# Patient Record
Sex: FEMALE | Race: OTHER | HISPANIC OR LATINO
[De-identification: names, ages, dates, MRNs, and addresses within clinical notes are randomized per-mention and may not be internally consistent; named-entity substitution may affect disease eponyms.]

---

## 2017-01-06 ENCOUNTER — APPOINTMENT (OUTPATIENT)
Dept: GASTROENTEROLOGY | Facility: CLINIC | Age: 42
End: 2017-01-06

## 2017-02-15 ENCOUNTER — APPOINTMENT (OUTPATIENT)
Dept: ENDOCRINOLOGY | Facility: CLINIC | Age: 42
End: 2017-02-15

## 2017-02-15 VITALS
DIASTOLIC BLOOD PRESSURE: 85 MMHG | BODY MASS INDEX: 35.54 KG/M2 | SYSTOLIC BLOOD PRESSURE: 126 MMHG | HEART RATE: 92 BPM | HEIGHT: 56 IN | WEIGHT: 158 LBS

## 2017-02-24 ENCOUNTER — OUTPATIENT (OUTPATIENT)
Dept: OUTPATIENT SERVICES | Facility: HOSPITAL | Age: 42
LOS: 1 days | Discharge: HOME | End: 2017-02-24

## 2017-02-24 ENCOUNTER — APPOINTMENT (OUTPATIENT)
Dept: GASTROENTEROLOGY | Facility: CLINIC | Age: 42
End: 2017-02-24

## 2017-03-01 ENCOUNTER — RESULT REVIEW (OUTPATIENT)
Age: 42
End: 2017-03-01

## 2017-03-02 ENCOUNTER — APPOINTMENT (OUTPATIENT)
Dept: PODIATRY | Facility: CLINIC | Age: 42
End: 2017-03-02

## 2017-03-06 LAB
AFP-TM SERPL-MCNC: 1.8 NG/ML
BACTERIA STL CULT: NORMAL
C DIFF DNA SPEC QL NAA+PROBE: NORMAL
CANCER AG19-9 SERPL-ACNC: 6.4 U/ML
CEA SERPL-MCNC: 0.9 NG/ML
HBV CORE AB SER-ACNC: NONREACTIVE
HBV SURFACE AB SERPL IA-ACNC: 208 MIU/ML
HBV SURFACE AG SER-ACNC: NONREACTIVE
HCV AB S/CO SERPL IA: 0.07 S/CO
HCV AB SER QL: NONREACTIVE

## 2017-03-24 ENCOUNTER — APPOINTMENT (OUTPATIENT)
Dept: GASTROENTEROLOGY | Facility: CLINIC | Age: 42
End: 2017-03-24

## 2017-04-18 ENCOUNTER — APPOINTMENT (OUTPATIENT)
Dept: OBGYN | Facility: CLINIC | Age: 42
End: 2017-04-18

## 2017-04-18 VITALS
BODY MASS INDEX: 36.89 KG/M2 | WEIGHT: 164 LBS | SYSTOLIC BLOOD PRESSURE: 106 MMHG | HEART RATE: 80 BPM | HEIGHT: 56 IN | DIASTOLIC BLOOD PRESSURE: 80 MMHG

## 2017-05-02 ENCOUNTER — APPOINTMENT (OUTPATIENT)
Dept: OBGYN | Facility: CLINIC | Age: 42
End: 2017-05-02

## 2017-05-03 ENCOUNTER — APPOINTMENT (OUTPATIENT)
Dept: ENDOCRINOLOGY | Facility: CLINIC | Age: 42
End: 2017-05-03

## 2017-05-05 ENCOUNTER — APPOINTMENT (OUTPATIENT)
Dept: GASTROENTEROLOGY | Facility: CLINIC | Age: 42
End: 2017-05-05

## 2017-05-16 ENCOUNTER — APPOINTMENT (OUTPATIENT)
Dept: OBGYN | Facility: CLINIC | Age: 42
End: 2017-05-16

## 2017-06-16 ENCOUNTER — APPOINTMENT (OUTPATIENT)
Dept: GASTROENTEROLOGY | Facility: CLINIC | Age: 42
End: 2017-06-16

## 2017-06-16 ENCOUNTER — OUTPATIENT (OUTPATIENT)
Dept: OUTPATIENT SERVICES | Facility: HOSPITAL | Age: 42
LOS: 1 days | Discharge: HOME | End: 2017-06-16

## 2017-06-16 DIAGNOSIS — Z98.890 OTHER SPECIFIED POSTPROCEDURAL STATES: ICD-10-CM

## 2017-06-16 DIAGNOSIS — K57.92 DIVERTICULITIS OF INTESTINE, PART UNSPECIFIED, WITHOUT PERFORATION OR ABSCESS WITHOUT BLEEDING: ICD-10-CM

## 2017-06-16 DIAGNOSIS — C56.9 MALIGNANT NEOPLASM OF UNSPECIFIED OVARY: ICD-10-CM

## 2017-06-28 DIAGNOSIS — Z02.9 ENCOUNTER FOR ADMINISTRATIVE EXAMINATIONS, UNSPECIFIED: ICD-10-CM

## 2017-07-19 ENCOUNTER — APPOINTMENT (OUTPATIENT)
Dept: ENDOCRINOLOGY | Facility: CLINIC | Age: 42
End: 2017-07-19

## 2017-07-26 ENCOUNTER — APPOINTMENT (OUTPATIENT)
Dept: OBGYN | Facility: CLINIC | Age: 42
End: 2017-07-26

## 2017-07-26 ENCOUNTER — RESULT CHARGE (OUTPATIENT)
Age: 42
End: 2017-07-26

## 2017-07-26 ENCOUNTER — OUTPATIENT (OUTPATIENT)
Dept: OUTPATIENT SERVICES | Facility: HOSPITAL | Age: 42
LOS: 1 days | Discharge: HOME | End: 2017-07-26

## 2017-07-26 VITALS
BODY MASS INDEX: 37.79 KG/M2 | WEIGHT: 168 LBS | HEIGHT: 56 IN | SYSTOLIC BLOOD PRESSURE: 120 MMHG | DIASTOLIC BLOOD PRESSURE: 70 MMHG

## 2017-07-26 DIAGNOSIS — K57.92 DIVERTICULITIS OF INTESTINE, PART UNSPECIFIED, WITHOUT PERFORATION OR ABSCESS WITHOUT BLEEDING: ICD-10-CM

## 2017-07-26 DIAGNOSIS — C56.9 MALIGNANT NEOPLASM OF UNSPECIFIED OVARY: ICD-10-CM

## 2017-07-26 DIAGNOSIS — Z98.890 OTHER SPECIFIED POSTPROCEDURAL STATES: ICD-10-CM

## 2017-07-26 LAB — HCG UR QL: NEGATIVE

## 2017-07-26 RX ORDER — CIPROFLOXACIN HYDROCHLORIDE 500 MG/1
500 TABLET, FILM COATED ORAL TWICE DAILY
Qty: 14 | Refills: 0 | Status: DISCONTINUED | COMMUNITY
Start: 2017-02-24 | End: 2017-07-26

## 2017-07-26 RX ORDER — METRONIDAZOLE 500 MG/1
500 TABLET ORAL 3 TIMES DAILY
Qty: 21 | Refills: 0 | Status: DISCONTINUED | COMMUNITY
Start: 2017-02-24 | End: 2017-07-26

## 2017-08-24 ENCOUNTER — APPOINTMENT (OUTPATIENT)
Dept: ENDOCRINOLOGY | Facility: CLINIC | Age: 42
End: 2017-08-24

## 2017-09-20 ENCOUNTER — OUTPATIENT (OUTPATIENT)
Dept: OUTPATIENT SERVICES | Facility: HOSPITAL | Age: 42
LOS: 1 days | Discharge: HOME | End: 2017-09-20

## 2017-09-20 ENCOUNTER — APPOINTMENT (OUTPATIENT)
Dept: OBGYN | Facility: CLINIC | Age: 42
End: 2017-09-20

## 2017-09-20 VITALS
SYSTOLIC BLOOD PRESSURE: 130 MMHG | HEIGHT: 56 IN | WEIGHT: 166 LBS | DIASTOLIC BLOOD PRESSURE: 80 MMHG | BODY MASS INDEX: 37.34 KG/M2

## 2017-09-20 DIAGNOSIS — Z98.890 OTHER SPECIFIED POSTPROCEDURAL STATES: ICD-10-CM

## 2017-09-20 DIAGNOSIS — K57.92 DIVERTICULITIS OF INTESTINE, PART UNSPECIFIED, WITHOUT PERFORATION OR ABSCESS WITHOUT BLEEDING: ICD-10-CM

## 2017-09-20 DIAGNOSIS — C56.9 MALIGNANT NEOPLASM OF UNSPECIFIED OVARY: ICD-10-CM

## 2017-09-22 ENCOUNTER — RESULT REVIEW (OUTPATIENT)
Age: 42
End: 2017-09-22

## 2017-09-26 ENCOUNTER — OUTPATIENT (OUTPATIENT)
Dept: OUTPATIENT SERVICES | Facility: HOSPITAL | Age: 42
LOS: 1 days | Discharge: HOME | End: 2017-09-26

## 2017-09-26 DIAGNOSIS — Z98.890 OTHER SPECIFIED POSTPROCEDURAL STATES: ICD-10-CM

## 2017-09-26 DIAGNOSIS — Z12.31 ENCOUNTER FOR SCREENING MAMMOGRAM FOR MALIGNANT NEOPLASM OF BREAST: ICD-10-CM

## 2017-09-26 DIAGNOSIS — K57.92 DIVERTICULITIS OF INTESTINE, PART UNSPECIFIED, WITHOUT PERFORATION OR ABSCESS WITHOUT BLEEDING: ICD-10-CM

## 2017-09-26 DIAGNOSIS — C56.9 MALIGNANT NEOPLASM OF UNSPECIFIED OVARY: ICD-10-CM

## 2017-09-30 LAB — HPV I/H RISK 1 DNA CVX QL PROBE+SIG AMP: NOT DETECTED

## 2017-10-02 DIAGNOSIS — Z01.419 ENCOUNTER FOR GYNECOLOGICAL EXAMINATION (GENERAL) (ROUTINE) WITHOUT ABNORMAL FINDINGS: ICD-10-CM

## 2017-10-05 ENCOUNTER — OUTPATIENT (OUTPATIENT)
Dept: OUTPATIENT SERVICES | Facility: HOSPITAL | Age: 42
LOS: 1 days | Discharge: HOME | End: 2017-10-05

## 2017-10-05 DIAGNOSIS — R92.8 OTHER ABNORMAL AND INCONCLUSIVE FINDINGS ON DIAGNOSTIC IMAGING OF BREAST: ICD-10-CM

## 2017-10-05 DIAGNOSIS — Z98.890 OTHER SPECIFIED POSTPROCEDURAL STATES: ICD-10-CM

## 2017-10-05 DIAGNOSIS — K57.92 DIVERTICULITIS OF INTESTINE, PART UNSPECIFIED, WITHOUT PERFORATION OR ABSCESS WITHOUT BLEEDING: ICD-10-CM

## 2017-10-05 DIAGNOSIS — C56.9 MALIGNANT NEOPLASM OF UNSPECIFIED OVARY: ICD-10-CM

## 2017-10-06 ENCOUNTER — OUTPATIENT (OUTPATIENT)
Dept: OUTPATIENT SERVICES | Facility: HOSPITAL | Age: 42
LOS: 1 days | Discharge: HOME | End: 2017-10-06

## 2017-10-06 ENCOUNTER — APPOINTMENT (OUTPATIENT)
Dept: GASTROENTEROLOGY | Facility: CLINIC | Age: 42
End: 2017-10-06

## 2017-10-06 VITALS — BODY MASS INDEX: 37.57 KG/M2 | WEIGHT: 167 LBS | HEIGHT: 56 IN

## 2017-10-06 DIAGNOSIS — R10.9 UNSPECIFIED ABDOMINAL PAIN: ICD-10-CM

## 2017-10-06 DIAGNOSIS — C56.9 MALIGNANT NEOPLASM OF UNSPECIFIED OVARY: ICD-10-CM

## 2017-10-06 DIAGNOSIS — K57.92 DIVERTICULITIS OF INTESTINE, PART UNSPECIFIED, WITHOUT PERFORATION OR ABSCESS WITHOUT BLEEDING: ICD-10-CM

## 2017-10-06 DIAGNOSIS — K57.32 DIVERTICULITIS OF LARGE INTESTINE WITHOUT PERFORATION OR ABSCESS WITHOUT BLEEDING: ICD-10-CM

## 2017-10-06 DIAGNOSIS — Z98.890 OTHER SPECIFIED POSTPROCEDURAL STATES: ICD-10-CM

## 2017-10-18 ENCOUNTER — APPOINTMENT (OUTPATIENT)
Dept: ENDOCRINOLOGY | Facility: CLINIC | Age: 42
End: 2017-10-18

## 2017-10-18 ENCOUNTER — OUTPATIENT (OUTPATIENT)
Dept: OUTPATIENT SERVICES | Facility: HOSPITAL | Age: 42
LOS: 1 days | Discharge: HOME | End: 2017-10-18

## 2017-10-18 VITALS
HEART RATE: 71 BPM | WEIGHT: 164 LBS | HEIGHT: 56 IN | SYSTOLIC BLOOD PRESSURE: 103 MMHG | DIASTOLIC BLOOD PRESSURE: 68 MMHG | BODY MASS INDEX: 36.89 KG/M2

## 2017-10-18 DIAGNOSIS — K57.92 DIVERTICULITIS OF INTESTINE, PART UNSPECIFIED, WITHOUT PERFORATION OR ABSCESS WITHOUT BLEEDING: ICD-10-CM

## 2017-10-18 DIAGNOSIS — C56.9 MALIGNANT NEOPLASM OF UNSPECIFIED OVARY: ICD-10-CM

## 2017-10-18 DIAGNOSIS — Z98.890 OTHER SPECIFIED POSTPROCEDURAL STATES: ICD-10-CM

## 2017-10-20 LAB
ANION GAP SERPL CALC-SCNC: 9 MEQ/L
BASOPHILS # BLD: 0.02 TH/MM3
BASOPHILS NFR BLD: 0.2 %
BUN SERPL-MCNC: 16 MG/DL
BUN/CREAT SERPL: 28.1 %
CALCIUM SERPL-MCNC: 9.4 MG/DL
CHLORIDE SERPL-SCNC: 108 MEQ/L
CO2 SERPL-SCNC: 23 MEQ/L
CREAT SERPL-MCNC: 0.57 MG/DL
DIFFERENTIAL METHOD BLD: NORMAL
EOSINOPHIL # BLD: 0.12 TH/MM3
EOSINOPHIL NFR BLD: 1.3 %
ERYTHROCYTE [DISTWIDTH] IN BLOOD BY AUTOMATED COUNT: 14.2 %
GFR SERPL CREATININE-BSD FRML MDRD: 117
GLUCOSE SERPL-MCNC: 93 MG/DL
GRANULOCYTES # BLD: 4.89 TH/MM3
GRANULOCYTES NFR BLD: 53.1 %
HCT VFR BLD AUTO: 47.3 %
HGB BLD-MCNC: 14.7 G/DL
IMM GRANULOCYTES # BLD: 0.02 TH/MM3
IMM GRANULOCYTES NFR BLD: 0.2 %
LYMPHOCYTES # BLD: 3.53 TH/MM3
LYMPHOCYTES NFR BLD: 38.3 %
MCH RBC QN AUTO: 28.8 PG
MCHC RBC AUTO-ENTMCNC: 31.1 G/DL
MCV RBC AUTO: 92.6 FL
MONOCYTES # BLD: 0.64 TH/MM3
MONOCYTES NFR BLD: 6.9 %
PLATELET # BLD: 337 TH/MM3
PMV BLD AUTO: 11.2 FL
POTASSIUM SERPL-SCNC: 4.4 MMOL/L
RBC # BLD AUTO: 5.11 MIL/MM3
SODIUM SERPL-SCNC: 140 MEQ/L
WBC # BLD: 9.22 TH/MM3

## 2017-10-23 RX ORDER — EMPAGLIFLOZIN 25 MG/1
25 TABLET, FILM COATED ORAL DAILY
Qty: 30 | Refills: 5 | Status: DISCONTINUED | COMMUNITY
Start: 2017-02-15 | End: 2017-10-23

## 2017-10-31 ENCOUNTER — OUTPATIENT (OUTPATIENT)
Dept: OUTPATIENT SERVICES | Facility: HOSPITAL | Age: 42
LOS: 1 days | Discharge: HOME | End: 2017-10-31

## 2017-10-31 ENCOUNTER — APPOINTMENT (OUTPATIENT)
Dept: OBGYN | Facility: CLINIC | Age: 42
End: 2017-10-31

## 2017-10-31 VITALS — SYSTOLIC BLOOD PRESSURE: 118 MMHG | WEIGHT: 168 LBS | BODY MASS INDEX: 37.67 KG/M2 | DIASTOLIC BLOOD PRESSURE: 80 MMHG

## 2017-10-31 DIAGNOSIS — K57.92 DIVERTICULITIS OF INTESTINE, PART UNSPECIFIED, WITHOUT PERFORATION OR ABSCESS WITHOUT BLEEDING: ICD-10-CM

## 2017-10-31 DIAGNOSIS — Z98.890 OTHER SPECIFIED POSTPROCEDURAL STATES: ICD-10-CM

## 2017-10-31 DIAGNOSIS — C56.9 MALIGNANT NEOPLASM OF UNSPECIFIED OVARY: ICD-10-CM

## 2017-11-20 ENCOUNTER — OUTPATIENT (OUTPATIENT)
Dept: OUTPATIENT SERVICES | Facility: HOSPITAL | Age: 42
LOS: 1 days | Discharge: HOME | End: 2017-11-20

## 2017-11-20 DIAGNOSIS — Z98.890 OTHER SPECIFIED POSTPROCEDURAL STATES: ICD-10-CM

## 2017-11-20 DIAGNOSIS — C56.9 MALIGNANT NEOPLASM OF UNSPECIFIED OVARY: ICD-10-CM

## 2017-11-20 DIAGNOSIS — K57.92 DIVERTICULITIS OF INTESTINE, PART UNSPECIFIED, WITHOUT PERFORATION OR ABSCESS WITHOUT BLEEDING: ICD-10-CM

## 2017-11-22 ENCOUNTER — OUTPATIENT (OUTPATIENT)
Dept: OUTPATIENT SERVICES | Facility: HOSPITAL | Age: 42
LOS: 1 days | Discharge: HOME | End: 2017-11-22

## 2017-11-22 DIAGNOSIS — Z01.818 ENCOUNTER FOR OTHER PREPROCEDURAL EXAMINATION: ICD-10-CM

## 2017-11-22 DIAGNOSIS — Z64.1 PROBLEMS RELATED TO MULTIPARITY: ICD-10-CM

## 2017-11-22 DIAGNOSIS — K57.92 DIVERTICULITIS OF INTESTINE, PART UNSPECIFIED, WITHOUT PERFORATION OR ABSCESS WITHOUT BLEEDING: ICD-10-CM

## 2017-11-22 DIAGNOSIS — Z98.890 OTHER SPECIFIED POSTPROCEDURAL STATES: ICD-10-CM

## 2017-11-22 DIAGNOSIS — C56.9 MALIGNANT NEOPLASM OF UNSPECIFIED OVARY: ICD-10-CM

## 2017-11-24 ENCOUNTER — APPOINTMENT (OUTPATIENT)
Dept: GASTROENTEROLOGY | Facility: CLINIC | Age: 42
End: 2017-11-24

## 2017-11-24 ENCOUNTER — OUTPATIENT (OUTPATIENT)
Dept: OUTPATIENT SERVICES | Facility: HOSPITAL | Age: 42
LOS: 1 days | Discharge: HOME | End: 2017-11-24

## 2017-11-24 VITALS
SYSTOLIC BLOOD PRESSURE: 135 MMHG | WEIGHT: 166 LBS | DIASTOLIC BLOOD PRESSURE: 91 MMHG | HEART RATE: 66 BPM | BODY MASS INDEX: 37.22 KG/M2

## 2017-11-24 DIAGNOSIS — E66.9 OBESITY, UNSPECIFIED: ICD-10-CM

## 2017-11-24 DIAGNOSIS — C56.9 MALIGNANT NEOPLASM OF UNSPECIFIED OVARY: ICD-10-CM

## 2017-11-24 DIAGNOSIS — E11.9 TYPE 2 DIABETES MELLITUS WITHOUT COMPLICATIONS: ICD-10-CM

## 2017-11-24 DIAGNOSIS — E78.00 PURE HYPERCHOLESTEROLEMIA, UNSPECIFIED: ICD-10-CM

## 2017-11-24 DIAGNOSIS — Z98.890 OTHER SPECIFIED POSTPROCEDURAL STATES: ICD-10-CM

## 2017-11-24 DIAGNOSIS — K57.92 DIVERTICULITIS OF INTESTINE, PART UNSPECIFIED, WITHOUT PERFORATION OR ABSCESS WITHOUT BLEEDING: ICD-10-CM

## 2017-11-27 ENCOUNTER — APPOINTMENT (OUTPATIENT)
Dept: ORTHOPEDIC SURGERY | Facility: CLINIC | Age: 42
End: 2017-11-27

## 2017-11-28 ENCOUNTER — OUTPATIENT (OUTPATIENT)
Dept: OUTPATIENT SERVICES | Facility: HOSPITAL | Age: 42
LOS: 1 days | Discharge: HOME | End: 2017-11-28

## 2017-11-28 ENCOUNTER — APPOINTMENT (OUTPATIENT)
Dept: OBGYN | Facility: CLINIC | Age: 42
End: 2017-11-28

## 2017-11-28 VITALS — DIASTOLIC BLOOD PRESSURE: 80 MMHG | WEIGHT: 171 LBS | SYSTOLIC BLOOD PRESSURE: 120 MMHG | BODY MASS INDEX: 38.34 KG/M2

## 2017-11-28 DIAGNOSIS — Z98.890 OTHER SPECIFIED POSTPROCEDURAL STATES: ICD-10-CM

## 2017-11-28 DIAGNOSIS — K57.92 DIVERTICULITIS OF INTESTINE, PART UNSPECIFIED, WITHOUT PERFORATION OR ABSCESS WITHOUT BLEEDING: ICD-10-CM

## 2017-11-28 DIAGNOSIS — C56.9 MALIGNANT NEOPLASM OF UNSPECIFIED OVARY: ICD-10-CM

## 2017-11-30 ENCOUNTER — APPOINTMENT (OUTPATIENT)
Dept: OTOLARYNGOLOGY | Facility: CLINIC | Age: 42
End: 2017-11-30

## 2017-11-30 LAB
CANCER AG125 SERPL-ACNC: 107 U/ML
CANCER AG19-9 SERPL-ACNC: 8.6 U/ML
CEA SERPL-MCNC: 1.6 NG/ML
LACTATE DEHYDROGENASE (NORTH): 241 IU/L

## 2017-12-01 ENCOUNTER — APPOINTMENT (OUTPATIENT)
Dept: ANTEPARTUM | Facility: CLINIC | Age: 42
End: 2017-12-01

## 2017-12-04 DIAGNOSIS — N83.209 UNSPECIFIED OVARIAN CYST, UNSPECIFIED SIDE: ICD-10-CM

## 2017-12-04 DIAGNOSIS — C56.1 MALIGNANT NEOPLASM OF RIGHT OVARY: ICD-10-CM

## 2017-12-04 LAB — INHIBIN B SERPL-MCNC: < 10 PG/ML

## 2017-12-15 ENCOUNTER — OUTPATIENT (OUTPATIENT)
Dept: OUTPATIENT SERVICES | Facility: HOSPITAL | Age: 42
LOS: 1 days | Discharge: HOME | End: 2017-12-15

## 2017-12-15 ENCOUNTER — INPATIENT (INPATIENT)
Facility: HOSPITAL | Age: 42
LOS: 5 days | Discharge: HOME | End: 2017-12-21
Attending: OBSTETRICS & GYNECOLOGY | Admitting: INTERNAL MEDICINE

## 2017-12-15 ENCOUNTER — APPOINTMENT (OUTPATIENT)
Dept: GYNECOLOGIC ONCOLOGY | Facility: CLINIC | Age: 42
End: 2017-12-15

## 2017-12-15 VITALS
BODY MASS INDEX: 37.34 KG/M2 | DIASTOLIC BLOOD PRESSURE: 98 MMHG | SYSTOLIC BLOOD PRESSURE: 142 MMHG | HEIGHT: 56 IN | RESPIRATION RATE: 14 BRPM | WEIGHT: 166 LBS | TEMPERATURE: 97.8 F | HEART RATE: 70 BPM

## 2017-12-15 DIAGNOSIS — Z98.890 OTHER SPECIFIED POSTPROCEDURAL STATES: ICD-10-CM

## 2017-12-15 DIAGNOSIS — K57.92 DIVERTICULITIS OF INTESTINE, PART UNSPECIFIED, WITHOUT PERFORATION OR ABSCESS WITHOUT BLEEDING: ICD-10-CM

## 2017-12-15 DIAGNOSIS — C56.9 MALIGNANT NEOPLASM OF UNSPECIFIED OVARY: ICD-10-CM

## 2017-12-18 DIAGNOSIS — C80.0 DISSEMINATED MALIGNANT NEOPLASM, UNSPECIFIED: ICD-10-CM

## 2017-12-27 DIAGNOSIS — E66.9 OBESITY, UNSPECIFIED: ICD-10-CM

## 2017-12-27 DIAGNOSIS — E78.5 HYPERLIPIDEMIA, UNSPECIFIED: ICD-10-CM

## 2017-12-27 DIAGNOSIS — R16.0 HEPATOMEGALY, NOT ELSEWHERE CLASSIFIED: ICD-10-CM

## 2017-12-27 DIAGNOSIS — R10.9 UNSPECIFIED ABDOMINAL PAIN: ICD-10-CM

## 2017-12-27 DIAGNOSIS — I34.0 NONRHEUMATIC MITRAL (VALVE) INSUFFICIENCY: ICD-10-CM

## 2017-12-27 DIAGNOSIS — K29.70 GASTRITIS, UNSPECIFIED, WITHOUT BLEEDING: ICD-10-CM

## 2017-12-27 DIAGNOSIS — E11.9 TYPE 2 DIABETES MELLITUS WITHOUT COMPLICATIONS: ICD-10-CM

## 2017-12-27 DIAGNOSIS — Z85.43 PERSONAL HISTORY OF MALIGNANT NEOPLASM OF OVARY: ICD-10-CM

## 2017-12-27 DIAGNOSIS — K57.90 DIVERTICULOSIS OF INTESTINE, PART UNSPECIFIED, WITHOUT PERFORATION OR ABSCESS WITHOUT BLEEDING: ICD-10-CM

## 2017-12-27 DIAGNOSIS — D39.11 NEOPLASM OF UNCERTAIN BEHAVIOR OF RIGHT OVARY: ICD-10-CM

## 2017-12-27 DIAGNOSIS — F17.210 NICOTINE DEPENDENCE, CIGARETTES, UNCOMPLICATED: ICD-10-CM

## 2017-12-28 ENCOUNTER — OUTPATIENT (OUTPATIENT)
Dept: OUTPATIENT SERVICES | Facility: HOSPITAL | Age: 42
LOS: 1 days | Discharge: HOME | End: 2017-12-28

## 2017-12-28 ENCOUNTER — APPOINTMENT (OUTPATIENT)
Dept: ENDOCRINOLOGY | Facility: CLINIC | Age: 42
End: 2017-12-28

## 2017-12-28 VITALS
HEIGHT: 56 IN | DIASTOLIC BLOOD PRESSURE: 78 MMHG | SYSTOLIC BLOOD PRESSURE: 118 MMHG | HEART RATE: 76 BPM | BODY MASS INDEX: 37.77 KG/M2 | WEIGHT: 167.9 LBS

## 2017-12-28 DIAGNOSIS — Z98.890 OTHER SPECIFIED POSTPROCEDURAL STATES: ICD-10-CM

## 2017-12-28 DIAGNOSIS — C56.9 MALIGNANT NEOPLASM OF UNSPECIFIED OVARY: ICD-10-CM

## 2017-12-28 DIAGNOSIS — K57.92 DIVERTICULITIS OF INTESTINE, PART UNSPECIFIED, WITHOUT PERFORATION OR ABSCESS WITHOUT BLEEDING: ICD-10-CM

## 2017-12-28 RX ORDER — FLUCONAZOLE 150 MG/1
150 TABLET ORAL DAILY
Qty: 1 | Refills: 0 | Status: COMPLETED | COMMUNITY
Start: 2017-09-28 | End: 2017-12-28

## 2017-12-28 RX ORDER — NORGESTIMATE AND ETHINYL ESTRADIOL 7DAYSX3 LO
0.18/0.215/0.25 KIT ORAL DAILY
Qty: 1 | Refills: 0 | Status: DISCONTINUED | COMMUNITY
Start: 2017-04-18 | End: 2017-12-28

## 2018-01-03 DIAGNOSIS — C56.1 MALIGNANT NEOPLASM OF RIGHT OVARY: ICD-10-CM

## 2018-01-03 DIAGNOSIS — I45.10 UNSPECIFIED RIGHT BUNDLE-BRANCH BLOCK: ICD-10-CM

## 2018-01-05 ENCOUNTER — OUTPATIENT (OUTPATIENT)
Dept: OUTPATIENT SERVICES | Facility: HOSPITAL | Age: 43
LOS: 1 days | Discharge: HOME | End: 2018-01-05

## 2018-01-05 ENCOUNTER — APPOINTMENT (OUTPATIENT)
Dept: GYNECOLOGIC ONCOLOGY | Facility: CLINIC | Age: 43
End: 2018-01-05

## 2018-01-05 VITALS
RESPIRATION RATE: 14 BRPM | HEART RATE: 78 BPM | SYSTOLIC BLOOD PRESSURE: 114 MMHG | HEIGHT: 56 IN | WEIGHT: 166 LBS | TEMPERATURE: 96.7 F | BODY MASS INDEX: 37.34 KG/M2 | DIASTOLIC BLOOD PRESSURE: 80 MMHG

## 2018-01-08 DIAGNOSIS — Z48.816 ENCOUNTER FOR SURGICAL AFTERCARE FOLLOWING SURGERY ON THE GENITOURINARY SYSTEM: ICD-10-CM

## 2018-01-10 DIAGNOSIS — E11.65 TYPE 2 DIABETES MELLITUS WITH HYPERGLYCEMIA: ICD-10-CM

## 2018-01-10 DIAGNOSIS — E66.01 MORBID (SEVERE) OBESITY DUE TO EXCESS CALORIES: ICD-10-CM

## 2018-01-29 ENCOUNTER — APPOINTMENT (OUTPATIENT)
Dept: HEMATOLOGY ONCOLOGY | Facility: CLINIC | Age: 43
End: 2018-01-29

## 2018-01-29 VITALS
HEART RATE: 94 BPM | WEIGHT: 172 LBS | BODY MASS INDEX: 38.69 KG/M2 | DIASTOLIC BLOOD PRESSURE: 81 MMHG | RESPIRATION RATE: 14 BRPM | SYSTOLIC BLOOD PRESSURE: 117 MMHG | HEIGHT: 56 IN | TEMPERATURE: 96.2 F

## 2018-01-30 DIAGNOSIS — Z98.890 OTHER SPECIFIED POSTPROCEDURAL STATES: ICD-10-CM

## 2018-01-30 DIAGNOSIS — K57.92 DIVERTICULITIS OF INTESTINE, PART UNSPECIFIED, WITHOUT PERFORATION OR ABSCESS WITHOUT BLEEDING: ICD-10-CM

## 2018-01-30 DIAGNOSIS — C56.9 MALIGNANT NEOPLASM OF UNSPECIFIED OVARY: ICD-10-CM

## 2018-01-31 LAB
ALBUMIN SERPL-MCNC: 4.2 G/DL
ALBUMIN/GLOB SERPL: 1.4
ALP SERPL-CCNC: 87 IU/L
ALT SERPL-CCNC: 45 IU/L
ANION GAP SERPL CALC-SCNC: 9 MEQ/L
AST SERPL-CCNC: 29 IU/L
BASOPHILS # BLD: 0.02 TH/MM3
BASOPHILS NFR BLD: 0.3 %
BILIRUB SERPL-MCNC: 0.2 MG/DL
BUN SERPL-MCNC: 14 MG/DL
BUN/CREAT SERPL: 26.9 %
CALCIUM SERPL-MCNC: 9.7 MG/DL
CANCER AG125 SERPL-ACNC: 34 U/ML
CHLORIDE SERPL-SCNC: 111 MEQ/L
CO2 SERPL-SCNC: 23 MEQ/L
CREAT SERPL-MCNC: 0.52 MG/DL
EOSINOPHIL # BLD: 0.27 TH/MM3
EOSINOPHIL NFR BLD: 3.4 %
ERYTHROCYTE [DISTWIDTH] IN BLOOD BY AUTOMATED COUNT: 13.6 %
GFR SERPL CREATININE-BSD FRML MDRD: 129
GLUCOSE SERPL-MCNC: 94 MG/DL
GRANULOCYTES # BLD: 4.03 TH/MM3
GRANULOCYTES NFR BLD: 50.5 %
HCT VFR BLD AUTO: 39.3 %
HGB BLD-MCNC: 12.6 G/DL
IMM GRANULOCYTES # BLD: 0.02 TH/MM3
IMM GRANULOCYTES NFR BLD: 0.3 %
LYMPHOCYTES # BLD: 3.18 TH/MM3
LYMPHOCYTES NFR BLD: 39.9 %
MCH RBC QN AUTO: 28.8 PG
MCHC RBC AUTO-ENTMCNC: 32.1 G/DL
MCV RBC AUTO: 89.9 FL
MONOCYTES # BLD: 0.45 TH/MM3
MONOCYTES NFR BLD: 5.6 %
PLATELET # BLD: 298 TH/MM3
PMV BLD AUTO: 10.2 FL
POTASSIUM SERPL-SCNC: 4.4 MMOL/L
PROT SERPL-MCNC: 7.2 G/DL
RBC # BLD AUTO: 4.37 MIL/MM3
SODIUM SERPL-SCNC: 143 MEQ/L
WBC # BLD: 7.97 TH/MM3

## 2018-02-02 ENCOUNTER — OUTPATIENT (OUTPATIENT)
Dept: OUTPATIENT SERVICES | Facility: HOSPITAL | Age: 43
LOS: 1 days | Discharge: HOME | End: 2018-02-02

## 2018-02-02 ENCOUNTER — APPOINTMENT (OUTPATIENT)
Dept: GYNECOLOGIC ONCOLOGY | Facility: CLINIC | Age: 43
End: 2018-02-02

## 2018-02-02 VITALS
RESPIRATION RATE: 14 BRPM | TEMPERATURE: 97.6 F | DIASTOLIC BLOOD PRESSURE: 80 MMHG | HEART RATE: 80 BPM | SYSTOLIC BLOOD PRESSURE: 108 MMHG | BODY MASS INDEX: 38.24 KG/M2 | WEIGHT: 170 LBS | HEIGHT: 56 IN

## 2018-02-04 DIAGNOSIS — K57.92 DIVERTICULITIS OF INTESTINE, PART UNSPECIFIED, WITHOUT PERFORATION OR ABSCESS WITHOUT BLEEDING: ICD-10-CM

## 2018-02-04 DIAGNOSIS — C56.9 MALIGNANT NEOPLASM OF UNSPECIFIED OVARY: ICD-10-CM

## 2018-02-04 DIAGNOSIS — Z98.890 OTHER SPECIFIED POSTPROCEDURAL STATES: ICD-10-CM

## 2018-02-05 ENCOUNTER — OUTPATIENT (OUTPATIENT)
Dept: OUTPATIENT SERVICES | Facility: HOSPITAL | Age: 43
LOS: 1 days | Discharge: HOME | End: 2018-02-05

## 2018-02-05 DIAGNOSIS — C56.9 MALIGNANT NEOPLASM OF UNSPECIFIED OVARY: ICD-10-CM

## 2018-02-05 DIAGNOSIS — N89.8 OTHER SPECIFIED NONINFLAMMATORY DISORDERS OF VAGINA: ICD-10-CM

## 2018-02-09 LAB
Lab: NEGATIVE
Lab: NORMAL

## 2018-02-12 ENCOUNTER — APPOINTMENT (OUTPATIENT)
Dept: HEMATOLOGY ONCOLOGY | Facility: CLINIC | Age: 43
End: 2018-02-12

## 2018-02-12 VITALS
DIASTOLIC BLOOD PRESSURE: 86 MMHG | BODY MASS INDEX: 38.24 KG/M2 | HEIGHT: 56 IN | HEART RATE: 87 BPM | WEIGHT: 170 LBS | SYSTOLIC BLOOD PRESSURE: 131 MMHG | TEMPERATURE: 96.8 F | RESPIRATION RATE: 14 BRPM

## 2018-02-20 DIAGNOSIS — R19.7 DIARRHEA, UNSPECIFIED: ICD-10-CM

## 2018-02-23 ENCOUNTER — OUTPATIENT (OUTPATIENT)
Dept: OUTPATIENT SERVICES | Facility: HOSPITAL | Age: 43
LOS: 1 days | Discharge: HOME | End: 2018-02-23

## 2018-02-23 DIAGNOSIS — C56.9 MALIGNANT NEOPLASM OF UNSPECIFIED OVARY: ICD-10-CM

## 2018-03-02 ENCOUNTER — OUTPATIENT (OUTPATIENT)
Dept: OUTPATIENT SERVICES | Facility: HOSPITAL | Age: 43
LOS: 1 days | Discharge: HOME | End: 2018-03-02

## 2018-03-02 VITALS
HEART RATE: 82 BPM | OXYGEN SATURATION: 98 % | WEIGHT: 169.09 LBS | HEIGHT: 56 IN | RESPIRATION RATE: 20 BRPM | SYSTOLIC BLOOD PRESSURE: 130 MMHG | DIASTOLIC BLOOD PRESSURE: 80 MMHG | TEMPERATURE: 98 F

## 2018-03-02 DIAGNOSIS — Z01.818 ENCOUNTER FOR OTHER PREPROCEDURAL EXAMINATION: ICD-10-CM

## 2018-03-02 DIAGNOSIS — Z98.890 OTHER SPECIFIED POSTPROCEDURAL STATES: Chronic | ICD-10-CM

## 2018-03-02 LAB
ALBUMIN SERPL ELPH-MCNC: 4.8 G/DL — SIGNIFICANT CHANGE UP (ref 3–5.5)
ALP SERPL-CCNC: 95 U/L — SIGNIFICANT CHANGE UP (ref 30–115)
ALT FLD-CCNC: 57 U/L — HIGH (ref 0–41)
ANION GAP SERPL CALC-SCNC: 10 MMOL/L — SIGNIFICANT CHANGE UP (ref 7–14)
APTT BLD: 32.1 SEC — SIGNIFICANT CHANGE UP (ref 27–39.2)
AST SERPL-CCNC: 34 U/L — SIGNIFICANT CHANGE UP (ref 0–41)
BILIRUB SERPL-MCNC: 0.4 MG/DL — SIGNIFICANT CHANGE UP (ref 0.2–1.2)
BUN SERPL-MCNC: 10 MG/DL — SIGNIFICANT CHANGE UP (ref 10–20)
CALCIUM SERPL-MCNC: 10.4 MG/DL — HIGH (ref 8.5–10.1)
CHLORIDE SERPL-SCNC: 105 MMOL/L — SIGNIFICANT CHANGE UP (ref 98–110)
CO2 SERPL-SCNC: 27 MMOL/L — SIGNIFICANT CHANGE UP (ref 17–32)
CREAT SERPL-MCNC: 0.6 MG/DL — LOW (ref 0.7–1.5)
GLUCOSE SERPL-MCNC: 88 MG/DL — SIGNIFICANT CHANGE UP (ref 70–110)
HCT VFR BLD CALC: 43.8 % — SIGNIFICANT CHANGE UP (ref 37–47)
HGB BLD-MCNC: 14.3 G/DL — SIGNIFICANT CHANGE UP (ref 12–16)
INR BLD: 1.11 RATIO — SIGNIFICANT CHANGE UP (ref 0.65–1.3)
MCHC RBC-ENTMCNC: 28.8 PG — SIGNIFICANT CHANGE UP (ref 27–31)
MCHC RBC-ENTMCNC: 32.6 G/DL — SIGNIFICANT CHANGE UP (ref 32–37)
MCV RBC AUTO: 88.1 FL — SIGNIFICANT CHANGE UP (ref 81–99)
NRBC # BLD: 0 /100 WBCS — SIGNIFICANT CHANGE UP (ref 0–0)
PLATELET # BLD AUTO: 413 K/UL — HIGH (ref 130–400)
POTASSIUM SERPL-MCNC: 4.4 MMOL/L — SIGNIFICANT CHANGE UP (ref 3.5–5)
POTASSIUM SERPL-SCNC: 4.4 MMOL/L — SIGNIFICANT CHANGE UP (ref 3.5–5)
PROT SERPL-MCNC: 8.4 G/DL — HIGH (ref 6–8)
PROTHROM AB SERPL-ACNC: 12 SEC — SIGNIFICANT CHANGE UP (ref 9.95–12.87)
RBC # BLD: 4.97 M/UL — SIGNIFICANT CHANGE UP (ref 4.2–5.4)
RBC # FLD: 12.9 % — SIGNIFICANT CHANGE UP (ref 11.5–14.5)
SODIUM SERPL-SCNC: 142 MMOL/L — SIGNIFICANT CHANGE UP (ref 135–146)
WBC # BLD: 9.19 K/UL — SIGNIFICANT CHANGE UP (ref 4.8–10.8)
WBC # FLD AUTO: 9.19 K/UL — SIGNIFICANT CHANGE UP (ref 4.8–10.8)

## 2018-03-02 NOTE — H&P PST ADULT - FAMILY HISTORY
Father  Still living? Unknown  Diabetes, Age at diagnosis: Age Unknown     Mother  Still living? Yes, Estimated age: 61-70  Diabetes, Age at diagnosis: Age Unknown  Hypercholesteremia, Age at diagnosis: Age Unknown  HTN (hypertension), Age at diagnosis: Age Unknown

## 2018-03-02 NOTE — H&P PST ADULT - PMH
Diabetes    Liver disorder  LIVER LESIONS APPEARED ON CT-SCAN  Ovarian cancer  UNSURE OF STAGE HOWEVER REPORTS IT "MINOR"

## 2018-03-02 NOTE — H&P PST ADULT - HISTORY OF PRESENT ILLNESS
41 Y/O FEMALE WITH H/O WILBERT IN DEC 2017. PT REPORTS SHE HAD A CANCEROUS MASS REMOVED &  NOW REQUIRES LIVER BIOPSY SECONDARY TO LESIONS THAT APPEARED ON CT-SCAN. SCHEDULED FOR LIVER BX. ONCE THE PATHOLOGY REPORTS ARE COMPLETED PT STATES SHE WILL BEGIN CHEMO THERAPY  REPORTS NO C/O CP,SOB,PALPITATIONS,COUGH OR DYSURIA  1-2 FOS WITHOUT SOB

## 2018-03-05 ENCOUNTER — APPOINTMENT (OUTPATIENT)
Dept: HEMATOLOGY ONCOLOGY | Facility: CLINIC | Age: 43
End: 2018-03-05

## 2018-03-05 VITALS
DIASTOLIC BLOOD PRESSURE: 81 MMHG | WEIGHT: 174 LBS | TEMPERATURE: 95.6 F | SYSTOLIC BLOOD PRESSURE: 127 MMHG | HEART RATE: 79 BPM | HEIGHT: 56 IN | BODY MASS INDEX: 39.14 KG/M2

## 2018-03-08 ENCOUNTER — RESULT REVIEW (OUTPATIENT)
Age: 43
End: 2018-03-08

## 2018-03-08 ENCOUNTER — OUTPATIENT (OUTPATIENT)
Dept: OUTPATIENT SERVICES | Facility: HOSPITAL | Age: 43
LOS: 1 days | Discharge: HOME | End: 2018-03-08

## 2018-03-08 DIAGNOSIS — R16.0 HEPATOMEGALY, NOT ELSEWHERE CLASSIFIED: ICD-10-CM

## 2018-03-08 DIAGNOSIS — Z98.890 OTHER SPECIFIED POSTPROCEDURAL STATES: Chronic | ICD-10-CM

## 2018-03-08 RX ORDER — SODIUM CHLORIDE 9 MG/ML
500 INJECTION INTRAMUSCULAR; INTRAVENOUS; SUBCUTANEOUS ONCE
Qty: 0 | Refills: 0 | Status: DISCONTINUED | OUTPATIENT
Start: 2018-03-08 | End: 2018-03-23

## 2018-03-08 NOTE — PROGRESS NOTE ADULT - SUBJECTIVE AND OBJECTIVE BOX
Interventional Radiology Follow- Up Note      42y Female s/p CT guided liver biopsy. Case went without issue; patient was in IR recovery about to be discharged and patient became faint and says she felt chest pain.     No complaints offered.    Vitals:  BP, HR, Resp within normal limits on bedside monitor    PHYSICAL EXAM:  General: sitting up in bed, non-toxic appearing  Abd - no significant drainage from biopsy site. No peritonitis No pain with palpation  Chest - pain increased with palpation of upper chest  Resp - non labored breathing      Impression: 42y Female s/p CT guided liver biopsy with chest discomfort after procedurre    Plan:    Pain/discomfort likely positional. Will obtain ECG and order 500 mL normal saline bolus. Patient can likely be discharged today.     Please call Interventional Radiology [phone] with any questions, concerns, or issues regarding above.

## 2018-03-08 NOTE — PROGRESS NOTE ADULT - SUBJECTIVE AND OBJECTIVE BOX
Interventional Radiology Outpatient Documentation    PREOPERATIVE DAY OF PROCEDURE EVALUATION:     I have personally seen and examined this patient. I agree with the history and physical which I have reviewed and noted any changes below:     Plan is for Liver mass biopsy    Procedure/ risks/ benefits/ goals/ alternatives were explained. All questions answered. Informed content obtained from patient. Consent placed in chart.     POSTOPERATIVE PROCEDURAL EVALUATION:     Procedure: CT guided liver mass biopsy    Pre-Op Diagnosis: Liver mass    Post-Op Diagnosis: Liver mass    Attending: Sarah  Assistant: None    Anesthesia (type):  [ ] General Anesthesia  [x ] Sedation  [ ] Spinal Anesthesia  [x ] Local/Regional    Contrast: 70 cc optiray    Estimated Blood Loss: < 10 cc    Condition:   [ ] Critical  [ ] Serious  [x ] Fair   [ ] Good    Findings/Follow up Plan of Care: See full report in pacs    Specimens Removed: 4 core samples    Implants: none    Complications: small perihepatic hematoma    Disposition: Recovery

## 2018-03-09 ENCOUNTER — OUTPATIENT (OUTPATIENT)
Dept: OUTPATIENT SERVICES | Facility: HOSPITAL | Age: 43
LOS: 1 days | Discharge: HOME | End: 2018-03-09

## 2018-03-09 ENCOUNTER — APPOINTMENT (OUTPATIENT)
Dept: PODIATRY | Facility: CLINIC | Age: 43
End: 2018-03-09
Payer: MEDICAID

## 2018-03-09 VITALS
BODY MASS INDEX: 39.14 KG/M2 | WEIGHT: 174 LBS | HEART RATE: 93 BPM | RESPIRATION RATE: 16 BRPM | DIASTOLIC BLOOD PRESSURE: 55 MMHG | HEIGHT: 56 IN | SYSTOLIC BLOOD PRESSURE: 108 MMHG

## 2018-03-09 DIAGNOSIS — Z98.890 OTHER SPECIFIED POSTPROCEDURAL STATES: Chronic | ICD-10-CM

## 2018-03-09 PROCEDURE — 99202 OFFICE O/P NEW SF 15 MIN: CPT

## 2018-03-12 LAB — NON-GYNECOLOGICAL CYTOLOGY STUDY: SIGNIFICANT CHANGE UP

## 2018-03-13 LAB — NON-GYNECOLOGICAL CYTOLOGY STUDY: SIGNIFICANT CHANGE UP

## 2018-03-19 DIAGNOSIS — K76.0 FATTY (CHANGE OF) LIVER, NOT ELSEWHERE CLASSIFIED: ICD-10-CM

## 2018-03-19 DIAGNOSIS — Z85.43 PERSONAL HISTORY OF MALIGNANT NEOPLASM OF OVARY: ICD-10-CM

## 2018-03-19 DIAGNOSIS — K76.9 LIVER DISEASE, UNSPECIFIED: ICD-10-CM

## 2018-03-26 ENCOUNTER — OUTPATIENT (OUTPATIENT)
Dept: OUTPATIENT SERVICES | Facility: HOSPITAL | Age: 43
LOS: 1 days | Discharge: HOME | End: 2018-03-26

## 2018-03-26 ENCOUNTER — APPOINTMENT (OUTPATIENT)
Dept: HEMATOLOGY ONCOLOGY | Facility: CLINIC | Age: 43
End: 2018-03-26

## 2018-03-26 VITALS
HEART RATE: 83 BPM | HEIGHT: 56 IN | SYSTOLIC BLOOD PRESSURE: 133 MMHG | BODY MASS INDEX: 39.37 KG/M2 | WEIGHT: 175 LBS | RESPIRATION RATE: 16 BRPM | DIASTOLIC BLOOD PRESSURE: 83 MMHG | TEMPERATURE: 96.6 F

## 2018-03-26 DIAGNOSIS — Z98.890 OTHER SPECIFIED POSTPROCEDURAL STATES: Chronic | ICD-10-CM

## 2018-03-26 DIAGNOSIS — N64.89 OTHER SPECIFIED DISORDERS OF BREAST: ICD-10-CM

## 2018-03-26 DIAGNOSIS — C56.9 MALIGNANT NEOPLASM OF UNSPECIFIED OVARY: ICD-10-CM

## 2018-03-27 DIAGNOSIS — Z15.89 GENETIC SUSCEPTIBILITY TO OTHER DISEASE: ICD-10-CM

## 2018-04-06 ENCOUNTER — APPOINTMENT (OUTPATIENT)
Dept: PODIATRY | Facility: CLINIC | Age: 43
End: 2018-04-06

## 2018-04-13 ENCOUNTER — APPOINTMENT (OUTPATIENT)
Dept: GYNECOLOGIC ONCOLOGY | Facility: CLINIC | Age: 43
End: 2018-04-13

## 2018-04-16 ENCOUNTER — EMERGENCY (EMERGENCY)
Facility: HOSPITAL | Age: 43
LOS: 0 days | Discharge: HOME | End: 2018-04-16
Attending: EMERGENCY MEDICINE | Admitting: INTERNAL MEDICINE

## 2018-04-16 VITALS
RESPIRATION RATE: 20 BRPM | DIASTOLIC BLOOD PRESSURE: 72 MMHG | OXYGEN SATURATION: 99 % | HEART RATE: 89 BPM | TEMPERATURE: 97 F | SYSTOLIC BLOOD PRESSURE: 129 MMHG

## 2018-04-16 VITALS
OXYGEN SATURATION: 99 % | DIASTOLIC BLOOD PRESSURE: 75 MMHG | TEMPERATURE: 97 F | SYSTOLIC BLOOD PRESSURE: 130 MMHG | RESPIRATION RATE: 20 BRPM | HEART RATE: 85 BPM

## 2018-04-16 DIAGNOSIS — R10.84 GENERALIZED ABDOMINAL PAIN: ICD-10-CM

## 2018-04-16 DIAGNOSIS — R19.7 DIARRHEA, UNSPECIFIED: ICD-10-CM

## 2018-04-16 DIAGNOSIS — R50.9 FEVER, UNSPECIFIED: ICD-10-CM

## 2018-04-16 DIAGNOSIS — Z90.49 ACQUIRED ABSENCE OF OTHER SPECIFIED PARTS OF DIGESTIVE TRACT: ICD-10-CM

## 2018-04-16 DIAGNOSIS — Z98.890 OTHER SPECIFIED POSTPROCEDURAL STATES: Chronic | ICD-10-CM

## 2018-04-16 DIAGNOSIS — Z90.710 ACQUIRED ABSENCE OF BOTH CERVIX AND UTERUS: ICD-10-CM

## 2018-04-16 DIAGNOSIS — R11.2 NAUSEA WITH VOMITING, UNSPECIFIED: ICD-10-CM

## 2018-04-16 DIAGNOSIS — R06.02 SHORTNESS OF BREATH: ICD-10-CM

## 2018-04-16 DIAGNOSIS — M79.1 MYALGIA: ICD-10-CM

## 2018-04-16 DIAGNOSIS — E78.00 PURE HYPERCHOLESTEROLEMIA, UNSPECIFIED: ICD-10-CM

## 2018-04-16 DIAGNOSIS — I10 ESSENTIAL (PRIMARY) HYPERTENSION: ICD-10-CM

## 2018-04-16 DIAGNOSIS — Z90.721 ACQUIRED ABSENCE OF OVARIES, UNILATERAL: ICD-10-CM

## 2018-04-16 DIAGNOSIS — E11.9 TYPE 2 DIABETES MELLITUS WITHOUT COMPLICATIONS: ICD-10-CM

## 2018-04-16 LAB
ALBUMIN SERPL ELPH-MCNC: 4.7 G/DL — SIGNIFICANT CHANGE UP (ref 3.5–5.2)
ALP SERPL-CCNC: 110 U/L — SIGNIFICANT CHANGE UP (ref 30–115)
ALT FLD-CCNC: 56 U/L — HIGH (ref 0–41)
ANION GAP SERPL CALC-SCNC: 16 MMOL/L — HIGH (ref 7–14)
APPEARANCE UR: (no result)
APTT BLD: 30 SEC — SIGNIFICANT CHANGE UP (ref 27–39.2)
AST SERPL-CCNC: 34 U/L — SIGNIFICANT CHANGE UP (ref 0–41)
BASOPHILS # BLD AUTO: 0.01 K/UL — SIGNIFICANT CHANGE UP (ref 0–0.2)
BASOPHILS NFR BLD AUTO: 0.1 % — SIGNIFICANT CHANGE UP (ref 0–1)
BILIRUB SERPL-MCNC: 0.3 MG/DL — SIGNIFICANT CHANGE UP (ref 0.2–1.2)
BILIRUB UR-MCNC: NEGATIVE — SIGNIFICANT CHANGE UP
BUN SERPL-MCNC: 15 MG/DL — SIGNIFICANT CHANGE UP (ref 10–20)
CALCIUM SERPL-MCNC: 9.4 MG/DL — SIGNIFICANT CHANGE UP (ref 8.5–10.1)
CHLORIDE SERPL-SCNC: 101 MMOL/L — SIGNIFICANT CHANGE UP (ref 98–110)
CO2 SERPL-SCNC: 22 MMOL/L — SIGNIFICANT CHANGE UP (ref 17–32)
COLOR SPEC: YELLOW — SIGNIFICANT CHANGE UP
CREAT SERPL-MCNC: 0.6 MG/DL — LOW (ref 0.7–1.5)
DIFF PNL FLD: NEGATIVE — SIGNIFICANT CHANGE UP
EOSINOPHIL # BLD AUTO: 0.06 K/UL — SIGNIFICANT CHANGE UP (ref 0–0.7)
EOSINOPHIL NFR BLD AUTO: 0.9 % — SIGNIFICANT CHANGE UP (ref 0–8)
GLUCOSE SERPL-MCNC: 96 MG/DL — SIGNIFICANT CHANGE UP (ref 70–99)
GLUCOSE UR QL: NEGATIVE MG/DL — SIGNIFICANT CHANGE UP
HCT VFR BLD CALC: 42.9 % — SIGNIFICANT CHANGE UP (ref 37–47)
HGB BLD-MCNC: 14.1 G/DL — SIGNIFICANT CHANGE UP (ref 12–16)
IMM GRANULOCYTES NFR BLD AUTO: 0.3 % — SIGNIFICANT CHANGE UP (ref 0.1–0.3)
INR BLD: 1.05 RATIO — SIGNIFICANT CHANGE UP (ref 0.65–1.3)
KETONES UR-MCNC: NEGATIVE — SIGNIFICANT CHANGE UP
LACTATE SERPL-SCNC: 1.5 MMOL/L — SIGNIFICANT CHANGE UP (ref 0.5–2.2)
LEUKOCYTE ESTERASE UR-ACNC: NEGATIVE — SIGNIFICANT CHANGE UP
LIDOCAIN IGE QN: 27 U/L — SIGNIFICANT CHANGE UP (ref 7–60)
LYMPHOCYTES # BLD AUTO: 0.89 K/UL — LOW (ref 1.2–3.4)
LYMPHOCYTES # BLD AUTO: 12.8 % — LOW (ref 20.5–51.1)
MCHC RBC-ENTMCNC: 28.5 PG — SIGNIFICANT CHANGE UP (ref 27–31)
MCHC RBC-ENTMCNC: 32.9 G/DL — SIGNIFICANT CHANGE UP (ref 32–37)
MCV RBC AUTO: 86.7 FL — SIGNIFICANT CHANGE UP (ref 81–99)
MONOCYTES # BLD AUTO: 0.26 K/UL — SIGNIFICANT CHANGE UP (ref 0.1–0.6)
MONOCYTES NFR BLD AUTO: 3.7 % — SIGNIFICANT CHANGE UP (ref 1.7–9.3)
NEUTROPHILS # BLD AUTO: 5.73 K/UL — SIGNIFICANT CHANGE UP (ref 1.4–6.5)
NEUTROPHILS NFR BLD AUTO: 82.2 % — HIGH (ref 42.2–75.2)
NITRITE UR-MCNC: NEGATIVE — SIGNIFICANT CHANGE UP
PH UR: 6 — SIGNIFICANT CHANGE UP (ref 5–8)
PLATELET # BLD AUTO: 264 K/UL — SIGNIFICANT CHANGE UP (ref 130–400)
POTASSIUM SERPL-MCNC: 4.5 MMOL/L — SIGNIFICANT CHANGE UP (ref 3.5–5)
POTASSIUM SERPL-SCNC: 4.5 MMOL/L — SIGNIFICANT CHANGE UP (ref 3.5–5)
PROT SERPL-MCNC: 7.5 G/DL — SIGNIFICANT CHANGE UP (ref 6–8)
PROT UR-MCNC: NEGATIVE MG/DL — SIGNIFICANT CHANGE UP
PROTHROM AB SERPL-ACNC: 11.4 SEC — SIGNIFICANT CHANGE UP (ref 9.95–12.87)
RBC # BLD: 4.95 M/UL — SIGNIFICANT CHANGE UP (ref 4.2–5.4)
RBC # FLD: 13.3 % — SIGNIFICANT CHANGE UP (ref 11.5–14.5)
SODIUM SERPL-SCNC: 139 MMOL/L — SIGNIFICANT CHANGE UP (ref 135–146)
SP GR SPEC: 1.01 — SIGNIFICANT CHANGE UP (ref 1.01–1.03)
UROBILINOGEN FLD QL: 0.2 MG/DL — SIGNIFICANT CHANGE UP (ref 0.2–0.2)
WBC # BLD: 6.97 K/UL — SIGNIFICANT CHANGE UP (ref 4.8–10.8)
WBC # FLD AUTO: 6.97 K/UL — SIGNIFICANT CHANGE UP (ref 4.8–10.8)

## 2018-04-16 RX ORDER — ACETAMINOPHEN 500 MG
650 TABLET ORAL ONCE
Qty: 0 | Refills: 0 | Status: COMPLETED | OUTPATIENT
Start: 2018-04-16 | End: 2018-04-16

## 2018-04-16 RX ORDER — SODIUM CHLORIDE 9 MG/ML
2000 INJECTION INTRAMUSCULAR; INTRAVENOUS; SUBCUTANEOUS ONCE
Qty: 0 | Refills: 0 | Status: COMPLETED | OUTPATIENT
Start: 2018-04-16 | End: 2018-04-16

## 2018-04-16 RX ORDER — ONDANSETRON 8 MG/1
4 TABLET, FILM COATED ORAL ONCE
Qty: 0 | Refills: 0 | Status: COMPLETED | OUTPATIENT
Start: 2018-04-16 | End: 2018-04-16

## 2018-04-16 RX ORDER — MORPHINE SULFATE 50 MG/1
6 CAPSULE, EXTENDED RELEASE ORAL ONCE
Qty: 0 | Refills: 0 | Status: DISCONTINUED | OUTPATIENT
Start: 2018-04-16 | End: 2018-04-16

## 2018-04-16 RX ORDER — FAMOTIDINE 10 MG/ML
20 INJECTION INTRAVENOUS ONCE
Qty: 0 | Refills: 0 | Status: COMPLETED | OUTPATIENT
Start: 2018-04-16 | End: 2018-04-16

## 2018-04-16 RX ADMIN — ONDANSETRON 4 MILLIGRAM(S): 8 TABLET, FILM COATED ORAL at 10:49

## 2018-04-16 RX ADMIN — MORPHINE SULFATE 6 MILLIGRAM(S): 50 CAPSULE, EXTENDED RELEASE ORAL at 10:49

## 2018-04-16 RX ADMIN — SODIUM CHLORIDE 4000 MILLILITER(S): 9 INJECTION INTRAMUSCULAR; INTRAVENOUS; SUBCUTANEOUS at 10:49

## 2018-04-16 RX ADMIN — MORPHINE SULFATE 6 MILLIGRAM(S): 50 CAPSULE, EXTENDED RELEASE ORAL at 13:01

## 2018-04-16 RX ADMIN — FAMOTIDINE 20 MILLIGRAM(S): 10 INJECTION INTRAVENOUS at 13:18

## 2018-04-16 RX ADMIN — Medication 650 MILLIGRAM(S): at 10:49

## 2018-04-16 RX ADMIN — Medication 650 MILLIGRAM(S): at 13:01

## 2018-04-16 NOTE — ED PROVIDER NOTE - NS ED ROS FT
Constitutional: chills, malaise, no fever, no loss of appetitie   Eyes: no redness/discharge/pain/vision changes  ENT: no rhinorrhea/ear pain/sore throat  Cardiac: No chest pain, edema. SOB for the past day since symptoms started.  Respiratory: No cough or respiratory distress  GI:  Nausea, vomiting, diarrhea, abdominal pain. As per HPI  : No dysuria, frequency, urgency or hematuria  MS: pain is generalized in back and extremities as per HPI, weakness. no loss of ROM   Neuro: No headache. No LOC.   Skin: No skin rash.  Endocrine: As per HPI, PMHx.  Except as documented in the HPI, all other systems are negative.

## 2018-04-16 NOTE — ED PROVIDER NOTE - PHYSICAL EXAMINATION
AOx4, Appears uncomfortable, Non toxic appearing, NAD. Skin  warm and moist, no acute rash. PERRLA/EOM, conjunctiva and sclera clear. MM moist, no nasal discharge.  Pharynx and TM's unremarkable. Neck supple nt, no meningeal signs. Heart RRR s1s2 nl, no rub/murmur. Lungs- BS equal, CTAB. Abdomen soft, tender to palipitation in all quadrants, no r/g.  Bowel sounds presents.  Extremities- moves all, +equal distal pulses, sensation wnl, normal ROM. No LE edema, calves nttp b/l.

## 2018-04-16 NOTE — ED PROVIDER NOTE - PROGRESS NOTE DETAILS
ATTENDING NOTE:   41 y/o F ovarian CA, s/p WILBERT and right ovary removal in December 2017, found to have fatty liver currently undergoing workup for this as outpatient including recent biopsy, presents to ED for evaluation of abdominal pain, nausea and vomiting that began last night. States pain is diffuse but worse to epigastrium. Reports few episodes of NB/NB emesis and about 6 episode of NB diarrhea since yesterday. (+)Myalgias. No fever/chills, CP, SOB. (+)Sick contacts at home with similar.     Oral temp 99.1. On exam: Pt is non-toxic, WA. Pink conjunctiva, anicteric. MMM, no exudates. Neck with no crepitus. Regular rate, no murmurs. Lungs CTAB. Abdomen is soft, (+)diffuse TTP. No peritoneal signs. (+)BS.  Will get labs, imaging and reassess.

## 2018-04-16 NOTE — ED PROVIDER NOTE - PSH
H/O abdominal hysterectomy  With Right Oophrecetomy   In December 2017  History of cholecystectomy  10-15 years ago

## 2018-04-16 NOTE — ED PROVIDER NOTE - OBJECTIVE STATEMENT
42 y F with PMHx of  DM and malignant neoplasm of R ovary for which she received WILBERT w/ R Oophorectomy (Dec 2017) presents to the ED with pain "all over in her abdomen, in her arms and legs".  Patient reports x6 BM last night approx 6:30PM, and vomitting x6 this morning at approx 6AM.  Patient reports nausea, fever, chills, generalized weakness, SOB. No LOC.  No blood in vomitus or BM.  Patient reports that her neice as well as her boyfriend had a viral illness last week and indicates her boyfriend has had similar symptoms as her. 42y female with PMHx of controlled DM and malignant neoplasm of R ovary for which she had a WILBERT w/ R oophorectomy (Dec 2017) presents to the ED for nausea, vomiting and generalized pain in her abdomen and extremities. Patient has had x6 watery bowel movements since approx 6:30PM and vomiting x6 since 6am.  No hemetesis, hematochezia. Patient reports SOB, fever, chills, and nausea.  As per patient there are no alleviating or exacerbating factors, Pain scale over general body is a 10. Patient did not get a flu shot this year, and indicates that both her niece and her boyfriend had viral illnesses last week.  Boyfriend presented with similar symptoms of vomiting and diarrhea last week and is currently still ill (boyfriend has not seen a doctor).

## 2018-04-16 NOTE — ED ADULT NURSE NOTE - OBJECTIVE STATEMENT
Pt presents to ED with epigastric and RUQ pain, with vomiting and diarrhea that began last night. Denies fever. Pain is sharp and severe.

## 2018-04-16 NOTE — ED PROVIDER NOTE - PMH
Diabetes  Family Doctor: Dr Reeves  Liver disorder  LIVER LESIONS APPEARED ON CT-SCAN  (Within the past few months)  Ovarian cancer  UNSURE OF STAGE HOWEVER REPORTS IT "MINOR"  Sees Oncologist Dr. Vanessa Correa

## 2018-04-27 ENCOUNTER — OUTPATIENT (OUTPATIENT)
Dept: OUTPATIENT SERVICES | Facility: HOSPITAL | Age: 43
LOS: 1 days | Discharge: HOME | End: 2018-04-27

## 2018-04-27 ENCOUNTER — APPOINTMENT (OUTPATIENT)
Dept: GYNECOLOGIC ONCOLOGY | Facility: CLINIC | Age: 43
End: 2018-04-27

## 2018-04-27 VITALS
BODY MASS INDEX: 38.69 KG/M2 | WEIGHT: 172 LBS | HEART RATE: 88 BPM | HEIGHT: 56 IN | TEMPERATURE: 97 F | SYSTOLIC BLOOD PRESSURE: 109 MMHG | RESPIRATION RATE: 16 BRPM | DIASTOLIC BLOOD PRESSURE: 82 MMHG

## 2018-04-27 DIAGNOSIS — Z98.890 OTHER SPECIFIED POSTPROCEDURAL STATES: Chronic | ICD-10-CM

## 2018-04-28 LAB — CANCER AG125 SERPL-ACNC: 23 U/ML

## 2018-05-01 DIAGNOSIS — D39.11 NEOPLASM OF UNCERTAIN BEHAVIOR OF RIGHT OVARY: ICD-10-CM

## 2018-06-04 ENCOUNTER — FORM ENCOUNTER (OUTPATIENT)
Age: 43
End: 2018-06-04

## 2018-06-04 ENCOUNTER — APPOINTMENT (OUTPATIENT)
Dept: PODIATRY | Facility: CLINIC | Age: 43
End: 2018-06-04

## 2018-06-05 ENCOUNTER — OUTPATIENT (OUTPATIENT)
Dept: OUTPATIENT SERVICES | Facility: HOSPITAL | Age: 43
LOS: 1 days | Discharge: HOME | End: 2018-06-05

## 2018-06-05 DIAGNOSIS — Z98.890 OTHER SPECIFIED POSTPROCEDURAL STATES: Chronic | ICD-10-CM

## 2018-06-05 DIAGNOSIS — R92.8 OTHER ABNORMAL AND INCONCLUSIVE FINDINGS ON DIAGNOSTIC IMAGING OF BREAST: ICD-10-CM

## 2018-06-25 ENCOUNTER — OUTPATIENT (OUTPATIENT)
Dept: OUTPATIENT SERVICES | Facility: HOSPITAL | Age: 43
LOS: 1 days | Discharge: HOME | End: 2018-06-25

## 2018-06-25 DIAGNOSIS — Z98.890 OTHER SPECIFIED POSTPROCEDURAL STATES: Chronic | ICD-10-CM

## 2018-06-25 DIAGNOSIS — Z98.818 OTHER DENTAL PROCEDURE STATUS: ICD-10-CM

## 2018-07-05 ENCOUNTER — LABORATORY RESULT (OUTPATIENT)
Age: 43
End: 2018-07-05

## 2018-07-05 ENCOUNTER — APPOINTMENT (OUTPATIENT)
Dept: HEMATOLOGY ONCOLOGY | Facility: CLINIC | Age: 43
End: 2018-07-05

## 2018-07-05 VITALS
DIASTOLIC BLOOD PRESSURE: 81 MMHG | HEIGHT: 56 IN | RESPIRATION RATE: 14 BRPM | BODY MASS INDEX: 39.14 KG/M2 | TEMPERATURE: 96.9 F | HEART RATE: 67 BPM | SYSTOLIC BLOOD PRESSURE: 142 MMHG | WEIGHT: 174 LBS

## 2018-07-09 LAB
ALBUMIN SERPL ELPH-MCNC: 4.8 G/DL
ALP BLD-CCNC: 139 U/L
ALT SERPL-CCNC: 68 U/L
ANION GAP SERPL CALC-SCNC: 17 MMOL/L
AST SERPL-CCNC: 39 U/L
BILIRUB SERPL-MCNC: <0.2 MG/DL
BUN SERPL-MCNC: 22 MG/DL
CALCIUM SERPL-MCNC: 9.9 MG/DL
CANCER AG125 SERPL-ACNC: 18 U/ML
CHLORIDE SERPL-SCNC: 102 MMOL/L
CO2 SERPL-SCNC: 23 MMOL/L
CREAT SERPL-MCNC: 0.6 MG/DL
GLUCOSE SERPL-MCNC: 75 MG/DL
HCT VFR BLD CALC: 40.7 %
HGB BLD-MCNC: 13.4 G/DL
MCHC RBC-ENTMCNC: 28.9 PG
MCHC RBC-ENTMCNC: 32.9 G/DL
MCV RBC AUTO: 87.9 FL
PLATELET # BLD AUTO: 305 K/UL
PMV BLD: 10.7 FL
POTASSIUM SERPL-SCNC: 4.6 MMOL/L
PROT SERPL-MCNC: 7.6 G/DL
RBC # BLD: 4.63 M/UL
RBC # FLD: 14.4 %
SODIUM SERPL-SCNC: 142 MMOL/L
WBC # FLD AUTO: 7.85 K/UL

## 2018-07-18 ENCOUNTER — OUTPATIENT (OUTPATIENT)
Dept: OUTPATIENT SERVICES | Facility: HOSPITAL | Age: 43
LOS: 1 days | Discharge: HOME | End: 2018-07-18

## 2018-07-18 ENCOUNTER — APPOINTMENT (OUTPATIENT)
Dept: ENDOCRINOLOGY | Facility: CLINIC | Age: 43
End: 2018-07-18

## 2018-07-18 VITALS
SYSTOLIC BLOOD PRESSURE: 112 MMHG | BODY MASS INDEX: 39.37 KG/M2 | HEIGHT: 56 IN | WEIGHT: 175 LBS | DIASTOLIC BLOOD PRESSURE: 80 MMHG | HEART RATE: 78 BPM

## 2018-07-18 DIAGNOSIS — Z98.890 OTHER SPECIFIED POSTPROCEDURAL STATES: Chronic | ICD-10-CM

## 2018-07-18 PROBLEM — C56.9 MALIGNANT NEOPLASM OF UNSPECIFIED OVARY: Chronic | Status: ACTIVE | Noted: 2018-03-02

## 2018-07-24 ENCOUNTER — APPOINTMENT (OUTPATIENT)
Dept: PODIATRY | Facility: CLINIC | Age: 43
End: 2018-07-24

## 2018-08-10 ENCOUNTER — APPOINTMENT (OUTPATIENT)
Dept: GASTROENTEROLOGY | Facility: CLINIC | Age: 43
End: 2018-08-10

## 2018-08-15 ENCOUNTER — OUTPATIENT (OUTPATIENT)
Dept: OUTPATIENT SERVICES | Facility: HOSPITAL | Age: 43
LOS: 1 days | Discharge: HOME | End: 2018-08-15

## 2018-08-15 ENCOUNTER — APPOINTMENT (OUTPATIENT)
Dept: INTERNAL MEDICINE | Facility: CLINIC | Age: 43
End: 2018-08-15

## 2018-08-15 VITALS
DIASTOLIC BLOOD PRESSURE: 87 MMHG | HEIGHT: 57 IN | HEART RATE: 82 BPM | TEMPERATURE: 98 F | WEIGHT: 173 LBS | SYSTOLIC BLOOD PRESSURE: 138 MMHG | BODY MASS INDEX: 37.32 KG/M2

## 2018-08-15 DIAGNOSIS — Z98.890 OTHER SPECIFIED POSTPROCEDURAL STATES: Chronic | ICD-10-CM

## 2018-08-15 DIAGNOSIS — Z80.49 FAMILY HISTORY OF MALIGNANT NEOPLASM OF OTHER GENITAL ORGANS: ICD-10-CM

## 2018-08-15 RX ORDER — DULAGLUTIDE 0.75 MG/.5ML
0.75 INJECTION, SOLUTION SUBCUTANEOUS
Qty: 4 | Refills: 5 | Status: DISCONTINUED | COMMUNITY
Start: 2017-10-23 | End: 2018-08-15

## 2018-08-15 RX ORDER — DULAGLUTIDE 1.5 MG/.5ML
1.5 INJECTION, SOLUTION SUBCUTANEOUS WEEKLY
Qty: 1 | Refills: 5 | Status: DISCONTINUED | COMMUNITY
Start: 2017-12-28 | End: 2018-08-15

## 2018-08-15 NOTE — HISTORY OF PRESENT ILLNESS
[Other: _____] : [unfilled] [FreeTextEntry1] : 43 yo F comes to establish primary care.  [de-identified] : 41 yo F with PMH DM, ovarian cancer s/p left oophorectomy in 2006 and RT oophorectomy and hysterotomy in 2017 comes to establish primary care. pt complain of burning epigastric pain of few weeks that increase with spicy food, denies nausea, vomiting or abnormal bowel movements. pt also complain of depressed mood with occasional suicidal ideation.

## 2018-08-15 NOTE — PLAN
[FreeTextEntry1] : \par \par 41 yo F with PMH DM, ovarian cancer s/p left oophorectomy in 2006 and RT oophorectomy and hysterotomy in 2017 comes to establish primary care. pt complain of burning epigastric pain of few weeks that increase with spicy food, denies nausea, vomiting or abnormal bowel movements. pt also complain of depressed mood with occasional suicidal ideation.  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

## 2018-08-15 NOTE — REVIEW OF SYSTEMS
[Hot Flashes] : hot flashes [Abdominal Pain] : abdominal pain [Depression] : depression [Fever] : no fever [Chills] : no chills [Fatigue] : no fatigue [Discharge] : no discharge [Vision Problems] : no vision problems [Earache] : no earache [Nasal Discharge] : no nasal discharge [Chest Pain] : no chest pain [Palpitations] : no palpitations [Leg Claudication] : no leg claudication [Lower Ext Edema] : no lower extremity edema [Shortness Of Breath] : no shortness of breath [Cough] : no cough [Dyspnea on Exertion] : no dyspnea on exertion [Nausea] : no nausea [Constipation] : no constipation [Diarrhea] : diarrhea [Vomiting] : no vomiting [Dysuria] : no dysuria [Hematuria] : no hematuria [Joint Pain] : no joint pain [Muscle Weakness] : no muscle weakness [Muscle Pain] : no muscle pain [Itching] : no itching [Skin Rash] : no skin rash [Headache] : no headache [FreeTextEntry7] : epigastric pain

## 2018-08-15 NOTE — PHYSICAL EXAM
[No Acute Distress] : no acute distress [Normal Sclera/Conjunctiva] : normal sclera/conjunctiva [Normal Outer Ear/Nose] : the outer ears and nose were normal in appearance [No JVD] : no jugular venous distention [No Respiratory Distress] : no respiratory distress  [Clear to Auscultation] : lungs were clear to auscultation bilaterally [Normal Rate] : normal rate  [Regular Rhythm] : with a regular rhythm [Normal S1, S2] : normal S1 and S2 [No Murmur] : no murmur heard [Pedal Pulses Present] : the pedal pulses are present [No Edema] : there was no peripheral edema [Soft] : abdomen soft [Non-distended] : non-distended [Grossly Normal Strength/Tone] : grossly normal strength/tone [No Rash] : no rash [Normal Gait] : normal gait [No Focal Deficits] : no focal deficits [de-identified] : mild epigastric tenderness  [de-identified] : complain of depressed mood

## 2018-08-21 ENCOUNTER — APPOINTMENT (OUTPATIENT)
Dept: NEUROLOGY | Facility: CLINIC | Age: 43
End: 2018-08-21

## 2018-08-21 ENCOUNTER — OUTPATIENT (OUTPATIENT)
Dept: OUTPATIENT SERVICES | Facility: HOSPITAL | Age: 43
LOS: 1 days | Discharge: HOME | End: 2018-08-21

## 2018-08-21 ENCOUNTER — APPOINTMENT (OUTPATIENT)
Dept: OBGYN | Facility: CLINIC | Age: 43
End: 2018-08-21

## 2018-08-21 VITALS
BODY MASS INDEX: 37.11 KG/M2 | SYSTOLIC BLOOD PRESSURE: 135 MMHG | DIASTOLIC BLOOD PRESSURE: 90 MMHG | HEIGHT: 57 IN | HEART RATE: 80 BPM | WEIGHT: 172 LBS

## 2018-08-21 DIAGNOSIS — R10.13 EPIGASTRIC PAIN: ICD-10-CM

## 2018-08-21 DIAGNOSIS — E66.01 MORBID (SEVERE) OBESITY DUE TO EXCESS CALORIES: ICD-10-CM

## 2018-08-21 DIAGNOSIS — Z23 ENCOUNTER FOR IMMUNIZATION: ICD-10-CM

## 2018-08-21 DIAGNOSIS — Z15.09 GENETIC SUSCEPTIBILITY TO OTHER MALIGNANT NEOPLASM: ICD-10-CM

## 2018-08-21 DIAGNOSIS — Z98.890 OTHER SPECIFIED POSTPROCEDURAL STATES: Chronic | ICD-10-CM

## 2018-08-21 DIAGNOSIS — D39.11 NEOPLASM OF UNCERTAIN BEHAVIOR OF RIGHT OVARY: ICD-10-CM

## 2018-08-21 DIAGNOSIS — E11.65 TYPE 2 DIABETES MELLITUS WITH HYPERGLYCEMIA: ICD-10-CM

## 2018-08-25 ENCOUNTER — LABORATORY RESULT (OUTPATIENT)
Age: 43
End: 2018-08-25

## 2018-08-27 LAB
CHOLEST SERPL-MCNC: 251 MG/DL
CHOLEST/HDLC SERPL: 6.6 RATIO
CREAT SPEC-SCNC: 161 MG/DL
CREAT SPEC-SCNC: 178 MG/DL
CREAT/PROT UR: 0.1 RATIO
HDLC SERPL-MCNC: 38 MG/DL
LDLC SERPL CALC-MCNC: 188 MG/DL
MICROALBUMIN 24H UR DL<=1MG/L-MCNC: <1.2 MG/DL
MICROALBUMIN/CREAT 24H UR-RTO: NORMAL
PROT UR-MCNC: 21 MG/DLG/24H
TRIGL SERPL-MCNC: 220 MG/DL

## 2018-08-29 ENCOUNTER — LABORATORY RESULT (OUTPATIENT)
Age: 43
End: 2018-08-29

## 2018-08-29 ENCOUNTER — OUTPATIENT (OUTPATIENT)
Dept: OUTPATIENT SERVICES | Facility: HOSPITAL | Age: 43
LOS: 1 days | Discharge: HOME | End: 2018-08-29

## 2018-08-29 DIAGNOSIS — Z98.890 OTHER SPECIFIED POSTPROCEDURAL STATES: Chronic | ICD-10-CM

## 2018-08-29 DIAGNOSIS — R25.1 TREMOR, UNSPECIFIED: ICD-10-CM

## 2018-08-29 DIAGNOSIS — R56.9 UNSPECIFIED CONVULSIONS: ICD-10-CM

## 2018-08-30 ENCOUNTER — APPOINTMENT (OUTPATIENT)
Dept: HEMATOLOGY ONCOLOGY | Facility: CLINIC | Age: 43
End: 2018-08-30

## 2018-08-30 RX ORDER — BLOOD SUGAR DIAGNOSTIC
STRIP MISCELLANEOUS TWICE DAILY
Qty: 50 | Refills: 5 | Status: DISCONTINUED | COMMUNITY
Start: 2017-12-28 | End: 2018-08-30

## 2018-09-01 LAB
ALBUMIN SERPL ELPH-MCNC: 4.5 G/DL
ALP BLD-CCNC: 131 U/L
ALT SERPL-CCNC: 38 U/L
AST SERPL-CCNC: 25 U/L
BILIRUB DIRECT SERPL-MCNC: <0.2 MG/DL
BILIRUB INDIRECT SERPL-MCNC: NORMAL MG/DL
BILIRUB SERPL-MCNC: <0.2 MG/DL
PROT SERPL-MCNC: 7.5 G/DL

## 2018-09-15 LAB — CK SERPL-CCNC: 207 U/L

## 2018-09-17 ENCOUNTER — OUTPATIENT (OUTPATIENT)
Dept: OUTPATIENT SERVICES | Facility: HOSPITAL | Age: 43
LOS: 1 days | Discharge: HOME | End: 2018-09-17

## 2018-09-17 ENCOUNTER — EMERGENCY (EMERGENCY)
Facility: HOSPITAL | Age: 43
LOS: 0 days | Discharge: HOME | End: 2018-09-17
Attending: EMERGENCY MEDICINE | Admitting: EMERGENCY MEDICINE

## 2018-09-17 VITALS
RESPIRATION RATE: 16 BRPM | HEART RATE: 86 BPM | TEMPERATURE: 98 F | DIASTOLIC BLOOD PRESSURE: 85 MMHG | OXYGEN SATURATION: 99 % | SYSTOLIC BLOOD PRESSURE: 130 MMHG

## 2018-09-17 VITALS
SYSTOLIC BLOOD PRESSURE: 132 MMHG | DIASTOLIC BLOOD PRESSURE: 86 MMHG | RESPIRATION RATE: 20 BRPM | TEMPERATURE: 98 F | HEART RATE: 80 BPM | OXYGEN SATURATION: 99 %

## 2018-09-17 DIAGNOSIS — R51 HEADACHE: ICD-10-CM

## 2018-09-17 DIAGNOSIS — Z98.890 OTHER SPECIFIED POSTPROCEDURAL STATES: Chronic | ICD-10-CM

## 2018-09-17 DIAGNOSIS — E11.9 TYPE 2 DIABETES MELLITUS WITHOUT COMPLICATIONS: ICD-10-CM

## 2018-09-17 DIAGNOSIS — Z90.49 ACQUIRED ABSENCE OF OTHER SPECIFIED PARTS OF DIGESTIVE TRACT: ICD-10-CM

## 2018-09-17 DIAGNOSIS — R06.02 SHORTNESS OF BREATH: ICD-10-CM

## 2018-09-17 DIAGNOSIS — R25.1 TREMOR, UNSPECIFIED: ICD-10-CM

## 2018-09-17 DIAGNOSIS — Z90.710 ACQUIRED ABSENCE OF BOTH CERVIX AND UTERUS: ICD-10-CM

## 2018-09-17 DIAGNOSIS — Z79.899 OTHER LONG TERM (CURRENT) DRUG THERAPY: ICD-10-CM

## 2018-09-17 LAB — GLUCOSE BLDC GLUCOMTR-MCNC: 127 MG/DL — HIGH (ref 70–99)

## 2018-09-17 RX ORDER — FAMOTIDINE 10 MG/ML
20 INJECTION INTRAVENOUS ONCE
Qty: 0 | Refills: 0 | Status: COMPLETED | OUTPATIENT
Start: 2018-09-17 | End: 2018-09-17

## 2018-09-17 RX ORDER — KETOROLAC TROMETHAMINE 30 MG/ML
30 SYRINGE (ML) INJECTION ONCE
Qty: 0 | Refills: 0 | Status: DISCONTINUED | OUTPATIENT
Start: 2018-09-17 | End: 2018-09-17

## 2018-09-17 RX ORDER — IPRATROPIUM BROMIDE 0.2 MG/ML
500 SOLUTION, NON-ORAL INHALATION ONCE
Qty: 0 | Refills: 0 | Status: COMPLETED | OUTPATIENT
Start: 2018-09-17 | End: 2018-09-17

## 2018-09-17 RX ORDER — METOCLOPRAMIDE HCL 10 MG
10 TABLET ORAL ONCE
Qty: 0 | Refills: 0 | Status: COMPLETED | OUTPATIENT
Start: 2018-09-17 | End: 2018-09-17

## 2018-09-17 RX ORDER — ALBUTEROL 90 UG/1
2.5 AEROSOL, METERED ORAL ONCE
Qty: 0 | Refills: 0 | Status: COMPLETED | OUTPATIENT
Start: 2018-09-17 | End: 2018-09-17

## 2018-09-17 RX ORDER — SODIUM CHLORIDE 9 MG/ML
1000 INJECTION INTRAMUSCULAR; INTRAVENOUS; SUBCUTANEOUS ONCE
Qty: 0 | Refills: 0 | Status: COMPLETED | OUTPATIENT
Start: 2018-09-17 | End: 2018-09-17

## 2018-09-17 RX ORDER — DIPHENHYDRAMINE HCL 50 MG
50 CAPSULE ORAL ONCE
Qty: 0 | Refills: 0 | Status: COMPLETED | OUTPATIENT
Start: 2018-09-17 | End: 2018-09-17

## 2018-09-17 RX ORDER — ONDANSETRON 8 MG/1
4 TABLET, FILM COATED ORAL ONCE
Qty: 0 | Refills: 0 | Status: COMPLETED | OUTPATIENT
Start: 2018-09-17 | End: 2018-09-17

## 2018-09-17 RX ADMIN — Medication 104 MILLIGRAM(S): at 18:06

## 2018-09-17 RX ADMIN — ONDANSETRON 4 MILLIGRAM(S): 8 TABLET, FILM COATED ORAL at 15:59

## 2018-09-17 RX ADMIN — SODIUM CHLORIDE 2000 MILLILITER(S): 9 INJECTION INTRAMUSCULAR; INTRAVENOUS; SUBCUTANEOUS at 18:06

## 2018-09-17 RX ADMIN — FAMOTIDINE 20 MILLIGRAM(S): 10 INJECTION INTRAVENOUS at 16:04

## 2018-09-17 RX ADMIN — Medication 500 MICROGRAM(S): at 16:00

## 2018-09-17 RX ADMIN — Medication 50 MILLIGRAM(S): at 16:11

## 2018-09-17 RX ADMIN — ALBUTEROL 2.5 MILLIGRAM(S): 90 AEROSOL, METERED ORAL at 16:00

## 2018-09-17 RX ADMIN — Medication 125 MILLIGRAM(S): at 16:34

## 2018-09-17 RX ADMIN — Medication 30 MILLIGRAM(S): at 15:58

## 2018-09-17 NOTE — ED PROVIDER NOTE - OBJECTIVE STATEMENT
43 yo F with hx of migraines, DM, presents for evaluation of shortness of breath, onset PTA, after received contrast for an outpatient MR, associated with headache. NO nausea, no vomiting, no chest pain, no back pain, no abdominal apin, no chills, no pallor, no jaundice. Per EMS, patient was given tylenol at the outpatient radiology with little relief. Per family, patient's face was swollen but has not gone down to normal. No other symptoms reported

## 2018-09-17 NOTE — ED PROVIDER NOTE - MEDICAL DECISION MAKING DETAILS
41 Y/O F H/O MIGRAINE HAS, DM PRESENTED WITH HEADACHE AFTER AN OUTPT MRI. NEURO EXAM NONFOCAL. PTS HEADACHE IMPROVED WITH TREATMENT IN THE ED. PT DISCHARGED TO HOME WITH FAMILY. PT GIVEN STRICT RETURN INSTRUCTIONS AND VERBALIZED UNDERSTANDING.

## 2018-09-17 NOTE — ED PROVIDER NOTE - PROGRESS NOTE DETAILS
ATTENDING NOTE: 43 y/o female with hx of migraines. Was receiving out-patient MRI today for headaches. After receiving contrast developed shortness of breath and worsening H/A. No urticaria. O/E: No respiratory distress. No pallor, no jaundice. Throat clear - no uvular edema. Neck supple, no meningeal signs. Lungs CTA b/l. No wheezes, no stridor. S1 S2 regular, no murmur. ABD soft, no tenderness. No pedal edema, no calf tenderness. No skin rash. No neurologic deficits.   Imp: Possible allergic reaction to gadolinium. Possible side effect - apparently other patients today had similar symptoms. No sign of PE.  A/P: Nebulizers, anti-histamines. Will re-assess. Patient doing well, will give medications and reassess, no sob at this time s/o Dr. Wallis. PT SIGNED OUT TO ME BY DR. RUSHING, REASSESS AND DISPO. Patient's headache resolved, feeling much better. Discussed follow up with Dr. Saravia. Discussed fluids and motrin and tylenol for headache. Discussed signs and symptoms to return to the ED. Patient and family agree with discharge plan

## 2018-09-17 NOTE — ED ADULT NURSE REASSESSMENT NOTE - NS ED NURSE REASSESS COMMENT FT1
Patient continues to c/o headache. Will give bolus of NS and Reglan and reassess. Will continue to monitor

## 2018-09-17 NOTE — ED ADULT NURSE NOTE - CHIEF COMPLAINT QUOTE
BIBA for headache after having an MRI with IV contrast possible allergic reaction pt also had facial swelling PTA

## 2018-09-17 NOTE — ED ADULT NURSE NOTE - OBJECTIVE STATEMENT
Patient went for MRI of head today with IV contrast, patient states following MRI began feeling lightheaded, developed facial swelling (especially eyelids) and headache

## 2018-09-17 NOTE — ED ADULT TRIAGE NOTE - CHIEF COMPLAINT QUOTE
BIBA for headache after having an MRI with IV contrast BIBA for headache after having an MRI with IV contrast possible allergic reaction pt also had facial swelling PTA

## 2018-09-21 ENCOUNTER — APPOINTMENT (OUTPATIENT)
Dept: GASTROENTEROLOGY | Facility: CLINIC | Age: 43
End: 2018-09-21

## 2018-09-21 ENCOUNTER — OUTPATIENT (OUTPATIENT)
Dept: OUTPATIENT SERVICES | Facility: HOSPITAL | Age: 43
LOS: 1 days | Discharge: HOME | End: 2018-09-21

## 2018-09-21 VITALS
DIASTOLIC BLOOD PRESSURE: 84 MMHG | SYSTOLIC BLOOD PRESSURE: 127 MMHG | WEIGHT: 172 LBS | HEART RATE: 76 BPM | HEIGHT: 57 IN | BODY MASS INDEX: 37.11 KG/M2

## 2018-09-21 DIAGNOSIS — Z98.890 OTHER SPECIFIED POSTPROCEDURAL STATES: Chronic | ICD-10-CM

## 2018-09-25 ENCOUNTER — OUTPATIENT (OUTPATIENT)
Dept: OUTPATIENT SERVICES | Facility: HOSPITAL | Age: 43
LOS: 1 days | Discharge: HOME | End: 2018-09-25

## 2018-09-25 ENCOUNTER — APPOINTMENT (OUTPATIENT)
Dept: NEUROLOGY | Facility: CLINIC | Age: 43
End: 2018-09-25

## 2018-09-25 VITALS
WEIGHT: 173 LBS | HEART RATE: 77 BPM | DIASTOLIC BLOOD PRESSURE: 80 MMHG | HEIGHT: 57 IN | BODY MASS INDEX: 37.32 KG/M2 | TEMPERATURE: 98 F | SYSTOLIC BLOOD PRESSURE: 110 MMHG

## 2018-09-25 DIAGNOSIS — Z98.890 OTHER SPECIFIED POSTPROCEDURAL STATES: Chronic | ICD-10-CM

## 2018-10-02 ENCOUNTER — APPOINTMENT (OUTPATIENT)
Dept: INTERNAL MEDICINE | Facility: CLINIC | Age: 43
End: 2018-10-02

## 2018-10-02 ENCOUNTER — OUTPATIENT (OUTPATIENT)
Dept: OUTPATIENT SERVICES | Facility: HOSPITAL | Age: 43
LOS: 1 days | Discharge: HOME | End: 2018-10-02

## 2018-10-02 VITALS
HEIGHT: 57 IN | WEIGHT: 171 LBS | BODY MASS INDEX: 36.89 KG/M2 | DIASTOLIC BLOOD PRESSURE: 70 MMHG | HEART RATE: 105 BPM | SYSTOLIC BLOOD PRESSURE: 102 MMHG

## 2018-10-02 DIAGNOSIS — Z98.890 OTHER SPECIFIED POSTPROCEDURAL STATES: Chronic | ICD-10-CM

## 2018-10-02 RX ORDER — POLYETHYLENE GLYCOL 3350 AND ELECTROLYTES WITH LEMON FLAVOR 236; 22.74; 6.74; 5.86; 2.97 G/4L; G/4L; G/4L; G/4L; G/4L
236 POWDER, FOR SOLUTION ORAL
Qty: 1 | Refills: 0 | Status: DISCONTINUED | COMMUNITY
Start: 2017-11-24 | End: 2018-10-02

## 2018-10-02 RX ORDER — SENNOSIDES 8.6 MG TABLETS 8.6 MG/1
8.6 TABLET ORAL TWICE DAILY
Qty: 60 | Refills: 2 | Status: DISCONTINUED | COMMUNITY
Start: 2017-11-24 | End: 2018-10-02

## 2018-10-02 RX ORDER — OMEPRAZOLE 40 MG/1
40 CAPSULE, DELAYED RELEASE ORAL DAILY
Qty: 30 | Refills: 3 | Status: DISCONTINUED | COMMUNITY
Start: 2018-08-15 | End: 2018-10-02
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
## 2018-10-02 NOTE — REVIEW OF SYSTEMS
[Fatigue] : fatigue [Vision Problems] : vision problems [Dyspnea on Exertion] : dyspnea on exertion [Nausea] : nausea [Heartburn] : heartburn [Itching] : Itching [Headache] : headache [Dizziness] : dizziness [Memory Loss] : memory loss [Negative] : Heme/Lymph [Discharge] : no discharge [Pain] : no pain [Redness] : no redness [Dryness] : no dryness  [Itching] : no itching [Shortness Of Breath] : no shortness of breath [Wheezing] : no wheezing [Cough] : no cough [Abdominal Pain] : no abdominal pain [Constipation] : no constipation [Diarrhea] : diarrhea [Vomiting] : no vomiting [Melena] : no melena [Mole Changes] : no mole changes [Nail Changes] : no nail changes [Hair Changes] : no hair changes [Skin Rash] : no skin rash [Fainting] : no fainting [Confusion] : no confusion [Unsteady Walking] : no ataxia [FreeTextEntry3] : blurry vision after starting keppra [de-identified] : itching after starting the keppra [de-identified] : everything started after the medicine

## 2018-10-02 NOTE — HISTORY OF PRESENT ILLNESS
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 In 2017, the pt had divericulitis, concern for metastatic dz following WILBERT/BSO and omenectomy in 2017. The pt had a colonoscopy 3 years ago which revealed hemoorhoids. She had a liver bx 2/2 lesion seen on CT A/P which showed benign lymphangioma.  recently had MRI of the brain w/ and w/o w/reaction to SYED, saw her neurologist Dr. Bui, who started her on keppra 500 mg bid for a possible LRE. The patient is scheduled for a VEEG in -patient in the next couple of weeks.

## 2018-10-03 DIAGNOSIS — G40.909 EPILEPSY, UNSPECIFIED, NOT INTRACTABLE, WITHOUT STATUS EPILEPTICUS: ICD-10-CM

## 2018-10-03 DIAGNOSIS — E11.65 TYPE 2 DIABETES MELLITUS WITH HYPERGLYCEMIA: ICD-10-CM

## 2018-10-03 DIAGNOSIS — C56.9 MALIGNANT NEOPLASM OF UNSPECIFIED OVARY: ICD-10-CM

## 2018-10-04 ENCOUNTER — LABORATORY RESULT (OUTPATIENT)
Age: 43
End: 2018-10-04

## 2018-10-04 ENCOUNTER — OUTPATIENT (OUTPATIENT)
Dept: OUTPATIENT SERVICES | Facility: HOSPITAL | Age: 43
LOS: 1 days | Discharge: HOME | End: 2018-10-04

## 2018-10-04 ENCOUNTER — APPOINTMENT (OUTPATIENT)
Dept: HEMATOLOGY ONCOLOGY | Facility: CLINIC | Age: 43
End: 2018-10-04

## 2018-10-04 VITALS
SYSTOLIC BLOOD PRESSURE: 110 MMHG | DIASTOLIC BLOOD PRESSURE: 85 MMHG | HEART RATE: 86 BPM | HEIGHT: 57 IN | WEIGHT: 172 LBS | BODY MASS INDEX: 37.11 KG/M2 | RESPIRATION RATE: 14 BRPM | TEMPERATURE: 96.7 F

## 2018-10-04 DIAGNOSIS — D39.11 NEOPLASM OF UNCERTAIN BEHAVIOR OF RIGHT OVARY: ICD-10-CM

## 2018-10-04 DIAGNOSIS — Z15.89 GENETIC SUSCEPTIBILITY TO OTHER DISEASE: ICD-10-CM

## 2018-10-04 DIAGNOSIS — Z98.890 OTHER SPECIFIED POSTPROCEDURAL STATES: Chronic | ICD-10-CM

## 2018-10-05 LAB
ALBUMIN SERPL ELPH-MCNC: 4.7 G/DL
ALP BLD-CCNC: 150 U/L
ALT SERPL-CCNC: 51 U/L
ANION GAP SERPL CALC-SCNC: 16 MMOL/L
AST SERPL-CCNC: 35 U/L
BILIRUB SERPL-MCNC: 0.3 MG/DL
BUN SERPL-MCNC: 20 MG/DL
CALCIUM SERPL-MCNC: 10.2 MG/DL
CANCER AG125 SERPL-ACNC: 17 U/ML
CHLORIDE SERPL-SCNC: 104 MMOL/L
CO2 SERPL-SCNC: 25 MMOL/L
CREAT SERPL-MCNC: 0.5 MG/DL
GLUCOSE SERPL-MCNC: 91 MG/DL
HCT VFR BLD CALC: 41.1 %
HGB BLD-MCNC: 13.6 G/DL
MCHC RBC-ENTMCNC: 28.9 PG
MCHC RBC-ENTMCNC: 33.1 G/DL
MCV RBC AUTO: 87.4 FL
PLATELET # BLD AUTO: 310 K/UL
PMV BLD: 10.6 FL
POTASSIUM SERPL-SCNC: 4.1 MMOL/L
PROT SERPL-MCNC: 7.4 G/DL
RBC # BLD: 4.7 M/UL
RBC # FLD: 13.4 %
SODIUM SERPL-SCNC: 145 MMOL/L
WBC # FLD AUTO: 7.67 K/UL

## 2018-10-09 ENCOUNTER — APPOINTMENT (OUTPATIENT)
Dept: NEUROLOGY | Facility: CLINIC | Age: 43
End: 2018-10-09

## 2018-10-09 ENCOUNTER — OUTPATIENT (OUTPATIENT)
Dept: OUTPATIENT SERVICES | Facility: HOSPITAL | Age: 43
LOS: 1 days | Discharge: HOME | End: 2018-10-09

## 2018-10-09 VITALS
HEIGHT: 57 IN | WEIGHT: 176 LBS | HEART RATE: 73 BPM | SYSTOLIC BLOOD PRESSURE: 119 MMHG | DIASTOLIC BLOOD PRESSURE: 82 MMHG | BODY MASS INDEX: 37.97 KG/M2

## 2018-10-09 DIAGNOSIS — Z98.890 OTHER SPECIFIED POSTPROCEDURAL STATES: Chronic | ICD-10-CM

## 2018-10-09 DIAGNOSIS — C56.9 MALIGNANT NEOPLASM OF UNSPECIFIED OVARY: ICD-10-CM

## 2018-10-10 ENCOUNTER — RESULT REVIEW (OUTPATIENT)
Age: 43
End: 2018-10-10

## 2018-10-10 ENCOUNTER — OUTPATIENT (OUTPATIENT)
Dept: OUTPATIENT SERVICES | Facility: HOSPITAL | Age: 43
LOS: 1 days | Discharge: HOME | End: 2018-10-10

## 2018-10-10 VITALS
HEART RATE: 84 BPM | DIASTOLIC BLOOD PRESSURE: 74 MMHG | TEMPERATURE: 98 F | SYSTOLIC BLOOD PRESSURE: 102 MMHG | HEIGHT: 56 IN | WEIGHT: 171.96 LBS | RESPIRATION RATE: 18 BRPM

## 2018-10-10 VITALS — SYSTOLIC BLOOD PRESSURE: 107 MMHG | HEART RATE: 77 BPM | RESPIRATION RATE: 18 BRPM | DIASTOLIC BLOOD PRESSURE: 58 MMHG

## 2018-10-10 DIAGNOSIS — Z98.890 OTHER SPECIFIED POSTPROCEDURAL STATES: Chronic | ICD-10-CM

## 2018-10-10 LAB — GLUCOSE BLDC GLUCOMTR-MCNC: 101 MG/DL — HIGH (ref 70–99)

## 2018-10-10 NOTE — H&P ADULT - NSHPPHYSICALEXAM_GEN_ALL_CORE
PHYSICAL EXAM:   Vital Signs:  Vital Signs Last 24 Hrs  T(C): 36.8 (10 Oct 2018 12:51), Max: 36.8 (10 Oct 2018 12:51)  T(F): 98.2 (10 Oct 2018 12:51), Max: 98.2 (10 Oct 2018 12:51)  HR: 84 (10 Oct 2018 12:51) (84 - 84)  BP: 102/74 (10 Oct 2018 12:51) (102/74 - 102/74)  BP(mean): --  RR: 18 (10 Oct 2018 12:51) (18 - 18)  SpO2: --  Daily Height in cm: 142.24 (10 Oct 2018 12:51)    Daily     GENERAL:  Appears stated age, well-groomed, well-nourished, no distress  HEENT:  NC/AT,  conjunctivae clear and pink, no thyromegaly, nodules, adenopathy, no JVD, sclera -anicteric  CHEST:  Full & symmetric excursion, no increased effort, breath sounds clear  HEART:  Regular rhythm, S1, S2, no murmur/rub/S3/S4, no abdominal bruit, no edema  ABDOMEN:  Soft, non-tender, non-distended, normoactive bowel sounds,  no masses ,no hepato-splenomegaly, no signs of chronic liver disease  EXTEREMITIES:  no cyanosis,clubbing or edema  SKIN:  No rash/erythema/ecchymoses/petechiae/wounds/abscess/warm/dry  NEURO:  Alert, oriented, no asterixis, no tremor, no encephalopathy

## 2018-10-10 NOTE — ASU DISCHARGE PLAN (ADULT/PEDIATRIC). - NOTIFY
Bleeding that does not stop/Persistent Nausea and Vomiting/Excessive Diarrhea/Pain not relieved by Medications/Fever greater than 101/Inability to Tolerate Liquids or Foods

## 2018-10-10 NOTE — H&P ADULT - ASSESSMENT
42 year old female history of ovarian cancer s/p WILBERT and BSO is here for EGD and EUS for screening for pancreatic cancer, since patient was found to have CKDN2A mutation.

## 2018-10-10 NOTE — ASU DISCHARGE PLAN (ADULT/PEDIATRIC). - ITEMS TO FOLLOWUP WITH YOUR PHYSICIAN'S
follow up on biopsy results, follow up in GI clinic  Reschedule the EUS after being 24 hours on clear liquid diet.

## 2018-10-11 LAB — SURGICAL PATHOLOGY STUDY: SIGNIFICANT CHANGE UP

## 2018-10-15 DIAGNOSIS — B96.81 HELICOBACTER PYLORI [H. PYLORI] AS THE CAUSE OF DISEASES CLASSIFIED ELSEWHERE: ICD-10-CM

## 2018-10-15 DIAGNOSIS — I10 ESSENTIAL (PRIMARY) HYPERTENSION: ICD-10-CM

## 2018-10-15 DIAGNOSIS — E78.00 PURE HYPERCHOLESTEROLEMIA, UNSPECIFIED: ICD-10-CM

## 2018-10-15 DIAGNOSIS — Z15.09 GENETIC SUSCEPTIBILITY TO OTHER MALIGNANT NEOPLASM: ICD-10-CM

## 2018-10-15 DIAGNOSIS — K29.50 UNSPECIFIED CHRONIC GASTRITIS WITHOUT BLEEDING: ICD-10-CM

## 2018-10-15 DIAGNOSIS — E11.9 TYPE 2 DIABETES MELLITUS WITHOUT COMPLICATIONS: ICD-10-CM

## 2018-10-16 ENCOUNTER — OUTPATIENT (OUTPATIENT)
Dept: OUTPATIENT SERVICES | Facility: HOSPITAL | Age: 43
LOS: 1 days | Discharge: HOME | End: 2018-10-16

## 2018-10-16 ENCOUNTER — LABORATORY RESULT (OUTPATIENT)
Age: 43
End: 2018-10-16

## 2018-10-16 ENCOUNTER — INPATIENT (INPATIENT)
Facility: HOSPITAL | Age: 43
LOS: 2 days | Discharge: HOME | End: 2018-10-19
Attending: HOSPITALIST | Admitting: HOSPITALIST

## 2018-10-16 VITALS
DIASTOLIC BLOOD PRESSURE: 74 MMHG | RESPIRATION RATE: 18 BRPM | WEIGHT: 174.17 LBS | SYSTOLIC BLOOD PRESSURE: 124 MMHG | HEIGHT: 56 IN | HEART RATE: 73 BPM | TEMPERATURE: 96 F

## 2018-10-16 DIAGNOSIS — Z98.890 OTHER SPECIFIED POSTPROCEDURAL STATES: Chronic | ICD-10-CM

## 2018-10-16 DIAGNOSIS — Z32.00 ENCOUNTER FOR PREGNANCY TEST, RESULT UNKNOWN: ICD-10-CM

## 2018-10-16 LAB
ALBUMIN SERPL ELPH-MCNC: 4.4 G/DL — SIGNIFICANT CHANGE UP (ref 3.5–5.2)
ALP SERPL-CCNC: 155 U/L — HIGH (ref 30–115)
ALT FLD-CCNC: 54 U/L — HIGH (ref 0–41)
ANION GAP SERPL CALC-SCNC: 12 MMOL/L — SIGNIFICANT CHANGE UP (ref 7–14)
AST SERPL-CCNC: 31 U/L — SIGNIFICANT CHANGE UP (ref 0–41)
BASOPHILS # BLD AUTO: 0.02 K/UL — SIGNIFICANT CHANGE UP (ref 0–0.2)
BASOPHILS NFR BLD AUTO: 0.3 % — SIGNIFICANT CHANGE UP (ref 0–1)
BILIRUB SERPL-MCNC: <0.2 MG/DL — SIGNIFICANT CHANGE UP (ref 0.2–1.2)
BUN SERPL-MCNC: 15 MG/DL — SIGNIFICANT CHANGE UP (ref 10–20)
CALCIUM SERPL-MCNC: 9.4 MG/DL — SIGNIFICANT CHANGE UP (ref 8.5–10.1)
CARBAMAZEPINE SERPL-MCNC: 6.8 UG/ML — SIGNIFICANT CHANGE UP (ref 4–12)
CHLORIDE SERPL-SCNC: 103 MMOL/L — SIGNIFICANT CHANGE UP (ref 98–110)
CO2 SERPL-SCNC: 27 MMOL/L — SIGNIFICANT CHANGE UP (ref 17–32)
CREAT SERPL-MCNC: 0.5 MG/DL — LOW (ref 0.7–1.5)
EOSINOPHIL # BLD AUTO: 0.19 K/UL — SIGNIFICANT CHANGE UP (ref 0–0.7)
EOSINOPHIL NFR BLD AUTO: 3.2 % — SIGNIFICANT CHANGE UP (ref 0–8)
GLUCOSE BLDC GLUCOMTR-MCNC: 100 MG/DL — HIGH (ref 70–99)
GLUCOSE BLDC GLUCOMTR-MCNC: 112 MG/DL — HIGH (ref 70–99)
GLUCOSE BLDC GLUCOMTR-MCNC: 130 MG/DL — HIGH (ref 70–99)
GLUCOSE SERPL-MCNC: 160 MG/DL — HIGH (ref 70–99)
HCT VFR BLD CALC: 40 % — SIGNIFICANT CHANGE UP (ref 37–47)
HGB BLD-MCNC: 12.9 G/DL — SIGNIFICANT CHANGE UP (ref 12–16)
IMM GRANULOCYTES NFR BLD AUTO: 0.3 % — SIGNIFICANT CHANGE UP (ref 0.1–0.3)
LYMPHOCYTES # BLD AUTO: 2.71 K/UL — SIGNIFICANT CHANGE UP (ref 1.2–3.4)
LYMPHOCYTES # BLD AUTO: 46.2 % — SIGNIFICANT CHANGE UP (ref 20.5–51.1)
MAGNESIUM SERPL-MCNC: 2.1 MG/DL — SIGNIFICANT CHANGE UP (ref 1.8–2.4)
MCHC RBC-ENTMCNC: 28.7 PG — SIGNIFICANT CHANGE UP (ref 27–31)
MCHC RBC-ENTMCNC: 32.3 G/DL — SIGNIFICANT CHANGE UP (ref 32–37)
MCV RBC AUTO: 88.9 FL — SIGNIFICANT CHANGE UP (ref 81–99)
MONOCYTES # BLD AUTO: 0.35 K/UL — SIGNIFICANT CHANGE UP (ref 0.1–0.6)
MONOCYTES NFR BLD AUTO: 6 % — SIGNIFICANT CHANGE UP (ref 1.7–9.3)
NEUTROPHILS # BLD AUTO: 2.57 K/UL — SIGNIFICANT CHANGE UP (ref 1.4–6.5)
NEUTROPHILS NFR BLD AUTO: 44 % — SIGNIFICANT CHANGE UP (ref 42.2–75.2)
NRBC # BLD: 0 /100 WBCS — SIGNIFICANT CHANGE UP (ref 0–0)
PHOSPHATE SERPL-MCNC: 4.3 MG/DL — SIGNIFICANT CHANGE UP (ref 2.1–4.9)
PLATELET # BLD AUTO: 335 K/UL — SIGNIFICANT CHANGE UP (ref 130–400)
POTASSIUM SERPL-MCNC: 4.2 MMOL/L — SIGNIFICANT CHANGE UP (ref 3.5–5)
POTASSIUM SERPL-SCNC: 4.2 MMOL/L — SIGNIFICANT CHANGE UP (ref 3.5–5)
PROT SERPL-MCNC: 7.1 G/DL — SIGNIFICANT CHANGE UP (ref 6–8)
RBC # BLD: 4.5 M/UL — SIGNIFICANT CHANGE UP (ref 4.2–5.4)
RBC # FLD: 13.5 % — SIGNIFICANT CHANGE UP (ref 11.5–14.5)
SODIUM SERPL-SCNC: 142 MMOL/L — SIGNIFICANT CHANGE UP (ref 135–146)
WBC # BLD: 5.86 K/UL — SIGNIFICANT CHANGE UP (ref 4.8–10.8)
WBC # FLD AUTO: 5.86 K/UL — SIGNIFICANT CHANGE UP (ref 4.8–10.8)

## 2018-10-16 RX ORDER — OMEPRAZOLE 10 MG/1
1 CAPSULE, DELAYED RELEASE ORAL
Qty: 0 | Refills: 0 | COMMUNITY

## 2018-10-16 RX ORDER — LEVETIRACETAM 250 MG/1
1 TABLET, FILM COATED ORAL
Qty: 0 | Refills: 0 | COMMUNITY

## 2018-10-16 RX ORDER — DEXTROSE 50 % IN WATER 50 %
25 SYRINGE (ML) INTRAVENOUS ONCE
Qty: 0 | Refills: 0 | Status: DISCONTINUED | OUTPATIENT
Start: 2018-10-16 | End: 2018-10-19

## 2018-10-16 RX ORDER — SODIUM CHLORIDE 9 MG/ML
1000 INJECTION, SOLUTION INTRAVENOUS
Qty: 0 | Refills: 0 | Status: DISCONTINUED | OUTPATIENT
Start: 2018-10-16 | End: 2018-10-19

## 2018-10-16 RX ORDER — NICOTINE POLACRILEX 2 MG
1 GUM BUCCAL DAILY
Qty: 0 | Refills: 0 | Status: DISCONTINUED | OUTPATIENT
Start: 2018-10-16 | End: 2018-10-19

## 2018-10-16 RX ORDER — PANTOPRAZOLE SODIUM 20 MG/1
40 TABLET, DELAYED RELEASE ORAL
Qty: 0 | Refills: 0 | Status: DISCONTINUED | OUTPATIENT
Start: 2018-10-16 | End: 2018-10-19

## 2018-10-16 RX ORDER — HEPARIN SODIUM 5000 [USP'U]/ML
5000 INJECTION INTRAVENOUS; SUBCUTANEOUS EVERY 12 HOURS
Qty: 0 | Refills: 0 | Status: DISCONTINUED | OUTPATIENT
Start: 2018-10-16 | End: 2018-10-19

## 2018-10-16 RX ORDER — DEXTROSE 50 % IN WATER 50 %
15 SYRINGE (ML) INTRAVENOUS ONCE
Qty: 0 | Refills: 0 | Status: DISCONTINUED | OUTPATIENT
Start: 2018-10-16 | End: 2018-10-19

## 2018-10-16 RX ORDER — ATORVASTATIN CALCIUM 80 MG/1
20 TABLET, FILM COATED ORAL AT BEDTIME
Qty: 0 | Refills: 0 | Status: DISCONTINUED | OUTPATIENT
Start: 2018-10-16 | End: 2018-10-19

## 2018-10-16 RX ORDER — GLUCAGON INJECTION, SOLUTION 0.5 MG/.1ML
1 INJECTION, SOLUTION SUBCUTANEOUS ONCE
Qty: 0 | Refills: 0 | Status: DISCONTINUED | OUTPATIENT
Start: 2018-10-16 | End: 2018-10-19

## 2018-10-16 RX ORDER — SENNA PLUS 8.6 MG/1
1 TABLET ORAL
Qty: 0 | Refills: 0 | COMMUNITY

## 2018-10-16 RX ORDER — DEXTROSE 50 % IN WATER 50 %
12.5 SYRINGE (ML) INTRAVENOUS ONCE
Qty: 0 | Refills: 0 | Status: DISCONTINUED | OUTPATIENT
Start: 2018-10-16 | End: 2018-10-19

## 2018-10-16 RX ORDER — INFLUENZA VIRUS VACCINE 15; 15; 15; 15 UG/.5ML; UG/.5ML; UG/.5ML; UG/.5ML
0.5 SUSPENSION INTRAMUSCULAR ONCE
Qty: 0 | Refills: 0 | Status: COMPLETED | OUTPATIENT
Start: 2018-10-16 | End: 2018-10-19

## 2018-10-16 RX ORDER — INSULIN LISPRO 100/ML
VIAL (ML) SUBCUTANEOUS
Qty: 0 | Refills: 0 | Status: DISCONTINUED | OUTPATIENT
Start: 2018-10-16 | End: 2018-10-19

## 2018-10-16 RX ORDER — CARBAMAZEPINE 200 MG
200 TABLET ORAL
Qty: 0 | Refills: 0 | Status: DISCONTINUED | OUTPATIENT
Start: 2018-10-16 | End: 2018-10-17

## 2018-10-16 RX ORDER — SIMVASTATIN 20 MG/1
1 TABLET, FILM COATED ORAL
Qty: 0 | Refills: 0 | COMMUNITY

## 2018-10-16 RX ADMIN — ATORVASTATIN CALCIUM 20 MILLIGRAM(S): 80 TABLET, FILM COATED ORAL at 21:28

## 2018-10-16 RX ADMIN — Medication 200 MILLIGRAM(S): at 17:51

## 2018-10-16 NOTE — CONSULT NOTE ADULT - CONSULT REASON
VEEG for better characterization and assessment VEEG for better characterization of the events  and treatment plan

## 2018-10-16 NOTE — H&P ADULT - HISTORY OF PRESENT ILLNESS
43 y/o F DM, Seizures and GERD presesnted for VEEG monitoring. Pt states that she has been waking up with seizure like activity and having nausea in the am. Pt states that she is awake during these episodes and remebers everything, She says she remembers the shaking and feels nauseous after. Denies any fevers, chills, ha, cp or sob. No Urinary sx after episodes.

## 2018-10-16 NOTE — CONSULT NOTE ADULT - ATTENDING COMMENTS
agree with the history and plan.  The history is not suggestive of an epileptic event  will stat the monitoring  Seizure precaution

## 2018-10-16 NOTE — H&P ADULT - ASSESSMENT
41 y/o F DM, Seizures and GERD presesnted for VEEG monitoring. Pt states that she has been waking up with seizure like activity and having nausea in the am. Pt states that she is awake during these episodes and remebers everything, She says she remembers the shaking and feels nauseous after. Denies any fevers, chills, ha, cp or sob. No Urinary sx after episodes.     Seizure like disorder  unlikely seizures as pt remembers episodes no post ictal states.   VEEG as per Neuro  Continue AED meds. Meds reviewed with Neuro  SZR precautions    Hx of suicidal ideations: Non currently  Neuro recommending Psych consult as pt dos have recent hx of suicidal attempt. neuro would like psych to establish care as she has not followed up.     DM  pt on trulicity at home  will place on SS here, fu fsbg if long acting needed will add    GERD- continue ppi    DVT PPX- hep sq

## 2018-10-16 NOTE — CONSULT NOTE ADULT - SUBJECTIVE AND OBJECTIVE BOX
Neurology/Epilepsy Consult:    ALFIE MOSLEY 42y Female  MRN-[id number]    Patient is a 42y old right-handed Female who presents for elective VEEG monitoring    HPI: History obtained from patient and son  Patient states about 18 months ago she started waking up with a sense of nausea, followed by a headache/dizziness and "body shaking). The shaking is described as shivering-like movements of the entire body, not associated with LOC, lasting up to 2 min. Patient remembers every second of it, and feels better in a few minutes. Gradually these episodes became more frequent, and by the Summer 2018 were happening every morning. Patient was evaluated at [facility] neurology clinic, had REEG and MRI done. At the beginning of September the frequency of the events increased to twice daily (on awakening and prior to falling asleep) During clinic follow on 2018 patient also reported waking up twice in the prior month with tongue bite on the left side, and was started on Keppra 500mg q12hrs plus referred for VEEG admission. Keppra was discontinued after 2 weeks due to c/o weakness, insomnia,  and dizziness. Last week patient was started on Tegretol that she continues taking. Patient c/o dizziness and weakness after every dose of Tegretol. States the typical events happen every morning, but are not associated with shaking any longer, just gets dizziness and headache that lasts for few minutes.  Patient has h/o head trauma with LOC that she sustained in  as a result of a suicide attempt (patient jumped on the train tracks). Patient was hospitalized at [facility] for 2 wks with pelvic fracture, denies any bleeding in the brain.   Patient does not follow up with psychiatrist, never had a therapist. Patient and son state they were trying to find a Occitan-speaking therapist, but were not successful. Patient denies any suicidal thought at this time, reports feeling a lot better since her children moved to the [place].         PAST MEDICAL & SURGICAL HISTORY:  Headaches  Ovarian cancer  DM  DLD  GERD  Suicide attempt   s/p hysterectomy/oophorectomy 2017  Liver biopsy 3/2018  History of cholecystectomy 10-15 years ago        FAMILY HISTORY:  HTN (Mother)  Hypercholesteremia (Mother)  Diabetes (Father, Mother)        Social History:   Lives with children  Smoking several sig/day  ETOH on weekend (3-4 drinks)  Works as HHA and cleaning      Risk factors:  Born without complications  Normal growth and development  No febrile seizures/CNS infections  Head trauma with LOC        Allergy:  No Known Allergies        Home Medications:  Tegretol 200mg q12hrs  Lipitor 20mg QHS  multivitamin: 1 tab(s) orally once a day (10 Oct 2018 13:02)  omeprazole 40 mg oral delayed release capsule: 1 cap(s) orally once a day (16 Oct 2018 12:12)  Trulicity Pen 0.75 mg/0.5 mL subcutaneous solution: 4 unit(s) subcutaneous once a week ON  (10 Oct 2018 13:02)  Tylenol PRN      MEDICATIONS  (STANDING):  atorvastatin 20 milliGRAM(s) Oral at bedtime  carBAMazepine 200 milliGRAM(s) Oral two times a day  dextrose 5%. 1000 milliLiter(s) (50 mL/Hr) IV Continuous <Continuous>  dextrose 50% Injectable 12.5 Gram(s) IV Push once  dextrose 50% Injectable 25 Gram(s) IV Push once  dextrose 50% Injectable 25 Gram(s) IV Push once  heparin  Injectable 5000 Unit(s) SubCutaneous every 12 hours  insulin lispro (HumaLOG) corrective regimen sliding scale   SubCutaneous three times a day before meals  pantoprazole    Tablet 40 milliGRAM(s) Oral before breakfast    MEDICATIONS  (PRN):  dextrose 40% Gel 15 Gram(s) Oral once PRN Blood Glucose LESS THAN 70 milliGRAM(s)/deciliter  glucagon  Injectable 1 milliGRAM(s) IntraMuscular once PRN Glucose LESS THAN 70 milligrams/deciliter  LORazepam   Injectable 2 milliGRAM(s) IV Push once PRN generalized tonic-clonic seizure lasting longer than 2 minutes        T(F): 96 (10-16-18 @ 12:10), Max: 96 (10-16-18 @ 12:10)  HR: 73 (10-16-18 @ 12:10) (73 - 73)  BP: 124/74 (10-16-18 @ 12:10) (124/74 - 124/74)  RR: 18 (10-16-18 @ 12:10) (18 - 18)  SpO2: --      Neurologic Examination:  General:  Appearance is consistent with chronologic age.  No abnormal facies.   General: The patient is oriented to person, place, time and date.  Recent and remote memory intact.  Follows 4-step directions. Language with normal repetition, comprehension and naming.    Cranial nerves: EOMI w/o nystagmus, skew or reported double vision.  PERRL.  No ptosis/weakness of eyelid closure.  Facial sensation is normal with normal bite.  No facial asymmetry.  Hearing grossly intact b/l.  Palate elevates midline.  Tongue midline.  Motor examination:   Normal tone, bulk and range of motion.  No tenderness, twitching, tremors or involuntary movements.  Formal Muscle Strength Testin/5 UE; 5/5 LE.  No observable drift.  Reflexes:   2+ b/l pectoralis, biceps, triceps, brachioradialis, patella and Achilles.    Sensory examination:   Intact to light touch and pinprick, pain.  Cerebellum:   FTN/HKS intact with normal MIQUEL in all limbs.  No dysmetria or dysdiadokinesia.  Gait narrow based and normal.        Neuroimaging:  MRI Brain:   < from: MR Head w/wo IV Cont (18 @ 13:41) >  EXAM:  MR BRAIN WAW IC          PROCEDURE DATE:  2018      INTERPRETATION:  Clinical History / Reason for exam: Shaking.     Technique: Sagittal and axial precontrast T1-weighted images, axial   T2-weighted images, axial FLAIR images,axial gradient echo images, axial   diffusion weighted images, and sagittal, axial and coronal postcontrast   T1-weighted images of the brain were obtained on the 1.5 Bruna magnet   after the intravenous administration of 7 cc of gadolinium contrast.0.5   cc of contrast was discarded.    Comparison: None available at this time.      Findings: The ventricles, basal cisterns and sulcal pattern are slightly   prominent consistent with parenchymal volume loss.  There are scattered   patchy and punctate foci of T2 and FLAIR hyperintensities in the   periventricular and subcortical white matter which are nonspecific and   without mass effect which may represent chronic small vessel ischemic   changes, demyelination or gliosis in a patient of this stated age.  There   is no acute mass effect, midline shift, hemorrhage or abnormal   enhancement.  No extra axial fluid collections are identified.    There are no acute infarcts on diffusion weighted images.  Expected   signal flow voids are noted in the major intracranial vessels consistent   with their patency.    Globes and orbits are grossly within normal limits.  The paranasal   sinuses and bilateral mastoid complexes are within normal limits.     There is no bone marrow signal abnormality. The sella is unremarkable.      IMPRESSION:    1.  Scattered T2 and FLAIR hyperintensities periventricular and   subcortical white matter which are nonspecific and without mass effect   which may represent chronic small vessel ischemic changes, demyelination   or gliosis in a patient of this stated age.    2.  No abnormal enhancement of any of the white matter lesions.    3.  No acute infarcts or intracranial hemorrhage.    TEJAS HAUSER M.D., ATTENDING RADIOLOGIST  This document has been electronically signed. Sep 17 2018  2:48PM    < end of copied text >        REEG 2018 - normal       Assessment:  This is a 42y Female with h/o head trauma, headaches, ovarian cancer, DM, DLD, and possible seizures. Patient was started on AED recently at [facility].      Discussed with Dr. Costa      Plan:   - VEEG for better characterization and assessment  - Seizure precautions  - Continue current dose of Tegretol 200mg q12hrs for now (ordered)  - Ativan 2mg IV PRN for generalized tonic-clonic seizure lasting longer than 2 minutes, or two consecutive seizures without return to baseline in-between (ordered)  - CBC, CMP, Mg, AED levels trough (ordered)  - Keep Mg above 2  - Consider psychiatry evaluation    Plan discussed with patient and son in details, all questions answered    Discussed with hospitalist Dr. Caldera Neurology/Epilepsy Consult:    ALFIE MOSLEY 42y Female  MRN-[id number]    Patient is a 42y old right-handed Female who presents for elective VEEG monitoring    HPI: History obtained from patient and son  Patient states about 18 months ago she started waking up with a sense of nausea, followed by a headache/dizziness and "body shaking." The shaking is described as shivering-like movements of the entire body, not associated with LOC, lasting up to 2 min. Patient remembers every second of it, and feels better in a few minutes. Gradually these episodes became more frequent, and by the Summer 2018 were happening every morning. Patient was evaluated at [facility] neurology clinic, had REEG and MRI done. At the beginning of September the frequency of the events increased to twice daily (on awakening and prior to falling asleep) During clinic follow on 2018 patient also reported waking up twice in the prior month with tongue bite on the left side, and was started on Keppra 500mg q12hrs plus referred for VEEG admission. Keppra was discontinued after 2 weeks due to c/o weakness, insomnia,  and dizziness. Last week patient was started on Tegretol that she continues taking. Patient c/o dizziness and weakness after every dose of Tegretol. States the typical events happen every morning, but are not associated with shaking any longer, just gets dizziness and headache that lasts for few minutes.  Patient has h/o head trauma with LOC that she sustained in  as a result of a suicide attempt (patient jumped on the train tracks). Patient was hospitalized at [facility] for 2 wks with pelvic fracture, denies any bleeding in the brain.   Patient does not follow up with psychiatrist, never had a therapist. Patient and son state they were trying to find a French-speaking therapist, but were not successful. Patient denies any suicidal thought at this time, reports feeling a lot better since her children moved to the [place].         PAST MEDICAL & SURGICAL HISTORY:  Headaches  Ovarian cancer  DM  DLD  GERD  Suicide attempt   s/p hysterectomy/oophorectomy 2017  Liver biopsy 3/2018  History of cholecystectomy 10-15 years ago        FAMILY HISTORY:  HTN (Mother)  Hypercholesteremia (Mother)  Diabetes (Father, Mother)        Social History:   Lives with children  Smoking several sig/day  ETOH on weekend (3-4 drinks)  Works as HHA and cleaning      Risk factors:  Born without complications  Normal growth and development  No febrile seizures/CNS infections  Head trauma with LOC        Allergy:  No Known Allergies        Home Medications:  Tegretol 200mg q12hrs  Lipitor 20mg QHS  multivitamin: 1 tab(s) orally once a day (10 Oct 2018 13:02)  omeprazole 40 mg oral delayed release capsule: 1 cap(s) orally once a day (16 Oct 2018 12:12)  Trulicity Pen 0.75 mg/0.5 mL subcutaneous solution: 4 unit(s) subcutaneous once a week ON  (10 Oct 2018 13:02)  Tylenol PRN      MEDICATIONS  (STANDING):  atorvastatin 20 milliGRAM(s) Oral at bedtime  carBAMazepine 200 milliGRAM(s) Oral two times a day  dextrose 5%. 1000 milliLiter(s) (50 mL/Hr) IV Continuous <Continuous>  dextrose 50% Injectable 12.5 Gram(s) IV Push once  dextrose 50% Injectable 25 Gram(s) IV Push once  dextrose 50% Injectable 25 Gram(s) IV Push once  heparin  Injectable 5000 Unit(s) SubCutaneous every 12 hours  insulin lispro (HumaLOG) corrective regimen sliding scale   SubCutaneous three times a day before meals  pantoprazole    Tablet 40 milliGRAM(s) Oral before breakfast    MEDICATIONS  (PRN):  dextrose 40% Gel 15 Gram(s) Oral once PRN Blood Glucose LESS THAN 70 milliGRAM(s)/deciliter  glucagon  Injectable 1 milliGRAM(s) IntraMuscular once PRN Glucose LESS THAN 70 milligrams/deciliter  LORazepam   Injectable 2 milliGRAM(s) IV Push once PRN generalized tonic-clonic seizure lasting longer than 2 minutes        T(F): 96 (10-16-18 @ 12:10), Max: 96 (10-16-18 @ 12:10)  HR: 73 (10-16-18 @ 12:10) (73 - 73)  BP: 124/74 (10-16-18 @ 12:10) (124/74 - 124/74)  RR: 18 (10-16-18 @ 12:10) (18 - 18)  SpO2: --      Neurologic Examination:  General:  Appearance is consistent with chronologic age.  No abnormal facies.   General: The patient is oriented to person, place, time and date.  Recent and remote memory intact.  Follows 4-step directions. Language with normal repetition, comprehension and naming.    Cranial nerves: EOMI w/o nystagmus, skew or reported double vision.  PERRL.  No ptosis/weakness of eyelid closure.  Facial sensation is normal with normal bite.  No facial asymmetry.  Hearing grossly intact b/l.  Palate elevates midline.  Tongue midline.  Motor examination:   Normal tone, bulk and range of motion.  No tenderness, twitching, tremors or involuntary movements.  Formal Muscle Strength Testin/5 UE; 5/5 LE.  No observable drift.  Reflexes:   2+ b/l pectoralis, biceps, triceps, brachioradialis, patella and Achilles.    Sensory examination:   Intact to light touch and pinprick, pain.  Cerebellum:   FTN/HKS intact with normal MIQUEL in all limbs.  No dysmetria or dysdiadokinesia.  Gait narrow based and normal.        Neuroimaging:  MRI Brain:   < from: MR Head w/wo IV Cont (18 @ 13:41) >  EXAM:  MR BRAIN WAW IC          PROCEDURE DATE:  2018      INTERPRETATION:  Clinical History / Reason for exam: Shaking.     Technique: Sagittal and axial precontrast T1-weighted images, axial   T2-weighted images, axial FLAIR images,axial gradient echo images, axial   diffusion weighted images, and sagittal, axial and coronal postcontrast   T1-weighted images of the brain were obtained on the 1.5 Bruna magnet   after the intravenous administration of 7 cc of gadolinium contrast.0.5   cc of contrast was discarded.    Comparison: None available at this time.      Findings: The ventricles, basal cisterns and sulcal pattern are slightly   prominent consistent with parenchymal volume loss.  There are scattered   patchy and punctate foci of T2 and FLAIR hyperintensities in the   periventricular and subcortical white matter which are nonspecific and   without mass effect which may represent chronic small vessel ischemic   changes, demyelination or gliosis in a patient of this stated age.  There   is no acute mass effect, midline shift, hemorrhage or abnormal   enhancement.  No extra axial fluid collections are identified.    There are no acute infarcts on diffusion weighted images.  Expected   signal flow voids are noted in the major intracranial vessels consistent   with their patency.    Globes and orbits are grossly within normal limits.  The paranasal   sinuses and bilateral mastoid complexes are within normal limits.     There is no bone marrow signal abnormality. The sella is unremarkable.      IMPRESSION:    1.  Scattered T2 and FLAIR hyperintensities periventricular and   subcortical white matter which are nonspecific and without mass effect   which may represent chronic small vessel ischemic changes, demyelination   or gliosis in a patient of this stated age.    2.  No abnormal enhancement of any of the white matter lesions.    3.  No acute infarcts or intracranial hemorrhage.    TEJAS HAUSER M.D., ATTENDING RADIOLOGIST  This document has been electronically signed. Sep 17 2018  2:48PM    < end of copied text >        REEG 2018 - normal       Assessment:  This is a 42y Female with h/o head trauma, headaches, ovarian cancer, DM, DLD, and possible seizures. Patient was started on AED recently at [facility].      Discussed with Dr. Costa      Plan:   - VEEG for better characterization and assessment  - Seizure precautions  - Continue current dose of Tegretol 200mg q12hrs for now (ordered)  - Ativan 2mg IV PRN for generalized tonic-clonic seizure lasting longer than 2 minutes, or two consecutive seizures without return to baseline in-between (ordered)  - CBC, CMP, Mg, AED levels trough (ordered)  - Keep Mg above 2  - Consider psychiatry evaluation    Plan discussed with patient and son in details, all questions answered    Discussed with hospitalist Dr. Caldera Neurology/Epilepsy Consult:    ALFIE MOSLEY 42y Female  MRN-[id number]    Patient is a 42y old right-handed Female who presents for elective VEEG monitoring    HPI: History obtained from patient and son  Patient states about 18 months ago she started waking up with a sense of nausea, followed by a headache/dizziness and "body shaking." The shaking is described as shivering-like movements of the entire body, not associated with LOC, lasting up to 2 min. Patient remembers every second of it, and feels better in a few minutes. Gradually these episodes became more frequent, and by the Summer 2018 were happening every morning. Patient was evaluated at [facility] neurology clinic, had REEG and MRI done. At the beginning of September the frequency of the events increased to twice daily (on awakening and prior to falling asleep) During clinic follow on 2018 patient also reported waking up twice in the prior month with tongue bite on the left side, and was started on Keppra 500mg q12hrs, referred for VEEG admission. Keppra was discontinued after 2 weeks due to c/o weakness, insomnia,  and dizziness. Last week patient was started on Tegretol that she continues taking. Patient c/o dizziness and weakness after every dose of Tegretol. States the typical events continue every morning, but are not associated with shaking any longer, just gets dizziness, nausea, and headache that lasts for few minutes.  Patient reports almost daily bi-temple headaches lasting 20-30 min, takes Tylenol 1000mg for those.  Patient has h/o head trauma with LOC that she sustained in  as a result of a suicide attempt (patient jumped on the train tracks). Patient was hospitalized at [facility] for 2 wks with pelvic fracture, denies any bleeding in the brain.   Patient does not follow up with psychiatrist, never had a therapist. Patient and son state they were trying to find a Mauritanian-speaking therapist, but were not successful. Patient denies any suicidal thoughts or ideation at this time, reports feeling a lot better since her children moved to the [place].         PAST MEDICAL & SURGICAL HISTORY:  Headaches  Ovarian cancer  DM  DLD  GERD  Suicide attempt   s/p hysterectomy/oophorectomy 2017  Liver biopsy 3/2018  History of cholecystectomy 10-15 years ago        FAMILY HISTORY:  HTN (Mother)  Hypercholesteremia (Mother)  Diabetes (Father, Mother)        Social History:   Lives with children  Smoking several sig/day  ETOH on weekend (3-4 drinks)  Works as HHA and cleaning      Risk factors:  Born without complications  Normal growth and development  No febrile seizures/CNS infections  Head trauma with LOC        Allergy:  No Known Allergies        Home Medications:  Tegretol 200mg q12hrs  Lipitor 20mg QHS  multivitamin: 1 tab(s) orally once a day (10 Oct 2018 13:02)  omeprazole 40 mg oral delayed release capsule: 1 cap(s) orally once a day (16 Oct 2018 12:12)  Trulicity Pen 0.75 mg/0.5 mL subcutaneous solution: 4 unit(s) subcutaneous once a week ON  (10 Oct 2018 13:02)  Tylenol PRN      MEDICATIONS  (STANDING):  atorvastatin 20 milliGRAM(s) Oral at bedtime  carBAMazepine 200 milliGRAM(s) Oral two times a day  dextrose 5%. 1000 milliLiter(s) (50 mL/Hr) IV Continuous <Continuous>  dextrose 50% Injectable 12.5 Gram(s) IV Push once  dextrose 50% Injectable 25 Gram(s) IV Push once  dextrose 50% Injectable 25 Gram(s) IV Push once  heparin  Injectable 5000 Unit(s) SubCutaneous every 12 hours  insulin lispro (HumaLOG) corrective regimen sliding scale   SubCutaneous three times a day before meals  pantoprazole    Tablet 40 milliGRAM(s) Oral before breakfast    MEDICATIONS  (PRN):  dextrose 40% Gel 15 Gram(s) Oral once PRN Blood Glucose LESS THAN 70 milliGRAM(s)/deciliter  glucagon  Injectable 1 milliGRAM(s) IntraMuscular once PRN Glucose LESS THAN 70 milligrams/deciliter  LORazepam   Injectable 2 milliGRAM(s) IV Push once PRN generalized tonic-clonic seizure lasting longer than 2 minutes        T(F): 96 (10-16-18 @ 12:10), Max: 96 (10-16-18 @ 12:10)  HR: 73 (10-16-18 @ 12:10) (73 - 73)  BP: 124/74 (10-16-18 @ 12:10) (124/74 - 124/74)  RR: 18 (10-16-18 @ 12:10) (18 - 18)  SpO2: --      Neurologic Examination:  General:  Appearance is consistent with chronologic age.  No abnormal facies.   General: The patient is oriented to person, place, time and date.  Recent and remote memory intact.  Follows 4-step directions. Language with normal repetition, comprehension and naming.    Cranial nerves: EOMI w/o nystagmus, skew or reported double vision.  PERRL.  No ptosis/weakness of eyelid closure.  Facial sensation is normal with normal bite.  No facial asymmetry.  Hearing grossly intact b/l.  Palate elevates midline.  Tongue midline.  Motor examination:   Normal tone, bulk and range of motion.  No tenderness, twitching, tremors or involuntary movements.  Formal Muscle Strength Testin/5 UE; 5/5 LE.  No observable drift.  Reflexes:   2+ b/l pectoralis, biceps, triceps, brachioradialis, patella and Achilles.    Sensory examination:   Intact to light touch and pinprick, pain.  Cerebellum:   FTN/HKS intact with normal MIQUEL in all limbs.  No dysmetria or dysdiadokinesia.  Gait narrow based and normal.        Neuroimaging:  MRI Brain:   < from: MR Head w/wo IV Cont (18 @ 13:41) >  EXAM:  MR BRAIN WAW IC          PROCEDURE DATE:  2018      INTERPRETATION:  Clinical History / Reason for exam: Shaking.     Technique: Sagittal and axial precontrast T1-weighted images, axial   T2-weighted images, axial FLAIR images,axial gradient echo images, axial   diffusion weighted images, and sagittal, axial and coronal postcontrast   T1-weighted images of the brain were obtained on the 1.5 Bruna magnet   after the intravenous administration of 7 cc of gadolinium contrast.0.5   cc of contrast was discarded.    Comparison: None available at this time.      Findings: The ventricles, basal cisterns and sulcal pattern are slightly   prominent consistent with parenchymal volume loss.  There are scattered   patchy and punctate foci of T2 and FLAIR hyperintensities in the   periventricular and subcortical white matter which are nonspecific and   without mass effect which may represent chronic small vessel ischemic   changes, demyelination or gliosis in a patient of this stated age.  There   is no acute mass effect, midline shift, hemorrhage or abnormal   enhancement.  No extra axial fluid collections are identified.    There are no acute infarcts on diffusion weighted images.  Expected   signal flow voids are noted in the major intracranial vessels consistent   with their patency.    Globes and orbits are grossly within normal limits.  The paranasal   sinuses and bilateral mastoid complexes are within normal limits.     There is no bone marrow signal abnormality. The sella is unremarkable.      IMPRESSION:    1.  Scattered T2 and FLAIR hyperintensities periventricular and   subcortical white matter which are nonspecific and without mass effect   which may represent chronic small vessel ischemic changes, demyelination   or gliosis in a patient of this stated age.    2.  No abnormal enhancement of any of the white matter lesions.    3.  No acute infarcts or intracranial hemorrhage.    TEJAS HAUSER M.D., ATTENDING RADIOLOGIST  This document has been electronically signed. Sep 17 2018  2:48PM    < end of copied text >        REEG 2018 - normal       Assessment:  This is a 42y Female with h/o head trauma, headaches, ovarian cancer, DM, DLD, and possible seizures. Patient was started on AED recently at [facility].      Discussed with Dr. Costa      Plan:   - VEEG for better characterization and assessment  - Seizure precautions  - Continue current dose of Tegretol 200mg q12hrs for now (ordered)  - Ativan 2mg IV PRN for generalized tonic-clonic seizure lasting longer than 2 minutes, or two consecutive seizures without return to baseline in-between (ordered)  - CBC, CMP, Mg, AED levels trough (ordered)  - Keep Mg above 2  - Consider psychiatry evaluation    Plan discussed with patient and son in details, all questions answered    Discussed with hospitalist Dr. Caldera

## 2018-10-17 DIAGNOSIS — T14.91XD: ICD-10-CM

## 2018-10-17 DIAGNOSIS — E11.9 TYPE 2 DIABETES MELLITUS WITHOUT COMPLICATIONS: ICD-10-CM

## 2018-10-17 DIAGNOSIS — R56.9 UNSPECIFIED CONVULSIONS: ICD-10-CM

## 2018-10-17 DIAGNOSIS — Z02.9 ENCOUNTER FOR ADMINISTRATIVE EXAMINATIONS, UNSPECIFIED: ICD-10-CM

## 2018-10-17 DIAGNOSIS — Z72.0 TOBACCO USE: ICD-10-CM

## 2018-10-17 DIAGNOSIS — K76.9 LIVER DISEASE, UNSPECIFIED: ICD-10-CM

## 2018-10-17 LAB
ANION GAP SERPL CALC-SCNC: 13 MMOL/L — SIGNIFICANT CHANGE UP (ref 7–14)
BUN SERPL-MCNC: 24 MG/DL — HIGH (ref 10–20)
CALCIUM SERPL-MCNC: 9 MG/DL — SIGNIFICANT CHANGE UP (ref 8.5–10.1)
CHLORIDE SERPL-SCNC: 109 MMOL/L — SIGNIFICANT CHANGE UP (ref 98–110)
CO2 SERPL-SCNC: 24 MMOL/L — SIGNIFICANT CHANGE UP (ref 17–32)
CREAT SERPL-MCNC: 0.5 MG/DL — LOW (ref 0.7–1.5)
ESTIMATED AVERAGE GLUCOSE: 111 MG/DL — SIGNIFICANT CHANGE UP (ref 68–114)
GLUCOSE BLDC GLUCOMTR-MCNC: 121 MG/DL — HIGH (ref 70–99)
GLUCOSE BLDC GLUCOMTR-MCNC: 134 MG/DL — HIGH (ref 70–99)
GLUCOSE BLDC GLUCOMTR-MCNC: 138 MG/DL — HIGH (ref 70–99)
GLUCOSE BLDC GLUCOMTR-MCNC: 93 MG/DL — SIGNIFICANT CHANGE UP (ref 70–99)
GLUCOSE SERPL-MCNC: 110 MG/DL — HIGH (ref 70–99)
HBA1C BLD-MCNC: 5.5 % — SIGNIFICANT CHANGE UP (ref 4–5.6)
HCT VFR BLD CALC: 37.4 % — SIGNIFICANT CHANGE UP (ref 37–47)
HGB BLD-MCNC: 12.2 G/DL — SIGNIFICANT CHANGE UP (ref 12–16)
MAGNESIUM SERPL-MCNC: 1.9 MG/DL — SIGNIFICANT CHANGE UP (ref 1.8–2.4)
MCHC RBC-ENTMCNC: 29 PG — SIGNIFICANT CHANGE UP (ref 27–31)
MCHC RBC-ENTMCNC: 32.6 G/DL — SIGNIFICANT CHANGE UP (ref 32–37)
MCV RBC AUTO: 88.8 FL — SIGNIFICANT CHANGE UP (ref 81–99)
NRBC # BLD: 0 /100 WBCS — SIGNIFICANT CHANGE UP (ref 0–0)
PLATELET # BLD AUTO: 288 K/UL — SIGNIFICANT CHANGE UP (ref 130–400)
POTASSIUM SERPL-MCNC: 4.4 MMOL/L — SIGNIFICANT CHANGE UP (ref 3.5–5)
POTASSIUM SERPL-SCNC: 4.4 MMOL/L — SIGNIFICANT CHANGE UP (ref 3.5–5)
RBC # BLD: 4.21 M/UL — SIGNIFICANT CHANGE UP (ref 4.2–5.4)
RBC # FLD: 13.6 % — SIGNIFICANT CHANGE UP (ref 11.5–14.5)
SODIUM SERPL-SCNC: 146 MMOL/L — SIGNIFICANT CHANGE UP (ref 135–146)
WBC # BLD: 5.43 K/UL — SIGNIFICANT CHANGE UP (ref 4.8–10.8)
WBC # FLD AUTO: 5.43 K/UL — SIGNIFICANT CHANGE UP (ref 4.8–10.8)

## 2018-10-17 RX ORDER — ACETAMINOPHEN 500 MG
650 TABLET ORAL EVERY 6 HOURS
Qty: 0 | Refills: 0 | Status: DISCONTINUED | OUTPATIENT
Start: 2018-10-17 | End: 2018-10-19

## 2018-10-17 RX ORDER — CARBAMAZEPINE 200 MG
100 TABLET ORAL
Qty: 0 | Refills: 0 | Status: DISCONTINUED | OUTPATIENT
Start: 2018-10-17 | End: 2018-10-18

## 2018-10-17 RX ADMIN — Medication 1 TABLET(S): at 11:01

## 2018-10-17 RX ADMIN — Medication 650 MILLIGRAM(S): at 11:01

## 2018-10-17 RX ADMIN — Medication 1 PATCH: at 11:01

## 2018-10-17 RX ADMIN — PANTOPRAZOLE SODIUM 40 MILLIGRAM(S): 20 TABLET, DELAYED RELEASE ORAL at 06:01

## 2018-10-17 RX ADMIN — Medication 100 MILLIGRAM(S): at 17:04

## 2018-10-17 RX ADMIN — Medication 200 MILLIGRAM(S): at 06:01

## 2018-10-17 RX ADMIN — Medication 1 PATCH: at 19:43

## 2018-10-17 RX ADMIN — Medication 650 MILLIGRAM(S): at 11:31

## 2018-10-17 NOTE — PROVIDER CONTACT NOTE (OTHER) - SITUATION
Pt c/o dizziness and nausea, with slight left arm twitch. Vs stable /90, Hr 90, PO 95%. Dizziness starting to pass after a few minutes. Dr. Rocha notified. will continue to monitor.

## 2018-10-18 ENCOUNTER — TRANSCRIPTION ENCOUNTER (OUTPATIENT)
Age: 43
End: 2018-10-18

## 2018-10-18 LAB
ERYTHROCYTE [SEDIMENTATION RATE] IN BLOOD: 40 MM/HR — HIGH (ref 0–20)
GLUCOSE BLDC GLUCOMTR-MCNC: 107 MG/DL — HIGH (ref 70–99)
GLUCOSE BLDC GLUCOMTR-MCNC: 165 MG/DL — HIGH (ref 70–99)
GLUCOSE BLDC GLUCOMTR-MCNC: 165 MG/DL — HIGH (ref 70–99)
GLUCOSE BLDC GLUCOMTR-MCNC: 85 MG/DL — SIGNIFICANT CHANGE UP (ref 70–99)

## 2018-10-18 RX ADMIN — Medication 1 TABLET(S): at 11:04

## 2018-10-18 RX ADMIN — Medication 2: at 12:09

## 2018-10-18 RX ADMIN — Medication 1 PATCH: at 23:30

## 2018-10-18 RX ADMIN — Medication 100 MILLIGRAM(S): at 05:19

## 2018-10-18 RX ADMIN — Medication 1 PATCH: at 11:04

## 2018-10-18 RX ADMIN — PANTOPRAZOLE SODIUM 40 MILLIGRAM(S): 20 TABLET, DELAYED RELEASE ORAL at 05:19

## 2018-10-18 RX ADMIN — ATORVASTATIN CALCIUM 20 MILLIGRAM(S): 80 TABLET, FILM COATED ORAL at 21:39

## 2018-10-18 RX ADMIN — Medication 1 PATCH: at 11:05

## 2018-10-18 NOTE — DISCHARGE NOTE ADULT - CARE PLAN
Principal Discharge DX:	Seizure  Goal:	prevent recurrence  Assessment and plan of treatment:	take medication as prescribed   follow up with neurology in the clinic on [address] [place] [address] on 11/27/18 at 2pm  seizure precautions  Secondary Diagnosis:	Diabetes  Assessment and plan of treatment:	take medication as prescribed  Secondary Diagnosis:	Tobacco abuse  Assessment and plan of treatment:	stop smoking  Secondary Diagnosis:	Liver disorder  Assessment and plan of treatment:	follow up with PMD  Secondary Diagnosis:	Suicide attempt, subsequent encounter  Assessment and plan of treatment:	please follow up with psychiatry - please make an appointment at the following numbers:  [facility] [phone]  [facility] [phone]

## 2018-10-18 NOTE — DISCHARGE NOTE ADULT - PLAN OF CARE
prevent recurrence take medication as prescribed   follow up with neurology in the clinic on [address] [place] [address] on 11/27/18 at 2pm  seizure precautions take medication as prescribed stop smoking follow up with PMD please follow up with psychiatry - please make an appointment at the following numbers:  [facility] [phone]  [facility] [phone]

## 2018-10-18 NOTE — DISCHARGE NOTE ADULT - MEDICATION SUMMARY - MEDICATIONS TO TAKE
I will START or STAY ON the medications listed below when I get home from the hospital:    carBAMazepine 100 mg oral tablet, chewable  -- take half a tablet every 12 hours  -- Chew tablets before swallowing  Do not take this drug if you are pregnant.  It is very important that you take or use this exactly as directed.  Do not skip doses or discontinue unless directed by your doctor.  May cause drowsiness.  Alcohol may intensify this effect.  Use care when operating dangerous machinery.  Obtain medical advice before taking any non-prescription drugs as some may affect the action of this medication.    -- Indication: For Seizure    Trulicity Pen 0.75 mg/0.5 mL subcutaneous solution  -- 4 unit(s) subcutaneous once a week ON MONDAYS  -- Indication: For Diabetes    omeprazole 40 mg oral delayed release capsule  -- 1 cap(s) by mouth once a day  -- Indication: For gerd    nicotine 21 mg/24 hr transdermal film, extended release  -- 1 patch by transdermal patch once a day   -- Indication: For Tobacco abuse    multivitamin  -- 1 tab(s) by mouth once a day  -- Indication: For VItamin

## 2018-10-18 NOTE — DISCHARGE NOTE ADULT - HOSPITAL COURSE
42 year old female brought in for VEEG to r/o seizures.  On VEEG for over 48 hours - no events.  Will d/c home today as per neurology on tegretol.  Outpt follow up.      Problem/Plan - 1:  ·  Problem: Seizure.  Plan: on veeg for over 48 hours - monitoring to be discontinued now as per neuro  seizure precautions  epilepsy following  will d/c on tegretol as per neuro with tapering dose as outpt  appointment obtained for pt with neurology as outpt.      Problem/Plan - 2:  ·  Problem: Diabetes.  Plan: cont insulin   check finger sticks qac and hs.      Problem/Plan - 3:  ·  Problem: Liver disorder.  Plan: outpt follow up.      Problem/Plan - 4:  ·  Problem: Suicide attempt, subsequent encounter.  Plan: history of suicide attempt  needs psych for continuity of care - consult done. will arrange for close follow up upon d/c  doesn't have a psychiatrist as outpt.      Problem/Plan - 5:  ·  Problem: Tobacco abuse.  Plan: smoking cessation counseling  nicotine patch.     Dispo: d/c home today with close neuro follow up  d/c planning took over 50 min

## 2018-10-18 NOTE — DISCHARGE NOTE ADULT - PROVIDER TOKENS
FREE:[LAST:[Primary doctor],PHONE:[(   )    -],FAX:[(   )    -]],FREE:[LAST:[Neurology Clinic],PHONE:[(   )    -],FAX:[(   )    -],ADDRESS:[[address] [place] [address]  11/27/18 2pm]],FREE:[LAST:[psychiatry],PHONE:[(   )    -],FAX:[(   )    -],ADDRESS:[North: [phone]  OR  South: [phone]]]

## 2018-10-18 NOTE — DISCHARGE NOTE ADULT - PATIENT PORTAL LINK FT
You can access the [email or web address], offered by [facility], by registering with the following website: [email or web address]

## 2018-10-18 NOTE — CONSULT NOTE ADULT - ASSESSMENT
r/o major depression recurrent in remission.    no need for ipp  no need for antidepressants   will benefit from therapy. refer to opd [phone] [place]  [phone] [place] [facility].

## 2018-10-18 NOTE — DISCHARGE NOTE ADULT - CARE PROVIDER_API CALL
Primary doctor,   Phone: (   )    -  Fax: (   )    -    Neurology Clinic,   [address] [place] [address]  11/27/18 2pm  Phone: (   )    -  Fax: (   )    -    psychiatry,   North: [phone]  OR  South: [phone]  Phone: (   )    -  Fax: (   )    -
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
## 2018-10-19 ENCOUNTER — APPOINTMENT (OUTPATIENT)
Dept: GYNECOLOGIC ONCOLOGY | Facility: CLINIC | Age: 43
End: 2018-10-19

## 2018-10-19 VITALS
RESPIRATION RATE: 16 BRPM | TEMPERATURE: 97 F | DIASTOLIC BLOOD PRESSURE: 62 MMHG | HEART RATE: 67 BPM | SYSTOLIC BLOOD PRESSURE: 115 MMHG

## 2018-10-19 LAB
ANA TITR SER: NEGATIVE — SIGNIFICANT CHANGE UP
CARDIOLIPIN AB SER-ACNC: NEGATIVE — SIGNIFICANT CHANGE UP
GLUCOSE BLDC GLUCOMTR-MCNC: 101 MG/DL — HIGH (ref 70–99)
GLUCOSE BLDC GLUCOMTR-MCNC: 99 MG/DL — SIGNIFICANT CHANGE UP (ref 70–99)

## 2018-10-19 RX ORDER — CARBAMAZEPINE 200 MG
1 TABLET ORAL
Qty: 30 | Refills: 2
Start: 2018-10-19 | End: 2019-01-16

## 2018-10-19 RX ORDER — NICOTINE POLACRILEX 2 MG
1 GUM BUCCAL
Qty: 7 | Refills: 0 | OUTPATIENT
Start: 2018-10-19 | End: 2018-10-25

## 2018-10-19 RX ADMIN — INFLUENZA VIRUS VACCINE 0.5 MILLILITER(S): 15; 15; 15; 15 SUSPENSION INTRAMUSCULAR at 11:51

## 2018-10-19 RX ADMIN — PANTOPRAZOLE SODIUM 40 MILLIGRAM(S): 20 TABLET, DELAYED RELEASE ORAL at 09:32

## 2018-10-19 RX ADMIN — Medication 1 TABLET(S): at 11:28

## 2018-10-19 NOTE — PROGRESS NOTE ADULT - ASSESSMENT
42 year old female brought in for VEEG to r/o seizures
42 year old female brought in for VEEG to r/o seizures
42 year old female brought in for VEEG to r/o seizures.  On VEEG for over 48 hours - no events.  Will d/c home today as per neurology on tegretol.  Outpt follow up.

## 2018-10-19 NOTE — PROGRESS NOTE ADULT - SUBJECTIVE AND OBJECTIVE BOX
ALFIE MOSLEY  42y  Female      Patient is a 42y old Female who presents with a chief complaint of Seizures (16 Oct 2018 15:21)      INTERVAL HPI/OVERNIGHT EVENTS:  on VEEG      REVIEW OF SYSTEMS:  CONSTITUTIONAL: No fever, weight loss, or fatigue  EYES: No eye pain, visual disturbances, or discharge  ENMT:  No difficulty hearing, tinnitus, vertigo; No sinus or throat pain  NECK: No pain or stiffness  BREASTS: No pain, masses, or nipple discharge  RESPIRATORY: No cough, wheezing, chills or hemoptysis; No shortness of breath  CARDIOVASCULAR: No chest pain, palpitations, dizziness, or leg swelling  GASTROINTESTINAL: No abdominal or epigastric pain. No nausea, vomiting, or hematemesis; No diarrhea or constipation. No melena or hematochezia.  GENITOURINARY: No dysuria, frequency, hematuria, or incontinence  NEUROLOGICAL: seizures  SKIN: No itching, burning, rashes, or lesions   LYMPH NODES: No enlarged glands  ENDOCRINE: No heat or cold intolerance; No hair loss  MUSCULOSKELETAL: No joint pain or swelling; No muscle, back, or extremity pain  PSYCHIATRIC: No depression, anxiety, mood swings, or difficulty sleeping  HEME/LYMPH: No easy bruising, or bleeding gums  ALLERY AND IMMUNOLOGIC: No hives or eczema    T(C): 35.9 (10-17-18 @ 06:13), Max: 36.3 (10-16-18 @ 22:37)  HR: 71 (10-17-18 @ 06:13) (71 - 81)  BP: 119/72 (10-17-18 @ 06:13) (118/78 - 124/74)  RR: 16 (10-17-18 @ 06:13) (16 - 18)  SpO2: --      PHYSICAL EXAM:  GENERAL: NAD, well-groomed, well-developed  HEAD:  Atraumatic, Normocephalic  EYES: EOMI, PERRLA, conjunctiva and sclera clear  ENMT: No tonsillar erythema, exudates, or enlargement; Moist mucous membranes, Good dentition, No lesions  NECK: Supple, No JVD, Normal thyroid  NERVOUS SYSTEM:  Alert & Oriented X3, Good concentration; Motor Strength 5/5 B/L upper and lower extremities; DTRs 2+ intact and symmetric  CHEST/LUNG: Clear to percussion bilaterally; No rales, rhonchi, wheezing, or rubs  HEART: Regular rate and rhythm; No murmurs, rubs, or gallops  ABDOMEN: Soft, Nontender, Nondistended; Bowel sounds present  EXTREMITIES:  2+ Peripheral Pulses, No clubbing, cyanosis, or edema  LYMPH: No lymphadenopathy noted  SKIN: No rashes or lesions    Consultant(s) Notes Reviewed:  [x ] YES  [ ] NO  Care Discussed with Consultants/Other Providers [ x] YES  [ ] NO    LAB:    10-17    146  |  109  |  24<H>  ----------------------------<  110<H>  4.4   |  24  |  0.5<L>    Ca    9.0      17 Oct 2018 07:03  Phos  4.3     10-16  Mg     1.9     10-17    TPro  7.1  /  Alb  4.4  /  TBili  <0.2  /  DBili  x   /  AST  31  /  ALT  54<H>  /  AlkPhos  155<H>  10-16                        12.2   5.43  )-----------( 288      ( 17 Oct 2018 07:03 )             37.4     Daily Height in cm: 142.24 (16 Oct 2018 12:10)    Daily   Drug Dosing Weight  Height (cm): 142.24 (16 Oct 2018 12:10)  Weight (kg): 79 (16 Oct 2018 12:10)  BMI (kg/m2): 39 (16 Oct 2018 12:10)  BSA (m2): 1.67 (16 Oct 2018 12:10)  CAPILLARY BLOOD GLUCOSE      POCT Blood Glucose.: 121 mg/dL (17 Oct 2018 08:08)  POCT Blood Glucose.: 112 mg/dL (16 Oct 2018 21:11)  POCT Blood Glucose.: 130 mg/dL (16 Oct 2018 16:37)  POCT Blood Glucose.: 100 mg/dL (16 Oct 2018 14:33)    I&O's Summary      LIVER FUNCTIONS - ( 16 Oct 2018 15:20 )  Alb: 4.4 g/dL / Pro: 7.1 g/dL / ALK PHOS: 155 U/L / ALT: 54 U/L / AST: 31 U/L / GGT: x               RADIOLOGY & ADDITIONAL TESTS:    Imaging Personally Reviewed:  [ ] YES  [ ] NO    HEALTH ISSUES - PROBLEM Dx:    MEDICATIONS  (STANDING):  atorvastatin 20 milliGRAM(s) Oral at bedtime  dextrose 5%. 1000 milliLiter(s) (50 mL/Hr) IV Continuous <Continuous>  dextrose 50% Injectable 12.5 Gram(s) IV Push once  dextrose 50% Injectable 25 Gram(s) IV Push once  dextrose 50% Injectable 25 Gram(s) IV Push once  heparin  Injectable 5000 Unit(s) SubCutaneous every 12 hours  influenza   Vaccine 0.5 milliLiter(s) IntraMuscular once  insulin lispro (HumaLOG) corrective regimen sliding scale   SubCutaneous three times a day before meals  multivitamin 1 Tablet(s) Oral daily  nicotine - 21 mG/24Hr(s) Patch 1 patch Transdermal daily  pantoprazole    Tablet 40 milliGRAM(s) Oral before breakfast    MEDICATIONS  (PRN):  acetaminophen   Tablet .. 650 milliGRAM(s) Oral every 6 hours PRN Mild Pain (1 - 3)  dextrose 40% Gel 15 Gram(s) Oral once PRN Blood Glucose LESS THAN 70 milliGRAM(s)/deciliter  glucagon  Injectable 1 milliGRAM(s) IntraMuscular once PRN Glucose LESS THAN 70 milligrams/deciliter  LORazepam   Injectable 2 milliGRAM(s) IV Push once PRN generalized tonic-clonic seizure lasting longer than 2 minutes
ALFIE MOSLEY  42y  Female      Patient is a 42y old Female who presents with a chief complaint of Seizures (16 Oct 2018 15:21)      INTERVAL HPI/OVERNIGHT EVENTS:  on VEEG  no complaints      REVIEW OF SYSTEMS:  CONSTITUTIONAL: No fever, weight loss, or fatigue  EYES: No eye pain, visual disturbances, or discharge  ENMT:  No difficulty hearing, tinnitus, vertigo; No sinus or throat pain  NECK: No pain or stiffness  BREASTS: No pain, masses, or nipple discharge  RESPIRATORY: No cough, wheezing, chills or hemoptysis; No shortness of breath  CARDIOVASCULAR: No chest pain, palpitations, dizziness, or leg swelling  GASTROINTESTINAL: No abdominal or epigastric pain. No nausea, vomiting, or hematemesis; No diarrhea or constipation. No melena or hematochezia.  GENITOURINARY: No dysuria, frequency, hematuria, or incontinence  NEUROLOGICAL: seizures  SKIN: No itching, burning, rashes, or lesions   LYMPH NODES: No enlarged glands  ENDOCRINE: No heat or cold intolerance; No hair loss  MUSCULOSKELETAL: No joint pain or swelling; No muscle, back, or extremity pain  PSYCHIATRIC: No depression, anxiety, mood swings, or difficulty sleeping  HEME/LYMPH: No easy bruising, or bleeding gums  ALLERY AND IMMUNOLOGIC: No hives or eczema    Vital Signs Last 24 Hrs  T(C): 36.2 (18 Oct 2018 14:06), Max: 36.2 (18 Oct 2018 14:06)  T(F): 97.2 (18 Oct 2018 14:06), Max: 97.2 (18 Oct 2018 14:06)  HR: 85 (18 Oct 2018 14:06) (74 - 90)  BP: 120/68 (18 Oct 2018 14:06) (120/68 - 150/86)  BP(mean): --  RR: 16 (18 Oct 2018 14:06) (16 - 18)  SpO2: 95% (17 Oct 2018 18:42) (95% - 95%) on room air      PHYSICAL EXAM:  GENERAL: NAD, well-groomed, well-developed  HEAD:  Atraumatic, Normocephalic  EYES: EOMI, PERRLA, conjunctiva and sclera clear  ENMT: No tonsillar erythema, exudates, or enlargement; Moist mucous membranes, Good dentition, No lesions  NECK: Supple, No JVD, Normal thyroid  NERVOUS SYSTEM:  Alert & Oriented X3, Good concentration; Motor Strength 5/5 B/L upper and lower extremities; DTRs 2+ intact and symmetric  CHEST/LUNG: Clear to percussion bilaterally; No rales, rhonchi, wheezing, or rubs  HEART: Regular rate and rhythm; No murmurs, rubs, or gallops  ABDOMEN: Soft, Nontender, Nondistended; Bowel sounds present  EXTREMITIES:  2+ Peripheral Pulses, No clubbing, cyanosis, or edema  LYMPH: No lymphadenopathy noted  SKIN: No rashes or lesions    Consultant(s) Notes Reviewed:  [x ] YES  [ ] NO  Care Discussed with Consultants/Other Providers [ x] YES  [ ] NO    LAB:    10-17    146  |  109  |  24<H>  ----------------------------<  110<H>  4.4   |  24  |  0.5<L>    Ca    9.0      17 Oct 2018 07:03  Phos  4.3     10-16  Mg     1.9     10-17    TPro  7.1  /  Alb  4.4  /  TBili  <0.2  /  DBili  x   /  AST  31  /  ALT  54<H>  /  AlkPhos  155<H>  10-16                        12.2   5.43  )-----------( 288      ( 17 Oct 2018 07:03 )             37.4     Daily Height in cm: 142.24 (16 Oct 2018 12:10)    Daily   Drug Dosing Weight  Height (cm): 142.24 (16 Oct 2018 12:10)  Weight (kg): 79 (16 Oct 2018 12:10)  BMI (kg/m2): 39 (16 Oct 2018 12:10)  BSA (m2): 1.67 (16 Oct 2018 12:10)  CAPILLARY BLOOD GLUCOSE    POCT Blood Glucose.: 121 mg/dL (17 Oct 2018 08:08)  POCT Blood Glucose.: 112 mg/dL (16 Oct 2018 21:11)  POCT Blood Glucose.: 130 mg/dL (16 Oct 2018 16:37)  POCT Blood Glucose.: 100 mg/dL (16 Oct 2018 14:33)      LIVER FUNCTIONS - ( 16 Oct 2018 15:20 )  Alb: 4.4 g/dL / Pro: 7.1 g/dL / ALK PHOS: 155 U/L / ALT: 54 U/L / AST: 31 U/L / GGT: x           RADIOLOGY & ADDITIONAL TESTS:    Imaging Personally Reviewed:  [x] YES  [ ] NO    HEALTH ISSUES - PROBLEM Dx:    MEDICATIONS  (STANDING):  atorvastatin 20 milliGRAM(s) Oral at bedtime  dextrose 5%. 1000 milliLiter(s) (50 mL/Hr) IV Continuous <Continuous>  dextrose 50% Injectable 12.5 Gram(s) IV Push once  dextrose 50% Injectable 25 Gram(s) IV Push once  dextrose 50% Injectable 25 Gram(s) IV Push once  heparin  Injectable 5000 Unit(s) SubCutaneous every 12 hours  influenza   Vaccine 0.5 milliLiter(s) IntraMuscular once  insulin lispro (HumaLOG) corrective regimen sliding scale   SubCutaneous three times a day before meals  multivitamin 1 Tablet(s) Oral daily  nicotine - 21 mG/24Hr(s) Patch 1 patch Transdermal daily  pantoprazole    Tablet 40 milliGRAM(s) Oral before breakfast    MEDICATIONS  (PRN):  acetaminophen   Tablet .. 650 milliGRAM(s) Oral every 6 hours PRN Mild Pain (1 - 3)  dextrose 40% Gel 15 Gram(s) Oral once PRN Blood Glucose LESS THAN 70 milliGRAM(s)/deciliter  glucagon  Injectable 1 milliGRAM(s) IntraMuscular once PRN Glucose LESS THAN 70 milligrams/deciliter  LORazepam   Injectable 2 milliGRAM(s) IV Push once PRN generalized tonic-clonic seizure lasting longer than 2 minutes
ALFIE MOSLEY  42y  Female      Patient is a 42y old Female who presents with a chief complaint of Seizures (16 Oct 2018 15:21)      INTERVAL HPI/OVERNIGHT EVENTS:  on VEEG - no epileptiform activity seen.   no complaints      REVIEW OF SYSTEMS:  CONSTITUTIONAL: No fever, weight loss, or fatigue  EYES: No eye pain, visual disturbances, or discharge  ENMT:  No difficulty hearing, tinnitus, vertigo; No sinus or throat pain  NECK: No pain or stiffness  BREASTS: No pain, masses, or nipple discharge  RESPIRATORY: No cough, wheezing, chills or hemoptysis; No shortness of breath  CARDIOVASCULAR: No chest pain, palpitations, dizziness, or leg swelling  GASTROINTESTINAL: No abdominal or epigastric pain. No nausea, vomiting, or hematemesis; No diarrhea or constipation. No melena or hematochezia.  GENITOURINARY: No dysuria, frequency, hematuria, or incontinence  NEUROLOGICAL: seizures  SKIN: No itching, burning, rashes, or lesions   LYMPH NODES: No enlarged glands  ENDOCRINE: No heat or cold intolerance; No hair loss  MUSCULOSKELETAL: No joint pain or swelling; No muscle, back, or extremity pain  PSYCHIATRIC: No depression, anxiety, mood swings, or difficulty sleeping  HEME/LYMPH: No easy bruising, or bleeding gums  ALLERY AND IMMUNOLOGIC: No hives or eczema    Vital Signs Last 24 Hrs  T(C): 36.1 (19 Oct 2018 06:29), Max: 36.3 (18 Oct 2018 22:06)  T(F): 97 (19 Oct 2018 06:29), Max: 97.4 (18 Oct 2018 22:06)  HR: 67 (19 Oct 2018 06:29) (67 - 87)  BP: 115/62 (19 Oct 2018 06:29) (115/62 - 122/84)  BP(mean): --  RR: 16 (19 Oct 2018 06:29) (16 - 16)  SpO2: --      PHYSICAL EXAM:  GENERAL: NAD, well-groomed, well-developed  HEAD:  Atraumatic, Normocephalic  EYES: EOMI, PERRLA, conjunctiva and sclera clear  ENMT: No tonsillar erythema, exudates, or enlargement; Moist mucous membranes, Good dentition, No lesions  NECK: Supple, No JVD, Normal thyroid  NERVOUS SYSTEM:  Alert & Oriented X3, Good concentration; Motor Strength 5/5 B/L upper and lower extremities; DTRs 2+ intact and symmetric  CHEST/LUNG: Clear to percussion bilaterally; No rales, rhonchi, wheezing, or rubs  HEART: Regular rate and rhythm; No murmurs, rubs, or gallops  ABDOMEN: Soft, Nontender, Nondistended; Bowel sounds present  EXTREMITIES:  2+ Peripheral Pulses, No clubbing, cyanosis, or edema  LYMPH: No lymphadenopathy noted  SKIN: No rashes or lesions    Consultant(s) Notes Reviewed:  [x ] YES  [ ] NO  Care Discussed with Consultants/Other Providers [ x] YES  [ ] NO    LAB: no new labs      10-17    146  |  109  |  24<H>  ----------------------------<  110<H>  4.4   |  24  |  0.5<L>    Ca    9.0      17 Oct 2018 07:03  Phos  4.3     10-16  Mg     1.9     10-17    TPro  7.1  /  Alb  4.4  /  TBili  <0.2  /  DBili  x   /  AST  31  /  ALT  54<H>  /  AlkPhos  155<H>  10-16                        12.2   5.43  )-----------( 288      ( 17 Oct 2018 07:03 )             37.4     Daily Height in cm: 142.24 (16 Oct 2018 12:10)    Daily   Drug Dosing Weight  Height (cm): 142.24 (16 Oct 2018 12:10)  Weight (kg): 79 (16 Oct 2018 12:10)  BMI (kg/m2): 39 (16 Oct 2018 12:10)  BSA (m2): 1.67 (16 Oct 2018 12:10)  CAPILLARY BLOOD GLUCOSE    POCT Blood Glucose.: 121 mg/dL (17 Oct 2018 08:08)  POCT Blood Glucose.: 112 mg/dL (16 Oct 2018 21:11)  POCT Blood Glucose.: 130 mg/dL (16 Oct 2018 16:37)  POCT Blood Glucose.: 100 mg/dL (16 Oct 2018 14:33)      LIVER FUNCTIONS - ( 16 Oct 2018 15:20 )  Alb: 4.4 g/dL / Pro: 7.1 g/dL / ALK PHOS: 155 U/L / ALT: 54 U/L / AST: 31 U/L / GGT: x           RADIOLOGY & ADDITIONAL TESTS:    Imaging Personally Reviewed:  [x] YES  [ ] NO    MEDICATIONS  (STANDING):  atorvastatin 20 milliGRAM(s) Oral at bedtime  dextrose 5%. 1000 milliLiter(s) (50 mL/Hr) IV Continuous <Continuous>  dextrose 50% Injectable 12.5 Gram(s) IV Push once  dextrose 50% Injectable 25 Gram(s) IV Push once  dextrose 50% Injectable 25 Gram(s) IV Push once  heparin  Injectable 5000 Unit(s) SubCutaneous every 12 hours  influenza   Vaccine 0.5 milliLiter(s) IntraMuscular once  insulin lispro (HumaLOG) corrective regimen sliding scale   SubCutaneous three times a day before meals  multivitamin 1 Tablet(s) Oral daily  nicotine - 21 mG/24Hr(s) Patch 1 patch Transdermal daily  pantoprazole    Tablet 40 milliGRAM(s) Oral before breakfast    MEDICATIONS  (PRN):  acetaminophen   Tablet .. 650 milliGRAM(s) Oral every 6 hours PRN Mild Pain (1 - 3)  dextrose 40% Gel 15 Gram(s) Oral once PRN Blood Glucose LESS THAN 70 milliGRAM(s)/deciliter  glucagon  Injectable 1 milliGRAM(s) IntraMuscular once PRN Glucose LESS THAN 70 milligrams/deciliter  LORazepam   Injectable 2 milliGRAM(s) IV Push once PRN generalized tonic-clonic seizure lasting longer than 2 minutes
Epilepsy Attending Note:     ALFIE MOSLEY    42y Female  MRN [id number]    Vital Signs Last 24 Hrs  T(C): 36.1 (19 Oct 2018 06:29), Max: 36.3 (18 Oct 2018 22:06)  T(F): 97 (19 Oct 2018 06:29), Max: 97.4 (18 Oct 2018 22:06)  HR: 67 (19 Oct 2018 06:29) (67 - 87)  BP: 115/62 (19 Oct 2018 06:29) (115/62 - 122/84)  BP(mean): --  RR: 16 (19 Oct 2018 06:29) (16 - 16)  SpO2: --                MEDICATIONS  (STANDING):  atorvastatin 20 milliGRAM(s) Oral at bedtime  dextrose 5%. 1000 milliLiter(s) (50 mL/Hr) IV Continuous <Continuous>  dextrose 50% Injectable 12.5 Gram(s) IV Push once  dextrose 50% Injectable 25 Gram(s) IV Push once  dextrose 50% Injectable 25 Gram(s) IV Push once  heparin  Injectable 5000 Unit(s) SubCutaneous every 12 hours  influenza   Vaccine 0.5 milliLiter(s) IntraMuscular once  insulin lispro (HumaLOG) corrective regimen sliding scale   SubCutaneous three times a day before meals  multivitamin 1 Tablet(s) Oral daily  nicotine - 21 mG/24Hr(s) Patch 1 patch Transdermal daily  pantoprazole    Tablet 40 milliGRAM(s) Oral before breakfast    MEDICATIONS  (PRN):  acetaminophen   Tablet .. 650 milliGRAM(s) Oral every 6 hours PRN Mild Pain (1 - 3)  dextrose 40% Gel 15 Gram(s) Oral once PRN Blood Glucose LESS THAN 70 milliGRAM(s)/deciliter  glucagon  Injectable 1 milliGRAM(s) IntraMuscular once PRN Glucose LESS THAN 70 milligrams/deciliter  LORazepam   Injectable 2 milliGRAM(s) IV Push once PRN generalized tonic-clonic seizure lasting longer than 2 minutes      Carbamazepine Level, Serum: 6.8 ug/mL [4.0 - 12.0] (10-16-18 @ 15:20)        VEEG in the last 24 hours:    Background-----8-9    Focal and generalized slowing----none    Interictal activity---none    Events---- she had reported an event and described it as feeling shakey and having tremor. On review it was not clinically seen and was also not associated with EEG changes from the baseline    Seizures---none    Impression:   normal EEG. The events as reported were not epileptic in nature    Plan - Dc the monitoring           Dc on 50 mg of tegretol BId , to be tapered off as out patient   needs F/U with neurology.   The anti-cardiolipin antibody sent yesterday will be followed as outpatient
Epilepsy Attending Note:     ALFIE MOSLEY    42y Female  MRN [id number]    Vital Signs Last 24 Hrs  T(C): 36.1 (18 Oct 2018 05:11), Max: 36.1 (18 Oct 2018 05:11)  T(F): 96.9 (18 Oct 2018 05:11), Max: 96.9 (18 Oct 2018 05:11)  HR: 74 (18 Oct 2018 05:11) (74 - 90)  BP: 125/64 (18 Oct 2018 05:11) (119/69 - 150/86)  BP(mean): --  RR: 16 (18 Oct 2018 05:11) (16 - 18)  SpO2: 95% (17 Oct 2018 18:42) (95% - 95%)                          12.2   5.43  )-----------( 288      ( 17 Oct 2018 07:03 )             37.4       10-17    146  |  109  |  24<H>  ----------------------------<  110<H>  4.4   |  24  |  0.5<L>    Ca    9.0      17 Oct 2018 07:03  Phos  4.3     10-16  Mg     1.9     10-17    TPro  7.1  /  Alb  4.4  /  TBili  <0.2  /  DBili  x   /  AST  31  /  ALT  54<H>  /  AlkPhos  155<H>  10-16      MEDICATIONS  (STANDING):  atorvastatin 20 milliGRAM(s) Oral at bedtime  dextrose 5%. 1000 milliLiter(s) (50 mL/Hr) IV Continuous <Continuous>  dextrose 50% Injectable 12.5 Gram(s) IV Push once  dextrose 50% Injectable 25 Gram(s) IV Push once  dextrose 50% Injectable 25 Gram(s) IV Push once  heparin  Injectable 5000 Unit(s) SubCutaneous every 12 hours  influenza   Vaccine 0.5 milliLiter(s) IntraMuscular once  insulin lispro (HumaLOG) corrective regimen sliding scale   SubCutaneous three times a day before meals  multivitamin 1 Tablet(s) Oral daily  nicotine - 21 mG/24Hr(s) Patch 1 patch Transdermal daily  pantoprazole    Tablet 40 milliGRAM(s) Oral before breakfast    MEDICATIONS  (PRN):  acetaminophen   Tablet .. 650 milliGRAM(s) Oral every 6 hours PRN Mild Pain (1 - 3)  dextrose 40% Gel 15 Gram(s) Oral once PRN Blood Glucose LESS THAN 70 milliGRAM(s)/deciliter  glucagon  Injectable 1 milliGRAM(s) IntraMuscular once PRN Glucose LESS THAN 70 milligrams/deciliter  LORazepam   Injectable 2 milliGRAM(s) IV Push once PRN generalized tonic-clonic seizure lasting longer than 2 minutes      Carbamazepine Level, Serum: 6.8 ug/mL [4.0 - 12.0] (10-16-18 @ 15:20)        VEEG in the last 24 hours:    Background----8-9    Focal and generalized slowing---none    Interictal activity----none    Events---on two occasions she reported events and described it as not feeling well /dizzy.  None were associated with EEG changes from the baseline      Seizures-----none    Impression   :normal EEG    Plan - DC the tegretol           Seizure precaution
Epilepsy Attending Note:     ALFIE MOSLEY    42y Female  MRN [id number]    Vital Signs Last 24 Hrs  T(C): 35.9 (17 Oct 2018 06:13), Max: 36.3 (16 Oct 2018 22:37)  T(F): 96.7 (17 Oct 2018 06:13), Max: 97.3 (16 Oct 2018 22:37)  HR: 71 (17 Oct 2018 06:13) (71 - 81)  BP: 119/72 (17 Oct 2018 06:13) (118/78 - 119/78)  BP(mean): --  RR: 16 (17 Oct 2018 06:13) (16 - 18)  SpO2: --                          12.2   5.43  )-----------( 288      ( 17 Oct 2018 07:03 )             37.4       10-17    146  |  109  |  24<H>  ----------------------------<  110<H>  4.4   |  24  |  0.5<L>    Ca    9.0      17 Oct 2018 07:03  Phos  4.3     10-16  Mg     1.9     10-17    TPro  7.1  /  Alb  4.4  /  TBili  <0.2  /  DBili  x   /  AST  31  /  ALT  54<H>  /  AlkPhos  155<H>  10-16      MEDICATIONS  (STANDING):  atorvastatin 20 milliGRAM(s) Oral at bedtime  carBAMazepine Chewable 100 milliGRAM(s) Chew two times a day  dextrose 5%. 1000 milliLiter(s) (50 mL/Hr) IV Continuous <Continuous>  dextrose 50% Injectable 12.5 Gram(s) IV Push once  dextrose 50% Injectable 25 Gram(s) IV Push once  dextrose 50% Injectable 25 Gram(s) IV Push once  heparin  Injectable 5000 Unit(s) SubCutaneous every 12 hours  influenza   Vaccine 0.5 milliLiter(s) IntraMuscular once  insulin lispro (HumaLOG) corrective regimen sliding scale   SubCutaneous three times a day before meals  multivitamin 1 Tablet(s) Oral daily  nicotine - 21 mG/24Hr(s) Patch 1 patch Transdermal daily  pantoprazole    Tablet 40 milliGRAM(s) Oral before breakfast    MEDICATIONS  (PRN):  acetaminophen   Tablet .. 650 milliGRAM(s) Oral every 6 hours PRN Mild Pain (1 - 3)  dextrose 40% Gel 15 Gram(s) Oral once PRN Blood Glucose LESS THAN 70 milliGRAM(s)/deciliter  glucagon  Injectable 1 milliGRAM(s) IntraMuscular once PRN Glucose LESS THAN 70 milligrams/deciliter  LORazepam   Injectable 2 milliGRAM(s) IV Push once PRN generalized tonic-clonic seizure lasting longer than 2 minutes      Carbamazepine Level, Serum: 6.8 ug/mL [4.0 - 12.0] (10-16-18 @ 15:20)        VEEG in the last 24 hours:    Background---8-9    Focal and generalized slowing----none    Interictal activity------none    Events---none    Seizures---none    Impression:  normal V-EEg    Plan - PS/HV           decrease tegretol to 100 bid           seizure precaution
Epilepsy NP Note:    Follow up appointment is scheduled at [facility]  for November 27, 2018 at 2 pm.    [address] [place] [id number]  [phone]    Rx for Tegretol sent to the pharmacy.    Information is given to the patient and hospitalist.

## 2018-10-19 NOTE — PROGRESS NOTE ADULT - PROBLEM SELECTOR PLAN 1
on veeg  seizure precautions  epilepsy following
on veeg  seizure precautions  epilepsy following
on veeg for over 48 hours - monitoring to be discontinued now as per neuro  seizure precautions  epilepsy following  will d/c on tegretol as per neuro with tapering dose as outpt  appointment obtained for pt with neurology as outpt

## 2018-10-19 NOTE — PROGRESS NOTE ADULT - PROBLEM SELECTOR PLAN 2
cont insulin   check finger sticks qac and hs

## 2018-10-19 NOTE — PROGRESS NOTE ADULT - PROBLEM SELECTOR PLAN 4
history of suicide attempt  needs psych for continuity of care  doesn't have a psychiatrist as outpt
history of suicide attempt  needs psych for continuity of care - consult done. will arrange for close follow up upon d/c  doesn't have a psychiatrist as outpt
history of suicide attempt  needs psych for continuity of care - consult done. will arrange for close follow up upon d/c  doesn't have a psychiatrist as outpt

## 2018-10-19 NOTE — PROGRESS NOTE ADULT - PROBLEM SELECTOR PROBLEM 4
Suicide attempt, subsequent encounter

## 2018-10-19 NOTE — PROGRESS NOTE ADULT - PROBLEM SELECTOR PLAN 5
smoking cessation counseling  nicotine patch

## 2018-10-25 DIAGNOSIS — F17.210 NICOTINE DEPENDENCE, CIGARETTES, UNCOMPLICATED: ICD-10-CM

## 2018-10-25 DIAGNOSIS — K21.9 GASTRO-ESOPHAGEAL REFLUX DISEASE WITHOUT ESOPHAGITIS: ICD-10-CM

## 2018-10-25 DIAGNOSIS — R51 HEADACHE: ICD-10-CM

## 2018-10-25 DIAGNOSIS — G40.909 EPILEPSY, UNSPECIFIED, NOT INTRACTABLE, WITHOUT STATUS EPILEPTICUS: ICD-10-CM

## 2018-10-25 DIAGNOSIS — Z85.43 PERSONAL HISTORY OF MALIGNANT NEOPLASM OF OVARY: ICD-10-CM

## 2018-10-25 DIAGNOSIS — E11.9 TYPE 2 DIABETES MELLITUS WITHOUT COMPLICATIONS: ICD-10-CM

## 2018-10-25 DIAGNOSIS — C56.9 MALIGNANT NEOPLASM OF UNSPECIFIED OVARY: ICD-10-CM

## 2018-10-25 DIAGNOSIS — K76.9 LIVER DISEASE, UNSPECIFIED: ICD-10-CM

## 2018-10-25 DIAGNOSIS — E78.5 HYPERLIPIDEMIA, UNSPECIFIED: ICD-10-CM

## 2018-11-02 ENCOUNTER — APPOINTMENT (OUTPATIENT)
Dept: GASTROENTEROLOGY | Facility: CLINIC | Age: 43
End: 2018-11-02

## 2018-11-09 ENCOUNTER — APPOINTMENT (OUTPATIENT)
Dept: GYNECOLOGIC ONCOLOGY | Facility: CLINIC | Age: 43
End: 2018-11-09

## 2018-11-13 ENCOUNTER — OUTPATIENT (OUTPATIENT)
Dept: OUTPATIENT SERVICES | Facility: HOSPITAL | Age: 43
LOS: 1 days | Discharge: HOME | End: 2018-11-13

## 2018-11-13 ENCOUNTER — APPOINTMENT (OUTPATIENT)
Dept: DERMATOLOGY | Facility: CLINIC | Age: 43
End: 2018-11-13

## 2018-11-13 DIAGNOSIS — Z98.890 OTHER SPECIFIED POSTPROCEDURAL STATES: Chronic | ICD-10-CM

## 2018-11-23 ENCOUNTER — OUTPATIENT (OUTPATIENT)
Dept: OUTPATIENT SERVICES | Facility: HOSPITAL | Age: 43
LOS: 1 days | Discharge: HOME | End: 2018-11-23

## 2018-11-23 ENCOUNTER — APPOINTMENT (OUTPATIENT)
Dept: GYNECOLOGIC ONCOLOGY | Facility: CLINIC | Age: 43
End: 2018-11-23

## 2018-11-23 VITALS
BODY MASS INDEX: 37.97 KG/M2 | HEIGHT: 57 IN | TEMPERATURE: 98.3 F | SYSTOLIC BLOOD PRESSURE: 124 MMHG | RESPIRATION RATE: 14 BRPM | WEIGHT: 176 LBS | DIASTOLIC BLOOD PRESSURE: 88 MMHG | HEART RATE: 85 BPM

## 2018-11-23 DIAGNOSIS — N83.9 NONINFLAMMATORY DISORDER OF OVARY, FALLOPIAN TUBE AND BROAD LIGAMENT, UNSPECIFIED: ICD-10-CM

## 2018-11-23 DIAGNOSIS — N83.201 UNSPECIFIED OVARIAN CYST, RIGHT SIDE: ICD-10-CM

## 2018-11-23 DIAGNOSIS — Z98.890 OTHER SPECIFIED POSTPROCEDURAL STATES: Chronic | ICD-10-CM

## 2018-11-23 DIAGNOSIS — Z85.43 PERSONAL HISTORY OF MALIGNANT NEOPLASM OF OVARY: ICD-10-CM

## 2018-11-23 LAB
APPEARANCE: CLEAR
BILIRUBIN URINE: NEGATIVE
BLOOD URINE: NEGATIVE
COLOR: YELLOW
GLUCOSE QUALITATIVE U: NEGATIVE MG/DL
KETONES URINE: NEGATIVE
LEUKOCYTE ESTERASE URINE: NEGATIVE
NITRITE URINE: NEGATIVE
PH URINE: 6
PROTEIN URINE: NEGATIVE MG/DL
SPECIFIC GRAVITY URINE: 1.02
UROBILINOGEN URINE: 0.2 MG/DL (ref 0.2–?)

## 2018-11-24 LAB — CANCER AG125 SERPL-ACNC: 17 U/ML

## 2018-11-26 ENCOUNTER — APPOINTMENT (OUTPATIENT)
Dept: INTERNAL MEDICINE | Facility: CLINIC | Age: 43
End: 2018-11-26

## 2018-11-26 ENCOUNTER — OUTPATIENT (OUTPATIENT)
Dept: OUTPATIENT SERVICES | Facility: HOSPITAL | Age: 43
LOS: 1 days | Discharge: HOME | End: 2018-11-26

## 2018-11-26 VITALS
BODY MASS INDEX: 38.62 KG/M2 | DIASTOLIC BLOOD PRESSURE: 86 MMHG | TEMPERATURE: 97.4 F | HEART RATE: 72 BPM | HEIGHT: 57 IN | WEIGHT: 179 LBS | SYSTOLIC BLOOD PRESSURE: 127 MMHG

## 2018-11-26 DIAGNOSIS — M54.2 CERVICALGIA: ICD-10-CM

## 2018-11-26 DIAGNOSIS — M79.2 NEURALGIA AND NEURITIS, UNSPECIFIED: ICD-10-CM

## 2018-11-26 DIAGNOSIS — Z98.890 OTHER SPECIFIED POSTPROCEDURAL STATES: Chronic | ICD-10-CM

## 2018-11-26 DIAGNOSIS — M17.11 UNILATERAL PRIMARY OSTEOARTHRITIS, RIGHT KNEE: ICD-10-CM

## 2018-11-26 DIAGNOSIS — M25.511 PAIN IN RIGHT SHOULDER: ICD-10-CM

## 2018-11-26 DIAGNOSIS — D39.10 NEOPLASM OF UNCERTAIN BEHAVIOR OF UNSPECIFIED OVARY: ICD-10-CM

## 2018-11-26 RX ORDER — LEVETIRACETAM 500 MG/1
500 TABLET, FILM COATED ORAL
Refills: 0 | Status: DISCONTINUED | COMMUNITY
End: 2018-11-26

## 2018-11-26 RX ORDER — LEVETIRACETAM 500 MG/1
500 TABLET, FILM COATED ORAL TWICE DAILY
Qty: 60 | Refills: 5 | Status: DISCONTINUED | COMMUNITY
Start: 2018-09-25 | End: 2018-11-26

## 2018-11-26 RX ORDER — ATORVASTATIN CALCIUM 20 MG/1
20 TABLET, FILM COATED ORAL
Qty: 30 | Refills: 3 | Status: DISCONTINUED | COMMUNITY
Start: 2018-08-27 | End: 2018-11-26

## 2018-11-26 NOTE — PHYSICAL EXAM
[No Acute Distress] : no acute distress [Well Nourished] : well nourished [Well Developed] : well developed [No Respiratory Distress] : no respiratory distress  [Clear to Auscultation] : lungs were clear to auscultation bilaterally [No Accessory Muscle Use] : no accessory muscle use [Normal Rate] : normal rate  [Regular Rhythm] : with a regular rhythm [Normal S1, S2] : normal S1 and S2 [No Edema] : there was no peripheral edema [Soft] : abdomen soft [Non Tender] : non-tender [No HSM] : no HSM [No Joint Swelling] : no joint swelling [No Rash] : no rash [No Focal Deficits] : no focal deficits [Normal Affect] : the affect was normal [Supple] : supple [No CVA Tenderness] : no CVA  tenderness

## 2018-11-26 NOTE — ASSESSMENT
[FreeTextEntry1] : 43 F PMH DM, ovarian ca s/p left oophorectomy in 2006, RT oopheroctomy and hysterectomy 2017, seizure disorder, dld presents for f/u; pt complains of cough, sore throat since 2 weeks and right arm pain and right hand pain\par \par # Right arm pain; right hand joint pain\par - celecoxib prn\par - xray shoulder and cervical spine\par \par # Cough with greenish sputum/ sore throat\par - zpack,albuterol syrop\par \par #  DLD\par - atorvastatin to 40 mg; non compliant with lipitor\par \par #  DM- controlled\par - 5.4%; repeat A1c; eye exam\par - c/w trulicity 0.75 mg weekly\par \par # Serous Ovarian Ca s/p WILBERT, right salpingo-oopherectomy\par - f/u w/ / Sunny\par - pt was seen by derm and gyn onc\par - pt has liver lesions which were suspected to be mets; but on biopsy found to be benign\par \par # Seizure disorder\par - pt follows with neuro\par - recently keppra changed to tegretol\par \par # Gastritis; h/o H pylori\par - check h pylori stool antigen\par - stop omeprazole,start famotidine\par \par HCM\par - PPSV 23 received previously\par - flu vac- received 1 month ago\par - pap smear- 2017 utd\par - mammogram referral

## 2018-11-26 NOTE — HISTORY OF PRESENT ILLNESS
[de-identified] : 43 F PMH DM, ovarian ca s/p left oophorectomy in 2006, RT oopheroctomy and hysterectomy 2017, seizure disorder, dld presents for f/u; pt complains of cough, sore throat since 2 weeks and right arm pain and right hand pain

## 2018-11-26 NOTE — REVIEW OF SYSTEMS
[Sore Throat] : sore throat [Cough] : cough [Joint Pain] : joint pain [Fever] : no fever [Recent Change In Weight] : ~T no recent weight change [Chest Pain] : no chest pain [Wheezing] : no wheezing [Abdominal Pain] : no abdominal pain [Nausea] : no nausea [Vomiting] : no vomiting [Heartburn] : no heartburn [Dysuria] : no dysuria [Headache] : no headache [Insomnia] : no insomnia [FreeTextEntry9] : right knee pain; right arm pain

## 2018-11-27 ENCOUNTER — OUTPATIENT (OUTPATIENT)
Dept: OUTPATIENT SERVICES | Facility: HOSPITAL | Age: 43
LOS: 1 days | Discharge: HOME | End: 2018-11-27

## 2018-11-27 ENCOUNTER — APPOINTMENT (OUTPATIENT)
Dept: NEUROLOGY | Facility: CLINIC | Age: 43
End: 2018-11-27

## 2018-11-27 VITALS
BODY MASS INDEX: 38.83 KG/M2 | WEIGHT: 180 LBS | HEART RATE: 84 BPM | HEIGHT: 57 IN | DIASTOLIC BLOOD PRESSURE: 92 MMHG | SYSTOLIC BLOOD PRESSURE: 129 MMHG | TEMPERATURE: 97 F

## 2018-11-27 DIAGNOSIS — M79.601 PAIN IN RIGHT ARM: ICD-10-CM

## 2018-11-27 DIAGNOSIS — J20.9 ACUTE BRONCHITIS, UNSPECIFIED: ICD-10-CM

## 2018-11-27 DIAGNOSIS — E11.9 TYPE 2 DIABETES MELLITUS WITHOUT COMPLICATIONS: ICD-10-CM

## 2018-11-27 DIAGNOSIS — B96.81 HELICOBACTER PYLORI [H. PYLORI] AS THE CAUSE OF DISEASES CLASSIFIED ELSEWHERE: ICD-10-CM

## 2018-11-27 DIAGNOSIS — R56.9 UNSPECIFIED CONVULSIONS: ICD-10-CM

## 2018-11-27 DIAGNOSIS — Z98.890 OTHER SPECIFIED POSTPROCEDURAL STATES: Chronic | ICD-10-CM

## 2018-11-27 DIAGNOSIS — E78.00 PURE HYPERCHOLESTEROLEMIA, UNSPECIFIED: ICD-10-CM

## 2018-12-09 ENCOUNTER — FORM ENCOUNTER (OUTPATIENT)
Age: 43
End: 2018-12-09

## 2018-12-10 ENCOUNTER — OUTPATIENT (OUTPATIENT)
Dept: OUTPATIENT SERVICES | Facility: HOSPITAL | Age: 43
LOS: 1 days | Discharge: HOME | End: 2018-12-10

## 2018-12-10 DIAGNOSIS — Z98.890 OTHER SPECIFIED POSTPROCEDURAL STATES: Chronic | ICD-10-CM

## 2018-12-10 DIAGNOSIS — R92.8 OTHER ABNORMAL AND INCONCLUSIVE FINDINGS ON DIAGNOSTIC IMAGING OF BREAST: ICD-10-CM

## 2018-12-10 LAB
CHOLEST SERPL-MCNC: 142 MG/DL
CHOLEST/HDLC SERPL: 3.7 RATIO
HDLC SERPL-MCNC: 38 MG/DL
LDLC SERPL CALC-MCNC: 99 MG/DL
TRIGL SERPL-MCNC: 121 MG/DL

## 2018-12-13 LAB — H PYLORI AG STL QL: NOT DETECTED

## 2018-12-14 ENCOUNTER — OUTPATIENT (OUTPATIENT)
Dept: OUTPATIENT SERVICES | Facility: HOSPITAL | Age: 43
LOS: 1 days | Discharge: HOME | End: 2018-12-14

## 2018-12-14 ENCOUNTER — APPOINTMENT (OUTPATIENT)
Dept: GYNECOLOGIC ONCOLOGY | Facility: CLINIC | Age: 43
End: 2018-12-14

## 2018-12-14 DIAGNOSIS — Z98.890 OTHER SPECIFIED POSTPROCEDURAL STATES: Chronic | ICD-10-CM

## 2018-12-14 LAB
ANION GAP SERPL CALC-SCNC: 17 MMOL/L
BUN SERPL-MCNC: 18 MG/DL
CALCIUM SERPL-MCNC: 9.6 MG/DL
CHLORIDE SERPL-SCNC: 101 MMOL/L
CO2 SERPL-SCNC: 22 MMOL/L
CREAT SERPL-MCNC: 0.6 MG/DL
GLUCOSE SERPL-MCNC: 74 MG/DL
POTASSIUM SERPL-SCNC: 4.3 MMOL/L
SODIUM SERPL-SCNC: 140 MMOL/L

## 2018-12-17 DIAGNOSIS — D39.10 NEOPLASM OF UNCERTAIN BEHAVIOR OF UNSPECIFIED OVARY: ICD-10-CM

## 2018-12-19 ENCOUNTER — APPOINTMENT (OUTPATIENT)
Dept: INTERNAL MEDICINE | Facility: CLINIC | Age: 43
End: 2018-12-19

## 2018-12-26 ENCOUNTER — OUTPATIENT (OUTPATIENT)
Dept: OUTPATIENT SERVICES | Facility: HOSPITAL | Age: 43
LOS: 1 days | Discharge: HOME | End: 2018-12-26

## 2018-12-26 DIAGNOSIS — Z98.890 OTHER SPECIFIED POSTPROCEDURAL STATES: Chronic | ICD-10-CM

## 2018-12-26 DIAGNOSIS — R10.2 PELVIC AND PERINEAL PAIN: ICD-10-CM

## 2018-12-28 ENCOUNTER — OUTPATIENT (OUTPATIENT)
Dept: OUTPATIENT SERVICES | Facility: HOSPITAL | Age: 43
LOS: 1 days | Discharge: HOME | End: 2018-12-28

## 2018-12-28 ENCOUNTER — APPOINTMENT (OUTPATIENT)
Dept: GASTROENTEROLOGY | Facility: CLINIC | Age: 43
End: 2018-12-28

## 2018-12-28 VITALS
SYSTOLIC BLOOD PRESSURE: 117 MMHG | HEIGHT: 57 IN | BODY MASS INDEX: 38.62 KG/M2 | WEIGHT: 179 LBS | DIASTOLIC BLOOD PRESSURE: 82 MMHG | HEART RATE: 101 BPM

## 2018-12-28 DIAGNOSIS — Z98.890 OTHER SPECIFIED POSTPROCEDURAL STATES: Chronic | ICD-10-CM

## 2019-01-08 ENCOUNTER — APPOINTMENT (OUTPATIENT)
Dept: NEUROLOGY | Facility: CLINIC | Age: 44
End: 2019-01-08

## 2019-01-08 ENCOUNTER — OUTPATIENT (OUTPATIENT)
Dept: OUTPATIENT SERVICES | Facility: HOSPITAL | Age: 44
LOS: 1 days | Discharge: HOME | End: 2019-01-08

## 2019-01-08 VITALS
BODY MASS INDEX: 38.4 KG/M2 | WEIGHT: 178 LBS | DIASTOLIC BLOOD PRESSURE: 81 MMHG | HEIGHT: 57 IN | HEART RATE: 91 BPM | SYSTOLIC BLOOD PRESSURE: 115 MMHG

## 2019-01-08 DIAGNOSIS — R56.9 UNSPECIFIED CONVULSIONS: ICD-10-CM

## 2019-01-08 DIAGNOSIS — Z98.890 OTHER SPECIFIED POSTPROCEDURAL STATES: Chronic | ICD-10-CM

## 2019-01-08 DIAGNOSIS — Z87.898 PERSONAL HISTORY OF OTHER SPECIFIED CONDITIONS: ICD-10-CM

## 2019-01-08 RX ORDER — CARBAMAZEPINE 200 MG/1
200 TABLET ORAL TWICE DAILY
Qty: 60 | Refills: 4 | Status: COMPLETED | COMMUNITY
Start: 2018-10-09 | End: 2019-01-08

## 2019-01-08 NOTE — HISTORY OF PRESENT ILLNESS
[FreeTextEntry1] : 42 yo F w/ recurrent b/l tremulousness with headaches, nausea, intermittent tongue biting and post-ictal fatigue found with chronic changes on MRI c/w prior h/o significant head trauma. Pt controlled on carbamazepine. VEEG done was consistent for Pseudo-seizures.

## 2019-01-08 NOTE — ASSESSMENT
[FreeTextEntry1] : 42 yo F w/ recurrent b/l tremulousness with headaches, nausea, intermittent tongue biting and post-ictal fatigue found with chronic changes on MRI c/w prior h/o significant head trauma. On 100mg carbamazepine Q24H\par \par - VEEG is consistent with pseudo-seizures\par - patient feels that she is doing good on carbamazepine 100mg q 24h. Will continue for now. \par

## 2019-01-08 NOTE — END OF VISIT
[] : Resident [FreeTextEntry3] : Patient seen and examiend agree with above except as noted. Patient not having episodes of tongue biting anymore in sleep.  She gets anxious and has shaking in her hands for which she says the carbamazepine works at calming her down.  She would like to continue the low dose CBZ \par A+Ox3 language and attention normal\par CN 2-12 normal\par no tremor\par power 5/5\par Temp, Vib symmetric\par FTN NL\par Gait normal\par \par A/P\par Patient with dx of pseudoseizures currently on CBZ which was being tapered.  She is using it for her anxiety and hand shaking.  She says it works.  Maybe getting benefit of mood stabilization on CBZ however it is at an extremely low dose.\par 1. Continue CBZ 100mg QD\par 2. f/u in 6 months\par

## 2019-01-08 NOTE — PHYSICAL EXAM
[General Appearance - Alert] : alert [Oriented To Time, Place, And Person] : oriented to person, place, and time [Person] : oriented to person [Place] : oriented to place [Time] : oriented to time [Hearing Threshold Finger Rub Not [place]] : hearing was normal [] : no respiratory distress [Respiration, Rhythm And Depth] : normal respiratory rhythm and effort [Exaggerated Use Of Accessory Muscles For Inspiration] : no accessory muscle use [Abdomen Soft] : soft [Abdomen Tenderness] : non-tender [Involuntary Movements] : no involuntary movements were seen [Motor Strength Upper Extremities Bilaterally] : strength was normal in both upper extremities [Motor Strength Lower Extremities Bilaterally] : strength was normal in both lower extremities

## 2019-01-10 ENCOUNTER — RX RENEWAL (OUTPATIENT)
Age: 44
End: 2019-01-10

## 2019-01-16 ENCOUNTER — APPOINTMENT (OUTPATIENT)
Dept: INTERNAL MEDICINE | Facility: CLINIC | Age: 44
End: 2019-01-16

## 2019-01-16 ENCOUNTER — APPOINTMENT (OUTPATIENT)
Dept: ENDOCRINOLOGY | Facility: CLINIC | Age: 44
End: 2019-01-16

## 2019-01-16 ENCOUNTER — OUTPATIENT (OUTPATIENT)
Dept: OUTPATIENT SERVICES | Facility: HOSPITAL | Age: 44
LOS: 1 days | Discharge: HOME | End: 2019-01-16

## 2019-01-16 VITALS
DIASTOLIC BLOOD PRESSURE: 85 MMHG | TEMPERATURE: 98.3 F | BODY MASS INDEX: 38.62 KG/M2 | SYSTOLIC BLOOD PRESSURE: 130 MMHG | HEART RATE: 92 BPM | HEIGHT: 57 IN | WEIGHT: 179 LBS

## 2019-01-16 DIAGNOSIS — Z98.890 OTHER SPECIFIED POSTPROCEDURAL STATES: Chronic | ICD-10-CM

## 2019-01-16 RX ORDER — ALBUTEROL SULFATE 2 MG/5ML
2 SYRUP ORAL 3 TIMES DAILY
Qty: 105 | Refills: 0 | Status: DISCONTINUED | COMMUNITY
Start: 2018-11-26 | End: 2019-01-16

## 2019-01-16 RX ORDER — CELECOXIB 200 MG/1
200 CAPSULE ORAL
Qty: 14 | Refills: 0 | Status: DISCONTINUED | COMMUNITY
Start: 2018-11-26 | End: 2019-01-16

## 2019-01-16 RX ORDER — FAMOTIDINE 20 MG/1
20 TABLET, FILM COATED ORAL DAILY
Qty: 14 | Refills: 5 | Status: DISCONTINUED | COMMUNITY
Start: 2018-11-26 | End: 2019-01-16

## 2019-01-16 RX ORDER — AZITHROMYCIN 250 MG/1
250 TABLET, FILM COATED ORAL
Qty: 1 | Refills: 0 | Status: DISCONTINUED | COMMUNITY
Start: 2018-11-26 | End: 2019-01-16

## 2019-01-16 NOTE — PHYSICAL EXAM
[No Acute Distress] : no acute distress [Well Nourished] : well nourished [Normal Sclera/Conjunctiva] : normal sclera/conjunctiva [Normal Outer Ear/Nose] : the outer ears and nose were normal in appearance [Normal Oropharynx] : the oropharynx was normal [No Respiratory Distress] : no respiratory distress  [Clear to Auscultation] : lungs were clear to auscultation bilaterally [Normal Rate] : normal rate  [Regular Rhythm] : with a regular rhythm [No Edema] : there was no peripheral edema [Soft] : abdomen soft [Non Tender] : non-tender [No HSM] : no HSM [Normal Bowel Sounds] : normal bowel sounds [No Joint Swelling] : no joint swelling [No Rash] : no rash [Normal Gait] : normal gait [Coordination Grossly Intact] : coordination grossly intact [de-identified] : morbid obesity [de-identified] : +pain on palpation along joints of RUE, bilateral knees, +pain on palpaption of muscles of upper extremities but strength is intact

## 2019-01-16 NOTE — REVIEW OF SYSTEMS
[Joint Pain] : joint pain [Joint Stiffness] : joint stiffness [Muscle Pain] : muscle pain [Back Pain] : back pain [Fever] : no fever [Vision Problems] : no vision problems [Chest Pain] : no chest pain [Palpitations] : no palpitations [Orthopnea] : no orthopnea [Shortness Of Breath] : no shortness of breath [Wheezing] : no wheezing [Abdominal Pain] : no abdominal pain [Nausea] : no nausea [Vomiting] : no vomiting [Heartburn] : no heartburn [Skin Rash] : no skin rash [Headache] : no headache [Dizziness] : no dizziness [Memory Loss] : no memory loss [Unsteady Walking] : no ataxia [FreeTextEntry2] : inc weight of 20 pounds in last one year

## 2019-01-16 NOTE — HISTORY OF PRESENT ILLNESS
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 Pt still c/o persistent RUE pain, now also associated with bilateral LE pain. She works as a  and uses her right arm. She states that the celecoxib has not been helping her. She states her pain is worse after resting, she describes it has a firey pain in her RUE that starts in her R shoulder and comes down to the small joints of her R hand. She also has lower back pain, with radiation of the pain to the R hip in a circular fashion, radiating down the thigh.

## 2019-01-16 NOTE — PLAN
[FreeTextEntry1] : # Right arm pain; right hand joint pain\par - will switch to voltaren cream\par - xray shoulder-neg, xray cervical spine-neg acute pathology, C3-4, C5-6, C6-7 disc space narrowing\par -check for myopathy, CK was wnl last time it was check\par -check esr and ck again\par \par # Cough-resolved\par \par # DLD\par -lipid profile 12/10/2018: trig 121, chol 142, HDL 38, LDL 99\par - c/w atorvastatin to 40 mg; non compliant with lipitor and has been educated to take it daily\par \par # DM- controlled\par - 5.4%; repeat A1c; eye exam done Aug 2018\par -will repeat A1c\par - c/w trulicity 0.75 mg weekly\par \par # Serous Ovarian Ca s/p WILBERT, right salpingo-oopherectomy\par - f/u w/ Dr Correa in 2/2019\par - pt has liver lesions which were suspected to be mets; but on biopsy found to be benign\par \par # Seizure disorder\par - pt follows with neuro- VEEG is consistent with pseudo-seizures\par -chronic changes on MRI 9/2018 c/w prior h/o significant head trauma\par - c/w carbamazepine 100mg q 24h\par \par # Gastritis; h/o H pylori\par -  Stool Ag test negative\par -as per GI-Will need H pylori biopsies again as previous biopsies were equivocal\par - pt currently on omeprazole\par -needs followup with GI in 3/2019\par \par HCM\par - PPSV 23 received previously\par - flu vac- received in 10/2018\par - pap smear-s/p hysterectomy \par - Mammogram 12/10/2018 results BIRADS 3 - needs repeat imaging in 6/2019\par -Will require repeat colonoscopy in 2020 as Increased Risk Screening\par - +CKDN2A mutation-CT abdomen reviewed from 04/2018 revealed normal pancreas-needs EUS for screening for Pancreatic Cancer\par -prior attempt at EUS on 10/10/2018 was aborted due to food in stomach, has appt with GI in 3/2019

## 2019-01-17 DIAGNOSIS — E11.9 TYPE 2 DIABETES MELLITUS WITHOUT COMPLICATIONS: ICD-10-CM

## 2019-01-17 DIAGNOSIS — E78.00 PURE HYPERCHOLESTEROLEMIA, UNSPECIFIED: ICD-10-CM

## 2019-01-17 DIAGNOSIS — R56.9 UNSPECIFIED CONVULSIONS: ICD-10-CM

## 2019-01-17 LAB
ALBUMIN SERPL ELPH-MCNC: 4.7 G/DL
ALP BLD-CCNC: 169 U/L
ALT SERPL-CCNC: 46 U/L
ANION GAP SERPL CALC-SCNC: 15 MMOL/L
AST SERPL-CCNC: 31 U/L
BASOPHILS # BLD AUTO: 0.03 K/UL
BASOPHILS NFR BLD AUTO: 0.4 %
BILIRUB SERPL-MCNC: 0.2 MG/DL
BUN SERPL-MCNC: 19 MG/DL
CALCIUM SERPL-MCNC: 9.6 MG/DL
CHLORIDE SERPL-SCNC: 103 MMOL/L
CK SERPL-CCNC: 134 U/L
CO2 SERPL-SCNC: 25 MMOL/L
CREAT SERPL-MCNC: 0.6 MG/DL
EOSINOPHIL # BLD AUTO: 0.15 K/UL
EOSINOPHIL NFR BLD AUTO: 2.1 %
ERYTHROCYTE [SEDIMENTATION RATE] IN BLOOD BY WESTERGREN METHOD: 46 MM/HR
ESTIMATED AVERAGE GLUCOSE: 128 MG/DL
GLUCOSE SERPL-MCNC: 84 MG/DL
HBA1C MFR BLD HPLC: 6.1 %
HCT VFR BLD CALC: 42.9 %
HGB BLD-MCNC: 13.6 G/DL
IMM GRANULOCYTES NFR BLD AUTO: 0.1 %
LYMPHOCYTES # BLD AUTO: 3.3 K/UL
LYMPHOCYTES NFR BLD AUTO: 46.6 %
MAN DIFF?: NORMAL
MCHC RBC-ENTMCNC: 28.6 PG
MCHC RBC-ENTMCNC: 31.7 G/DL
MCV RBC AUTO: 90.3 FL
MONOCYTES # BLD AUTO: 0.44 K/UL
MONOCYTES NFR BLD AUTO: 6.2 %
NEUTROPHILS # BLD AUTO: 3.15 K/UL
NEUTROPHILS NFR BLD AUTO: 44.6 %
PLATELET # BLD AUTO: 370 K/UL
POTASSIUM SERPL-SCNC: 4.6 MMOL/L
PROT SERPL-MCNC: 7.4 G/DL
RBC # BLD: 4.75 M/UL
RBC # FLD: 13.2 %
SODIUM SERPL-SCNC: 143 MMOL/L
WBC # FLD AUTO: 7.08 K/UL

## 2019-02-13 ENCOUNTER — APPOINTMENT (OUTPATIENT)
Dept: ENDOCRINOLOGY | Facility: CLINIC | Age: 44
End: 2019-02-13

## 2019-02-13 ENCOUNTER — OUTPATIENT (OUTPATIENT)
Dept: OUTPATIENT SERVICES | Facility: HOSPITAL | Age: 44
LOS: 1 days | Discharge: HOME | End: 2019-02-13

## 2019-02-13 ENCOUNTER — APPOINTMENT (OUTPATIENT)
Dept: HEMATOLOGY ONCOLOGY | Facility: CLINIC | Age: 44
End: 2019-02-13

## 2019-02-13 VITALS
TEMPERATURE: 96.5 F | SYSTOLIC BLOOD PRESSURE: 113 MMHG | WEIGHT: 180 LBS | HEIGHT: 57 IN | DIASTOLIC BLOOD PRESSURE: 61 MMHG | BODY MASS INDEX: 38.83 KG/M2 | RESPIRATION RATE: 18 BRPM | HEART RATE: 78 BPM

## 2019-02-13 VITALS — BODY MASS INDEX: 38.62 KG/M2 | WEIGHT: 179 LBS | HEIGHT: 57 IN

## 2019-02-13 DIAGNOSIS — E66.01 MORBID (SEVERE) OBESITY DUE TO EXCESS CALORIES: ICD-10-CM

## 2019-02-13 DIAGNOSIS — Z98.890 OTHER SPECIFIED POSTPROCEDURAL STATES: Chronic | ICD-10-CM

## 2019-02-13 DIAGNOSIS — E11.65 TYPE 2 DIABETES MELLITUS WITH HYPERGLYCEMIA: ICD-10-CM

## 2019-02-13 NOTE — REVIEW OF SYSTEMS
[Fatigue] : no fatigue [Recent Weight Gain (___ Lbs)] : no recent weight gain [Recent Weight Loss (___ Lbs)] : no recent weight loss [Chest Pain] : no chest pain [Shortness Of Breath] : no shortness of breath [Nausea] : no nausea [Vomiting] : no vomiting was observed [Polydipsia] : no polydipsia [Galactorrhea] : no galactorrhea  [Cold Intolerance] : cold tolerant [Heat Intolerance] : heat tolerant [Hot Flashes] : no hot flashes

## 2019-02-13 NOTE — ASSESSMENT
[Carbohydrate Consistent Diet] : carbohydrate consistent diet [Diabetes Foot Care] : diabetes foot care [Long Term Vascular Complications] : long term vascular complications of diabetes [Importance of Diet and Exercise] : importance of diet and exercise to improve glycemic control, achieve weight loss and improve cardiovascular health [FreeTextEntry1] : 43 F PMH DM, ovarian ca s/p left oophorectomy in 2006, RT oopheroctomy and hysterectomy 2017, seizure disorder, dld,\par \par # obesity + DM\par - c/w trulicity 0.75 once a week\par - annual podiatry and ophthal\par - hba1c- 6.1; \par - f/u with pmd; f/u with endo prn; \par \par \par # DLD\par - c/w lipitor

## 2019-02-13 NOTE — PHYSICAL EXAM
[Normal Rate] : heart rate was normal  [Normal S1, S2] : normal S1 and S2 [Murmurs] : no murmurs [No Rubs] : no pericardial rub [No Edema] : there was no peripheral edema [Not Tender] : non-tender [Soft] : abdomen soft [Not Distended] : not distended [Normal Gait] : normal gait [No Rash] : no rash [Alert] : alert [No Acute Distress] : no acute distress [Well Nourished] : well nourished [Well Developed] : well developed [Normal Sclera/Conjunctiva] : normal sclera/conjunctiva [EOMI] : extra ocular movement intact [No Proptosis] : no proptosis [Normal Oropharynx] : the oropharynx was normal [Thyroid Not Enlarged] : the thyroid was not enlarged [No Thyroid Nodules] : there were no palpable thyroid nodules [No Respiratory Distress] : no respiratory distress [No Accessory Muscle Use] : no accessory muscle use [Clear to Auscultation] : lungs were clear to auscultation bilaterally [Regular Rhythm] : with a regular rhythm [Pedal Pulses Normal] : the pedal pulses are present [Normal Bowel Sounds] : normal bowel sounds [Post Cervical Nodes] : posterior cervical nodes [Anterior Cervical Nodes] : anterior cervical nodes [Axillary Nodes] : axillary nodes [Normal] : normal and non tender [No Spinal Tenderness] : no spinal tenderness [Spine Straight] : spine straight [No Stigmata of Cushings Syndrome] : no stigmata of cushings syndrome [Normal Strength/Tone] : muscle strength and tone were normal [Normal Reflexes] : deep tendon reflexes were 2+ and symmetric [No Tremors] : no tremors [Oriented x3] : oriented to person, place, and time [Acanthosis Nigricans] : no acanthosis nigricans

## 2019-02-13 NOTE — PHYSICAL EXAM
[Fully active, able to carry on all pre-disease performance without restriction] : Status 0 - Fully active, able to carry on all pre-disease performance without restriction [Obese] : obese [Normal] : grossly intact

## 2019-02-14 DIAGNOSIS — Z15.89 GENETIC SUSCEPTIBILITY TO OTHER DISEASE: ICD-10-CM

## 2019-02-14 DIAGNOSIS — D39.11 NEOPLASM OF UNCERTAIN BEHAVIOR OF RIGHT OVARY: ICD-10-CM

## 2019-02-14 LAB
ALBUMIN SERPL ELPH-MCNC: 4.6 G/DL
ALP BLD-CCNC: 175 U/L
ALT SERPL-CCNC: 64 U/L
ANION GAP SERPL CALC-SCNC: 20 MMOL/L
AST SERPL-CCNC: 40 U/L
BILIRUB SERPL-MCNC: 0.2 MG/DL
BUN SERPL-MCNC: 21 MG/DL
CALCIUM SERPL-MCNC: 10 MG/DL
CANCER AG125 SERPL-ACNC: 13 U/ML
CHLORIDE SERPL-SCNC: 99 MMOL/L
CO2 SERPL-SCNC: 24 MMOL/L
CREAT SERPL-MCNC: 0.6 MG/DL
GLUCOSE SERPL-MCNC: 97 MG/DL
POTASSIUM SERPL-SCNC: 4.4 MMOL/L
PROT SERPL-MCNC: 7.4 G/DL
SODIUM SERPL-SCNC: 143 MMOL/L

## 2019-02-14 NOTE — ASSESSMENT
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 The pathology does not show invasive implants. 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 Patient was told to inform family especially children to undergo genetic testing as well.  \par \par Patient was told to follow up with Dr Weinberg of GI for EUS of the pancreas and consideration of a MRCP in the future. The patient was also recommended to see a dermatologist for full skin evaluation\par \par RECOMMENDATIONS:\par Follow up with GI pancreatic EUS.  Due for EGD and EUS in 3/19.\par Due for mammo in July, 2019. \par Check CMP, . \par Neurology f/u for evaluation of seizures.\par Continue follow up with dermatology. \par Advised to stop smoking and alcohol consumption \par \par RTC in 4 months.\par \par Pt seen and examined with \par

## 2019-02-14 NOTE — RESULTS/DATA
[FreeTextEntry1] : CT ABDOMEN AND PELVIS OC \par \par \par PROCEDURE DATE: 12/26/2018 \par \par \par \par \par INTERPRETATION: CLINICAL STATEMENT: Pelvic pain, borderline ovarian cancer \par \par TECHNIQUE: Contiguous axial CT images were obtained from the lower chest to \par the pubic symphysis without intravenous contrast. Intravenous contrast was \par not administered as the patient had a recent anaphylactic reaction to what \par is thought to be gadolinium contrast. Oral contrast was given. Reformatted \par images in the coronal and sagittal planes were acquired. \par \par COMPARISON CT: April 16, 2018 \par \par FINDINGS: \par \par LOWER CHEST: Unremarkable. \par \par HEPATOBILIARY: Status post cholecystectomy. Fatty infiltration liver. \par \par SPLEEN: Unremarkable. \par \par PANCREAS: Unremarkable. \par \par ADRENAL GLANDS: Unremarkable. \par \par KIDNEYS: Unremarkable. \par \par ABDOMINOPELVIC NODES: Unremarkable. \par \par PELVIC ORGANS: The uterus is not seen, likely surgically removed. The \par uterine bed is stable since prior examination. \par \par PERITONEUM/MESENTERY/BOWEL: Diverticulosis, but no diverticulitis. No free \par air or obstruction. The appendix is unremarkable. There is a small bowel \par Childs hernia on image 55 series 2 without obstruction. \par \par BONES/SOFT TISSUES: There are postoperative subcutaneous changes. \par Degenerative changes are present. \par \par IMPRESSION: \par \par No evidence of pelvic mass or abdominal/pelvic adenopathy. \par \par Nonobstructing small bowel Childs hernia. \par \par Fatty infiltration of the liver \par \par \par \par \par \par \par SHERRILL GRAJEDA M.D., ATTENDING RADIOLOGIST

## 2019-02-14 NOTE — HISTORY OF PRESENT ILLNESS
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  (SEE MICROSCOPIC DESCRIPTION FOR SYNOPTIC REPORT)\par \par       -    TUMOR IS PRESENT IN THE PARATUBAL TISSUE AND ON THE OVARIAN\par            SURFACE.\par \par       -    SCATTERED FOCI SUSPICIOUS OF STROMAL MICROINVASION, LARGEST\par            FOCUS MEASURING 4 MM. (SLIDE A4)\par \par       -    PATHOLOGIC STAGE (pTNM):  pT(m)2b pNX\par    ONE FOCUS SUGGESTIVE OF LOW GRADE SEROUS CARCINOMA. (0.6 MM;\par            SLIDE A7)\par \par There were no post operative complications.\par She C/o hot flashes.\par C/O pain in the abdomen, which is diffuse.\par No weight loss.\par No vaginal bleeding\par No fever.\par Operative report was reviewed. No residual disease noted.\par \par The pathology slides had been sent for 2nd opinion and result is noted below. No invasion was identified.\par \par Patient also had a liver biopsy which demonstrated normal liver parenchyma which is noted below.  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 Patient needs evaluation with EUS +/- MRCP for evaluation of pancreatic cancer and regular follow up with dermatology  [de-identified] :  Report CR- received from [facility], [address] [place] [address] by Moy Briscoe on 2/8/2018 with the following    diagnosis:     FINAL DIAGNOSIS:    OVARY, RIGHT, SALPINGO-OOPHORECTOMY:          - SEROUS BORDERLINE TUMOR WITH NONINVASIVE IMPLANTS.    Comment:     I agree with your interpretation on this case of serous borderline    tumor. There are areas of increased proliferation but nothing I    would call serous carcinoma. There are noninvasive implants of the    fallopian tube and on the surface of the ovary. The sections from    the pelvic side wall and omentum also show noninvasive implants of    serous borderline tumor.\par \par Cytopathology Report       Final Diagnosis\par  LIVER LESION, LIVER LOBE, CT-GUIDED NEEDLE BIOPSY AND IMPRINT:\par  - NO MALIGNANT CELLS IDENTIFIED\par . - LIVER PARENCHYMA TISSUE WITH MACROVESICULAR AND MICROVESICULAR STEATOSIS ( 50% OF THE TISSUE). \par - IMMUNOHISTOCHEMICAL AND SPECIAL STAINS PENDING.\par  [de-identified] : C/O Pain in left shoulder and left breast as before.\par She had a PET scan and she is scheduled for liver biopsy later this week.\par \par 3/26/18\par Patient here for follow up complains of symptoms including hot flashes and abdominal pain in the RUQ at the side of liver biopsy. Patient denies any changes in bowel habits, vaginal bleeding. She had a patient had a liver biopsy which was normal showing normal liver parenchyma. 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 Patient was told to inform family especially children to undergo genetic testing as well.  Patient was told to follow up with Dr Weinberg of GI for EUS of the pancreas and consideration of a MRCP in the future. The patient was also recommended to see a dermatologist for full skin evaluation. \par \par 7/5/18\par She has been drinking heavy ( 10 shots of tequila  each weekend).\par She has an appointment with GI next week.\par Has not seen dermatology.\par Denies abdominal pain, no vaginal bleeding\par \par 10/4/18:\par Had headaches and had MRI brain few weeks ago.\par C/o dizziness and blurry vision. \par On keppra started by neurology for possible convulsions, started a week ago. She says that she doesn't feel good on Keppra. \par Denies fever, nausea, vomiting, chest pain, SOB, abdominal pain, bowel and bladder problems.\par Due for EGD and EUS on 10/10/18\par Due for mammo in Oct 2018. \par Due for VEEG. \par \par 2/13/19:\par Patient here for follow up, feeling well, no new complaints.  Patient denies abdominal pain or bloating, denies vaginal bleeding or discharge.  She had a CT scan which was normal and she is scheduled with GYN ONC on Friday.  She saw dermatologist as well as GI.  EUS was scheduled and apparently cancelled after the patient was found to have food in her stomach.  She states its now scheduled for end of March.  Patient also being followed for pseudoseizures by neurology.\par \par \par

## 2019-02-15 ENCOUNTER — APPOINTMENT (OUTPATIENT)
Dept: GYNECOLOGIC ONCOLOGY | Facility: CLINIC | Age: 44
End: 2019-02-15

## 2019-02-15 ENCOUNTER — OUTPATIENT (OUTPATIENT)
Dept: OUTPATIENT SERVICES | Facility: HOSPITAL | Age: 44
LOS: 1 days | Discharge: HOME | End: 2019-02-15

## 2019-02-15 VITALS
BODY MASS INDEX: 38.83 KG/M2 | RESPIRATION RATE: 18 BRPM | WEIGHT: 180 LBS | TEMPERATURE: 97.3 F | SYSTOLIC BLOOD PRESSURE: 116 MMHG | DIASTOLIC BLOOD PRESSURE: 90 MMHG | HEIGHT: 57 IN | HEART RATE: 80 BPM

## 2019-02-15 DIAGNOSIS — Z98.890 OTHER SPECIFIED POSTPROCEDURAL STATES: Chronic | ICD-10-CM

## 2019-02-15 NOTE — DISCUSSION/SUMMARY
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 Correa for further genetic testing for her children.

## 2019-02-15 NOTE — HISTORY OF PRESENT ILLNESS
[FreeTextEntry1] : 42yo\par \par Stage IIIA Serous BOT\par Surgery date: WILBERT/RSO/Omentectomy 12/18/2017\par \par Path: 3 foci of serous BOT, with one suggestive of non-invasive low grade serous carcinoma; omentum with non-invasive implants; tumor is also present in paratubal tissue and ovarian surface; uterus/cervix => not involved => path send for outside consultation => no invasive carcinoma => final result: serous BOT\par \par Adjuvant treatment: None\par Most recent : 11/23/18 - 17\par \par MRI abdomen 02/05/2018: 3 hepatic lesions, could be suspicious for metastatic implants, but unchanged compared to 03/2017\par \par PET/CT 02/23/2018: no increased uptake in liver lesion; uptake in right medical upper thigh\par \par CT guided biopsy of liver lesion 03/08/2018 => benign benign\par \par 12/2018 CT abd/pelvis: No evidence of pelvic mass or abdominal/pelvic adenopathy. Nonobstructing small bowel Childs hernia. Fatty infiltration of the liver \par \par Genetic testing positive for melanona/pancreatic cancer syndrome\par \par Saw GI on 12/2018: s/p attempted EGD and EUS but procedure was aborted due to presence of solid food in her stomach- rescheduled for 3/2019 for repeat testing, needs repeat colonoscopy in 2020\par \par Saw Derm 11/13/18 - normal skin exam, f/u with PMD and return in case any suspicious skin lesions\par \par Denies any complaints today, reports intermittent abdominal pain. Denies any bloating, bleeding, changes in appetite, bowel or bladder habits. \par Patient is refusing pelvic exam today.

## 2019-02-20 DIAGNOSIS — D39.10 NEOPLASM OF UNCERTAIN BEHAVIOR OF UNSPECIFIED OVARY: ICD-10-CM

## 2019-03-01 ENCOUNTER — OUTPATIENT (OUTPATIENT)
Dept: OUTPATIENT SERVICES | Facility: HOSPITAL | Age: 44
LOS: 1 days | Discharge: HOME | End: 2019-03-01

## 2019-03-01 ENCOUNTER — APPOINTMENT (OUTPATIENT)
Dept: INTERNAL MEDICINE | Facility: CLINIC | Age: 44
End: 2019-03-01

## 2019-03-01 VITALS
DIASTOLIC BLOOD PRESSURE: 83 MMHG | HEART RATE: 72 BPM | BODY MASS INDEX: 39.26 KG/M2 | SYSTOLIC BLOOD PRESSURE: 126 MMHG | HEIGHT: 57 IN | WEIGHT: 182 LBS

## 2019-03-01 DIAGNOSIS — Z98.890 OTHER SPECIFIED POSTPROCEDURAL STATES: Chronic | ICD-10-CM

## 2019-03-01 DIAGNOSIS — M54.10 RADICULOPATHY, SITE UNSPECIFIED: ICD-10-CM

## 2019-03-01 NOTE — PHYSICAL EXAM
[No Acute Distress] : no acute distress [Well-Appearing] : well-appearing [Normal Sclera/Conjunctiva] : normal sclera/conjunctiva [PERRL] : pupils equal round and reactive to light [EOMI] : extraocular movements intact [Normal Oropharynx] : the oropharynx was normal [No JVD] : no jugular venous distention [No Respiratory Distress] : no respiratory distress  [Clear to Auscultation] : lungs were clear to auscultation bilaterally [No Accessory Muscle Use] : no accessory muscle use [Normal Rate] : normal rate  [Regular Rhythm] : with a regular rhythm [Normal S1, S2] : normal S1 and S2 [No Murmur] : no murmur heard [No Edema] : there was no peripheral edema [Soft] : abdomen soft [Non Tender] : non-tender [Non-distended] : non-distended [Normal Bowel Sounds] : normal bowel sounds [No Spinal Tenderness] : no spinal tenderness [No Joint Swelling] : no joint swelling [Grossly Normal Strength/Tone] : grossly normal strength/tone [Normal Gait] : normal gait [Coordination Grossly Intact] : coordination grossly intact [No Focal Deficits] : no focal deficits [Normal Affect] : the affect was normal [de-identified] : Obesity [de-identified] : Large pannus.

## 2019-03-01 NOTE — HISTORY OF PRESENT ILLNESS
[FreeTextEntry1] : Follow up examination [de-identified] : This is a 43 year old female with a PMHx of Ovarian Ca, Pseudoseizure, T2DM, and HLD. She presents with a cc/o dizziness. She notices her symptoms mainly occur in bed with head movements, especially if made quickly. This has been occurring every day (~5x/day) for approximately 3 months. She has not identified any alleviating factors.\par \par She also complains of RIGHT shoulder/arm pain, as well as lower back pain that radiates to the lower extremities bilaterally. This has been occurring for approximately 6 months. She notices increased pain with prolonged sitting, and hasnot noted alleviating factors.\par \par ROS is otherwise negative.

## 2019-03-01 NOTE — REVIEW OF SYSTEMS
[Negative] : Psychiatric [FreeTextEntry9] : RIGHT shoulder/arm pain as well as pain in the bilateral lower extremities. [de-identified] : Dizziness, as noted in the HPI.

## 2019-03-01 NOTE — ASSESSMENT
[FreeTextEntry1] : This is a 43 year old female with a  PMHx of CKDN2A MUTATION, Ovarian Ca s/p resection, T2DM, Pseudoseizure, and HLD. She presents with a cc/o dizziness. She notices her symptoms mainly occur in bed with head movements, especially if made quickly. This has been occurring every day (~5x/day) for approximately 3 months. She has not identified any alleviating factors. She also complains of RIGHT shoulder/arm pain, as well as lower back pain that radiates to the lower extremities bilaterally.\par \par DIZZINESS; SUSPICIOUS FOR BENIGN PAROXYSMAL POSITIONAL VERTIGO; NEW\par - Prior head MRI showed nonspecific hyperintensities of subcortical and periventricular white matter\par - Stanley-Hallpike negative\par - Advised to not use Q-tips as ear appear irritated on PE; Debrox 5 drops each ear PRN \par - Meclizine PRN\par - Consider therapy if not improved on next visit as symptoms mild\par \par LIKELY L-SPINE RADICULOPATHY WITH ASSOCIATED BILATERAL LOWER EXTREMITY PAIN; NEW\par - Physical therapy referral\par \par C-SPINE RADICULOPATHY WITH ASSOCIATED RIGHT SHOULDER/ARM PAIN\par - Physical therapy referral\par \par MUSCLE PAIN\par - Elevated ESR\par - Refer to Rheumatology\par \par HX OF LIVER LESION; ELEVATED ALT AND ALK PHOS\par - s/p biopsy with normal parenchyma\par - 01/2019: ALT 64; ALK PHOS 164 [Levels normal in 10/2018]\par - Admits to EtOH use on weekends; Advised to discontinue \par - Follow up repeat CMP \par \par TYPE 2 DIABETES MELLITUS; CONTROLLED\par - Last A1c 6.1 on 01/2019\par - Continue Trulicity \par - Follow up with Dr. León\par \par HYPERCHOLESTEROLEMIA; CONTROLLED\par - Low HDL on 01/2019\par - Continue Atorvastatin\par - Advised to increase HDL with diet and exercise\par \par Hx of SEROUS BORDERLINE OVARIAN TUMOR; STAGE IIIA\par - s/p LEFT oophorectomy in 2006\par - s/p oophorectomy/hysterectomy in 2017\par - Ca125 (13)\par - Follow up with Dr. Vega\par \par HX OF PSEUDOSEIZURE; CHRONIC\par - Follow with Neurology\par - Continue Carbamazepine\par \par HX OF BENIGN BREAST MASS; RIGHT-SIDE\par - Noted on mammogram and followed up with ultrasound (12/2018)\par \par GASTRITIS; CHRONIC\par - Continue PPI\par - Follow up with Dr. Weinberg later this month for EGD\par \par OBESITY; CHRONIC\par - Advised weight loss through diet and exercise\par - Advised to reduce, and preferably stop, EtOH consumption\par \par GENETIC CKDN2A MUTATION\par - Heterozygous mutation\par - Part of melanoma-pancreatic cancer syndrome, which confers a lifetime risk 17% pancreatic cancer and 14-50% melanoma\par - Advised to have family members made aware and screened \par - Scheduled for follow up this month for endo with Dr. Weinberg\par - Went to dermatology for full skin evaluation\par \par HEALTH CARE MAINTENANCE\par - Regular follow up with OB/GYN advised\par - Annual follow up with dermatology complete for 2018\par - Annual mammogram completed

## 2019-03-04 DIAGNOSIS — E78.00 PURE HYPERCHOLESTEROLEMIA, UNSPECIFIED: ICD-10-CM

## 2019-03-04 DIAGNOSIS — R56.9 UNSPECIFIED CONVULSIONS: ICD-10-CM

## 2019-03-04 DIAGNOSIS — E11.9 TYPE 2 DIABETES MELLITUS WITHOUT COMPLICATIONS: ICD-10-CM

## 2019-03-20 ENCOUNTER — RX RENEWAL (OUTPATIENT)
Age: 44
End: 2019-03-20

## 2019-03-25 ENCOUNTER — OUTPATIENT (OUTPATIENT)
Dept: OUTPATIENT SERVICES | Facility: HOSPITAL | Age: 44
LOS: 1 days | Discharge: HOME | End: 2019-03-25

## 2019-03-25 DIAGNOSIS — Z98.890 OTHER SPECIFIED POSTPROCEDURAL STATES: Chronic | ICD-10-CM

## 2019-03-27 DIAGNOSIS — E11.9 TYPE 2 DIABETES MELLITUS WITHOUT COMPLICATIONS: ICD-10-CM

## 2019-03-27 DIAGNOSIS — H02.884 MEIBOMIAN GLAND DYSFUNCTION LEFT UPPER EYELID: ICD-10-CM

## 2019-03-27 DIAGNOSIS — H11.042 PERIPHERAL PTERYGIUM, STATIONARY, LEFT EYE: ICD-10-CM

## 2019-03-27 DIAGNOSIS — H02.881 MEIBOMIAN GLAND DYSFUNCTION RIGHT UPPER EYELID: ICD-10-CM

## 2019-03-28 ENCOUNTER — RESULT REVIEW (OUTPATIENT)
Age: 44
End: 2019-03-28

## 2019-03-28 ENCOUNTER — TRANSCRIPTION ENCOUNTER (OUTPATIENT)
Age: 44
End: 2019-03-28

## 2019-03-28 ENCOUNTER — OUTPATIENT (OUTPATIENT)
Dept: OUTPATIENT SERVICES | Facility: HOSPITAL | Age: 44
LOS: 1 days | Discharge: HOME | End: 2019-03-28
Payer: MEDICAID

## 2019-03-28 VITALS
DIASTOLIC BLOOD PRESSURE: 87 MMHG | RESPIRATION RATE: 18 BRPM | HEART RATE: 68 BPM | SYSTOLIC BLOOD PRESSURE: 118 MMHG | HEIGHT: 56 IN | WEIGHT: 179.9 LBS | TEMPERATURE: 98 F

## 2019-03-28 VITALS
RESPIRATION RATE: 18 BRPM | HEART RATE: 64 BPM | SYSTOLIC BLOOD PRESSURE: 138 MMHG | DIASTOLIC BLOOD PRESSURE: 95 MMHG | OXYGEN SATURATION: 99 %

## 2019-03-28 DIAGNOSIS — Z98.890 OTHER SPECIFIED POSTPROCEDURAL STATES: Chronic | ICD-10-CM

## 2019-03-28 LAB — GLUCOSE BLDC GLUCOMTR-MCNC: 97 MG/DL — SIGNIFICANT CHANGE UP (ref 70–99)

## 2019-03-28 PROCEDURE — 88305 TISSUE EXAM BY PATHOLOGIST: CPT | Mod: 26

## 2019-03-28 PROCEDURE — 88312 SPECIAL STAINS GROUP 1: CPT | Mod: 26

## 2019-03-28 RX ORDER — ONDANSETRON 8 MG/1
4 TABLET, FILM COATED ORAL ONCE
Qty: 0 | Refills: 0 | Status: DISCONTINUED | OUTPATIENT
Start: 2019-03-28 | End: 2019-04-12

## 2019-03-28 RX ORDER — SODIUM CHLORIDE 9 MG/ML
1000 INJECTION, SOLUTION INTRAVENOUS
Qty: 0 | Refills: 0 | Status: DISCONTINUED | OUTPATIENT
Start: 2019-03-28 | End: 2019-04-12

## 2019-03-28 NOTE — CHART NOTE - NSCHARTNOTEFT_GEN_A_CORE
PACU ANESTHESIA ADMISSION NOTE      Procedure:   Post op diagnosis:      ____  Intubated  TV:______       Rate: ______      FiO2: ______    __x__  Patent Airway    __x__  Full return of protective reflexes    __x__  Full recovery from anesthesia / back to baseline status      Vitals:   BP   141/60         HR   8212        RR   12          O2 sat 100          Temp 36 C      Mental Status:  __x__ Awake   _____ Alert   _____ Drowsy   _____ Sedated    Nausea/Vomiting:  __x__ No    ____ Yes, See Post - Op Orders        Pain Scale (0-10):  __0___    Treatment: ____ None    ____ See Post - Op/PCA Orders    Post - Operative Fluids:   __x__ Oral   ____ See Post - Op Orders    Plan: Discharge:   _x___Home       _____Floor     _____Critical Care    _____  Other:_________________    Comments: No anesthesia complications noted.  Discharge once criteria met.

## 2019-03-28 NOTE — H&P ADULT - ATTENDING COMMENTS
Pt seen and chart reviewed.  EUS requested to screen for pancreatic cancer given her genetic profile

## 2019-03-28 NOTE — ASU PATIENT PROFILE, ADULT - PMH
Diabetes  Family Doctor: Dr Reeves  Hypercholesteremia    Liver disorder  LIVER LESIONS APPEARED ON CT-SCAN  (Within the past few months)  Ovarian cancer  UNSURE OF STAGE HOWEVER REPORTS IT "MINOR"  Sees Oncologist Dr. Vanessa Correa  Seizure disorder    Vertigo

## 2019-03-28 NOTE — PRE-ANESTHESIA EVALUATION ADULT - NSANTHOSAYNRD_GEN_A_CORE
No. KAN screening performed.  STOP BANG Legend: 0-2 = LOW Risk; 3-4 = INTERMEDIATE Risk; 5-8 = HIGH Risk

## 2019-03-28 NOTE — H&P ADULT - NSHPPHYSICALEXAM_GEN_ALL_CORE
PHYSICAL EXAM:   Vital Signs:  Vital Signs Last 24 Hrs  T(C): 36.4 (28 Mar 2019 10:19), Max: 36.4 (28 Mar 2019 10:19)  T(F): 97.5 (28 Mar 2019 10:19), Max: 97.5 (28 Mar 2019 10:19)  HR: 68 (28 Mar 2019 10:19) (68 - 68)  BP: 118/87 (28 Mar 2019 10:19) (118/87 - 118/87)  BP(mean): --  RR: 18 (28 Mar 2019 10:19) (18 - 18)  SpO2: --  Daily Height in cm: 142.24 (28 Mar 2019 10:19)    Daily     GENERAL:  Appears stated age, well-groomed, well-nourished, no distress  HEENT:  NC/AT,  conjunctivae clear and pink, no thyromegaly, nodules, adenopathy, no JVD, sclera -anicteric  CHEST:  Full & symmetric excursion, no increased effort, breath sounds clear  HEART:  Regular rhythm, S1, S2, no murmur/rub/S3/S4, no abdominal bruit, no edema  ABDOMEN:  Soft, non-tender, non-distended, normoactive bowel sounds,  no masses ,no hepato-splenomegaly, no signs of chronic liver disease  EXTEREMITIES:  no cyanosis,clubbing or edema  SKIN:  No rash/erythema/ecchymoses/petechiae/wounds/abscess/warm/dry  NEURO:  Alert, oriented, no asterixis, no tremor, no encephalopathy

## 2019-03-28 NOTE — H&P ADULT - ASSESSMENT
43 year female with history of ovarian cancer, with CKDN2A mutation is here for elective EGD with EUS for screening for pancreatic ca.

## 2019-03-28 NOTE — ASU DISCHARGE PLAN (ADULT/PEDIATRIC) - CALL YOUR DOCTOR IF YOU HAVE ANY OF THE FOLLOWING:
Nausea and vomiting that does not stop/Excessive diarrhea/Fever greater than (need to indicate Fahrenheit or Celsius)/Inability to tolerate liquids or foods/Pain not relieved by Medications/Bleeding that does not stop

## 2019-03-28 NOTE — ASU DISCHARGE PLAN (ADULT/PEDIATRIC) - ASU DC SPECIAL INSTRUCTIONSFT
follow up on biopsy results, follow up in GI clinic in 2-4 weeks at [facility], phone no [phone]

## 2019-04-01 LAB — SURGICAL PATHOLOGY STUDY: SIGNIFICANT CHANGE UP

## 2019-04-03 DIAGNOSIS — Z12.89 ENCOUNTER FOR SCREENING FOR MALIGNANT NEOPLASM OF OTHER SITES: ICD-10-CM

## 2019-04-03 DIAGNOSIS — K29.50 UNSPECIFIED CHRONIC GASTRITIS WITHOUT BLEEDING: ICD-10-CM

## 2019-04-03 DIAGNOSIS — Z14.8 GENETIC CARRIER OF OTHER DISEASE: ICD-10-CM

## 2019-04-03 DIAGNOSIS — Z91.041 RADIOGRAPHIC DYE ALLERGY STATUS: ICD-10-CM

## 2019-04-19 ENCOUNTER — APPOINTMENT (OUTPATIENT)
Dept: GASTROENTEROLOGY | Facility: CLINIC | Age: 44
End: 2019-04-19

## 2019-04-19 ENCOUNTER — OUTPATIENT (OUTPATIENT)
Dept: OUTPATIENT SERVICES | Facility: HOSPITAL | Age: 44
LOS: 1 days | Discharge: HOME | End: 2019-04-19

## 2019-04-19 DIAGNOSIS — Z98.890 OTHER SPECIFIED POSTPROCEDURAL STATES: Chronic | ICD-10-CM

## 2019-04-19 PROBLEM — E78.00 PURE HYPERCHOLESTEROLEMIA, UNSPECIFIED: Chronic | Status: ACTIVE | Noted: 2019-03-28

## 2019-04-19 NOTE — ASSESSMENT
[FreeTextEntry1] : 42 yo F with PMH of DM, Ovarian cancer s/p left oophorectomy in 2006 and RT oophorectomy and hysterotomy in 2017, Diverticulitis in 2017, H pylori gastritis referred to GI clinic for considering EUS for screening of pancreatic CA as she was found to have CKDN2A mutation, she is a heterozygote and is part of the melanoma-pancreatic cancer syndrome which confers a lifetime risk 17% pancreatic cancer. \par Pt had an MRI liver protocol done in 2017 for evaluation of cyst in the liver that turned out to be simple cyst. \par Pt had an episode of diverticulitis in 2017 and CT abdomen revealed nodularity of the Omentum suspicious for metastatic disease s/p WILBERT/BSO and omentectomy. The pathology did not show invasive implants. Biopsy of the liver demonstrated benign lymphangioma.\par Pt had EGD and Colonoscopy done 4 yrs ago and revealed gastritis HP positive that she took treatment and eradicated based on stool studies. Colonoscopy revealed hemorrhoids,. \par Pt had an EGD and EUS done last month ( March 2019 )  that revealed atrophic gastritis, no intestinal metaplasia, no H pylori  and EUS revealed normal pancreas and normal sized PD. \par Pt c/o mild intermittent epigastric abdominal pain for the last one month not related to food intake but no weight loss, diarrhea, back pain, jaundice or other GI symptoms. \par \par Will start on Omeprazole 20 mg twice daily in stead of once daily.\par Small fractionated meals for possible gastroparesis  \par Will need repeat EUS and CA 19-9 levels in March 2020. \par Will need CRC Screening in 2020 as increased risk screening. \par Need referral for Mammography and Dermatologist for Skin cancer screening. \par Follow up with Oncologist for further management. \par Return to GI clinic in 3 months. \par \par

## 2019-04-19 NOTE — PHYSICAL EXAM
[General Appearance - Alert] : alert [General Appearance - In No Acute Distress] : in no acute distress [General Appearance - Well Nourished] : well nourished [General Appearance - Well Developed] : well developed [Heart Rate And Rhythm] : heart rate was normal and rhythm regular [Auscultation Breath Sounds / Voice Sounds] : lungs were clear to auscultation bilaterally [Heart Sounds] : normal S1 and S2 [Abdomen Soft] : soft [Bowel Sounds] : normal bowel sounds [] : no hepato-splenomegaly [Abdomen Tenderness] : non-tender [Involuntary Movements] : no involuntary movements were seen [No Focal Deficits] : no focal deficits [Oriented To Time, Place, And Person] : oriented to person, place, and time

## 2019-04-19 NOTE — HISTORY OF PRESENT ILLNESS
[de-identified] : 44 yo F with PMH of DM, Ovarian cancer s/p left oophorectomy in 2006 and RT oophorectomy and hysterotomy in 2017, Diverticulitis in 2017, H pylori gastritis referred to GI clinic for considering EUS for screening of pancreatic CA as she was found to have CKDN2A mutation, she is a heterozygote and is part of the melanoma-pancreatic cancer syndrome which confers a lifetime risk 17% pancreatic cancer. \gilma Pt had an MRI liver protocol done in 2017 for evaluation of cyst in the liver that turned out to be simple cyst. \gilma Pt had an episode of diverticulitis in 2017 and CT abdomen revealed nodularity of the Omentum suspicious for metastatic disease s/p WILBERT/BSO and omentectomy. The pathology did not show invasive implants. Biopsy of the liver demonstrated benign lymphangioma.\gilma Pt had EGD and Colonoscopy done 4 yrs ago and revealed gastritis HP positive that she took treatment and eradicated based on stool studies. Colonoscopy revealed hemorrhoids,. \gilma Pt had an EGD and EUS done last month ( March 2019 )  that revealed atrophic gastritis and EUS revealed normal pancreas and normal sized PD. \gilma Pt c/o mild intermittent epigastric abdominal pain for the last one month not related to food intake but no weight loss, diarrhea, back pain, jaundice or other GI symptoms. \gilma

## 2019-04-19 NOTE — END OF VISIT
[FreeTextEntry3] : Pt seen and examined with Dr Ashby.  Given her genetic abnormalites which can predispose to pancreatic cancer I recommend both an MR and EUS in one year.  For her epigastric pain and her history of DM which can cause gastroparesis I recommend splitting the dose of omeprazole to 20 mg po BID.

## 2019-04-25 ENCOUNTER — APPOINTMENT (OUTPATIENT)
Dept: RHEUMATOLOGY | Facility: CLINIC | Age: 44
End: 2019-04-25

## 2019-05-17 ENCOUNTER — OUTPATIENT (OUTPATIENT)
Dept: OUTPATIENT SERVICES | Facility: HOSPITAL | Age: 44
LOS: 1 days | Discharge: HOME | End: 2019-05-17

## 2019-05-17 ENCOUNTER — APPOINTMENT (OUTPATIENT)
Dept: GYNECOLOGIC ONCOLOGY | Facility: CLINIC | Age: 44
End: 2019-05-17
Payer: SUBSIDIZED

## 2019-05-17 VITALS
WEIGHT: 180 LBS | SYSTOLIC BLOOD PRESSURE: 111 MMHG | TEMPERATURE: 97.4 F | DIASTOLIC BLOOD PRESSURE: 80 MMHG | BODY MASS INDEX: 38.83 KG/M2 | HEIGHT: 57 IN | RESPIRATION RATE: 18 BRPM | HEART RATE: 83 BPM

## 2019-05-17 DIAGNOSIS — Z98.890 OTHER SPECIFIED POSTPROCEDURAL STATES: Chronic | ICD-10-CM

## 2019-05-17 PROCEDURE — 99214 OFFICE O/P EST MOD 30 MIN: CPT

## 2019-05-17 NOTE — DISCUSSION/SUMMARY
[FreeTextEntry1] : 42yo with melanoma/pancreatic cancer syndrome, serous borderline ovarian tumor stage IIIA s/p TAHRSO, omentectomy 12/2017, [facility]. \par - continue f/u with GI and medical oncology as scheduled \par - RTC 6 months for surveillance \par

## 2019-05-17 NOTE — HISTORY OF PRESENT ILLNESS
[FreeTextEntry1] : 44yo\par \par Stage IIIA Serous BOT\par Surgery date: WILBERT/RSO/Omentectomy 12/18/2017\par \par Path: 3 foci of serous BOT, with one suggestive of non-invasive low grade serous carcinoma; omentum with non-invasive implants; tumor is also present in paratubal tissue and ovarian surface; uterus/cervix => not involved => path send for outside consultation => no invasive carcinoma => final result: serous BOT\par \par Adjuvant treatment: None\par Most recent : 2/14/19 - 13\par \par MRI abdomen 02/05/2018: 3 hepatic lesions, could be suspicious for metastatic implants, but unchanged compared to 03/2017\par \par PET/CT 02/23/2018: no increased uptake in liver lesion; uptake in right medical upper thigh\par \par CT guided biopsy of liver lesion 03/08/2018 => benign benign\par \par 12/2018 CT abd/pelvis: No evidence of pelvic mass or abdominal/pelvic adenopathy. Nonobstructing small bowel Childs hernia. Fatty infiltration of the liver \par \par Genetic testing positive for melanona/pancreatic cancer syndrome\par \par Saw GI on 4/19/19 s/p EGD and pancreatic ultrasound \par needs repeat colonoscopy in 2020 with EUS pancreas in 2020 \par \par Saw Derm 11/13/18 - normal skin exam, f/u with PMD and return in case any suspicious skin lesions\par \par Last mammmogram: 6mos ago - WNL per pt \par Denies unintentional weight loss, decreased appetite, changes in bowel or urinary habits, vaginal bleeding. \par

## 2019-05-28 ENCOUNTER — OUTPATIENT (OUTPATIENT)
Dept: OUTPATIENT SERVICES | Facility: HOSPITAL | Age: 44
LOS: 1 days | Discharge: HOME | End: 2019-05-28

## 2019-05-28 ENCOUNTER — APPOINTMENT (OUTPATIENT)
Dept: DERMATOLOGY | Facility: CLINIC | Age: 44
End: 2019-05-28

## 2019-05-28 DIAGNOSIS — Z98.890 OTHER SPECIFIED POSTPROCEDURAL STATES: Chronic | ICD-10-CM

## 2019-05-28 DIAGNOSIS — D39.10 NEOPLASM OF UNCERTAIN BEHAVIOR OF UNSPECIFIED OVARY: ICD-10-CM

## 2019-05-28 NOTE — PHYSICAL EXAM
[Alert] : alert [Oriented x 3] : ~L oriented x 3 [Face] : Face [Declined] : declined [Hair] : Hair [Scalp] : Scalp [Neck] : Neck [Nose] : Nose [Back] : Back [FreeTextEntry3] : Seborrheic dermatosis on L bridge of nose\par Several nevi of small size and uniform in color on back, shouder, and face.

## 2019-05-28 NOTE — PHYSICAL EXAM
[Alert] : alert [Face] : Face [Oriented x 3] : ~L oriented x 3 [Declined] : declined [Hair] : Hair [Scalp] : Scalp [Neck] : Neck [Nose] : Nose [Back] : Back [FreeTextEntry3] : Seborrheic dermatosis on L bridge of nose\par Several nevi of small size and uniform in color on back, shouder, and face.

## 2019-05-29 LAB
ALBUMIN SERPL ELPH-MCNC: 4.4 G/DL
ALP BLD-CCNC: 165 U/L
ALT SERPL-CCNC: 53 U/L
ANION GAP SERPL CALC-SCNC: 15 MMOL/L
AST SERPL-CCNC: 28 U/L
BASOPHILS # BLD AUTO: 0.02 K/UL
BASOPHILS NFR BLD AUTO: 0.3 %
BILIRUB SERPL-MCNC: <0.2 MG/DL
BUN SERPL-MCNC: 17 MG/DL
CALCIUM SERPL-MCNC: 9.1 MG/DL
CHLORIDE SERPL-SCNC: 105 MMOL/L
CO2 SERPL-SCNC: 21 MMOL/L
CREAT SERPL-MCNC: 0.5 MG/DL
EOSINOPHIL # BLD AUTO: 0.11 K/UL
EOSINOPHIL NFR BLD AUTO: 1.7 %
ESTIMATED AVERAGE GLUCOSE: 117 MG/DL
GLUCOSE SERPL-MCNC: 94 MG/DL
HBA1C MFR BLD HPLC: 5.7 %
HCT VFR BLD CALC: 39.8 %
HGB BLD-MCNC: 12.9 G/DL
IMM GRANULOCYTES NFR BLD AUTO: 0.3 %
LYMPHOCYTES # BLD AUTO: 3.1 K/UL
LYMPHOCYTES NFR BLD AUTO: 46.6 %
MAN DIFF?: NORMAL
MCHC RBC-ENTMCNC: 28.8 PG
MCHC RBC-ENTMCNC: 32.4 G/DL
MCV RBC AUTO: 88.8 FL
MONOCYTES # BLD AUTO: 0.31 K/UL
MONOCYTES NFR BLD AUTO: 4.7 %
NEUTROPHILS # BLD AUTO: 3.09 K/UL
NEUTROPHILS NFR BLD AUTO: 46.4 %
PLATELET # BLD AUTO: 314 K/UL
POTASSIUM SERPL-SCNC: 4.7 MMOL/L
PROT SERPL-MCNC: 6.8 G/DL
RBC # BLD: 4.48 M/UL
RBC # FLD: 13.7 %
SODIUM SERPL-SCNC: 141 MMOL/L
WBC # FLD AUTO: 6.65 K/UL

## 2019-06-03 ENCOUNTER — OUTPATIENT (OUTPATIENT)
Dept: OUTPATIENT SERVICES | Facility: HOSPITAL | Age: 44
LOS: 1 days | Discharge: HOME | End: 2019-06-03

## 2019-06-03 ENCOUNTER — APPOINTMENT (OUTPATIENT)
Dept: HEMATOLOGY ONCOLOGY | Facility: CLINIC | Age: 44
End: 2019-06-03
Payer: COMMERCIAL

## 2019-06-03 VITALS
SYSTOLIC BLOOD PRESSURE: 122 MMHG | DIASTOLIC BLOOD PRESSURE: 71 MMHG | WEIGHT: 184 LBS | TEMPERATURE: 96.3 F | RESPIRATION RATE: 18 BRPM | HEART RATE: 73 BPM | BODY MASS INDEX: 39.7 KG/M2 | HEIGHT: 57 IN

## 2019-06-03 DIAGNOSIS — Z98.890 OTHER SPECIFIED POSTPROCEDURAL STATES: Chronic | ICD-10-CM

## 2019-06-03 PROCEDURE — 99213 OFFICE O/P EST LOW 20 MIN: CPT

## 2019-06-05 LAB
CANCER AG125 SERPL-ACNC: 9 U/ML
GGT SERPL-CCNC: 77 U/L

## 2019-06-06 NOTE — HISTORY OF PRESENT ILLNESS
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  (SEE MICROSCOPIC DESCRIPTION FOR SYNOPTIC REPORT)\par \par       -    TUMOR IS PRESENT IN THE PARATUBAL TISSUE AND ON THE OVARIAN\par            SURFACE.\par \par       -    SCATTERED FOCI SUSPICIOUS OF STROMAL MICROINVASION, LARGEST\par            FOCUS MEASURING 4 MM. (SLIDE A4)\par \par       -    PATHOLOGIC STAGE (pTNM):  pT(m)2b pNX\par    ONE FOCUS SUGGESTIVE OF LOW GRADE SEROUS CARCINOMA. (0.6 MM;\par            SLIDE A7)\par \par There were no post operative complications.\par She C/o hot flashes.\par C/O pain in the abdomen, which is diffuse.\par No weight loss.\par No vaginal bleeding\par No fever.\par Operative report was reviewed. No residual disease noted.\par \par The pathology slides had been sent for 2nd opinion and result is noted below. No invasion was identified.\par \par Patient also had a liver biopsy which demonstrated normal liver parenchyma which is noted below.  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 Patient needs evaluation with EUS +/- MRCP for evaluation of pancreatic cancer and regular follow up with dermatology  [de-identified] :  Report CR- received from [facility], [address] [place] [address] by Moy Briscoe on 2/8/2018 with the following    diagnosis:     FINAL DIAGNOSIS:    OVARY, RIGHT, SALPINGO-OOPHORECTOMY:          - SEROUS BORDERLINE TUMOR WITH NONINVASIVE IMPLANTS.    Comment:     I agree with your interpretation on this case of serous borderline    tumor. There are areas of increased proliferation but nothing I    would call serous carcinoma. There are noninvasive implants of the    fallopian tube and on the surface of the ovary. The sections from    the pelvic side wall and omentum also show noninvasive implants of    serous borderline tumor.\par \par Cytopathology Report       Final Diagnosis\par  LIVER LESION, LIVER LOBE, CT-GUIDED NEEDLE BIOPSY AND IMPRINT:\par  - NO MALIGNANT CELLS IDENTIFIED\par . - LIVER PARENCHYMA TISSUE WITH MACROVESICULAR AND MICROVESICULAR STEATOSIS ( 50% OF THE TISSUE). \par - IMMUNOHISTOCHEMICAL AND SPECIAL STAINS PENDING.\par  [de-identified] : C/O Pain in left shoulder and left breast as before.\par She had a PET scan and she is scheduled for liver biopsy later this week.\par \par 3/26/18\par Patient here for follow up complains of symptoms including hot flashes and abdominal pain in the RUQ at the side of liver biopsy. Patient denies any changes in bowel habits, vaginal bleeding. She had a patient had a liver biopsy which was normal showing normal liver parenchyma. 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 Patient was told to inform family especially children to undergo genetic testing as well.  Patient was told to follow up with Dr Weinberg of GI for EUS of the pancreas and consideration of a MRCP in the future. The patient was also recommended to see a dermatologist for full skin evaluation. \par \par 7/5/18\par She has been drinking heavy ( 10 shots of tequila  each weekend).\par She has an appointment with GI next week.\par Has not seen dermatology.\par Denies abdominal pain, no vaginal bleeding\par \par 10/4/18:\par Had headaches and had MRI brain few weeks ago.\par C/o dizziness and blurry vision. \par On keppra started by neurology for possible convulsions, started a week ago. She says that she doesn't feel good on Keppra. \par Denies fever, nausea, vomiting, chest pain, SOB, abdominal pain, bowel and bladder problems.\par Due for EGD and EUS on 10/10/18\par Due for mammo in Oct 2018. \par Due for VEEG. \par \par 2/13/19:\par Patient here for follow up, feeling well, no new complaints.  Patient denies abdominal pain or bloating, denies vaginal bleeding or discharge.  She had a CT scan which was normal and she is scheduled with GYN ONC on Friday.  She saw dermatologist as well as GI.  EUS was scheduled and apparently cancelled after the patient was found to have food in her stomach.  She states its now scheduled for end of March.  Patient also being followed for pseudoseizures by neurology.\par \par 6/3/19:\par Doing well. No major complaints.\par Denies fever, nausea, vomiting, chest pain, SOB, abdominal pain, bowel and bladder problems.\par Seen by Dermatology and gynonc. \par Pt had an EGD and EUS done in March 2019  that revealed atrophic gastritis and EUS revealed normal pancreas and normal sized PD\par Due for mammo this week. \par

## 2019-06-06 NOTE — ASSESSMENT
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 The pathology does not show invasive implants. 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 Patient was told to inform family especially children to undergo genetic testing as well.  \par \par Patient was told to follow up with Dr Weinberg of GI for EUS of the pancreas and consideration of a MRCP in the future. The patient was also recommended to see a dermatologist for full skin evaluation\par \par RECOMMENDATIONS:\par S/p EGD and EUS in 3/19.\par Due for mammo this week. \par Check CMP, . \par Elevated ALP likely secondary to fatty liver disease. Check GGT. \par Neurology f/u for evaluation of seizures.\par Continue follow up with dermatology. \par Advised to stop smoking and alcohol consumption \par \par RTC in 4 months.\par Pt seen and examined with \par

## 2019-06-07 DIAGNOSIS — D39.10 NEOPLASM OF UNCERTAIN BEHAVIOR OF UNSPECIFIED OVARY: ICD-10-CM

## 2019-06-14 ENCOUNTER — APPOINTMENT (OUTPATIENT)
Dept: INTERNAL MEDICINE | Facility: CLINIC | Age: 44
End: 2019-06-14

## 2019-06-14 ENCOUNTER — OUTPATIENT (OUTPATIENT)
Dept: OUTPATIENT SERVICES | Facility: HOSPITAL | Age: 44
LOS: 1 days | Discharge: HOME | End: 2019-06-14

## 2019-06-14 VITALS
SYSTOLIC BLOOD PRESSURE: 134 MMHG | WEIGHT: 181 LBS | HEIGHT: 57 IN | DIASTOLIC BLOOD PRESSURE: 87 MMHG | HEART RATE: 73 BPM | BODY MASS INDEX: 39.05 KG/M2

## 2019-06-14 DIAGNOSIS — Z98.890 OTHER SPECIFIED POSTPROCEDURAL STATES: Chronic | ICD-10-CM

## 2019-06-18 NOTE — ASSESSMENT
[FreeTextEntry1] : This is a 43 year old female with a  PMHx of CKDN2A MUTATION, Ovarian Ca s/p resection, T2DM, Pseudoseizure, and HLD. \par \par DIZZINESS; SUSPICIOUS FOR BENIGN PAROXYSMAL POSITIONAL VERTIGO\par - Prior head MRI showed nonspecific hyperintensities of subcortical and periventricular white matter\par - Rheems-Hallpike negative\par - Advised to not use Q-tips as ear appear irritated on PE\par - Meclizine PRN\par - Has appointment with Neurology up coming\par \par LIKELY L-SPINE RADICULOPATHY WITH ASSOCIATED BILATERAL LOWER EXTREMITY PAIN; NEW\par - Resolved\par \par C-SPINE RADICULOPATHY WITH ASSOCIATED RIGHT SHOULDER/ARM PAIN\par - Resolved\par \par MUSCLE PAIN\par - Elevated ESR\par - Resolved\par \par HX OF LIVER LESION; ELEVATED ALT AND ALK PHOS; LIKELY FATTY LIVER\par - s/p biopsy with normal parenchyma\par - Admits to EtOH use on weekends; Advised to discontinue \par - Elevated GGT\par - Follow up repeat CMP \par \par TYPE 2 DIABETES MELLITUS; CONTROLLED\par - Last A1c 5.7\par - Continue Trulicity \par - Follow up with Dr. León\par \par HYPERCHOLESTEROLEMIA; CONTROLLED\par - Low HDL\par - Continue Atorvastatin\par - Advised to increase HDL with diet and exercise\par \par Hx of SEROUS BORDERLINE OVARIAN TUMOR; STAGE IIIA\par - s/p LEFT oophorectomy in 2006\par - s/p oophorectomy/hysterectomy in 2017\par - Ca125 low\par - Follow up with HEME/ONC\par \par HX OF PSEUDOSEIZURE; CHRONIC\par - Follow with Neurology\par - Continue Carbamazepine\par \par HX OF BENIGN BREAST MASS; RIGHT-SIDE\par - Resent for breast US and Mammo\par \par GASTRITIS; CHRONIC\par - Continue PPI\par - Follow up with GI\par \par OBESITY; CHRONIC\par - Advised weight loss through diet and exercise\par - Advised to reduce, and preferably stop, EtOH consumption\par \par GENETIC CKDN2A MUTATION\par - Heterozygous mutation\par - Part of melanoma-pancreatic cancer syndrome, which confers a lifetime risk 17% pancreatic cancer and 14-50% melanoma\par - Will need repeat EUS and CA 19-9 levels in March 2020. \par - Will need CRC Screening in 2020 as increased risk screening. \par - Advised to have family members made aware and screened \par - Went to dermatology\par \par HEALTH CARE MAINTENANCE\par - Regular follow up with OB/GYN advised\par - Annual follow up with dermatology\par - Referred for mammo and US

## 2019-06-18 NOTE — PHYSICAL EXAM
[Well-Appearing] : well-appearing [No Acute Distress] : no acute distress [EOMI] : extraocular movements intact [Normal Sclera/Conjunctiva] : normal sclera/conjunctiva [PERRL] : pupils equal round and reactive to light [Normal Oropharynx] : the oropharynx was normal [No JVD] : no jugular venous distention [No Accessory Muscle Use] : no accessory muscle use [No Respiratory Distress] : no respiratory distress  [Clear to Auscultation] : lungs were clear to auscultation bilaterally [Regular Rhythm] : with a regular rhythm [Normal S1, S2] : normal S1 and S2 [Normal Rate] : normal rate  [No Edema] : there was no peripheral edema [No Murmur] : no murmur heard [Soft] : abdomen soft [Non Tender] : non-tender [Non-distended] : non-distended [Normal Bowel Sounds] : normal bowel sounds [No Spinal Tenderness] : no spinal tenderness [No Joint Swelling] : no joint swelling [Grossly Normal Strength/Tone] : grossly normal strength/tone [Normal Gait] : normal gait [Coordination Grossly Intact] : coordination grossly intact [No Focal Deficits] : no focal deficits [Normal Affect] : the affect was normal [de-identified] : Obesity [de-identified] : Large pannus.

## 2019-06-18 NOTE — HISTORY OF PRESENT ILLNESS
[FreeTextEntry1] : Follow up [de-identified] : This is a 43 year old female with a PMHx of Ovarian Ca, Pseudoseizure, T2DM, and HLD. She complained about mild left ear pain and discomfort, and can sometimes hear a noise. She does not use Qtips to clean her ears and her dizziness is improved since her last visit. She has no other active complaints at this time. \par \par ROS is otherwise negative.

## 2019-07-19 NOTE — PRE-ANESTHESIA EVALUATION ADULT - NSANTHASARD_GEN_ALL_CORE
07/19/19                            Azucena Reyes  [id number]  [facility] [address]    To Whom It May Concern:    This is to certify Azucena Reyes was evaluated with Antionette Ames PA-C on 07/19/19 and is excused from work on 7/20/19.                   Antionette Ames PA-C  [facility]  [id number] [facility] [address]  Phone: [phone]                    
3

## 2019-08-20 ENCOUNTER — EMERGENCY (EMERGENCY)
Facility: HOSPITAL | Age: 44
LOS: 0 days | Discharge: HOME | End: 2019-08-21
Attending: EMERGENCY MEDICINE | Admitting: EMERGENCY MEDICINE
Payer: MEDICAID

## 2019-08-20 VITALS
DIASTOLIC BLOOD PRESSURE: 82 MMHG | SYSTOLIC BLOOD PRESSURE: 145 MMHG | RESPIRATION RATE: 17 BRPM | HEART RATE: 94 BPM | OXYGEN SATURATION: 99 % | TEMPERATURE: 98 F

## 2019-08-20 DIAGNOSIS — R10.32 LEFT LOWER QUADRANT PAIN: ICD-10-CM

## 2019-08-20 DIAGNOSIS — Z98.890 OTHER SPECIFIED POSTPROCEDURAL STATES: Chronic | ICD-10-CM

## 2019-08-20 DIAGNOSIS — R11.0 NAUSEA: ICD-10-CM

## 2019-08-20 DIAGNOSIS — R30.0 DYSURIA: ICD-10-CM

## 2019-08-20 DIAGNOSIS — Z91.041 RADIOGRAPHIC DYE ALLERGY STATUS: ICD-10-CM

## 2019-08-20 DIAGNOSIS — K57.92 DIVERTICULITIS OF INTESTINE, PART UNSPECIFIED, WITHOUT PERFORATION OR ABSCESS WITHOUT BLEEDING: ICD-10-CM

## 2019-08-20 DIAGNOSIS — R10.9 UNSPECIFIED ABDOMINAL PAIN: ICD-10-CM

## 2019-08-20 LAB
ALBUMIN SERPL ELPH-MCNC: 4.5 G/DL — SIGNIFICANT CHANGE UP (ref 3.5–5.2)
ALP SERPL-CCNC: 166 U/L — HIGH (ref 30–115)
ALT FLD-CCNC: 45 U/L — HIGH (ref 0–41)
ANION GAP SERPL CALC-SCNC: 13 MMOL/L — SIGNIFICANT CHANGE UP (ref 7–14)
APPEARANCE UR: CLEAR — SIGNIFICANT CHANGE UP
AST SERPL-CCNC: 33 U/L — SIGNIFICANT CHANGE UP (ref 0–41)
BASOPHILS # BLD AUTO: 0.04 K/UL — SIGNIFICANT CHANGE UP (ref 0–0.2)
BASOPHILS NFR BLD AUTO: 0.4 % — SIGNIFICANT CHANGE UP (ref 0–1)
BILIRUB DIRECT SERPL-MCNC: <0.2 MG/DL — SIGNIFICANT CHANGE UP (ref 0–0.2)
BILIRUB INDIRECT FLD-MCNC: >0 MG/DL — LOW (ref 0.2–1.2)
BILIRUB SERPL-MCNC: 0.2 MG/DL — SIGNIFICANT CHANGE UP (ref 0.2–1.2)
BILIRUB UR-MCNC: NEGATIVE — SIGNIFICANT CHANGE UP
BUN SERPL-MCNC: 13 MG/DL — SIGNIFICANT CHANGE UP (ref 10–20)
CALCIUM SERPL-MCNC: 9.8 MG/DL — SIGNIFICANT CHANGE UP (ref 8.5–10.1)
CHLORIDE SERPL-SCNC: 101 MMOL/L — SIGNIFICANT CHANGE UP (ref 98–110)
CO2 SERPL-SCNC: 26 MMOL/L — SIGNIFICANT CHANGE UP (ref 17–32)
COLOR SPEC: YELLOW — SIGNIFICANT CHANGE UP
CREAT SERPL-MCNC: 0.6 MG/DL — LOW (ref 0.7–1.5)
DIFF PNL FLD: NEGATIVE — SIGNIFICANT CHANGE UP
EOSINOPHIL # BLD AUTO: 0.07 K/UL — SIGNIFICANT CHANGE UP (ref 0–0.7)
EOSINOPHIL NFR BLD AUTO: 0.7 % — SIGNIFICANT CHANGE UP (ref 0–8)
GLUCOSE SERPL-MCNC: 94 MG/DL — SIGNIFICANT CHANGE UP (ref 70–99)
GLUCOSE UR QL: NEGATIVE — SIGNIFICANT CHANGE UP
HCT VFR BLD CALC: 38.8 % — SIGNIFICANT CHANGE UP (ref 37–47)
HGB BLD-MCNC: 12.9 G/DL — SIGNIFICANT CHANGE UP (ref 12–16)
IMM GRANULOCYTES NFR BLD AUTO: 0.2 % — SIGNIFICANT CHANGE UP (ref 0.1–0.3)
KETONES UR-MCNC: NEGATIVE — SIGNIFICANT CHANGE UP
LACTATE SERPL-SCNC: 1 MMOL/L — SIGNIFICANT CHANGE UP (ref 0.5–2.2)
LEUKOCYTE ESTERASE UR-ACNC: NEGATIVE — SIGNIFICANT CHANGE UP
LIDOCAIN IGE QN: 32 U/L — SIGNIFICANT CHANGE UP (ref 7–60)
LYMPHOCYTES # BLD AUTO: 3.15 K/UL — SIGNIFICANT CHANGE UP (ref 1.2–3.4)
LYMPHOCYTES # BLD AUTO: 32.8 % — SIGNIFICANT CHANGE UP (ref 20.5–51.1)
MCHC RBC-ENTMCNC: 29.7 PG — SIGNIFICANT CHANGE UP (ref 27–31)
MCHC RBC-ENTMCNC: 33.2 G/DL — SIGNIFICANT CHANGE UP (ref 32–37)
MCV RBC AUTO: 89.2 FL — SIGNIFICANT CHANGE UP (ref 81–99)
MONOCYTES # BLD AUTO: 0.55 K/UL — SIGNIFICANT CHANGE UP (ref 0.1–0.6)
MONOCYTES NFR BLD AUTO: 5.7 % — SIGNIFICANT CHANGE UP (ref 1.7–9.3)
NEUTROPHILS # BLD AUTO: 5.77 K/UL — SIGNIFICANT CHANGE UP (ref 1.4–6.5)
NEUTROPHILS NFR BLD AUTO: 60.2 % — SIGNIFICANT CHANGE UP (ref 42.2–75.2)
NITRITE UR-MCNC: NEGATIVE — SIGNIFICANT CHANGE UP
NRBC # BLD: 0 /100 WBCS — SIGNIFICANT CHANGE UP (ref 0–0)
PH UR: 6 — SIGNIFICANT CHANGE UP (ref 5–8)
PLATELET # BLD AUTO: 303 K/UL — SIGNIFICANT CHANGE UP (ref 130–400)
POTASSIUM SERPL-MCNC: 4.6 MMOL/L — SIGNIFICANT CHANGE UP (ref 3.5–5)
POTASSIUM SERPL-SCNC: 4.6 MMOL/L — SIGNIFICANT CHANGE UP (ref 3.5–5)
PROT SERPL-MCNC: 7.3 G/DL — SIGNIFICANT CHANGE UP (ref 6–8)
PROT UR-MCNC: SIGNIFICANT CHANGE UP
RBC # BLD: 4.35 M/UL — SIGNIFICANT CHANGE UP (ref 4.2–5.4)
RBC # FLD: 13.2 % — SIGNIFICANT CHANGE UP (ref 11.5–14.5)
SODIUM SERPL-SCNC: 140 MMOL/L — SIGNIFICANT CHANGE UP (ref 135–146)
SP GR SPEC: 1.03 — HIGH (ref 1.01–1.02)
UROBILINOGEN FLD QL: SIGNIFICANT CHANGE UP
WBC # BLD: 9.6 K/UL — SIGNIFICANT CHANGE UP (ref 4.8–10.8)
WBC # FLD AUTO: 9.6 K/UL — SIGNIFICANT CHANGE UP (ref 4.8–10.8)

## 2019-08-20 PROCEDURE — 99285 EMERGENCY DEPT VISIT HI MDM: CPT

## 2019-08-20 RX ORDER — KETOROLAC TROMETHAMINE 30 MG/ML
15 SYRINGE (ML) INJECTION ONCE
Refills: 0 | Status: DISCONTINUED | OUTPATIENT
Start: 2019-08-20 | End: 2019-08-20

## 2019-08-20 RX ORDER — SODIUM CHLORIDE 9 MG/ML
1000 INJECTION INTRAMUSCULAR; INTRAVENOUS; SUBCUTANEOUS ONCE
Refills: 0 | Status: COMPLETED | OUTPATIENT
Start: 2019-08-20 | End: 2019-08-20

## 2019-08-20 RX ORDER — IOHEXOL 300 MG/ML
30 INJECTION, SOLUTION INTRAVENOUS ONCE
Refills: 0 | Status: COMPLETED | OUTPATIENT
Start: 2019-08-20 | End: 2019-08-20

## 2019-08-20 RX ADMIN — SODIUM CHLORIDE 1000 MILLILITER(S): 9 INJECTION INTRAMUSCULAR; INTRAVENOUS; SUBCUTANEOUS at 23:00

## 2019-08-20 RX ADMIN — Medication 15 MILLIGRAM(S): at 22:37

## 2019-08-20 RX ADMIN — Medication 15 MILLIGRAM(S): at 22:43

## 2019-08-20 RX ADMIN — IOHEXOL 30 MILLILITER(S): 300 INJECTION, SOLUTION INTRAVENOUS at 22:37

## 2019-08-20 RX ADMIN — SODIUM CHLORIDE 1000 MILLILITER(S): 9 INJECTION INTRAMUSCULAR; INTRAVENOUS; SUBCUTANEOUS at 22:37

## 2019-08-20 NOTE — ED ADULT NURSE NOTE - OBJECTIVE STATEMENT
Pt c/o R abd pain radiating to R flank, urinary frequency and nausea x 2days. Denies any other symptoms

## 2019-08-20 NOTE — ED ADULT NURSE NOTE - NSIMPLEMENTINTERV_GEN_ALL_ED
Implemented All Universal Safety Interventions:  [place] to call system. Call bell, personal items and telephone within reach. Instruct patient to call for assistance. Room bathroom lighting operational. Non-slip footwear when patient is off stretcher. Physically safe environment: no spills, clutter or unnecessary equipment. Stretcher in lowest position, wheels locked, appropriate side rails in place.

## 2019-08-20 NOTE — ED ADULT TRIAGE NOTE - CHIEF COMPLAINT QUOTE
Pt complaining of right flank pain since yesterday. Also complaining of nausea and frequent urination.

## 2019-08-21 VITALS — HEART RATE: 79 BPM | RESPIRATION RATE: 18 BRPM | DIASTOLIC BLOOD PRESSURE: 77 MMHG | SYSTOLIC BLOOD PRESSURE: 129 MMHG

## 2019-08-21 LAB
CULTURE RESULTS: SIGNIFICANT CHANGE UP
SPECIMEN SOURCE: SIGNIFICANT CHANGE UP

## 2019-08-21 PROCEDURE — 74176 CT ABD & PELVIS W/O CONTRAST: CPT | Mod: 26

## 2019-08-21 RX ORDER — MORPHINE SULFATE 50 MG/1
4 CAPSULE, EXTENDED RELEASE ORAL ONCE
Refills: 0 | Status: DISCONTINUED | OUTPATIENT
Start: 2019-08-21 | End: 2019-08-21

## 2019-08-21 RX ORDER — METRONIDAZOLE 500 MG
500 TABLET ORAL ONCE
Refills: 0 | Status: COMPLETED | OUTPATIENT
Start: 2019-08-21 | End: 2019-08-21

## 2019-08-21 RX ORDER — CIPROFLOXACIN LACTATE 400MG/40ML
1 VIAL (ML) INTRAVENOUS
Qty: 20 | Refills: 0
Start: 2019-08-21 | End: 2019-08-30

## 2019-08-21 RX ORDER — METRONIDAZOLE 500 MG
1 TABLET ORAL
Qty: 30 | Refills: 0
Start: 2019-08-21 | End: 2019-08-30

## 2019-08-21 RX ORDER — CIPROFLOXACIN LACTATE 400MG/40ML
400 VIAL (ML) INTRAVENOUS ONCE
Refills: 0 | Status: COMPLETED | OUTPATIENT
Start: 2019-08-21 | End: 2019-08-21

## 2019-08-21 RX ADMIN — MORPHINE SULFATE 4 MILLIGRAM(S): 50 CAPSULE, EXTENDED RELEASE ORAL at 00:18

## 2019-08-21 RX ADMIN — Medication 200 MILLIGRAM(S): at 02:08

## 2019-08-21 RX ADMIN — MORPHINE SULFATE 4 MILLIGRAM(S): 50 CAPSULE, EXTENDED RELEASE ORAL at 00:03

## 2019-08-21 RX ADMIN — Medication 400 MILLIGRAM(S): at 03:29

## 2019-08-21 RX ADMIN — Medication 100 MILLIGRAM(S): at 02:08

## 2019-08-21 RX ADMIN — Medication 500 MILLIGRAM(S): at 03:29

## 2019-08-21 NOTE — ED PROVIDER NOTE - ATTENDING CONTRIBUTION TO CARE
44 yo femal with pmh of DM, HLD, seizure disorder, ovarian cancer s/p TAHBSO, cholecystectomy, presents to ER for right flank pain since last night. Pain constant, sharp, occasionally radiates to right groin. +asssociated with nausea, +frequent urination but denies burning/blood/foul smelling urine. No diarrhea. Denies F/C/CP/SOB/rash/vaginal complaints/HA/neck pain. On exam +tenderness to the right flank, abdomen is nontender. Will check labs, urine, CT scan and reassess.

## 2019-08-21 NOTE — ED PROVIDER NOTE - NS ED ROS FT
Constitutional: no fever, chills, no recent weight loss, change in appetite or malaise  Cardiac: No chest pain, SOB or edema.  Respiratory: No cough or respiratory distress  GI: + nausea and rt flank pain. No vomiting, diarrhea   : + increased urinary frequency. No dysuria or hematuria  MS: no pain to back or extremities, no loss of ROM, no weakness  Neuro: No headache or weakness.   Skin: No skin rash.  Endocrine: + hx of DM  Except as documented in the HPI, all other systems are negative.

## 2019-08-21 NOTE — ED PROVIDER NOTE - OBJECTIVE STATEMENT
43 year old F with hx of HLD, seizure d/o on carbamazepine, DM, ovarian ca in 2017 s/p hysterectomy/ b/l oophorectomy, cholecystectomy c/o rt flank pain since last night. The pain is non radiating and constant. + increased urinary frequency and nausea. No chest pain, sob, vomiting, hematuria, hx of kidney stones, chest pain, sob, diarrhea, rectal bleeding.

## 2019-08-21 NOTE — ED PROVIDER NOTE - PHYSICAL EXAMINATION
CONSTITUTIONAL: Well-appearing; well-nourished; in no apparent distress.   NECK: Supple; non-tender; no cervical lymphadenopathy.   CARDIOVASCULAR: Normal S1, S2; no murmurs, rubs, or gallops.   RESPIRATORY: Normal chest excursion with respiration; breath sounds clear and equal bilaterally; no wheezes, rhonchi, or rales.  GI/: Normal bowel sounds; non-distended; + rt CVA ttp. Abdomen soft/non tender. No rebound. guarding  MS: No evidence of trauma or deformity.  Normal ROM in all four extremities; non-tender to palpation; distal pulses are normal.   SKIN: Normal for age and race; warm; dry; good turgor; no apparent lesions or exudate.   NEURO/PSYCH: A & O x 4; grossly unremarkable. mood and manner are appropriate.

## 2019-08-21 NOTE — ED PROVIDER NOTE - CARE PROVIDER_API CALL
Jefferson Stewart)  Gastroenterology  [address] [place] [address]  Phone: [phone]  Fax: [phone]  Follow Up Time:

## 2019-08-27 ENCOUNTER — OUTPATIENT (OUTPATIENT)
Dept: OUTPATIENT SERVICES | Facility: HOSPITAL | Age: 44
LOS: 1 days | Discharge: HOME | End: 2019-08-27

## 2019-08-27 ENCOUNTER — APPOINTMENT (OUTPATIENT)
Dept: INTERNAL MEDICINE | Facility: CLINIC | Age: 44
End: 2019-08-27
Payer: MEDICAID

## 2019-08-27 VITALS
WEIGHT: 180 LBS | HEART RATE: 65 BPM | DIASTOLIC BLOOD PRESSURE: 84 MMHG | HEIGHT: 57 IN | SYSTOLIC BLOOD PRESSURE: 131 MMHG | BODY MASS INDEX: 38.83 KG/M2

## 2019-08-27 DIAGNOSIS — Z98.890 OTHER SPECIFIED POSTPROCEDURAL STATES: Chronic | ICD-10-CM

## 2019-08-27 PROCEDURE — 99213 OFFICE O/P EST LOW 20 MIN: CPT | Mod: GC

## 2019-08-27 RX ORDER — DICLOFENAC SODIUM 10 MG/G
1 GEL TOPICAL
Qty: 1 | Refills: 3 | Status: DISCONTINUED | COMMUNITY
Start: 2019-01-16 | End: 2019-08-27

## 2019-08-30 NOTE — ASSESSMENT
[FreeTextEntry1] : #) Diverticulitis\par - patient reports 3-4 bouts in past\par - discussed possibility of surgery given recurrent bouts\par - will refer to Gen Surg for eval\par \par #) Mid-back pain\par - likely MS[employer] related from occupation (works as )\par - will give Rx for Celebrex PRN\par \par #) Dizziness suspect BPPV\par - c/w Meclizine PRN\par - has Neuro appt already made\par \par #) Liver lesion\par - s/p Bx = normal parenchyma\par - elevated LFTs likely BUI\par - follows w/ GI + Onc\par \par #) Pre-DM\par - last A1c=5.7\par - c/w Trulicity\par - follows w/ Endo\par \par #) DLD\par - stable\par - c/w Lipitor\par \par #) Hx of Ovarian Ca\par - s/p L oophorectomy in 2006\par - s/p oophorectomy/hysterectomy in 2017\par - Ca125 low\par - follows w/ Heme/Onc\par \par #) Hx of Pseudoseizures\par - stable, follows w/ Neurology\par - c/w Carbamazepine\par \par #) Hx of breast mass, benign\par - needs repeat mammo\par \par #) Gastritis\par - c/w PPI\par - follows w/ GI\par \par #) Genetic CKDN2A MUTATION\par - Heterozygous mutation\par - Part of melanoma-pancreatic cancer syndrome, which confers a lifetime risk 17% pancreatic cancer and 14-50% melanoma\par - Will need repeat EUS and CA 19-9 levels in March 2020. \par - Will need CRC Screening in 2020 as increased risk screening. \par - Advised to have family members made aware and screened \par - Went to dermatology\par \par #) HCM\par - will repeat labs\par - needs repeat mammo

## 2019-08-30 NOTE — HISTORY OF PRESENT ILLNESS
[FreeTextEntry1] : follow up [de-identified] : 42 yo F with PMH of ovarian cancer, DM2, pseudoseizures, diverticulitis here at clinic for routine follow up. Was seen in ED recently - diagnosed with diverticulitis and given Rx for Cipro + Flagyl. Still has Abx, still complaining of some abdominal pain, improving. Also complaining of some mid thoracic back pain, reports relief w/ Tylenol.

## 2019-09-17 ENCOUNTER — APPOINTMENT (OUTPATIENT)
Dept: NEUROLOGY | Facility: CLINIC | Age: 44
End: 2019-09-17

## 2019-09-19 ENCOUNTER — RX RENEWAL (OUTPATIENT)
Age: 44
End: 2019-09-19

## 2019-09-24 ENCOUNTER — APPOINTMENT (OUTPATIENT)
Dept: SURGERY | Facility: CLINIC | Age: 44
End: 2019-09-24
Payer: MEDICAID

## 2019-09-24 VITALS
HEIGHT: 57 IN | SYSTOLIC BLOOD PRESSURE: 126 MMHG | BODY MASS INDEX: 39.7 KG/M2 | DIASTOLIC BLOOD PRESSURE: 74 MMHG | WEIGHT: 184 LBS

## 2019-09-24 PROCEDURE — 99203 OFFICE O/P NEW LOW 30 MIN: CPT

## 2019-10-01 ENCOUNTER — EMERGENCY (EMERGENCY)
Facility: HOSPITAL | Age: 44
LOS: 0 days | Discharge: HOME | End: 2019-10-01
Admitting: EMERGENCY MEDICINE
Payer: MEDICAID

## 2019-10-01 VITALS
RESPIRATION RATE: 18 BRPM | SYSTOLIC BLOOD PRESSURE: 131 MMHG | TEMPERATURE: 97 F | HEIGHT: 56 IN | WEIGHT: 179.9 LBS | HEART RATE: 72 BPM | DIASTOLIC BLOOD PRESSURE: 83 MMHG | OXYGEN SATURATION: 97 %
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
## 2019-10-01 DIAGNOSIS — S93.401A SPRAIN OF UNSPECIFIED LIGAMENT OF RIGHT ANKLE, INITIAL ENCOUNTER: ICD-10-CM
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
## 2019-10-01 DIAGNOSIS — Z98.890 OTHER SPECIFIED POSTPROCEDURAL STATES: Chronic | ICD-10-CM
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
## 2019-10-01 DIAGNOSIS — Z91.041 RADIOGRAPHIC DYE ALLERGY STATUS: ICD-10-CM

## 2019-10-01 PROCEDURE — 99283 EMERGENCY DEPT VISIT LOW MDM: CPT

## 2019-10-01 PROCEDURE — 73610 X-RAY EXAM OF ANKLE: CPT | Mod: 26,RT

## 2019-10-01 NOTE — ED PROVIDER NOTE - PHYSICAL EXAMINATION
CONST: Well appearing in NAD  MS: Normal ROM in all extremities. No midline spinal tenderness. Right ankle with tenderness and swelling laterally. FROM. NV intact distally. No prox fib tenderness  SKIN: Warm, dry, no acute rashes. Good turgor  NEURO: A&Ox3, No focal deficits. Strength 5/5 with no sensory deficits. Steady gait

## 2019-10-01 NOTE — ED PROVIDER NOTE - OBJECTIVE STATEMENT
Pt twisted right ankle 4 days ago and now with moderate constant dull pain when walking. Denies dec ROM or dec sensation

## 2019-10-01 NOTE — ED PROVIDER NOTE - NSFOLLOWUPCLINICS_GEN_ALL_ED_FT
[facility]  Orthpedic  [address], [place]   Phone: [phone]  Fax:   Follow Up Time: 7-10 Days

## 2019-10-01 NOTE — ED PROVIDER NOTE - PATIENT PORTAL LINK FT
You can access the FollowMyHealth Patient Portal offered by [facility] by registering at the following website: [email or web address]. By joining [facility]’s FollowMyHealth portal, you will also be able to view your health information using other applications (apps) compatible with our system.

## 2019-10-01 NOTE — ED PROVIDER NOTE - CLINICAL SUMMARY MEDICAL DECISION MAKING FREE TEXT BOX
Pt with ankle injury. swelling noted, Concern for fx ruled out by neg xray. Pt with sprain. Will advise elevation and FU with ortho . I have discussed the discharge plan with the patient. The patient agrees with the plan, as discussed.  The patient understands Emergency Department diagnosis is a preliminary diagnosis often based on limited information and that the patient must adhere to the follow-up plan as discussed.  The patient understands that if the symptoms worsen or if prescribed medications do not have the desired/planned effect that the patient may return to the Emergency Department at any time for further evaluation and treatment.

## 2019-10-07 ENCOUNTER — OTHER (OUTPATIENT)
Age: 44
End: 2019-10-07

## 2019-10-07 ENCOUNTER — FORM ENCOUNTER (OUTPATIENT)
Age: 44
End: 2019-10-07

## 2019-10-08 ENCOUNTER — OUTPATIENT (OUTPATIENT)
Dept: OUTPATIENT SERVICES | Facility: HOSPITAL | Age: 44
LOS: 1 days | Discharge: HOME | End: 2019-10-08
Payer: MEDICAID

## 2019-10-08 DIAGNOSIS — Z98.890 OTHER SPECIFIED POSTPROCEDURAL STATES: Chronic | ICD-10-CM

## 2019-10-08 DIAGNOSIS — R92.8 OTHER ABNORMAL AND INCONCLUSIVE FINDINGS ON DIAGNOSTIC IMAGING OF BREAST: ICD-10-CM

## 2019-10-08 PROCEDURE — 77061 BREAST TOMOSYNTHESIS UNI: CPT | Mod: 26

## 2019-10-08 PROCEDURE — 76641 ULTRASOUND BREAST COMPLETE: CPT | Mod: 26,LT

## 2019-10-08 PROCEDURE — 77065 DX MAMMO INCL CAD UNI: CPT | Mod: 26,LT

## 2019-10-15 ENCOUNTER — APPOINTMENT (OUTPATIENT)
Dept: NEUROLOGY | Facility: CLINIC | Age: 44
End: 2019-10-15
Payer: MEDICAID

## 2019-10-15 ENCOUNTER — OUTPATIENT (OUTPATIENT)
Dept: OUTPATIENT SERVICES | Facility: HOSPITAL | Age: 44
LOS: 1 days | Discharge: HOME | End: 2019-10-15

## 2019-10-15 VITALS — DIASTOLIC BLOOD PRESSURE: 86 MMHG | SYSTOLIC BLOOD PRESSURE: 125 MMHG | HEART RATE: 88 BPM

## 2019-10-15 DIAGNOSIS — Z98.890 OTHER SPECIFIED POSTPROCEDURAL STATES: Chronic | ICD-10-CM

## 2019-10-15 PROCEDURE — ZZZZZ: CPT

## 2019-10-15 NOTE — REVIEW OF SYSTEMS
[Tension Headache] : tension-type headaches [Negative] : Integumentary [Fever] : no fever [Feeling Poorly] : not feeling poorly [Chills] : no chills [Seizures] : no convulsions [Feeling Tired] : not feeling tired [Lightheadedness] : no lightheadedness [Vertigo] : no vertigo [Dizziness] : no dizziness [de-identified] : Tremor

## 2019-10-15 NOTE — HISTORY OF PRESENT ILLNESS
[FreeTextEntry1] : Patient is a 42yo female with PMHx of pseudoseizures, tremulousness, diabetes mellitus, ovarian cancer, diverticulitis, gastritis, dyslipidemia, and genetic CKDN2A mutation who presents to the neurology clinic complaining of headaches. She says that they are present when she wakes up in the morning about twice per month. The headache wraps around her entire head. They started when she developed this creeping sensation leading up to her ears. She takes Tylenol to help with the headaches. She does not have any neck pain. She was scheduled for colonic resection secondary to recurrent diverticulitis, but will reschedule it for a later date.\par \par She has been taking two pills of carbamazepine instead of one because it was not having an affect on her tremors.\par \par She is unsure if the carbamazepine is related to the headaches and wants to increase the dose.

## 2019-10-15 NOTE — END OF VISIT
[] : Resident [FreeTextEntry3] : Patient seen and examined with resident and prior history reviewed by me personally.  Patient says that the tremors still respond to CBZ and calm down after 10-15 minutes after taking. \par Patient gets 2 headaches per month and takes tyelenol and it goes away\par Also complaints of left ear itching and says she was given liquid to put in ear but it doesn’t help.\par Patient shaking is not seizures as confirmed on VEEG\par \par Plan \par 1. Increase CBZ 200mg QD (chewable)\par 2. Tyelenol PRN Headaches\par 3. F/u with PMD or ENT in regards to left ear itch\par 4. F/u in 9-12 months

## 2019-10-15 NOTE — PHYSICAL EXAM
[General Appearance - In No Acute Distress] : in no acute distress [General Appearance - Alert] : alert [Oriented To Time, Place, And Person] : oriented to person, place, and time [Affect] : the affect was normal [Person] : oriented to person [Memory Recent] : recent memory was not impaired [Place] : oriented to place [Time] : oriented to time [Cranial Nerves Optic (II)] : visual acuity intact bilaterally,  visual fields full to confrontation, pupils equal round and reactive to light [Cranial Nerves Trigeminal (V)] : facial sensation intact symmetrically [Cranial Nerves Oculomotor (III)] : extraocular motion intact [Cranial Nerves Facial (VII)] : face symmetrical [Cranial Nerves Vestibulocochlear (VIII)] : hearing was intact bilaterally [Motor Tone] : muscle tone was normal in all four extremities [Motor Strength] : muscle strength was normal in all four extremities [Sensation Tactile Decrease] : light touch was intact [Sensation Pain / Temperature Decrease] : pain and temperature was intact [Abnormal Walk] : normal gait [Sensation Vibration Decrease] : vibration was intact [Sclera] : the sclera and conjunctiva were normal [PERRL With Normal Accommodation] : pupils were equal in size, round, reactive to light, with normal accommodation [Extraocular Movements] : extraocular movements were intact [Outer Ear] : the ears and nose were normal in appearance [Hearing Threshold Finger Rub Not [place]] : hearing was normal [Both Tympanic Membranes Were Examined] : both tympanic membranes were normal [Neck Appearance] : the appearance of the neck was normal [FreeTextEntry1] : obese

## 2019-10-15 NOTE — ASSESSMENT
[FreeTextEntry1] : #Headaches\par - Continue with Tylenol PRN\par \par #Tremors\par - Increase carbamazepine to 200mg PO QD

## 2019-11-08 ENCOUNTER — OUTPATIENT (OUTPATIENT)
Dept: OUTPATIENT SERVICES | Facility: HOSPITAL | Age: 44
LOS: 1 days | Discharge: HOME | End: 2019-11-08

## 2019-11-08 ENCOUNTER — APPOINTMENT (OUTPATIENT)
Dept: GYNECOLOGIC ONCOLOGY | Facility: CLINIC | Age: 44
End: 2019-11-08
Payer: MEDICAID

## 2019-11-08 VITALS
HEIGHT: 57 IN | SYSTOLIC BLOOD PRESSURE: 144 MMHG | DIASTOLIC BLOOD PRESSURE: 80 MMHG | HEART RATE: 80 BPM | RESPIRATION RATE: 18 BRPM | TEMPERATURE: 97.8 F

## 2019-11-08 DIAGNOSIS — Z98.890 OTHER SPECIFIED POSTPROCEDURAL STATES: Chronic | ICD-10-CM

## 2019-11-08 PROCEDURE — 99214 OFFICE O/P EST MOD 30 MIN: CPT

## 2019-11-08 NOTE — DISCUSSION/SUMMARY
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 Correa for further genetic testing for her children.

## 2019-11-08 NOTE — HISTORY OF PRESENT ILLNESS
[FreeTextEntry1] : 42yo\par \par Stage IIIA Serous BOT\par Surgery date: WILBERT/RSO/Omentectomy 12/18/2017\par \par Path: 3 foci of serous BOT, with one suggestive of non-invasive low grade serous carcinoma; omentum with non-invasive implants; tumor is also present in paratubal tissue and ovarian surface; uterus/cervix => not involved => path send for outside consultation => no invasive carcinoma => final result: serous BOT\par \par Adjuvant treatment: None\par Most recent : 6/3/19 - 9\par 2/13/19 - 13\par 11/23/18 - 17\par \par MRI abdomen 02/05/2018: 3 hepatic lesions, could be suspicious for metastatic implants, but unchanged compared to 03/2017\par \par PET/CT 02/23/2018: no increased uptake in liver lesion; uptake in right medical upper thigh\par \par CT guided biopsy of liver lesion 03/08/2018 => benign benign\par \par 12/2018 CT abd/pelvis: No evidence of pelvic mass or abdominal/pelvic adenopathy. Nonobstructing small bowel Childs hernia. Fatty infiltration of the liver \par \par Genetic testing positive for melanona/pancreatic cancer syndrome\par \par Follows with GI on 12/2018: s/p EGD and EUS 3/2019 atrophic gastritis and normal pancreas, needs colonoscopy 2020\par \par Saw Derm 5/28/19 - full body skin exam, likely seborrheic dermatitis and non dysplastic nevi, f/u 6mos\par \par Pt with recurrent bouts of diverticulitis.  Is following with GI and plan is for surgery in December.\par \par Pt also c/o urinary incontinence.

## 2019-11-21 ENCOUNTER — APPOINTMENT (OUTPATIENT)
Dept: SURGERY | Facility: CLINIC | Age: 44
End: 2019-11-21
Payer: MEDICAID

## 2019-11-21 VITALS
WEIGHT: 178 LBS | TEMPERATURE: 97.7 F | HEIGHT: 57 IN | BODY MASS INDEX: 38.4 KG/M2 | DIASTOLIC BLOOD PRESSURE: 85 MMHG | SYSTOLIC BLOOD PRESSURE: 119 MMHG | HEART RATE: 90 BPM

## 2019-11-21 PROCEDURE — 99214 OFFICE O/P EST MOD 30 MIN: CPT

## 2019-11-26 ENCOUNTER — OUTPATIENT (OUTPATIENT)
Dept: OUTPATIENT SERVICES | Facility: HOSPITAL | Age: 44
LOS: 1 days | Discharge: HOME | End: 2019-11-26

## 2019-11-26 ENCOUNTER — APPOINTMENT (OUTPATIENT)
Dept: DERMATOLOGY | Facility: CLINIC | Age: 44
End: 2019-11-26
Payer: MEDICAID

## 2019-11-26 DIAGNOSIS — Z98.890 OTHER SPECIFIED POSTPROCEDURAL STATES: Chronic | ICD-10-CM

## 2019-11-26 PROCEDURE — 99212 OFFICE O/P EST SF 10 MIN: CPT | Mod: GC

## 2019-11-26 NOTE — HISTORY OF PRESENT ILLNESS
[FreeTextEntry1] : Follow up visit and lesion on nose [de-identified] : Pt with PMH ovarian cancer seen by Dr. Correa. 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 \par Patient was advised she needs regular follow up with dermatology. \par \par 5/28 follow up:\par Patient returns for regular follow up, she reports some pigmented lesions on the bridge of her nose present for the last year, raised, non-pruritic, with a crust, which she can scrape off and then returns. Otherwise denies any new skin lesions, and reports using sunscreen whenever she goes out in the sun.

## 2019-11-26 NOTE — HISTORY OF PRESENT ILLNESS
[FreeTextEntry1] : Follow up visit and lesion on nose [de-identified] : Pt with PMH ovarian cancer seen by Dr. Correa. 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 \par Patient was advised she needs regular follow up with dermatology. \par \par 5/28 follow up:\par Patient returns for regular follow up, she reports some pigmented lesions on the bridge of her nose present for the last year, raised, non-pruritic, with a crust, which she can scrape off and then returns. Otherwise denies any new skin lesions, and reports using sunscreen whenever she goes out in the sun.

## 2019-11-26 NOTE — HISTORY OF PRESENT ILLNESS
[FreeTextEntry1] : Follow up visit and lesion on nose [de-identified] : Pt with PMH ovarian cancer seen by Dr. Correa. 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 \par Patient was advised she needs regular follow up with dermatology. \par \par 5/28 follow up:\par Patient returns for regular follow up, she reports some pigmented lesions on the bridge of her nose present for the last year, raised, non-pruritic, with a crust, which she can scrape off and then returns. Reports a similar lesion on the anterior aspect of Rt thigh ( that she scraped off a month ago) and one on the buttock (which is still there), Patient describes these lesions to be itchy and they form a crust which can be scraped off. Patient reports using sunscreen whenever she goes out in the sun.

## 2019-11-26 NOTE — ASSESSMENT
[FreeTextEntry1] : Patient advise to follow up in 6 months for full body skin exam, and not to scrape lesion on nose in order to better evaluate. \par \par - Likely seborrheic keratosis (lesions on nose and one on the buttock)\par - non-dysplastic nevi; benign looking unremarkable\par - Follow up in 12 months for full body exam\par - Continue to avoid excessive sun exposure and use sunscreen when outdoors.\par

## 2019-11-26 NOTE — PHYSICAL EXAM
[Alert] : alert [Oriented x 3] : ~L oriented x 3 [Well Nourished] : well nourished [Conjunctiva Non-injected] : conjunctiva non-injected [No Clubbing] : no clubbing [No Visual Lymphadenopathy] : no visual  lymphadenopathy [No Edema] : no edema [No Bromhidrosis] : no bromhidrosis [No Chromhidrosis] : no chromhidrosis [FreeTextEntry3] : Full body skin exam was declined. Hair, Scalp, Face, Nose, Neck and Back are normal. small dermatofibroma on back of Rt shoulder\par Seborrheic keratosis on L bridge of nose\par Several nevi of small size and uniform in color on back, arms and face

## 2019-11-26 NOTE — ASSESSMENT
[FreeTextEntry1] : Patient advise to follow up in 6 months for full body skin exam, and not to scrape lesion on nose in order to better evaluate. \par - Likely seborrheic dermatitis \par - non-dysplastic nevi; unremarkable\par - Follow up in 6 months for full body exam\par - Continue to avoid excessive sun exposure and use sunscreen when outdoors.\par

## 2019-12-02 ENCOUNTER — OUTPATIENT (OUTPATIENT)
Dept: OUTPATIENT SERVICES | Facility: HOSPITAL | Age: 44
LOS: 1 days | Discharge: HOME | End: 2019-12-02

## 2019-12-02 ENCOUNTER — APPOINTMENT (OUTPATIENT)
Dept: PODIATRY | Facility: CLINIC | Age: 44
End: 2019-12-02
Payer: MEDICAID

## 2019-12-02 VITALS
DIASTOLIC BLOOD PRESSURE: 84 MMHG | HEART RATE: 80 BPM | WEIGHT: 178 LBS | BODY MASS INDEX: 38.4 KG/M2 | HEIGHT: 57 IN | SYSTOLIC BLOOD PRESSURE: 124 MMHG

## 2019-12-02 DIAGNOSIS — Z98.890 OTHER SPECIFIED POSTPROCEDURAL STATES: Chronic | ICD-10-CM

## 2019-12-02 PROCEDURE — 99213 OFFICE O/P EST LOW 20 MIN: CPT

## 2019-12-06 ENCOUNTER — APPOINTMENT (OUTPATIENT)
Dept: INTERNAL MEDICINE | Facility: CLINIC | Age: 44
End: 2019-12-06

## 2019-12-09 NOTE — HISTORY OF PRESENT ILLNESS
[FreeTextEntry1] : Patient states pain is present on first step in the morning and gets better with increased movement.\par

## 2019-12-09 NOTE — ASSESSMENT
[FreeTextEntry1] : - Patient examined, history and chart reviewed\par - Patient  educated on heel pain\par - RX Meloxicam for b/L Heel Pain \par - PAtient to follow up in 3 weeks  \par \par \par \par

## 2019-12-09 NOTE — PHYSICAL EXAM
[General Appearance - Alert] : alert [General Appearance - In No Acute Distress] : in no acute distress [Abnormal Walk] : normal gait [Nail Clubbing] : no clubbing  or cyanosis of the fingernails [Edema] : there was no peripheral edema [Full Pulse] : the pedal pulses are present [Motor Tone] : muscle strength and tone were normal [Musculoskeletal - Swelling] : no joint swelling seen [Skin Turgor] : normal skin turgor [Skin Color & Pigmentation] : normal skin color and pigmentation [] : no rash [Normal in Appearance] : normal in appearance [Normal] : normal [Full ROM] : with full range of motion [Sensation] : the sensory exam was normal to light touch and pinprick [Deep Tendon Reflexes (DTR)] : deep tendon reflexes were 2+ and symmetric [Oriented To Time, Place, And Person] : oriented to person, place, and time [Impaired Insight] : insight and judgment were intact [No Focal Deficits] : no focal deficits [Affect] : the affect was normal

## 2019-12-12 DIAGNOSIS — D39.10 NEOPLASM OF UNCERTAIN BEHAVIOR OF UNSPECIFIED OVARY: ICD-10-CM

## 2019-12-16 ENCOUNTER — LABORATORY RESULT (OUTPATIENT)
Age: 44
End: 2019-12-16

## 2019-12-16 ENCOUNTER — APPOINTMENT (OUTPATIENT)
Dept: HEMATOLOGY ONCOLOGY | Facility: CLINIC | Age: 44
End: 2019-12-16
Payer: MEDICAID

## 2019-12-16 VITALS
HEART RATE: 81 BPM | TEMPERATURE: 96.7 F | SYSTOLIC BLOOD PRESSURE: 115 MMHG | BODY MASS INDEX: 40.56 KG/M2 | HEIGHT: 57 IN | WEIGHT: 188 LBS | DIASTOLIC BLOOD PRESSURE: 75 MMHG

## 2019-12-16 PROCEDURE — 99213 OFFICE O/P EST LOW 20 MIN: CPT

## 2019-12-16 NOTE — HISTORY OF PRESENT ILLNESS
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  (SEE MICROSCOPIC DESCRIPTION FOR SYNOPTIC REPORT)\par \par       -    TUMOR IS PRESENT IN THE PARATUBAL TISSUE AND ON THE OVARIAN\par            SURFACE.\par \par       -    SCATTERED FOCI SUSPICIOUS OF STROMAL MICROINVASION, LARGEST\par            FOCUS MEASURING 4 MM. (SLIDE A4)\par \par       -    PATHOLOGIC STAGE (pTNM):  pT(m)2b pNX\par    ONE FOCUS SUGGESTIVE OF LOW GRADE SEROUS CARCINOMA. (0.6 MM;\par            SLIDE A7)\par \par There were no post operative complications.\par She C/o hot flashes.\par C/O pain in the abdomen, which is diffuse.\par No weight loss.\par No vaginal bleeding\par No fever.\par Operative report was reviewed. No residual disease noted.\par \par The pathology slides had been sent for 2nd opinion and result is noted below. No invasion was identified.\par \par Patient also had a liver biopsy which demonstrated normal liver parenchyma which is noted below.  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 Patient needs evaluation with EUS +/- MRCP for evaluation of pancreatic cancer and regular follow up with dermatology  [de-identified] :  Report CR- received from [facility], [address] [place] [address] by Moy Briscoe on 2/8/2018 with the following    diagnosis:     FINAL DIAGNOSIS:    OVARY, RIGHT, SALPINGO-OOPHORECTOMY:          - SEROUS BORDERLINE TUMOR WITH NONINVASIVE IMPLANTS.    Comment:     I agree with your interpretation on this case of serous borderline    tumor. There are areas of increased proliferation but nothing I    would call serous carcinoma. There are noninvasive implants of the    fallopian tube and on the surface of the ovary. The sections from    the pelvic side wall and omentum also show noninvasive implants of    serous borderline tumor.\par \par Cytopathology Report       Final Diagnosis\par  LIVER LESION, LIVER LOBE, CT-GUIDED NEEDLE BIOPSY AND IMPRINT:\par  - NO MALIGNANT CELLS IDENTIFIED\par . - LIVER PARENCHYMA TISSUE WITH MACROVESICULAR AND MICROVESICULAR STEATOSIS ( 50% OF THE TISSUE). \par - IMMUNOHISTOCHEMICAL AND SPECIAL STAINS PENDING.\par  [de-identified] : C/O Pain in left shoulder and left breast as before.\par She had a PET scan and she is scheduled for liver biopsy later this week.\par \par 3/26/18\par Patient here for follow up complains of symptoms including hot flashes and abdominal pain in the RUQ at the side of liver biopsy. Patient denies any changes in bowel habits, vaginal bleeding. She had a patient had a liver biopsy which was normal showing normal liver parenchyma. 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 Patient was told to inform family especially children to undergo genetic testing as well.  Patient was told to follow up with Dr Weinberg of GI for EUS of the pancreas and consideration of a MRCP in the future. The patient was also recommended to see a dermatologist for full skin evaluation. \par \par 7/5/18\par She has been drinking heavy ( 10 shots of tequila  each weekend).\par She has an appointment with GI next week.\par Has not seen dermatology.\par Denies abdominal pain, no vaginal bleeding\par \par 10/4/18:\par Had headaches and had MRI brain few weeks ago.\par C/o dizziness and blurry vision. \par On keppra started by neurology for possible convulsions, started a week ago. She says that she doesn't feel good on Keppra. \par Denies fever, nausea, vomiting, chest pain, SOB, abdominal pain, bowel and bladder problems.\par Due for EGD and EUS on 10/10/18\par Due for mammo in Oct 2018. \par Due for VEEG. \par \par 2/13/19:\par Patient here for follow up, feeling well, no new complaints.  Patient denies abdominal pain or bloating, denies vaginal bleeding or discharge.  She had a CT scan which was normal and she is scheduled with GYN ONC on Friday.  She saw dermatologist as well as GI.  EUS was scheduled and apparently cancelled after the patient was found to have food in her stomach.  She states its now scheduled for end of March.  Patient also being followed for pseudoseizures by neurology.\par \par 6/3/19:\par Doing well. No major complaints.\par Denies fever, nausea, vomiting, chest pain, SOB, abdominal pain, bowel and bladder problems.\par Seen by Dermatology and gynonc. \par Pt had an EGD and EUS done in March 2019  that revealed atrophic gastritis and EUS revealed normal pancreas and normal sized PD\par Due for mammo this week. \par \par \par 12/16/19\par Pt is here for follow up.\par States she is going for surgery for cecal diverticulitis.\par No vaginal bleeding.\par Has been following with GI for EUS for pancreatic ca screening.

## 2019-12-16 NOTE — ASSESSMENT
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 The pathology does not show invasive implants. 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 Patient was told to inform family especially children to undergo genetic testing as well.  \par \par The patient was also recommended to see a dermatologist for full skin evaluation\par \par RECOMMENDATIONS:\par S/p EGD and EUS in 3/19.\par Due for mammo this week. \par Check  . \par \par \par RTC in 4 months.\par

## 2019-12-17 LAB
ALBUMIN SERPL ELPH-MCNC: 4.3 G/DL
ALP BLD-CCNC: 151 U/L
ALT SERPL-CCNC: 48 U/L
ANION GAP SERPL CALC-SCNC: 12 MMOL/L
AST SERPL-CCNC: 28 U/L
BILIRUB SERPL-MCNC: <0.2 MG/DL
BUN SERPL-MCNC: 19 MG/DL
CALCIUM SERPL-MCNC: 9.5 MG/DL
CANCER AG125 SERPL-ACNC: 8 U/ML
CHLORIDE SERPL-SCNC: 105 MMOL/L
CO2 SERPL-SCNC: 24 MMOL/L
CREAT SERPL-MCNC: 0.5 MG/DL
GLUCOSE SERPL-MCNC: 146 MG/DL
HCT VFR BLD CALC: 38.5 %
HGB BLD-MCNC: 12.8 G/DL
MCHC RBC-ENTMCNC: 29.4 PG
MCHC RBC-ENTMCNC: 33.2 G/DL
MCV RBC AUTO: 88.3 FL
PLATELET # BLD AUTO: 301 K/UL
PMV BLD: 10.5 FL
POTASSIUM SERPL-SCNC: 4.1 MMOL/L
PROT SERPL-MCNC: 7 G/DL
RBC # BLD: 4.36 M/UL
RBC # FLD: 13.2 %
SODIUM SERPL-SCNC: 141 MMOL/L
WBC # FLD AUTO: 8.21 K/UL

## 2019-12-18 ENCOUNTER — MEDICATION RENEWAL (OUTPATIENT)
Age: 44
End: 2019-12-18

## 2019-12-18 NOTE — PHYSICAL EXAM
[Alert] : alert [Oriented to Person] : oriented to person [Oriented to Place] : oriented to place [Oriented to Time] : oriented to time [Calm] : calm [de-identified] : Soft, mild tenderness, Non-distended, no guarding or rebound. [de-identified] : Healthy female, NAD [de-identified] : Full range of motion [de-identified] : No focal deficits.

## 2019-12-18 NOTE — ASSESSMENT
[FreeTextEntry1] : I have advised that she could proceed with surgery or continued observation. I would favor continued observation at this time. The patient thinks that she would prefer to proceed with surgery. With that in mind the risks benefits and alternatives of surgery were all discussed. She understands that surgery would involve removing a section of colon and put it back together. Risks including bleeding and infection additional surgery and a small potential for an ostomy all exist. All questions were answered. Informed consent was obtained. We will begin the scheduling process while she considers her options and she will let us know of she would like to proceed with surgery.

## 2019-12-18 NOTE — HISTORY OF PRESENT ILLNESS
[FreeTextEntry1] : This is a new patient visit for Ms. VERONICA who is being seen for diverticulitis. The patient presented to the ED in late August with a complaint of low, no pain workup including CT scan of the abdomen and pelvis revealed the right-sided cecal diverticulitis. The patient is now referred for surgical consultation.

## 2019-12-19 ENCOUNTER — APPOINTMENT (OUTPATIENT)
Dept: SURGERY | Facility: CLINIC | Age: 44
End: 2019-12-19
Payer: MEDICAID

## 2019-12-19 VITALS
HEART RATE: 79 BPM | HEIGHT: 57 IN | BODY MASS INDEX: 39.7 KG/M2 | SYSTOLIC BLOOD PRESSURE: 130 MMHG | TEMPERATURE: 97.6 F | DIASTOLIC BLOOD PRESSURE: 100 MMHG | WEIGHT: 184 LBS

## 2019-12-19 PROCEDURE — 99214 OFFICE O/P EST MOD 30 MIN: CPT

## 2019-12-19 NOTE — PROCEDURE
[FreeTextEntry1] : Looks and feels well.\par \par Vitals as noted above\par \par Examination of the abdomen reveals a soft there is mild left lower quadrant tenderness to deep palpation is no guarding or rebound.

## 2019-12-19 NOTE — PHYSICAL EXAM
[Alert] : alert [Oriented to Person] : oriented to person [Oriented to Place] : oriented to place [Calm] : calm [Oriented to Time] : oriented to time [de-identified] : Healthy female, NAD [de-identified] : Soft, mild tenderness, Non-distended, no guarding or rebound. [de-identified] : Full range of motion [de-identified] : No focal deficits. [FreeTextEntry1] : Looks and feels well.\par \par Vitals as noted above.\par \par Examination of the abdomen is soft there is mild to moderate left lower quadrant tenderness to deep palpation is no guarding or rebound

## 2019-12-19 NOTE — ASSESSMENT
[FreeTextEntry1] : Ms. MOSLEY continues to have mild to moderate diverticular symptoms. We will proceed with sending her for a CT scan of the abdomen for reevaluation. She'll return to see me in approximately 3-4 weeks. Certainly if she has increasing pain or fever she is to call me sooner. She does have a refill on her antibiotics in the event that she has a flareup that off hours. We will proceed with scheduling her surgery.

## 2019-12-19 NOTE — HISTORY OF PRESENT ILLNESS
[FreeTextEntry1] : This is a followup visit for Ms. VERONICA who is being seen for diverticulitis. The patient states that Continues to have mild load, molar pain and tenderness. She denies any fever nausea or vomiting.

## 2019-12-19 NOTE — HISTORY OF PRESENT ILLNESS
[FreeTextEntry1] : This is a followup visit for Ms. MOSLEY who is being treated for diverticulitis. The patient states that she continues to have some intermittent lower, middle pains as well as gas. The she did have increasing pain approximately 2 weeks ago and called in for a refill on antibiotics she has currently completed the course.

## 2019-12-19 NOTE — ASSESSMENT
[FreeTextEntry1] : Ms. MOSLEY continues to have symptoms consistent with mild diverticulitis. Placenta for CT scan of the abdomen and pelvis for reevaluation. And we'll get her started back on oral antibiotics she'll return to see me in a 2-3 weeks certainly sooner should any problems arise.

## 2019-12-23 ENCOUNTER — APPOINTMENT (OUTPATIENT)
Dept: PODIATRY | Facility: CLINIC | Age: 44
End: 2019-12-23

## 2019-12-26 ENCOUNTER — RX RENEWAL (OUTPATIENT)
Age: 44
End: 2019-12-26

## 2019-12-28 ENCOUNTER — OUTPATIENT (OUTPATIENT)
Dept: OUTPATIENT SERVICES | Facility: HOSPITAL | Age: 44
LOS: 1 days | Discharge: HOME | End: 2019-12-28
Payer: MEDICAID

## 2019-12-28 DIAGNOSIS — Z98.890 OTHER SPECIFIED POSTPROCEDURAL STATES: Chronic | ICD-10-CM

## 2019-12-28 DIAGNOSIS — K57.90 DIVERTICULOSIS OF INTESTINE, PART UNSPECIFIED, WITHOUT PERFORATION OR ABSCESS WITHOUT BLEEDING: ICD-10-CM

## 2019-12-28 PROCEDURE — 74176 CT ABD & PELVIS W/O CONTRAST: CPT | Mod: 26

## 2020-01-02 ENCOUNTER — OUTPATIENT (OUTPATIENT)
Dept: OUTPATIENT SERVICES | Facility: HOSPITAL | Age: 45
LOS: 1 days | Discharge: HOME | End: 2020-01-02

## 2020-01-02 ENCOUNTER — APPOINTMENT (OUTPATIENT)
Dept: PODIATRY | Facility: CLINIC | Age: 45
End: 2020-01-02
Payer: MEDICAID

## 2020-01-02 VITALS
DIASTOLIC BLOOD PRESSURE: 95 MMHG | HEART RATE: 99 BPM | BODY MASS INDEX: 39.7 KG/M2 | WEIGHT: 184 LBS | HEIGHT: 57 IN | SYSTOLIC BLOOD PRESSURE: 135 MMHG

## 2020-01-02 DIAGNOSIS — Z98.890 OTHER SPECIFIED POSTPROCEDURAL STATES: Chronic | ICD-10-CM

## 2020-01-02 PROCEDURE — 99213 OFFICE O/P EST LOW 20 MIN: CPT

## 2020-01-09 ENCOUNTER — APPOINTMENT (OUTPATIENT)
Dept: PODIATRY | Facility: CLINIC | Age: 45
End: 2020-01-09
Payer: MEDICAID

## 2020-01-09 ENCOUNTER — OUTPATIENT (OUTPATIENT)
Dept: OUTPATIENT SERVICES | Facility: HOSPITAL | Age: 45
LOS: 1 days | Discharge: HOME | End: 2020-01-09

## 2020-01-09 DIAGNOSIS — Z98.890 OTHER SPECIFIED POSTPROCEDURAL STATES: Chronic | ICD-10-CM

## 2020-01-09 LAB
ANION GAP SERPL CALC-SCNC: 13 MMOL/L
BUN SERPL-MCNC: 18 MG/DL
CALCIUM SERPL-MCNC: 9.7 MG/DL
CHLORIDE SERPL-SCNC: 103 MMOL/L
CO2 SERPL-SCNC: 24 MMOL/L
CREAT SERPL-MCNC: 0.6 MG/DL
GLUCOSE SERPL-MCNC: 95 MG/DL
POTASSIUM SERPL-SCNC: 4.5 MMOL/L
SODIUM SERPL-SCNC: 140 MMOL/L

## 2020-01-09 PROCEDURE — 99213 OFFICE O/P EST LOW 20 MIN: CPT

## 2020-01-10 ENCOUNTER — EMERGENCY (EMERGENCY)
Facility: HOSPITAL | Age: 45
LOS: 0 days | Discharge: HOME | End: 2020-01-10
Attending: EMERGENCY MEDICINE | Admitting: EMERGENCY MEDICINE
Payer: MEDICAID

## 2020-01-10 VITALS
DIASTOLIC BLOOD PRESSURE: 86 MMHG | RESPIRATION RATE: 18 BRPM | HEART RATE: 81 BPM | OXYGEN SATURATION: 97 % | SYSTOLIC BLOOD PRESSURE: 128 MMHG | TEMPERATURE: 97 F

## 2020-01-10 DIAGNOSIS — Z98.890 OTHER SPECIFIED POSTPROCEDURAL STATES: Chronic | ICD-10-CM

## 2020-01-10 DIAGNOSIS — M54.2 CERVICALGIA: ICD-10-CM

## 2020-01-10 DIAGNOSIS — R51 HEADACHE: ICD-10-CM

## 2020-01-10 DIAGNOSIS — Z90.710 ACQUIRED ABSENCE OF BOTH CERVIX AND UTERUS: ICD-10-CM

## 2020-01-10 DIAGNOSIS — R22.0 LOCALIZED SWELLING, MASS AND LUMP, HEAD: ICD-10-CM

## 2020-01-10 DIAGNOSIS — Z91.041 RADIOGRAPHIC DYE ALLERGY STATUS: ICD-10-CM

## 2020-01-10 LAB
ALBUMIN SERPL ELPH-MCNC: 4.5 G/DL — SIGNIFICANT CHANGE UP (ref 3.5–5.2)
ALP SERPL-CCNC: 154 U/L — HIGH (ref 30–115)
ALT FLD-CCNC: 71 U/L — HIGH (ref 0–41)
ANION GAP SERPL CALC-SCNC: 13 MMOL/L — SIGNIFICANT CHANGE UP (ref 7–14)
AST SERPL-CCNC: 76 U/L — HIGH (ref 0–41)
BILIRUB SERPL-MCNC: 0.3 MG/DL — SIGNIFICANT CHANGE UP (ref 0.2–1.2)
BUN SERPL-MCNC: 14 MG/DL — SIGNIFICANT CHANGE UP (ref 10–20)
CALCIUM SERPL-MCNC: 9.1 MG/DL — SIGNIFICANT CHANGE UP (ref 8.5–10.1)
CHLORIDE SERPL-SCNC: 100 MMOL/L — SIGNIFICANT CHANGE UP (ref 98–110)
CO2 SERPL-SCNC: 22 MMOL/L — SIGNIFICANT CHANGE UP (ref 17–32)
CREAT SERPL-MCNC: 0.5 MG/DL — LOW (ref 0.7–1.5)
GLUCOSE SERPL-MCNC: 147 MG/DL — HIGH (ref 70–99)
HCT VFR BLD CALC: 41.8 % — SIGNIFICANT CHANGE UP (ref 37–47)
HGB BLD-MCNC: 13.8 G/DL — SIGNIFICANT CHANGE UP (ref 12–16)
MCHC RBC-ENTMCNC: 29.4 PG — SIGNIFICANT CHANGE UP (ref 27–31)
MCHC RBC-ENTMCNC: 33 G/DL — SIGNIFICANT CHANGE UP (ref 32–37)
MCV RBC AUTO: 89.1 FL — SIGNIFICANT CHANGE UP (ref 81–99)
NRBC # BLD: 0 /100 WBCS — SIGNIFICANT CHANGE UP (ref 0–0)
PLATELET # BLD AUTO: 334 K/UL — SIGNIFICANT CHANGE UP (ref 130–400)
POTASSIUM SERPL-MCNC: SIGNIFICANT CHANGE UP MMOL/L (ref 3.5–5)
POTASSIUM SERPL-SCNC: SIGNIFICANT CHANGE UP MMOL/L (ref 3.5–5)
PROT SERPL-MCNC: 8 G/DL — SIGNIFICANT CHANGE UP (ref 6–8)
RBC # BLD: 4.69 M/UL — SIGNIFICANT CHANGE UP (ref 4.2–5.4)
RBC # FLD: 13.2 % — SIGNIFICANT CHANGE UP (ref 11.5–14.5)
SODIUM SERPL-SCNC: 135 MMOL/L — SIGNIFICANT CHANGE UP (ref 135–146)
WBC # BLD: 7.31 K/UL — SIGNIFICANT CHANGE UP (ref 4.8–10.8)
WBC # FLD AUTO: 7.31 K/UL — SIGNIFICANT CHANGE UP (ref 4.8–10.8)

## 2020-01-10 PROCEDURE — 99284 EMERGENCY DEPT VISIT MOD MDM: CPT

## 2020-01-10 PROCEDURE — 70450 CT HEAD/BRAIN W/O DYE: CPT | Mod: 26

## 2020-01-10 RX ORDER — SODIUM CHLORIDE 9 MG/ML
1000 INJECTION INTRAMUSCULAR; INTRAVENOUS; SUBCUTANEOUS ONCE
Refills: 0 | Status: COMPLETED | OUTPATIENT
Start: 2020-01-10 | End: 2020-01-10

## 2020-01-10 RX ORDER — METOCLOPRAMIDE HCL 10 MG
10 TABLET ORAL ONCE
Refills: 0 | Status: COMPLETED | OUTPATIENT
Start: 2020-01-10 | End: 2020-01-10

## 2020-01-10 RX ADMIN — SODIUM CHLORIDE 1000 MILLILITER(S): 9 INJECTION INTRAMUSCULAR; INTRAVENOUS; SUBCUTANEOUS at 11:52

## 2020-01-10 RX ADMIN — Medication 10 MILLIGRAM(S): at 11:52

## 2020-01-10 NOTE — ED PROVIDER NOTE - PHYSICAL EXAMINATION
Gen: Alert, NAD, well appearing  Head: NC, AT, PERRL, EOMI, normal lids/conjunctiva  ENT: normal hearing, patent oropharynx without erythema/exudate. +tenderness to right upper gums over molars  Neck: +supple, no tenderness/meningismus,  Pulm: Bilateral BS, normal resp effort, no wheeze/stridor/retractions  CV: RRR, no murmer  Abd: soft, NT/ND  Mskel: no edema/erythema/cyanosis  Skin: no rash, warm/dry  Neuro: AAOx3, no sensory/motor deficits, smile unequal (? due to right facial swelling). Normal speech. Normal gait Gen: Alert, NAD, well appearing  Head: NC, AT, PERRL, EOMI, normal lids/conjunctiva  ENT: normal hearing, patent oropharynx without erythema/exudate. +tenderness to right upper gums over molars. TM: WNL. canals clear b/l  Neck: +supple, no tenderness/meningismus,  Pulm: Bilateral BS, normal resp effort, no wheeze/stridor/retractions  CV: RRR, no murmer  Abd: soft, NT/ND  Mskel: no edema/erythema/cyanosis  Skin: no rash, warm/dry  Neuro: AAOx3, no sensory/motor deficits, smile unequal (? due to right facial swelling). Normal speech. Normal gait

## 2020-01-10 NOTE — ED PROVIDER NOTE - ATTENDING CONTRIBUTION TO CARE
43 y/o female with hx of diverticulitis presents to ED with right sided facial pain and swelling x 1 day.  No fever or chills.  No neck pain.  No dysphagia/odynophagia.  (+) nausea.  Has also been diagnosed with diverticulitis and is on Cipro/Flagyl.  PE:  agree with above.  A/P:  Facial Swelling.  CT and reassess.

## 2020-01-10 NOTE — CONSULT NOTE ADULT - SUBJECTIVE AND OBJECTIVE BOX
Patient is a 44y old  Female who presents with a chief complaint of pain on her upper right    HPI: Patient presents to ER today with complaints of sensitivity on her upper right. Patient stated that the pain began 3 days ago. The symptoms include diffuse pain across entire right side of her head, with pain on opening. Patient denies any fever, swelling, lymphadenopathy, trismus, dysphagia, or dyspnea.       PAST MEDICAL & SURGICAL HISTORY:  Vertigo  Hypercholesteremia  Seizure disorder  Ovarian cancer: UNSURE OF STAGE HOWEVER REPORTS IT &quot;MINOR&quot;  Sees Oncologist Dr. Vanessa Correa  Diabetes: Family Doctor: Dr Reeves  Liver disorder: LIVER LESIONS APPEARED ON CT-SCAN  (Within the past few months)  H/O abdominal hysterectomy: With Right Oophrecetomy   In December 2017  History of cholecystectomy: 10-15 years ago    (   ) heart valve replacement  (   ) joint replacement  (   ) pregnancy    MEDICATIONS  (STANDING):    MEDICATIONS  (PRN):      Allergies    contrast media (gadolinium-based) (Short breath; Headache)    Intolerances        FAMILY HISTORY:  HTN (hypertension) (Mother)  Hypercholesteremia (Mother)  Diabetes (Father, Mother)      *SOCIAL HISTORY: (   ) Tobacco; (   ) ETOH    *Last Dental Visit: "I don't remember"    Vital Signs Last 24 Hrs  T(C): 36.1 (10 Dinh 2020 10:47), Max: 36.1 (10 Dinh 2020 10:47)  T(F): 97 (10 Dinh 2020 10:47), Max: 97 (10 Dinh 2020 10:47)  HR: 81 (10 Dinh 2020 10:47) (81 - 81)  BP: 128/86 (10 Dinh 2020 10:47) (128/86 - 128/86)  BP(mean): --  RR: 18 (10 Dinh 2020 10:47) (18 - 18)  SpO2: 97% (10 Dinh 2020 10:47) (97% - 97%)    LABS:                        13.8   7.31  )-----------( 334      ( 10 Dinh 2020 12:05 )             41.8     01-10    135  |  100  |  14  ----------------------------<  147<H>  TNP   |  22  |  0.5<L>    Ca    9.1      10 Dinh 2020 12:05    TPro  8.0  /  Alb  4.5  /  TBili  0.3  /  DBili  x   /  AST  76<H>  /  ALT  71<H>  /  AlkPhos  154<H>  01-10    WBC Count: 7.31 K/uL [4.80 - 10.80] (01-10 @ 12:05)  Platelet Count - Automated: 334 K/uL [130 - 400] (01-10 @ 12:05)      EOE:  TMJ - pain on palpation on right side near TMJ             Mandible <<FROM>>             Facial bones and MOM - Masseter muscle very tender to palpation on right side             (   ) trismus             (   ) lymphadenopathy             (   ) swelling             (   ) asymmetry             ( x ) palpation - R masseter muscle tender when palpated             (   ) dyspnea             (   ) dysphagia             (   ) loss of consciousness    IOE:  No intraoral abnormalities    Dentition present: Yes  Mobility: None  Caries: None evident         *DENTAL RADIOGRAPHS: N/A    *ASSESSMENT:    Upon clinical evaluation, there were no signs of caries on either the upper right or lower right quadrants. Patient stated that she felt pain behind her ear traveling up to her skull. Patient very sensitive to palpation with right masseter muscle. Explained to patient that there is no sign of an odontogenic infection at this moment, and that her symptoms are characteristic of temperomandibular joint pain. Gave patient information regarding TMD specialists in [facility] to contact for follow-up.      RECOMMENDATIONS:  1) Dental F/U with TMD for evaluation of symptoms   2) If any difficulty swallowing/breathing, fever occur, return to ER.

## 2020-01-10 NOTE — ED PROVIDER NOTE - CLINICAL SUMMARY MEDICAL DECISION MAKING FREE TEXT BOX
No signs of sepsis.  Labs and imaging reassuring.  Stable for transfer to dental clinic for evaluation.

## 2020-01-10 NOTE — ED ADULT NURSE NOTE - OBJECTIVE STATEMENT
Pt presented with c/o right facial pain and swelling. Endorses pain to head as well. Currently on abx for diverticulitis. Denies SOB, difficulty swallowing. Alert and oriented x3.

## 2020-01-10 NOTE — ED PROVIDER NOTE - OBJECTIVE STATEMENT
Setswana speaking,  # [id number]  43 yo F hx of diverticulitis c/o right facial swelling since yesterday. Patient also c/o pain to back of head and neck x 3 days. +nausea. +sore throat. No hx of injury. No fevers. No numbness/weakness to extremities. Patient currently on Cipro/Flagyl for diverticulitis.

## 2020-01-10 NOTE — ED ADULT TRIAGE NOTE - CHIEF COMPLAINT QUOTE
right facial pain, swelling x 3 days. patient reports being on antibiotics for "diverticulitis" since Monday. airway intact

## 2020-01-10 NOTE — ED PROVIDER NOTE - PROGRESS NOTE DETAILS
+improvement of headache and nausea. Will send to dental clinic. Patient advised of elevated LFT. Copies of results given for f/u with PMD

## 2020-01-10 NOTE — ED PROVIDER NOTE - NS ED ROS FT
Review of Systems    Constitutional: (-) fever, (-) chills  Eyes/ENT: (-) blurry vision, (-) epistaxis, (-) sore throat  Cardiovascular: (-) chest pain, (-) syncope  Respiratory: (-) cough, (-) shortness of breath  Gastrointestinal: (-) pain, (+) nausea, (-) vomiting, (-) diarrhea  Musculoskeletal: (+) neck pain, (-) back pain, (+) right facial swelling  Integumentary: (-) rash, (-) edema  Neurological: (+) headache, (-) altered mental status  Psychiatric: (-) hallucinations  Allergic/Immunologic: (-) pruritus

## 2020-01-11 ENCOUNTER — OUTPATIENT (OUTPATIENT)
Dept: OUTPATIENT SERVICES | Facility: HOSPITAL | Age: 45
LOS: 1 days | Discharge: HOME | End: 2020-01-11

## 2020-01-11 ENCOUNTER — APPOINTMENT (OUTPATIENT)
Dept: INTERNAL MEDICINE | Facility: CLINIC | Age: 45
End: 2020-01-11
Payer: MEDICAID

## 2020-01-11 VITALS
WEIGHT: 184 LBS | HEIGHT: 57 IN | BODY MASS INDEX: 39.7 KG/M2 | DIASTOLIC BLOOD PRESSURE: 84 MMHG | SYSTOLIC BLOOD PRESSURE: 124 MMHG | HEART RATE: 76 BPM

## 2020-01-11 DIAGNOSIS — Z98.890 OTHER SPECIFIED POSTPROCEDURAL STATES: Chronic | ICD-10-CM

## 2020-01-11 PROCEDURE — 99214 OFFICE O/P EST MOD 30 MIN: CPT | Mod: GC

## 2020-01-11 NOTE — END OF VISIT
[FreeTextEntry3] : I personally discussed this patient with the Resident at the time of the visit. And I was present with the Resident during the key \par portions of the history and exam. I agree with the findings, assessment and plan as written, unless noted below.\par \par Pt. has right Bell's palsy, pt. unable to close right eye, has drooling when drinking water, and has loss of right nasolabial fold.  Pt. agrees for lyme and HIV testing, and pt. will be treated, and follow up visit in 1 month

## 2020-01-11 NOTE — ASSESSMENT
[FreeTextEntry1] : 45 yo F with PMHx of ovarian cancer, DM2, pseudoseizures, diverticulitis, presents to clinic for follow-up. She was evaluated in the ED yesterday for R sided facial swelling, noted to be likely secondary to gingivitis, referred to dentistry. Bloodwork was positive for elevated transaminitis (, AST 76, ALT 71, TBili 0.3). 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) s/p L oophorectomy/omentectomy and R ovarian cystectomy (2006)\par - s/p WILBERT/RSO/Omentectomy (12/2017)\par - Tested positive for CKDN2A mutation, heterozygosity, part of melanoma-pancreatic cancer syndrome which confers 17% lifetime risk of pancreatic cancer and 14-50% melanoma\par - Was referred to Dermatology for full skin evaluation; last seen in 11/2019, diagnosed with seborrheic keratosis of nose/buttock with benign non-dysplastic nevi\par - s/p EGD and EUS on 03/2019: Atrophic gastritis, normal pancreas and normal-sized PD\par \par #) Hx of cecal diverticulitis\par - Follow-up with Colorectal Surgery\par - Surgery for next month\par \par #) Transaminitis\par - Hx of alcohol abuse \par - Follow-up with GI \par - Follow-up repeat CMP\par \par #) Pseudoseizures\par - Follow-up with Neurology\par - Carbamazepine 200 mg QD, Tylenol PRN for headaches\par - Has not been compliant with CBZ, has stopped due to fear of interaction with antibiotics \par \par #) Peroneal tendonitis of LLE \par - Follow-up with Podiatry, Meloxicam PRN \par \par #) Health Maintenance\par - Follow-up TSH, A1c, Vit D, Lipid\par - Mammogram (10/2019): BIRADS 2\par - Pap smear last in 2017, will need repeat this year\par - GI: Will need CRC screening in 2020 due to elevated risk\par - Flu shot (received 2-3 months ago)

## 2020-01-11 NOTE — HISTORY OF PRESENT ILLNESS
[de-identified] : 43 yo F with PMHx of ovarian cancer, DM2, pseudoseizures, diverticulitis, presents to clinic for follow-up. She was evaluated in the ED yesterday for R sided facial swelling, noted in ED note to be likely secondary to gingivitis, had a negative CT Head NC, referred to dentistry. Bloodwork was positive for elevated transaminitis (, AST 76, ALT 71, TBili 0.3). \par \par She notes swelling of the R lip/neck, started on Tuesday, headache. First time this has occurred. Numbness in the tongue, R lateral side. R sided blurry vision. No fever or chills \par \par [place] , Иван: [id number]\par Mitra [id number]

## 2020-01-11 NOTE — PHYSICAL EXAM
[Well Nourished] : well nourished [No Acute Distress] : no acute distress [Well Developed] : well developed [Well-Appearing] : well-appearing [Normal Sclera/Conjunctiva] : normal sclera/conjunctiva [PERRL] : pupils equal round and reactive to light [EOMI] : extraocular movements intact [Normal Outer Ear/Nose] : the outer ears and nose were normal in appearance [Normal Oropharynx] : the oropharynx was normal [No JVD] : no jugular venous distention [Thyroid Normal, No Nodules] : the thyroid was normal and there were no nodules present [Supple] : supple [No Lymphadenopathy] : no lymphadenopathy [No Accessory Muscle Use] : no accessory muscle use [Clear to Auscultation] : lungs were clear to auscultation bilaterally [No Respiratory Distress] : no respiratory distress  [Normal S1, S2] : normal S1 and S2 [Regular Rhythm] : with a regular rhythm [Normal Rate] : normal rate  [No Varicosities] : no varicosities [No Murmur] : no murmur heard [No Carotid Bruits] : no carotid bruits [No Abdominal Bruit] : a ~M bruit was not heard ~T in the abdomen [Pedal Pulses Present] : the pedal pulses are present [No Edema] : there was no peripheral edema [No Palpable Aorta] : no palpable aorta [Soft] : abdomen soft [No Extremity Clubbing/Cyanosis] : no extremity clubbing/cyanosis [Non Tender] : non-tender [No HSM] : no HSM [Normal Bowel Sounds] : normal bowel sounds [No Masses] : no abdominal mass palpated [Non-distended] : non-distended [Normal Posterior Cervical Nodes] : no posterior cervical lymphadenopathy [Normal Anterior Cervical Nodes] : no anterior cervical lymphadenopathy [No CVA Tenderness] : no CVA  tenderness [No Joint Swelling] : no joint swelling [Grossly Normal Strength/Tone] : grossly normal strength/tone [No Spinal Tenderness] : no spinal tenderness [No Rash] : no rash [Normal Gait] : normal gait [Coordination Grossly Intact] : coordination grossly intact [Deep Tendon Reflexes (DTR)] : deep tendon reflexes were 2+ and symmetric [Normal Insight/Judgement] : insight and judgment were intact [Normal Affect] : the affect was normal [de-identified] : R eyelid ptosis [de-identified] : Flattened R nasolabial fold [de-identified] : R facial drooping, mild slurring of speech, reduced R-sided CN V

## 2020-01-15 DIAGNOSIS — E11.65 TYPE 2 DIABETES MELLITUS WITH HYPERGLYCEMIA: ICD-10-CM

## 2020-01-15 DIAGNOSIS — F32.9 MAJOR DEPRESSIVE DISORDER, SINGLE EPISODE, UNSPECIFIED: ICD-10-CM

## 2020-01-15 DIAGNOSIS — K57.92 DIVERTICULITIS OF INTESTINE, PART UNSPECIFIED, WITHOUT PERFORATION OR ABSCESS WITHOUT BLEEDING: ICD-10-CM

## 2020-01-15 DIAGNOSIS — K29.70 GASTRITIS, UNSPECIFIED, WITHOUT BLEEDING: ICD-10-CM

## 2020-01-15 DIAGNOSIS — G25.9 EXTRAPYRAMIDAL AND MOVEMENT DISORDER, UNSPECIFIED: ICD-10-CM

## 2020-01-15 DIAGNOSIS — G51.0 BELL'S PALSY: ICD-10-CM

## 2020-01-16 NOTE — ASSESSMENT
[FreeTextEntry1] : - Patient examined, history and chart reviewed\par - peroneal tendonitis RLE\par - PAtient to follow up in 3 weeks  \par \par \par \par

## 2020-01-16 NOTE — PHYSICAL EXAM
[General Appearance - In No Acute Distress] : in no acute distress [General Appearance - Alert] : alert [Edema] : there was no peripheral edema [Full Pulse] : the pedal pulses are present [Motor Tone] : muscle strength and tone were normal [Nail Clubbing] : no clubbing  or cyanosis of the fingernails [Abnormal Walk] : normal gait [Musculoskeletal - Swelling] : no joint swelling seen [Skin Turgor] : normal skin turgor [Skin Color & Pigmentation] : normal skin color and pigmentation [] : no rash [Normal] : normal [Normal in Appearance] : normal in appearance [Full ROM] : with full range of motion [Deep Tendon Reflexes (DTR)] : deep tendon reflexes were 2+ and symmetric [No Focal Deficits] : no focal deficits [Sensation] : the sensory exam was normal to light touch and pinprick [Impaired Insight] : insight and judgment were intact [Oriented To Time, Place, And Person] : oriented to person, place, and time [Affect] : the affect was normal [FreeTextEntry1] : ingrown toenail 1st b/l , peroneal tendonitis right foot

## 2020-01-16 NOTE — HISTORY OF PRESENT ILLNESS
[FreeTextEntry1] : Patient states pain is present on first step in the morning and gets better with increased movement.\par po Meloxicam helped relief some pain but comes back once medications worn out. she also complaints right lateral foot pain.\par

## 2020-01-17 NOTE — PHYSICAL EXAM
[General Appearance - Alert] : alert [General Appearance - In No Acute Distress] : in no acute distress [Full Pulse] : the pedal pulses are present [Edema] : there was no peripheral edema [Abnormal Walk] : normal gait [Nail Clubbing] : no clubbing  or cyanosis of the fingernails [Musculoskeletal - Swelling] : no joint swelling seen [Motor Tone] : muscle strength and tone were normal [Skin Turgor] : normal skin turgor [Skin Color & Pigmentation] : normal skin color and pigmentation [] : no rash [Normal in Appearance] : normal in appearance [Normal] : normal [Full ROM] : with full range of motion [Deep Tendon Reflexes (DTR)] : deep tendon reflexes were 2+ and symmetric [Sensation] : the sensory exam was normal to light touch and pinprick [No Focal Deficits] : no focal deficits [Impaired Insight] : insight and judgment were intact [Oriented To Time, Place, And Person] : oriented to person, place, and time [Affect] : the affect was normal [FreeTextEntry1] : ingrown toenail 1st b/l , peroneal tendonitis right foot

## 2020-01-21 ENCOUNTER — APPOINTMENT (OUTPATIENT)
Dept: SURGERY | Facility: CLINIC | Age: 45
End: 2020-01-21
Payer: MEDICAID

## 2020-01-21 VITALS
TEMPERATURE: 98.5 F | WEIGHT: 185 LBS | BODY MASS INDEX: 39.91 KG/M2 | DIASTOLIC BLOOD PRESSURE: 80 MMHG | HEIGHT: 57 IN | SYSTOLIC BLOOD PRESSURE: 130 MMHG | HEART RATE: 116 BPM

## 2020-01-21 PROCEDURE — 99214 OFFICE O/P EST MOD 30 MIN: CPT

## 2020-01-21 NOTE — HISTORY OF PRESENT ILLNESS
[FreeTextEntry1] : This is a followup visit for Ms. Mahesh Friedman who is being treated for diverticular disease. She states that she continues to have abdominal pain and discomfort. She comes in today to discuss scheduling surgery and reports that she recently was seen and evaluated in the emergency department and diagnosed with a Bell's palsy. Currently she is on several new medications to manage her at this issue.

## 2020-01-21 NOTE — PHYSICAL EXAM
[FreeTextEntry1] : Looks and feels well, she has significant left-sided facial drooping.\par \par Vitals as noted above.\par \par Abdomen is soft nontender nondistended is no guarding or rebound.

## 2020-01-21 NOTE — ASSESSMENT
[FreeTextEntry1] : Ms. Mahesh Sheffield would like to proceed with surgery. Unfortunately with the new onset of her Bell's palsy and recurrent change in her medical management I think is best to delay surgery until her treatment is completed. When she's done with her antiviral therapy she will return to see her medical medical doctor and obtain a clearance for her surgery. We'll get her rescheduled at her earliest convenience.

## 2020-01-22 DIAGNOSIS — M79.671 PAIN IN RIGHT FOOT: ICD-10-CM

## 2020-01-22 DIAGNOSIS — M72.2 PLANTAR FASCIAL FIBROMATOSIS: ICD-10-CM

## 2020-01-22 DIAGNOSIS — M79.609 PAIN IN UNSPECIFIED LIMB: ICD-10-CM

## 2020-01-22 DIAGNOSIS — M79.672 PAIN IN LEFT FOOT: ICD-10-CM

## 2020-01-23 ENCOUNTER — APPOINTMENT (OUTPATIENT)
Dept: PODIATRY | Facility: CLINIC | Age: 45
End: 2020-01-23

## 2020-01-29 ENCOUNTER — OUTPATIENT (OUTPATIENT)
Dept: OUTPATIENT SERVICES | Facility: HOSPITAL | Age: 45
LOS: 1 days | Discharge: HOME | End: 2020-01-29

## 2020-01-29 ENCOUNTER — APPOINTMENT (OUTPATIENT)
Dept: UROGYNECOLOGY | Facility: CLINIC | Age: 45
End: 2020-01-29
Payer: MEDICAID

## 2020-01-29 VITALS — SYSTOLIC BLOOD PRESSURE: 130 MMHG | DIASTOLIC BLOOD PRESSURE: 70 MMHG

## 2020-01-29 DIAGNOSIS — R10.2 PELVIC AND PERINEAL PAIN: ICD-10-CM

## 2020-01-29 DIAGNOSIS — Z86.19 PERSONAL HISTORY OF OTHER INFECTIOUS AND PARASITIC DISEASES: ICD-10-CM

## 2020-01-29 DIAGNOSIS — R10.9 UNSPECIFIED ABDOMINAL PAIN: ICD-10-CM

## 2020-01-29 DIAGNOSIS — Z86.03 PERSONAL HISTORY OF NEOPLASM OF UNCERTAIN BEHAVIOR: ICD-10-CM

## 2020-01-29 DIAGNOSIS — Z30.9 ENCOUNTER FOR CONTRACEPTIVE MANAGEMENT, UNSPECIFIED: ICD-10-CM

## 2020-01-29 DIAGNOSIS — M79.671 PAIN IN RIGHT FOOT: ICD-10-CM

## 2020-01-29 DIAGNOSIS — D39.12 NEOPLASM OF UNCERTAIN BEHAVIOR OF LEFT OVARY: ICD-10-CM

## 2020-01-29 DIAGNOSIS — Z87.2 PERSONAL HISTORY OF DISEASES OF THE SKIN AND SUBCUTANEOUS TISSUE: ICD-10-CM

## 2020-01-29 DIAGNOSIS — N64.4 MASTODYNIA: ICD-10-CM

## 2020-01-29 DIAGNOSIS — Z87.39 PERSONAL HISTORY OF OTHER DISEASES OF THE MUSCULOSKELETAL SYSTEM AND CONNECTIVE TISSUE: ICD-10-CM

## 2020-01-29 DIAGNOSIS — Z98.890 OTHER SPECIFIED POSTPROCEDURAL STATES: Chronic | ICD-10-CM

## 2020-01-29 DIAGNOSIS — Z87.898 PERSONAL HISTORY OF OTHER SPECIFIED CONDITIONS: ICD-10-CM

## 2020-01-29 DIAGNOSIS — R16.0 HEPATOMEGALY, NOT ELSEWHERE CLASSIFIED: ICD-10-CM

## 2020-01-29 DIAGNOSIS — Z86.39 PERSONAL HISTORY OF OTHER ENDOCRINE, NUTRITIONAL AND METABOLIC DISEASE: ICD-10-CM

## 2020-01-29 DIAGNOSIS — M79.672 PAIN IN LEFT FOOT: ICD-10-CM

## 2020-01-29 DIAGNOSIS — E78.00 PURE HYPERCHOLESTEROLEMIA, UNSPECIFIED: ICD-10-CM

## 2020-01-29 DIAGNOSIS — N63.0 UNSPECIFIED LUMP IN UNSPECIFIED BREAST: ICD-10-CM

## 2020-01-29 DIAGNOSIS — H61.23 IMPACTED CERUMEN, BILATERAL: ICD-10-CM

## 2020-01-29 DIAGNOSIS — Z87.19 PERSONAL HISTORY OF OTHER DISEASES OF THE DIGESTIVE SYSTEM: ICD-10-CM

## 2020-01-29 PROCEDURE — 99205 OFFICE O/P NEW HI 60 MIN: CPT | Mod: 25

## 2020-01-29 PROCEDURE — 51701 INSERT BLADDER CATHETER: CPT

## 2020-01-29 RX ORDER — CELECOXIB 200 MG/1
200 CAPSULE ORAL TWICE DAILY
Qty: 60 | Refills: 3 | Status: DISCONTINUED | COMMUNITY
Start: 2019-08-27 | End: 2020-01-29

## 2020-01-29 RX ORDER — POLYETHYLENE GLYCOL 3350 AND ELECTROLYTES WITH LEMON FLAVOR 236; 22.74; 6.74; 5.86; 2.97 G/4L; G/4L; G/4L; G/4L; G/4L
236 POWDER, FOR SOLUTION ORAL
Qty: 1 | Refills: 0 | Status: DISCONTINUED | COMMUNITY
Start: 2019-09-24 | End: 2020-01-29

## 2020-01-29 RX ORDER — METRONIDAZOLE 500 MG/1
500 TABLET ORAL 3 TIMES DAILY
Qty: 30 | Refills: 2 | Status: DISCONTINUED | COMMUNITY
Start: 2019-11-25 | End: 2020-01-29

## 2020-01-29 RX ORDER — ASPIRIN 81 MG
6.5 TABLET, DELAYED RELEASE (ENTERIC COATED) ORAL
Qty: 1 | Refills: 1 | Status: DISCONTINUED | COMMUNITY
Start: 2019-03-20 | End: 2020-01-29

## 2020-01-29 RX ORDER — METRONIDAZOLE 500 MG/1
500 TABLET ORAL
Qty: 6 | Refills: 0 | Status: DISCONTINUED | COMMUNITY
Start: 2019-09-24 | End: 2020-01-29

## 2020-01-29 RX ORDER — CIPROFLOXACIN HYDROCHLORIDE 500 MG/1
500 TABLET, FILM COATED ORAL
Qty: 20 | Refills: 1 | Status: DISCONTINUED | COMMUNITY
Start: 2019-11-25 | End: 2020-01-29

## 2020-01-29 RX ORDER — MULTIVITAMIN
CAPSULE ORAL DAILY
Qty: 90 | Refills: 1 | Status: DISCONTINUED | COMMUNITY
Start: 2017-02-15 | End: 2020-01-29

## 2020-01-29 RX ORDER — NEOMYCIN SULFATE 500 MG/1
500 TABLET ORAL
Qty: 6 | Refills: 0 | Status: DISCONTINUED | COMMUNITY
Start: 2019-09-24 | End: 2020-01-29

## 2020-01-29 RX ORDER — OMEPRAZOLE 40 MG/1
40 CAPSULE, DELAYED RELEASE ORAL
Qty: 90 | Refills: 1 | Status: DISCONTINUED | COMMUNITY
Start: 2017-01-06 | End: 2020-01-29

## 2020-01-29 RX ORDER — CARBAMAZEPINE 100 MG/1
100 TABLET, CHEWABLE ORAL DAILY
Qty: 60 | Refills: 7 | Status: DISCONTINUED | COMMUNITY
Start: 2018-11-27 | End: 2020-01-29

## 2020-01-29 RX ORDER — MELOXICAM 15 MG/1
15 TABLET ORAL DAILY
Qty: 14 | Refills: 1 | Status: DISCONTINUED | COMMUNITY
Start: 2020-01-16 | End: 2020-01-29

## 2020-01-29 RX ORDER — VALACYCLOVIR 1 G/1
1 TABLET, FILM COATED ORAL 3 TIMES DAILY
Qty: 21 | Refills: 0 | Status: DISCONTINUED | COMMUNITY
Start: 2020-01-11 | End: 2020-01-29

## 2020-01-29 RX ORDER — PREDNISONE 20 MG/1
20 TABLET ORAL DAILY
Qty: 7 | Refills: 0 | Status: DISCONTINUED | COMMUNITY
Start: 2020-01-11 | End: 2020-01-29

## 2020-01-29 NOTE — DISCUSSION/SUMMARY
[FreeTextEntry1] : \par Stress incontinence-\par The pathophysiology of the above condition was discussed with the patient. The management options for stress incontinence were discussed including observation, pessary placement, pelvic floor physical therapy or surgery. We will follow up on her urine tests. The patient voiced understanding and agrees with pessary management for now. The patient will be scheduled for a pessary fitting. \par

## 2020-01-29 NOTE — COUNSELING
[FreeTextEntry1] : \par We will notify you of the urine results if they are abnormal\par \par Schedule pessary fitting with my PAJanneth\par \par Good luck with your surgery with Dr Borja.

## 2020-01-29 NOTE — HISTORY OF PRESENT ILLNESS
[FreeTextEntry1] : \par Pt with pelvic floor dysfunction here for urogynecologic evaluation. She describes: \par Referring provider: Dr Jtet\par \par Chief PFD: stress incontinence\par \par Acute diverticulitis\par s/p cipro and flagyl\par Was supposed to have surgery with Dr Borja but developed [place] Palsy on 1/21/20, so it was postponed\par \par Pelvic organ prolapse: s/p rani, s/p LS0, s/p WILBERT/RS0 (borderline ovarian cancer), no chemo, no RT, no bulge, denies splinting\par Stress urinary incontinence: with cough, sneeze, laugh\par Overactive bladder syndrome: hx of pseuodseizures, hx of DM, voids [id number] secondary to pain that improves with urination, no eneuresis, 3-4 voids per night, min urge incontinence, not bothersome\par Voiding dysfunction: no Incomplete bladder emptying, no hesitancy \par Lower urinary tract/vaginal symptoms: no recurrent UTIs per year, no hematuria, no dysuria, bladder pain \par Fecal incontinence: denies\par Defecatory dysfunction: Type I\par Sexual dysfunction: urinary incontinence with penetration\par Pelvic pain: on meloxicam, acute diverticulitis, following up with Dr oBrja in 3 weeks\par Vaginal dryness : some\par \par Her pelvic floor symptoms are significantly bothersome and negatively impacting her quality of life. \par \par

## 2020-01-30 DIAGNOSIS — N39.3 STRESS INCONTINENCE (FEMALE) (MALE): ICD-10-CM

## 2020-01-31 LAB — BACTERIA UR CULT: NORMAL

## 2020-02-05 LAB
ALBUMIN SERPL ELPH-MCNC: 4.8 G/DL
ALP BLD-CCNC: 170 U/L
ALT SERPL-CCNC: 65 U/L
ANION GAP SERPL CALC-SCNC: 16 MMOL/L
AST SERPL-CCNC: 40 U/L
BILIRUB SERPL-MCNC: <0.2 MG/DL
BUN SERPL-MCNC: 17 MG/DL
CALCIUM SERPL-MCNC: 9.8 MG/DL
CHLORIDE SERPL-SCNC: 101 MMOL/L
CO2 SERPL-SCNC: 23 MMOL/L
CREAT SERPL-MCNC: 0.6 MG/DL
GLUCOSE SERPL-MCNC: 102 MG/DL
POTASSIUM SERPL-SCNC: 4.8 MMOL/L
PROT SERPL-MCNC: 7.4 G/DL
SODIUM SERPL-SCNC: 140 MMOL/L

## 2020-02-06 LAB
25(OH)D3 SERPL-MCNC: 17 NG/ML
B BURGDOR AB SER-IMP: NEGATIVE
B BURGDOR IGM PATRN SER IB-IMP: NEGATIVE
B BURGDOR18KD IGG SER QL IB: NORMAL
B BURGDOR23KD IGG SER QL IB: NORMAL
B BURGDOR23KD IGM SER QL IB: NORMAL
B BURGDOR28KD IGG SER QL IB: NORMAL
B BURGDOR30KD IGG SER QL IB: NORMAL
B BURGDOR31KD IGG SER QL IB: NORMAL
B BURGDOR39KD IGG SER QL IB: NORMAL
B BURGDOR39KD IGM SER QL IB: NORMAL
B BURGDOR41KD IGG SER QL IB: NORMAL
B BURGDOR41KD IGM SER QL IB: PRESENT
B BURGDOR45KD IGG SER QL IB: NORMAL
B BURGDOR58KD IGG SER QL IB: NORMAL
B BURGDOR66KD IGG SER QL IB: NORMAL
B BURGDOR93KD IGG SER QL IB: NORMAL
CHOLEST SERPL-MCNC: 285 MG/DL
CHOLEST/HDLC SERPL: 7.9 RATIO
ESTIMATED AVERAGE GLUCOSE: 131 MG/DL
HBA1C MFR BLD HPLC: 6.2 %
HDLC SERPL-MCNC: 36 MG/DL
HIV1+2 AB SPEC QL IA.RAPID: NONREACTIVE
LDLC SERPL CALC-MCNC: 215 MG/DL
TRIGL SERPL-MCNC: 325 MG/DL
TSH SERPL-ACNC: 1.84 UIU/ML

## 2020-02-12 ENCOUNTER — OUTPATIENT (OUTPATIENT)
Dept: OUTPATIENT SERVICES | Facility: HOSPITAL | Age: 45
LOS: 1 days | Discharge: HOME | End: 2020-02-12
Payer: MEDICAID

## 2020-02-12 ENCOUNTER — APPOINTMENT (OUTPATIENT)
Dept: INTERNAL MEDICINE | Facility: CLINIC | Age: 45
End: 2020-02-12
Payer: MEDICAID

## 2020-02-12 ENCOUNTER — APPOINTMENT (OUTPATIENT)
Dept: ENDOCRINOLOGY | Facility: CLINIC | Age: 45
End: 2020-02-12
Payer: MEDICAID

## 2020-02-12 ENCOUNTER — OUTPATIENT (OUTPATIENT)
Dept: OUTPATIENT SERVICES | Facility: HOSPITAL | Age: 45
LOS: 1 days | Discharge: HOME | End: 2020-02-12

## 2020-02-12 VITALS
HEIGHT: 57 IN | WEIGHT: 185 LBS | BODY MASS INDEX: 39.91 KG/M2 | HEART RATE: 85 BPM | DIASTOLIC BLOOD PRESSURE: 85 MMHG | SYSTOLIC BLOOD PRESSURE: 122 MMHG

## 2020-02-12 VITALS
BODY MASS INDEX: 39.91 KG/M2 | WEIGHT: 185 LBS | SYSTOLIC BLOOD PRESSURE: 119 MMHG | HEIGHT: 57 IN | DIASTOLIC BLOOD PRESSURE: 79 MMHG | HEART RATE: 92 BPM

## 2020-02-12 DIAGNOSIS — Z98.890 OTHER SPECIFIED POSTPROCEDURAL STATES: Chronic | ICD-10-CM

## 2020-02-12 DIAGNOSIS — R25.1 TREMOR, UNSPECIFIED: ICD-10-CM

## 2020-02-12 PROCEDURE — ZZZZZ: CPT

## 2020-02-12 PROCEDURE — XXXXX: CPT

## 2020-02-12 PROCEDURE — 99213 OFFICE O/P EST LOW 20 MIN: CPT | Mod: GC

## 2020-02-12 NOTE — REVIEW OF SYSTEMS
[Incontinence] : incontinence [Negative] : Respiratory [FreeTextEntry8] : urinary incontinence when cough

## 2020-02-12 NOTE — HISTORY OF PRESENT ILLNESS
[de-identified] : 43 yo F with hx of ovarian cancer, DM2, Pseudoseizures, diverticulitis, and Bell palsy 2/2 lyme presents to clinic for follow-up and medication refills. Patient also reports that she needs medical clearance for colectomy for her recurrent diverticulitis. Surgery was scheduled on Feb 6 but got cancelled due to her active Bell Palsy.  Surgeon told her to go back to PCP and get medical clearance for Surgery once Bell palsy resolved. \par Patient was treated for Bell Palsy 1 months ago tx with Prednisone and valacyclovir with significant improvement in symptoms. \par [place]  # [id number]\par

## 2020-02-12 NOTE — ASSESSMENT
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 presents to clinic for follow-up and medication refills.\par \par #) DM2, controlled\par - A1c increased from 5.7 --> 6.2\par - As per patient, unable to eat high-fiber diet with fruits/vegetables as per Surgery to prepare for colon surgery \par - Fingersticks obtained 1-2 weekly, generally between \par - Advised weight loss/diet\par - Compliant with Podiatry, will refer to Ophthalomology\par - Will need to refill needles/strips and Trulicity  [Carbohydrate Consistent Diet] : carbohydrate consistent diet [Diabetes Foot Care] : diabetes foot care [Long Term Vascular Complications] : long term vascular complications of diabetes [Importance of Diet and Exercise] : importance of diet and exercise to improve glycemic control, achieve weight loss and improve cardiovascular health

## 2020-02-12 NOTE — PHYSICAL EXAM
[No Acute Distress] : no acute distress [Well Nourished] : well nourished [Well Developed] : well developed [Normal Sclera/Conjunctiva] : normal sclera/conjunctiva [Well-Appearing] : well-appearing [Supple] : supple [EOMI] : extraocular movements intact [Normal Outer Ear/Nose] : the outer ears and nose were normal in appearance [No Respiratory Distress] : no respiratory distress  [Clear to Auscultation] : lungs were clear to auscultation bilaterally [Normal Rate] : normal rate  [Normal S1, S2] : normal S1 and S2 [No Edema] : there was no peripheral edema [No Murmur] : no murmur heard [Soft] : abdomen soft [Non Tender] : non-tender [Normal Bowel Sounds] : normal bowel sounds [Non-distended] : non-distended [Coordination Grossly Intact] : coordination grossly intact [No Rash] : no rash [No Focal Deficits] : no focal deficits [Normal Gait] : normal gait [Normal Affect] : the affect was normal

## 2020-02-12 NOTE — HISTORY OF PRESENT ILLNESS
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 Patient also reports that she needs medical clearance for colectomy for her recurrent diverticulitis. Surgery was scheduled on Feb 6 but got cancelled due to her active Bell Palsy. Surgeon told her to go back to PCP and get medical clearance for Surgery once Bell palsy resolved. \par Patient was treated for Bell Palsy 1 months ago tx with Prednisone and valacyclovir with significant improvement in symptoms. \par  \par

## 2020-02-12 NOTE — ASSESSMENT
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 \par \par R facial swelling with droop likely secondary to Laird Palsy \par - s/p dental evaluation which was negative. CTH negative. \par - s/p 1 wk course of prednisone and valacyclovir with significant improvement in symptoms. 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 \par \par Hx of recurrent diverticulitis\par - pt is following Colorectal surgeon. \par - MET >4. \par - pt need to go back to surgeon to schedule for surgery and come back with pre-op labs for medical clearance. \par \par Transaminitis\par - Hx of alcohol abuse \par - liver enzyme stable but still elevated. \par - pt had liver biopsy --> fatty liver (2-3 years ago). \par - will give hepatology referral \par \par Pseudoseizures/Tremor\par - Follow-up with Neurology\par - c.w Carbamazepine 200 mg QD, Tylenol PRN for headaches\par \par Peroneal tendonitis of LLE \par - Meloxicam PRN \par - Podiatry appointment today. \par \par HLD\par - . Total Cholesterol 285. \par - pt is not taking Lipitor everyday. Advise to take it daily. \par - c/w Lipitor 40mg qhs \par - repeat in 3 months. \par \par \par Health Maintenance\par - Mammogram (10/2019): BIRADS 2. \par - Pap smear last in 2017. Patient has WILBERT including cervix. \par - GI: Will need CRC screening in 2020 due to elevated risk. Will hold off for now as pt is going for colorectal surgery. \par - Flu shot Nov 2019. PPSV 23 UTD. \par

## 2020-02-12 NOTE — PHYSICAL EXAM
[Alert] : alert [No Acute Distress] : no acute distress [Well Developed] : well developed [Normal Sclera/Conjunctiva] : normal sclera/conjunctiva [Well Nourished] : well nourished [EOMI] : extra ocular movement intact [No Proptosis] : no proptosis [Thyroid Not Enlarged] : the thyroid was not enlarged [Normal Oropharynx] : the oropharynx was normal [No Thyroid Nodules] : there were no palpable thyroid nodules [No Respiratory Distress] : no respiratory distress [No Accessory Muscle Use] : no accessory muscle use [Normal Rate] : heart rate was normal  [Clear to Auscultation] : lungs were clear to auscultation bilaterally [Regular Rhythm] : with a regular rhythm [Normal S1, S2] : normal S1 and S2 [No Edema] : there was no peripheral edema [Pedal Pulses Normal] : the pedal pulses are present [Not Tender] : non-tender [Normal Bowel Sounds] : normal bowel sounds [Post Cervical Nodes] : posterior cervical nodes [Soft] : abdomen soft [Not Distended] : not distended [Anterior Cervical Nodes] : anterior cervical nodes [Axillary Nodes] : axillary nodes [Normal] : normal and non tender [No Spinal Tenderness] : no spinal tenderness [Normal Gait] : normal gait [No Stigmata of Cushings Syndrome] : no stigmata of cushings syndrome [Spine Straight] : spine straight [Normal Strength/Tone] : muscle strength and tone were normal [No Rash] : no rash [No Tremors] : no tremors [Normal Reflexes] : deep tendon reflexes were 2+ and symmetric [Oriented x3] : oriented to person, place, and time [Acanthosis Nigricans] : no acanthosis nigricans

## 2020-02-14 ENCOUNTER — OUTPATIENT (OUTPATIENT)
Dept: OUTPATIENT SERVICES | Facility: HOSPITAL | Age: 45
LOS: 1 days | Discharge: HOME | End: 2020-02-14

## 2020-02-14 ENCOUNTER — APPOINTMENT (OUTPATIENT)
Dept: UROGYNECOLOGY | Facility: CLINIC | Age: 45
End: 2020-02-14
Payer: MEDICAID

## 2020-02-14 VITALS
SYSTOLIC BLOOD PRESSURE: 118 MMHG | WEIGHT: 185 LBS | HEIGHT: 57 IN | BODY MASS INDEX: 39.91 KG/M2 | DIASTOLIC BLOOD PRESSURE: 70 MMHG

## 2020-02-14 DIAGNOSIS — Z98.890 OTHER SPECIFIED POSTPROCEDURAL STATES: Chronic | ICD-10-CM

## 2020-02-14 DIAGNOSIS — N39.3 STRESS INCONTINENCE (FEMALE) (MALE): ICD-10-CM

## 2020-02-14 PROCEDURE — ZZZZZ: CPT

## 2020-02-14 NOTE — HISTORY OF PRESENT ILLNESS
[FreeTextEntry1] : \par Patient is present for pessary fitting for stress incontinence\par Last seen 1/29/2020 as NP for stress incontinence\par \par H/o DM\par H/o pseudoseizures\par h/o Diverticulitis\par H/o borderline ovarian cancer, no chemo, no radiation therapy\par \par s/p WILBERT/RSO\par \par Today, patient is doing well and has no complaints.

## 2020-02-14 NOTE — COUNSELING
[FreeTextEntry1] : \par Please call the office if you have any issues with vaginal pain, vaginal bleeding, difficulty urinating or having a bowel movement or if the pessary falls out so that we can arrange another size pessary.\par \par Please follow up for pessary maintenance in 3.5 weeks with DUC Lagunas

## 2020-02-14 NOTE — REASON FOR VISIT
[[employer] ] : provided by [employer]   [Source: ______] : History obtained from [unfilled] [FreeTextEntry1] : [id number] [TWNoteComboBox1] : [place]

## 2020-02-14 NOTE — DISCUSSION/SUMMARY
[FreeTextEntry1] : \par Stress Incontinence:\par Ring and Support with knob #3 placed without difficulty. Knob not positioned under urethra. Pessary removed without difficulty.\par Ring and Support with knob #4 placed without difficulty. Remained in place with Valsalva and coughing. We did not have the actual pessary in the office. Pessary removed without difficulty.\par Ring and Support with knob #5 placed without difficulty.Remained in place with Valsalva and coughing.\par The patient tolerated all fittings well.\par Precautions given.\par The patient will follow up in 3.5 weeks for routine pessary management.

## 2020-02-18 ENCOUNTER — APPOINTMENT (OUTPATIENT)
Dept: SURGERY | Facility: CLINIC | Age: 45
End: 2020-02-18
Payer: MEDICAID

## 2020-02-18 VITALS
SYSTOLIC BLOOD PRESSURE: 116 MMHG | WEIGHT: 190 LBS | TEMPERATURE: 97.7 F | DIASTOLIC BLOOD PRESSURE: 98 MMHG | BODY MASS INDEX: 40.99 KG/M2 | HEIGHT: 57 IN | HEART RATE: 102 BPM

## 2020-02-18 DIAGNOSIS — E11.65 TYPE 2 DIABETES MELLITUS WITH HYPERGLYCEMIA: ICD-10-CM

## 2020-02-18 DIAGNOSIS — E66.01 MORBID (SEVERE) OBESITY DUE TO EXCESS CALORIES: ICD-10-CM

## 2020-02-18 PROCEDURE — 99214 OFFICE O/P EST MOD 30 MIN: CPT

## 2020-02-18 NOTE — HISTORY OF PRESENT ILLNESS
[FreeTextEntry1] : This is a followup visit for Ms. Mahesh Friedman who is being treated for diverticular disease. She states that she continues to have abdominal pain and discomfort. She comes in today to discuss re-scheduling the surgery that was cancelled due to gaviota Bell's palsy. her Bell's palsy has resolved and she is been cleared by her neurologist.

## 2020-02-18 NOTE — ASSESSMENT
[FreeTextEntry1] : Ms. Mahesh Sheffield would like to proceed with surgery. The risks benefits and alternatives to surgery were discussed with her once again. She understands that risks included potential for bleeding as well as infection. She understands that infection could lead to additional surgery and the potential for a Colostomy (bag). She understands that there is surgery will be done minimally invasively if possible or open if necessary. All questions were answered informed consent was obtained. Ms. MELANY nicole will be scheduled for surgery at her earliest convenience.

## 2020-02-20 ENCOUNTER — OUTPATIENT (OUTPATIENT)
Dept: OUTPATIENT SERVICES | Facility: HOSPITAL | Age: 45
LOS: 1 days | Discharge: HOME | End: 2020-02-20

## 2020-02-20 ENCOUNTER — APPOINTMENT (OUTPATIENT)
Dept: PODIATRY | Facility: CLINIC | Age: 45
End: 2020-02-20
Payer: MEDICAID

## 2020-02-20 DIAGNOSIS — Z98.890 OTHER SPECIFIED POSTPROCEDURAL STATES: Chronic | ICD-10-CM

## 2020-02-20 PROCEDURE — 99213 OFFICE O/P EST LOW 20 MIN: CPT

## 2020-02-25 DIAGNOSIS — R74.0 NONSPECIFIC ELEVATION OF LEVELS OF TRANSAMINASE AND LACTIC ACID DEHYDROGENASE [LDH]: ICD-10-CM

## 2020-02-25 DIAGNOSIS — N39.3 STRESS INCONTINENCE (FEMALE) (MALE): ICD-10-CM

## 2020-02-25 DIAGNOSIS — R73.03 PREDIABETES: ICD-10-CM

## 2020-02-25 DIAGNOSIS — R25.1 TREMOR, UNSPECIFIED: ICD-10-CM

## 2020-02-25 NOTE — PHYSICAL EXAM
[General Appearance - Alert] : alert [General Appearance - In No Acute Distress] : in no acute distress [Full Pulse] : the pedal pulses are present [Edema] : there was no peripheral edema [Abnormal Walk] : normal gait [Nail Clubbing] : no clubbing  or cyanosis of the fingernails [Musculoskeletal - Swelling] : no joint swelling seen [Motor Tone] : muscle strength and tone were normal [Skin Color & Pigmentation] : normal skin color and pigmentation [Skin Turgor] : normal skin turgor [] : no rash [Normal in Appearance] : normal in appearance [Normal] : normal [Full ROM] : with full range of motion [Deep Tendon Reflexes (DTR)] : deep tendon reflexes were 2+ and symmetric [Sensation] : the sensory exam was normal to light touch and pinprick [No Focal Deficits] : no focal deficits [Oriented To Time, Place, And Person] : oriented to person, place, and time [Impaired Insight] : insight and judgment were intact [Affect] : the affect was normal [FreeTextEntry1] : ingrown toenail 1st b/l , peroneal tendonitis right foot

## 2020-02-25 NOTE — ASSESSMENT
[FreeTextEntry1] : - Patient examined, history and chart reviewed\par - peroneal tendonitis RLE\par - Rx Meloxicam 14 days\par - PAtient to follow up in 3 weeks  \par \par \par \par

## 2020-02-25 NOTE — END OF VISIT
[] : Resident [Resident] : Resident [FreeTextEntry3] : agree with above note.  \par Was present and instructing resident during visit. \par All Procedure performed under direct supervision.  \par NIKOLAI Lorenz DPM\par  [FreeTextEntry2] : agree with above note.  \par Was present and instructing resident during visit. \par All Procedure performed under direct supervision.  \par NIKOLAI Lorenz DPM\par

## 2020-03-04 ENCOUNTER — FORM ENCOUNTER (OUTPATIENT)
Age: 45
End: 2020-03-04

## 2020-03-05 ENCOUNTER — OUTPATIENT (OUTPATIENT)
Dept: OUTPATIENT SERVICES | Facility: HOSPITAL | Age: 45
LOS: 1 days | Discharge: HOME | End: 2020-03-05
Payer: MEDICAID

## 2020-03-05 VITALS
HEIGHT: 56 IN | DIASTOLIC BLOOD PRESSURE: 73 MMHG | SYSTOLIC BLOOD PRESSURE: 125 MMHG | TEMPERATURE: 97 F | HEART RATE: 76 BPM | RESPIRATION RATE: 17 BRPM | WEIGHT: 187.39 LBS

## 2020-03-05 DIAGNOSIS — K57.92 DIVERTICULITIS OF INTESTINE, PART UNSPECIFIED, WITHOUT PERFORATION OR ABSCESS WITHOUT BLEEDING: ICD-10-CM

## 2020-03-05 DIAGNOSIS — Z01.818 ENCOUNTER FOR OTHER PREPROCEDURAL EXAMINATION: ICD-10-CM

## 2020-03-05 DIAGNOSIS — Z98.890 OTHER SPECIFIED POSTPROCEDURAL STATES: Chronic | ICD-10-CM

## 2020-03-05 LAB
ALBUMIN SERPL ELPH-MCNC: 4.6 G/DL — SIGNIFICANT CHANGE UP (ref 3.5–5.2)
ALP SERPL-CCNC: 164 U/L — HIGH (ref 30–115)
ALT FLD-CCNC: 90 U/L — HIGH (ref 0–41)
ANION GAP SERPL CALC-SCNC: 14 MMOL/L — SIGNIFICANT CHANGE UP (ref 7–14)
APPEARANCE UR: CLEAR — SIGNIFICANT CHANGE UP
APTT BLD: 36.4 SEC — SIGNIFICANT CHANGE UP (ref 27–39.2)
AST SERPL-CCNC: 56 U/L — HIGH (ref 0–41)
BASOPHILS # BLD AUTO: 0.03 K/UL — SIGNIFICANT CHANGE UP (ref 0–0.2)
BASOPHILS NFR BLD AUTO: 0.4 % — SIGNIFICANT CHANGE UP (ref 0–1)
BILIRUB SERPL-MCNC: 0.3 MG/DL — SIGNIFICANT CHANGE UP (ref 0.2–1.2)
BILIRUB UR-MCNC: NEGATIVE — SIGNIFICANT CHANGE UP
BLD GP AB SCN SERPL QL: SIGNIFICANT CHANGE UP
BUN SERPL-MCNC: 17 MG/DL — SIGNIFICANT CHANGE UP (ref 10–20)
CALCIUM SERPL-MCNC: 9.3 MG/DL — SIGNIFICANT CHANGE UP (ref 8.5–10.1)
CHLORIDE SERPL-SCNC: 105 MMOL/L — SIGNIFICANT CHANGE UP (ref 98–110)
CO2 SERPL-SCNC: 23 MMOL/L — SIGNIFICANT CHANGE UP (ref 17–32)
COLOR SPEC: YELLOW — SIGNIFICANT CHANGE UP
CREAT SERPL-MCNC: 0.5 MG/DL — LOW (ref 0.7–1.5)
DIFF PNL FLD: NEGATIVE — SIGNIFICANT CHANGE UP
EOSINOPHIL # BLD AUTO: 0.12 K/UL — SIGNIFICANT CHANGE UP (ref 0–0.7)
EOSINOPHIL NFR BLD AUTO: 1.6 % — SIGNIFICANT CHANGE UP (ref 0–8)
ESTIMATED AVERAGE GLUCOSE: 146 MG/DL — HIGH (ref 68–114)
GLUCOSE SERPL-MCNC: 107 MG/DL — HIGH (ref 70–99)
GLUCOSE UR QL: NEGATIVE — SIGNIFICANT CHANGE UP
HBA1C BLD-MCNC: 6.7 % — HIGH (ref 4–5.6)
HCT VFR BLD CALC: 40.6 % — SIGNIFICANT CHANGE UP (ref 37–47)
HGB BLD-MCNC: 13.4 G/DL — SIGNIFICANT CHANGE UP (ref 12–16)
IMM GRANULOCYTES NFR BLD AUTO: 0.3 % — SIGNIFICANT CHANGE UP (ref 0.1–0.3)
INR BLD: 1.1 RATIO — SIGNIFICANT CHANGE UP (ref 0.65–1.3)
KETONES UR-MCNC: NEGATIVE — SIGNIFICANT CHANGE UP
LEUKOCYTE ESTERASE UR-ACNC: NEGATIVE — SIGNIFICANT CHANGE UP
LYMPHOCYTES # BLD AUTO: 2.7 K/UL — SIGNIFICANT CHANGE UP (ref 1.2–3.4)
LYMPHOCYTES # BLD AUTO: 35.9 % — SIGNIFICANT CHANGE UP (ref 20.5–51.1)
MCHC RBC-ENTMCNC: 29.8 PG — SIGNIFICANT CHANGE UP (ref 27–31)
MCHC RBC-ENTMCNC: 33 G/DL — SIGNIFICANT CHANGE UP (ref 32–37)
MCV RBC AUTO: 90.4 FL — SIGNIFICANT CHANGE UP (ref 81–99)
MONOCYTES # BLD AUTO: 0.39 K/UL — SIGNIFICANT CHANGE UP (ref 0.1–0.6)
MONOCYTES NFR BLD AUTO: 5.2 % — SIGNIFICANT CHANGE UP (ref 1.7–9.3)
MRSA PCR RESULT.: NEGATIVE — SIGNIFICANT CHANGE UP
NEUTROPHILS # BLD AUTO: 4.26 K/UL — SIGNIFICANT CHANGE UP (ref 1.4–6.5)
NEUTROPHILS NFR BLD AUTO: 56.6 % — SIGNIFICANT CHANGE UP (ref 42.2–75.2)
NITRITE UR-MCNC: NEGATIVE — SIGNIFICANT CHANGE UP
NRBC # BLD: 0 /100 WBCS — SIGNIFICANT CHANGE UP (ref 0–0)
PH UR: 6 — SIGNIFICANT CHANGE UP (ref 5–8)
PLATELET # BLD AUTO: 310 K/UL — SIGNIFICANT CHANGE UP (ref 130–400)
POTASSIUM SERPL-MCNC: 4.3 MMOL/L — SIGNIFICANT CHANGE UP (ref 3.5–5)
POTASSIUM SERPL-SCNC: 4.3 MMOL/L — SIGNIFICANT CHANGE UP (ref 3.5–5)
PROT SERPL-MCNC: 7.2 G/DL — SIGNIFICANT CHANGE UP (ref 6–8)
PROT UR-MCNC: SIGNIFICANT CHANGE UP
PROTHROM AB SERPL-ACNC: 12.6 SEC — SIGNIFICANT CHANGE UP (ref 9.95–12.87)
RBC # BLD: 4.49 M/UL — SIGNIFICANT CHANGE UP (ref 4.2–5.4)
RBC # FLD: 13.2 % — SIGNIFICANT CHANGE UP (ref 11.5–14.5)
SODIUM SERPL-SCNC: 142 MMOL/L — SIGNIFICANT CHANGE UP (ref 135–146)
SP GR SPEC: 1.03 — HIGH (ref 1.01–1.02)
UROBILINOGEN FLD QL: SIGNIFICANT CHANGE UP
WBC # BLD: 7.52 K/UL — SIGNIFICANT CHANGE UP (ref 4.8–10.8)
WBC # FLD AUTO: 7.52 K/UL — SIGNIFICANT CHANGE UP (ref 4.8–10.8)

## 2020-03-05 PROCEDURE — 93010 ELECTROCARDIOGRAM REPORT: CPT

## 2020-03-05 PROCEDURE — 71046 X-RAY EXAM CHEST 2 VIEWS: CPT | Mod: 26

## 2020-03-05 RX ORDER — CELECOXIB 200 MG/1
1 CAPSULE ORAL
Qty: 0 | Refills: 0 | DISCHARGE

## 2020-03-05 RX ORDER — MECLIZINE HCL 12.5 MG
1 TABLET ORAL
Qty: 0 | Refills: 0 | DISCHARGE

## 2020-03-05 NOTE — H&P PST ADULT - NSICDXFAMILYHX_GEN_ALL_CORE_FT
FAMILY HISTORY:  Father  Still living? Unknown  Diabetes, Age at diagnosis: Age Unknown    Mother  Still living? Yes, Estimated age: 61-70  Diabetes, Age at diagnosis: Age Unknown  HTN (hypertension), Age at diagnosis: Age Unknown  Hypercholesteremia, Age at diagnosis: Age Unknown

## 2020-03-05 NOTE — H&P PST ADULT - NSICDXPASTSURGICALHX_GEN_ALL_CORE_FT
PAST SURGICAL HISTORY:  H/O abdominal hysterectomy With Right Oophrecetomy   In December 2017    History of cholecystectomy 10-15 years ago

## 2020-03-05 NOTE — H&P PST ADULT - HISTORY OF PRESENT ILLNESS
44 year old female here for above 44 year old female here for above procedure, pt has diverticulosis, + pain in abdomen, sometimes diffuse, often concentrated in left lower quadrant, pt states + constipation at times  pt denies palpitations, sob, + anderson with 1 fos, pt also notes + chest pain (left side) unrelated to activity or food, this pain comes and goes, no diaphoresis, jaw pain, neck pain, arm pain or fatigue noted  pt denies urinary frequency, urgency or burning  ex yamila 1 fos

## 2020-03-05 NOTE — H&P PST ADULT - TEMPERATURE IN FAHRENHEIT (DEGREES F)
PULMONARY PROGRESS NOTE    MYRIAM HEATH  MRN-[id number]    Patient is a 69y old  Female who presents with a chief complaint of PEG tube dislodgement, fever, leukocytosis (28 Jul 2019 09:08)      HPI:  -Appears fairly comfortable. NAD  -    ROS:   -    ACTIVE MEDICATION LIST:  MEDICATIONS  (STANDING):  ALBUTerol/ipratropium for Nebulization 3 milliLiter(s) Nebulizer every 6 hours  apixaban 2.5 milliGRAM(s) Oral two times a day  artificial tears (preservative free) Ophthalmic Solution 1 Drop(s) Both EYES four times a day  ascorbic acid 500 milliGRAM(s) Oral daily  BACItracin   Ointment 1 Application(s) Topical two times a day  chlorhexidine 2% Cloths 1 Application(s) Topical daily  clonazePAM  Tablet 1 milliGRAM(s) Oral at bedtime  Dakins Solution - 1/4 Strength 1 Application(s) Topical two times a day  dextrose 5%. 1000 milliLiter(s) (50 mL/Hr) IV Continuous <Continuous>  famotidine    Tablet 20 milliGRAM(s) Oral daily  ferrous    sulfate Liquid 300 milliGRAM(s) Enteral Tube daily  formoterol for nebulization 20 MICROGram(s) Nebulizer two times a day  gabapentin   Solution 300 milliGRAM(s) Oral every 12 hours  insulin lispro (HumaLOG) corrective regimen sliding scale   SubCutaneous every 6 hours  levothyroxine Injectable 60 MICROGram(s) IV Push at bedtime  Medical Marijuana Awake Oil 2 milliLiter(s),Medical Marijuana Awake Oil 2 milliLiter(s) 2 milliLiter(s) Enteral Tube <User Schedule>  Medical Marijuana Calm Oil 2 milliLiter(s),Medical [place] Calm Oil 2 milliLiter(s) 2 milliLiter(s) Enteral Tube <User Schedule>  Medical Marijuana [place] Oil 2 milliLiter(s),Medical Marijuana [place] Oil 2 milliLiter(s) 2 milliLiter(s) Enteral Tube <User Schedule>  metoprolol tartrate 12.5 milliGRAM(s) Oral two times a day  multivitamin/minerals/iron Oral Solution (CENTRUM) 15 milliLiter(s) Oral daily  predniSONE   Tablet 50 milliGRAM(s) Oral daily  QUEtiapine 25 milliGRAM(s) Oral at bedtime  sevelamer carbonate Powder 800 milliGRAM(s) Oral three times a day with meals  sodium chloride 0.9%. 1000 milliLiter(s) (75 mL/Hr) IV Continuous <Continuous>  tiZANidine 4 milliGRAM(s) Oral <User Schedule>  zinc sulfate 220 milliGRAM(s) Oral daily    MEDICATIONS  (PRN):  acetaminophen  Suppository .. 650 milliGRAM(s) Rectal every 6 hours PRN Temp greater or equal to 38C (100.4F), Mild Pain (1 - 3)  carboxymethylcellulose 0.5% (Preservative-Free) Ophthalmic Solution 1 Drop(s) Both EYES three times a day PRN dry eyes  glucagon  Injectable 1 milliGRAM(s) IntraMuscular once PRN Glucose LESS THAN 70 milligrams/deciliter  nystatin Powder 1 Application(s) Topical three times a day PRN under breasts      EXAM:  Vital Signs Last 24 Hrs  T(C): 36.5 (28 Jul 2019 09:00), Max: 36.7 (27 Jul 2019 17:00)  T(F): 97.7 (28 Jul 2019 09:00), Max: 98 (27 Jul 2019 17:00)  HR: 104 (28 Jul 2019 09:00) (98 - 107)  BP: 132/82 (28 Jul 2019 09:00) (114/74 - 150/101)  BP(mean): --  RR: 18 (28 Jul 2019 09:00) (18 - 20)  SpO2: 97% (28 Jul 2019 09:00) (97% - 100%)    GENERAL: The patient is awake and alert in no apparent distress.     LUNGS: Clear to auscultation without wheezing, rales or rhonchi; respirations unlabored    HEART: Regular rate and rhythm without murmur.      PROBLEM LIST:  69y Female with HEALTH ISSUES - PROBLEM Dx:  Fever: Fever  Type 2 diabetes mellitus without complication, without long-term current use of insulin:   Make sure you get your HgA1c checked every three months.  If you take oral diabetes medications, check your blood glucose two times a day.  If you take insulin, check your blood glucose before meals and at bedtime.  It&#x27;s important not to skip any meals.  Keep a log of your blood glucose results and always take it with you to your doctor appointments.  Keep a list of your current medications including injectables and over the counter medications and bring this medication list with you to all your doctor appointments.  If you have not seen your ophthalmologist this year call for appointment.  Check your feet daily for redness, sores, or openings. Do not self treat. If no improvement in two days call your primary care physician for an appointment.  Low blood sugar (hypoglycemia) is a blood sugar below 70mg/dl. Check your blood sugar if you feel signs/symptoms of hypoglycemia. If your blood sugar is below 70 take 15 grams of carbohydrates (ex 4 oz of apple juice, 3-4 glucose tablets, or 4-6 oz of regular soda) wait 15 minutes and repeat blood sugar to make sure it comes up above 70.  If your blood sugar is above 70 and you are due for a meal, have a meal.  If you are not due for a meal have a snack.  This snack helps keeps your blood sugar at a safe range.    Pressure injury of skin of sacral region, unspecified injury stage: Continue with wound care as instructed.   Maintain adequate nutrition, frequent repositioning , offloading with Zfloat boots.  Follow-up with your primary care physician and Wound Care Specialist within 1 week. Call for appointment.    Chronic deep vein thrombosis (DVT) of right lower extremity, unspecified vein: Take your &quot;blood thinners&quot; as prescribed.  Walking is encouraged, increase activity as tolerated.  If you develop new leg pain, swelling, and/or redness contact your healthcare provider.  If you develop new chest pain with difficulty breathing, a rapid heart rate and/or a feeling of passing out call emergency medical services 911.    Prophylactic measure: Prophylactic measure  Dementia without behavioral disturbance, unspecified dementia type: Continue with medications as prescribed by your doctor.  Maintain safe environment.  Maintain adequate nutrition, hydration.  Follow-up with your primary care physician within 1 week. Call for appointment.    Uncomplicated asthma, unspecified asthma severity, unspecified whether persistent: Stable.  Follow-up with your primary care physician within 1 week. Call for appointment.    Sepsis, due to unspecified organism: Take all antibiotics as ordered.  Call you Health care provider upon arrival home to make a one week follow up appointment.  If you develop fever, chills, malaise, or change in mental status call your Health Care Provider or go to the Emergency Department.  Nutrition is important, eat small frequent meals to help ensure you get adequate calories.  Do not stay in bed all day!  Increase your activity daily as tolerated.    PEG tube malfunction: PEG tube malfunction            RECS:  O2  DuoNeb  Prednisone taper  Diuresis  f/u labs    Lauren Hector DO  [phone] 96.8

## 2020-03-05 NOTE — H&P PST ADULT - NSICDXPASTMEDICALHX_GEN_ALL_CORE_FT
PAST MEDICAL HISTORY:  Diabetes Family Doctor: Dr Reeves    Diverticulitis     Diverticulosis of intestine     Hypercholesteremia     Liver disorder LIVER LESIONS APPEARED ON CT-SCAN  (Within the past few months)    Ovarian cancer UNSURE OF STAGE HOWEVER REPORTS IT "MINOR"  Sees Oncologist Dr. Vanessa Correa    Seizure disorder     Vertigo PAST MEDICAL HISTORY:  Diabetes     Diverticulitis     Diverticulosis of intestine     Fatty liver     Hypercholesteremia     Obesity     Ovarian cancer UNSURE OF STAGE HOWEVER REPORTS IT "MINOR"  Sees Oncologist Dr. Vanessa Correa    Seizure disorder last seizure > 6 months ago    Vertigo > 3 months ago, not currently complaining of same (3/4/2020)

## 2020-03-06 ENCOUNTER — APPOINTMENT (OUTPATIENT)
Dept: UROGYNECOLOGY | Facility: CLINIC | Age: 45
End: 2020-03-06

## 2020-03-06 PROBLEM — E11.9 TYPE 2 DIABETES MELLITUS WITHOUT COMPLICATIONS: Chronic | Status: ACTIVE | Noted: 2018-03-02

## 2020-03-06 PROBLEM — K57.90 DIVERTICULOSIS OF INTESTINE, PART UNSPECIFIED, WITHOUT PERFORATION OR ABSCESS WITHOUT BLEEDING: Chronic | Status: ACTIVE | Noted: 2020-03-05

## 2020-03-06 PROBLEM — K76.0 FATTY (CHANGE OF) LIVER, NOT ELSEWHERE CLASSIFIED: Chronic | Status: ACTIVE | Noted: 2020-03-05

## 2020-03-06 PROBLEM — E66.9 OBESITY, UNSPECIFIED: Chronic | Status: ACTIVE | Noted: 2020-03-05

## 2020-03-06 PROBLEM — K57.92 DIVERTICULITIS OF INTESTINE, PART UNSPECIFIED, WITHOUT PERFORATION OR ABSCESS WITHOUT BLEEDING: Chronic | Status: ACTIVE | Noted: 2020-03-05

## 2020-03-06 PROBLEM — R42 DIZZINESS AND GIDDINESS: Chronic | Status: ACTIVE | Noted: 2019-03-28

## 2020-03-07 ENCOUNTER — APPOINTMENT (OUTPATIENT)
Dept: INTERNAL MEDICINE | Facility: CLINIC | Age: 45
End: 2020-03-07

## 2020-03-10 ENCOUNTER — APPOINTMENT (OUTPATIENT)
Dept: NEUROLOGY | Facility: CLINIC | Age: 45
End: 2020-03-10
Payer: MEDICAID

## 2020-03-10 ENCOUNTER — OUTPATIENT (OUTPATIENT)
Dept: OUTPATIENT SERVICES | Facility: HOSPITAL | Age: 45
LOS: 1 days | Discharge: HOME | End: 2020-03-10

## 2020-03-10 VITALS
WEIGHT: 192 LBS | SYSTOLIC BLOOD PRESSURE: 135 MMHG | BODY MASS INDEX: 41.42 KG/M2 | HEIGHT: 57 IN | HEART RATE: 91 BPM | DIASTOLIC BLOOD PRESSURE: 80 MMHG

## 2020-03-10 DIAGNOSIS — Z98.890 OTHER SPECIFIED POSTPROCEDURAL STATES: Chronic | ICD-10-CM

## 2020-03-10 PROCEDURE — 99214 OFFICE O/P EST MOD 30 MIN: CPT | Mod: GC

## 2020-03-10 NOTE — DISCUSSION/SUMMARY
[FreeTextEntry1] : 43yo woman presenting with Headaches and for surgical clearance for colon surgery.  No neurological contraindication for surgery as she does not have epilepsy.\par 1. Switched her CBZ to chewable 100mg 2 tabs daily\par 2. xray cervical spine\par 3. Will need PT for cervical spine after xray done\par 4. f/u in 6-9 months

## 2020-03-10 NOTE — HISTORY OF PRESENT ILLNESS
[FreeTextEntry1] : Patient is a 45yo female with PMHx of pseudoseizures, tremulousness, diabetes mellitus, ovarian cancer, diverticulitis, gastritis, dyslipidemia, and genetic CKDN2A mutation who presents to the neurology clinic complaining of headaches. She says that they are present when she wakes up in the morning about 7-8 times per month. The headache wraps around her entire head.  She says the carbamazepine chewable 100-200mg helps with the headaches right away but when switched to 200mg tablet it is not helping.  She is here for surgical clearance for a robotic colon resection and possible ostomy.\par She admits to neck pain when moving her head and works as a  and home health aide

## 2020-03-10 NOTE — REVIEW OF SYSTEMS
[As Noted in HPI] : as noted in HPI [Arthralgias] : arthralgias [Joint Pain] : joint pain [Negative] : Cardiovascular

## 2020-03-10 NOTE — PHYSICAL EXAM
[FreeTextEntry1] : Constitutional: alert and in no acute distress . obese. \par Psychiatric: oriented to person, place, and time, the affect was normal and recent memory was not impaired. \par Neurologic: \par Orientation: oriented to person, oriented to place and oriented to time. \par Cranial Nerves: visual acuity intact bilaterally, visual fields full to confrontation, pupils equal round and reactive to light, extraocular motion intact, facial sensation intact symmetrically, face symmetrical and hearing was intact bilaterally. \par Motor: muscle tone was normal in all four extremities and muscle strength was normal in all four extremities. \par Sensory exam: light touch was intact, pain and temperature was intact and vibration was intact. \par Coordination:. normal gait.  \par Eyes: the sclera and conjunctiva were normal, pupils were equal in size, round, reactive to light, with normal accommodation and extraocular movements were intact. \par ENT: the ears and nose were normal in appearance, hearing was normal and both tympanic membranes were normal. \par Neck: the appearance of the neck was normal.

## 2020-03-11 ENCOUNTER — FORM ENCOUNTER (OUTPATIENT)
Age: 45
End: 2020-03-11

## 2020-03-12 ENCOUNTER — APPOINTMENT (OUTPATIENT)
Dept: INTERNAL MEDICINE | Facility: CLINIC | Age: 45
End: 2020-03-12
Payer: MEDICAID

## 2020-03-12 ENCOUNTER — OUTPATIENT (OUTPATIENT)
Dept: OUTPATIENT SERVICES | Facility: HOSPITAL | Age: 45
LOS: 1 days | Discharge: HOME | End: 2020-03-12
Payer: MEDICAID

## 2020-03-12 ENCOUNTER — OUTPATIENT (OUTPATIENT)
Dept: OUTPATIENT SERVICES | Facility: HOSPITAL | Age: 45
LOS: 1 days | Discharge: HOME | End: 2020-03-12

## 2020-03-12 VITALS
DIASTOLIC BLOOD PRESSURE: 84 MMHG | HEART RATE: 84 BPM | WEIGHT: 188 LBS | BODY MASS INDEX: 40.56 KG/M2 | HEIGHT: 57 IN | SYSTOLIC BLOOD PRESSURE: 128 MMHG

## 2020-03-12 DIAGNOSIS — Z98.890 OTHER SPECIFIED POSTPROCEDURAL STATES: Chronic | ICD-10-CM

## 2020-03-12 DIAGNOSIS — M50.90 CERVICAL DISC DISORDER, UNSPECIFIED, UNSPECIFIED CERVICAL REGION: ICD-10-CM

## 2020-03-12 PROCEDURE — 99213 OFFICE O/P EST LOW 20 MIN: CPT | Mod: GC

## 2020-03-12 PROCEDURE — 72050 X-RAY EXAM NECK SPINE 4/5VWS: CPT | Mod: 26

## 2020-03-13 NOTE — END OF VISIT
[] : Resident [FreeTextEntry3] : PREOP FOR ROBOTIC SIGMOID COLON RESECTION\par >4 METS\par RCRI class I risk\par see neuro note\par Risk ve benefit weighed, would proceed as planned for 3/23\par

## 2020-03-13 NOTE — PHYSICAL EXAM
[No Acute Distress] : no acute distress [Well Nourished] : well nourished [Well Developed] : well developed [Well-Appearing] : well-appearing [Normal Sclera/Conjunctiva] : normal sclera/conjunctiva [PERRL] : pupils equal round and reactive to light [EOMI] : extraocular movements intact [Normal Outer Ear/Nose] : the outer ears and nose were normal in appearance [Normal Oropharynx] : the oropharynx was normal [No JVD] : no jugular venous distention [No Lymphadenopathy] : no lymphadenopathy [Supple] : supple [Thyroid Normal, No Nodules] : the thyroid was normal and there were no nodules present [No Respiratory Distress] : no respiratory distress  [No Accessory Muscle Use] : no accessory muscle use [Clear to Auscultation] : lungs were clear to auscultation bilaterally [Normal Rate] : normal rate  [Regular Rhythm] : with a regular rhythm [Normal S1, S2] : normal S1 and S2 [No Murmur] : no murmur heard [No Carotid Bruits] : no carotid bruits [No Abdominal Bruit] : a ~M bruit was not heard ~T in the abdomen [No Varicosities] : no varicosities [Pedal Pulses Present] : the pedal pulses are present [No Edema] : there was no peripheral edema [No Palpable Aorta] : no palpable aorta [No Extremity Clubbing/Cyanosis] : no extremity clubbing/cyanosis [Soft] : abdomen soft [Non-distended] : non-distended [No Masses] : no abdominal mass palpated [No HSM] : no HSM [Normal Bowel Sounds] : normal bowel sounds [Normal Posterior Cervical Nodes] : no posterior cervical lymphadenopathy [Normal Anterior Cervical Nodes] : no anterior cervical lymphadenopathy [No CVA Tenderness] : no CVA  tenderness [No Spinal Tenderness] : no spinal tenderness [No Joint Swelling] : no joint swelling [Grossly Normal Strength/Tone] : grossly normal strength/tone [No Rash] : no rash [Coordination Grossly Intact] : coordination grossly intact [No Focal Deficits] : no focal deficits [Normal Gait] : normal gait [Deep Tendon Reflexes (DTR)] : deep tendon reflexes were 2+ and symmetric [Normal Affect] : the affect was normal [Normal Insight/Judgement] : insight and judgment were intact [de-identified] : tender in lower quandrant

## 2020-03-13 NOTE — HEALTH RISK ASSESSMENT
[Yes] : Yes [Monthly or less (1 pt)] : Monthly or less (1 point) [1 or 2 (0 pts)] : 1 or 2 (0 points) [Never (0 pts)] : Never (0 points) [No falls in past year] : Patient reported no falls in the past year [] : No

## 2020-03-13 NOTE — ASSESSMENT
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 CTH negative. \par - s/p 1 wk course of prednisone and valacyclovir with significant improvement in symptoms. 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 \par \par # Hx of recurrent diverticulitis\par - pt is following Colorectal surgeon, planned for sx 3/23\par - MET >4. \par \par # Transaminitis, elevated alk phos has lymphangioma\par - GGT 77\par - Hx of alcohol abuse \par - liver enzyme stable but still elevated. \par - pt had liver biopsy \par \par # Pseudoseizures/Tremor\par - Follow-up with Neurology\par - c.w Carbamazepine 200 mg QD, Tylenol PRN for headaches\par \par # Peroneal tendonitis of LLE \par - Meloxicam PRN, it helped \par - she was given a boot to walk however it is too big she cannot walk with it\par - following with podiatry \par \par # HLD\par - . Total Cholesterol 285, ASCVD risk 19.7%, moderate intensity statin indicated\par - pt is not taking Lipitor everyday. Advise to take it daily. \par - inc Lipitor 40mg to 80 mg qhs \par - repeat in 3 months. \par \par \par # Health Maintenance\par - Mammogram (10/2019): BIRADS 2. Benign L breast mass, need to rpt \par - Pap smear last in 2017. Patient has WILBERT including cervix. \par - GI: Will need CRC screening in 2020 due to elevated risk. Will hold off for now as pt is going for colorectal surgery. \par - Flu shot Nov 2019. PPSV 23 UTD. \par

## 2020-03-13 NOTE — HISTORY OF PRESENT ILLNESS
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 The pt has sx scheduled for 3/23 for a colon resection for diverticular dz.  The pt admits to frequent constipation (denies diarrhea/blood in stool), endorses some ab pain from time to time in the lower quadrant with nausea and vomiting when she develops divirticulitis, which she states she has developed 7 times in the past couple of years. She is nervous about the surgery. \par The pt states she used to smoke but quit 1 year ago. The pt states that she tires after walking up a flight of stairs. However, pt states that she can walk several blocks and does not tire. \par

## 2020-03-17 DIAGNOSIS — E78.00 PURE HYPERCHOLESTEROLEMIA, UNSPECIFIED: ICD-10-CM

## 2020-03-17 DIAGNOSIS — Z00.00 ENCOUNTER FOR GENERAL ADULT MEDICAL EXAMINATION WITHOUT ABNORMAL FINDINGS: ICD-10-CM

## 2020-03-17 DIAGNOSIS — M50.90 CERVICAL DISC DISORDER, UNSPECIFIED, UNSPECIFIED CERVICAL REGION: ICD-10-CM

## 2020-03-17 DIAGNOSIS — R74.0 NONSPECIFIC ELEVATION OF LEVELS OF TRANSAMINASE AND LACTIC ACID DEHYDROGENASE [LDH]: ICD-10-CM

## 2020-03-17 DIAGNOSIS — C25.9 MALIGNANT NEOPLASM OF PANCREAS, UNSPECIFIED: ICD-10-CM

## 2020-03-23 ENCOUNTER — APPOINTMENT (OUTPATIENT)
Dept: HEPATOLOGY | Facility: CLINIC | Age: 45
End: 2020-03-23

## 2020-05-13 ENCOUNTER — APPOINTMENT (OUTPATIENT)
Dept: ENDOCRINOLOGY | Facility: CLINIC | Age: 45
End: 2020-05-13

## 2020-05-28 ENCOUNTER — OUTPATIENT (OUTPATIENT)
Dept: OUTPATIENT SERVICES | Facility: HOSPITAL | Age: 45
LOS: 1 days | Discharge: HOME | End: 2020-05-28

## 2020-05-28 ENCOUNTER — APPOINTMENT (OUTPATIENT)
Dept: HEMATOLOGY ONCOLOGY | Facility: CLINIC | Age: 45
End: 2020-05-28
Payer: MEDICAID

## 2020-05-28 ENCOUNTER — APPOINTMENT (OUTPATIENT)
Dept: ENDOCRINOLOGY | Facility: CLINIC | Age: 45
End: 2020-05-28

## 2020-05-28 VITALS
HEIGHT: 57 IN | DIASTOLIC BLOOD PRESSURE: 78 MMHG | HEART RATE: 78 BPM | BODY MASS INDEX: 42.28 KG/M2 | WEIGHT: 196 LBS | SYSTOLIC BLOOD PRESSURE: 126 MMHG

## 2020-05-28 VITALS
DIASTOLIC BLOOD PRESSURE: 60 MMHG | SYSTOLIC BLOOD PRESSURE: 110 MMHG | HEIGHT: 57 IN | RESPIRATION RATE: 18 BRPM | HEART RATE: 85 BPM | BODY MASS INDEX: 41.85 KG/M2 | WEIGHT: 194 LBS | TEMPERATURE: 97.6 F

## 2020-05-28 DIAGNOSIS — E66.01 MORBID (SEVERE) OBESITY DUE TO EXCESS CALORIES: ICD-10-CM

## 2020-05-28 DIAGNOSIS — Z98.890 OTHER SPECIFIED POSTPROCEDURAL STATES: Chronic | ICD-10-CM

## 2020-05-28 DIAGNOSIS — D39.10 NEOPLASM OF UNCERTAIN BEHAVIOR OF UNSPECIFIED OVARY: ICD-10-CM

## 2020-05-28 DIAGNOSIS — E11.65 TYPE 2 DIABETES MELLITUS WITH HYPERGLYCEMIA: ICD-10-CM

## 2020-05-28 DIAGNOSIS — E78.01 FAMILIAL HYPERCHOLESTEROLEMIA: ICD-10-CM

## 2020-05-28 DIAGNOSIS — Z86.03 PERSONAL HISTORY OF NEOPLASM OF UNCERTAIN BEHAVIOR: ICD-10-CM

## 2020-05-28 DIAGNOSIS — C25.9 MALIGNANT NEOPLASM OF PANCREAS, UNSPECIFIED: ICD-10-CM

## 2020-05-28 PROCEDURE — 99214 OFFICE O/P EST MOD 30 MIN: CPT

## 2020-05-28 RX ORDER — BLOOD SUGAR DIAGNOSTIC
STRIP MISCELLANEOUS DAILY
Qty: 50 | Refills: 5 | Status: DISCONTINUED | COMMUNITY
Start: 2018-08-30 | End: 2020-05-28

## 2020-05-28 RX ORDER — BLOOD-GLUCOSE METER
KIT MISCELLANEOUS 3 TIMES DAILY
Qty: 90 | Refills: 11 | Status: DISCONTINUED | COMMUNITY
Start: 2020-05-22 | End: 2020-05-28

## 2020-05-28 NOTE — PHYSICAL EXAM
[Alert] : alert [No Acute Distress] : no acute distress [Well Nourished] : well nourished [Normal Sclera/Conjunctiva] : normal sclera/conjunctiva [Well Developed] : well developed [EOMI] : extra ocular movement intact [No Proptosis] : no proptosis [Thyroid Not Enlarged] : the thyroid was not enlarged [No Thyroid Nodules] : no palpable thyroid nodules [Normal Oropharynx] : the oropharynx was normal [No Respiratory Distress] : no respiratory distress [Clear to Auscultation] : lungs were clear to auscultation bilaterally [No Accessory Muscle Use] : no accessory muscle use [Normal S1, S2] : normal S1 and S2 [Normal Rate] : heart rate was normal [Regular Rhythm] : with a regular rhythm [No Edema] : no peripheral edema [Pedal Pulses Normal] : the pedal pulses are present [Normal Bowel Sounds] : normal bowel sounds [Not Tender] : non-tender [Not Distended] : not distended [Soft] : abdomen soft [Normal Anterior Cervical Nodes] : no anterior cervical lymphadenopathy [Normal Posterior Cervical Nodes] : no posterior cervical lymphadenopathy [Spine Straight] : spine straight [No Spinal Tenderness] : no spinal tenderness [No Stigmata of Cushings Syndrome] : no stigmata of Cushings Syndrome [Normal Gait] : normal gait [Normal Strength/Tone] : muscle strength and tone were normal [No Rash] : no rash [Normal Reflexes] : deep tendon reflexes were 2+ and symmetric [Acanthosis Nigricans] : no acanthosis nigricans [No Tremors] : no tremors [Oriented x3] : oriented to person, place, and time

## 2020-05-28 NOTE — ASSESSMENT
[FreeTextEntry1] : patient had stopped taking atorvastatin which she attributes to abdominal pain from her diverticular disease. again told her to take atorvastatin regularly, will try trulicity 1.5 mg. isac

## 2020-05-29 ENCOUNTER — APPOINTMENT (OUTPATIENT)
Dept: GYNECOLOGIC ONCOLOGY | Facility: CLINIC | Age: 45
End: 2020-05-29

## 2020-05-29 NOTE — HISTORY OF PRESENT ILLNESS
[FreeTextEntry1] : 43yo\par \par Dx: Stage IIIA Serous Borderline ovarian tumor\par Surgery date: WILBERT/RSO/Omentectomy 12/18/2017\par \par Path: 3 foci of serous BOT, with one suggestive of non-invasive low grade serous carcinoma; omentum with non-invasive implants; tumor is also present in paratubal tissue and ovarian surface; uterus/cervix => not involved => path send for outside consultation => no invasive carcinoma => final result: serous BOT\par \par Adjuvant treatment: None\par Most recent : 12/16/19 - 8\par 6/3/19 - 9\par 2/13/19 - 13\par 11/23/18 - 17\par \par MRI abdomen 02/05/2018: 3 hepatic lesions, could be suspicious for metastatic implants, but unchanged compared to 03/2017\par \par PET/CT 02/23/2018: no increased uptake in liver lesion; uptake in right medical upper thigh\par \par CT guided biopsy of liver lesion 03/08/2018 => benign benign\par \par 12/2018 CT abd/pelvis: No evidence of pelvic mass or abdominal/pelvic adenopathy. Nonobstructing small bowel Childs hernia. Fatty infiltration of the liver \par \par Genetic testing positive for melanoma/pancreatic cancer syndrome\par \par Follows with GI on 12/2018: s/p EGD and EUS 3/2019 atrophic gastritis and normal pancreas, needs colonoscopy 2020\par \par Saw Derm 5/28/19 - full body skin exam, likely seborrheic dermatitis and non dysplastic nevi, f/u 6mos\par \par Pt with recurrent bouts of diverticulitis. Was supposed to have colorectal surgery sigmoid resection in March but did not happen due to COVID-19.\par \par 8/21/19 CT abd/pelvis for right flank pain: There is inflammation of the cecum and right peritoneal reflection which appears to be centered on inflamed diverticulum. Consistent with acute cecal diverticulitis. No abscess. Normal appendix. Fatty infiltration of the liver.

## 2020-06-01 LAB — CANCER AG125 SERPL-ACNC: 9 U/ML

## 2020-06-01 NOTE — HISTORY OF PRESENT ILLNESS
[de-identified] : C/O Pain in left shoulder and left breast as before.\par She had a PET scan and she is scheduled for liver biopsy later this week.\par \par 3/26/18\par Patient here for follow up complains of symptoms including hot flashes and abdominal pain in the RUQ at the side of liver biopsy. Patient denies any changes in bowel habits, vaginal bleeding. She had a patient had a liver biopsy which was normal showing normal liver parenchyma. 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 Patient was told to inform family especially children to undergo genetic testing as well.  Patient was told to follow up with Dr Weinberg of GI for EUS of the pancreas and consideration of a MRCP in the future. The patient was also recommended to see a dermatologist for full skin evaluation. \par \par 7/5/18\par She has been drinking heavy ( 10 shots of tequila  each weekend).\par She has an appointment with GI next week.\par Has not seen dermatology.\par Denies abdominal pain, no vaginal bleeding\par \par 10/4/18:\par Had headaches and had MRI brain few weeks ago.\par C/o dizziness and blurry vision. \par On keppra started by neurology for possible convulsions, started a week ago. She says that she doesn't feel good on Keppra. \par Denies fever, nausea, vomiting, chest pain, SOB, abdominal pain, bowel and bladder problems.\par Due for EGD and EUS on 10/10/18\par Due for mammo in Oct 2018. \par Due for VEEG. \par \par 2/13/19:\par Patient here for follow up, feeling well, no new complaints.  Patient denies abdominal pain or bloating, denies vaginal bleeding or discharge.  She had a CT scan which was normal and she is scheduled with GYN ONC on Friday.  She saw dermatologist as well as GI.  EUS was scheduled and apparently cancelled after the patient was found to have food in her stomach.  She states its now scheduled for end of March.  Patient also being followed for pseudoseizures by neurology.\par \par 6/3/19:\par Doing well. No major complaints.\par Denies fever, nausea, vomiting, chest pain, SOB, abdominal pain, bowel and bladder problems.\par Seen by Dermatology and gynonc. \par Pt had an EGD and EUS done in March 2019  that revealed atrophic gastritis and EUS revealed normal pancreas and normal sized PD\par Due for mammo this week. \par \par \par 12/16/19\par Pt is here for follow up.\par States she is going for surgery for cecal diverticulitis.\par No vaginal bleeding.\par Has been following with GI for EUS for pancreatic ca screening.\par \par 05/28/2020\par ALFIE MOSLEY a 44 year F is here today for follow up of ovarian cancer and melanoma pancreatic cancer syndrome. \par Was referred to Dermatology for full skin evaluation; last seen in 11/2019, diagnosed with seborrheic keratosis of nose/buttock with benign non-dysplastic nevi\par Last EUD, EUS 3/29 normal pancreas. Next f/up with GI next week.\par As per pt all of her f/up appt were postponed due to COVID.\par Colorectal surgery for cecal diverticulitis was  postponed. \par Denies vaginal bleeding, reports chronic intermittent abdominal pain. \par  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.\par She has a prior H/o borderline serous cancer of the left ovary, s/p left oophorectomy/omentectomy/ and right ovarian cystectomy in 2006.\par She was followed by GYN and recently noted to have an elevated Ca125 of 107.\par She had further imaging done as noted below which showed complex septated right ovarian cyst and new peritoneal infiltration.\par Several bilobar hypodensities were also seen.\par \par She underwent WILBERT/RSO/Omentectomy on 12/18/17.\par PATHOLOGY\par  A)   TUBE AND OVARY, RIGHT, SALPINGO-OOPHORECTOMY:\par \par       -    PREDOMINANTLY SEROUS BORDERLINE TUMOR, LARGEST FOCUS MEASURING\par            4.6 CM.  (SEE MICROSCOPIC DESCRIPTION FOR SYNOPTIC REPORT)\par \par       -    TUMOR IS PRESENT IN THE PARATUBAL TISSUE AND ON THE OVARIAN\par            SURFACE.\par \par       -    SCATTERED FOCI SUSPICIOUS OF STROMAL MICROINVASION, LARGEST\par            FOCUS MEASURING 4 MM. (SLIDE A4)\par \par       -    PATHOLOGIC STAGE (pTNM):  pT(m)2b pNX\par    ONE FOCUS SUGGESTIVE OF LOW GRADE SEROUS CARCINOMA. (0.6 MM;\par            SLIDE A7)\par \par There were no post operative complications.\par She C/o hot flashes.\par C/O pain in the abdomen, which is diffuse.\par No weight loss.\par No vaginal bleeding\par No fever.\par Operative report was reviewed. No residual disease noted.\par \par The pathology slides had been sent for 2nd opinion and result is noted below. No invasion was identified.\par \par Patient also had a liver biopsy which demonstrated normal liver parenchyma which is noted below.  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 Patient needs evaluation with EUS +/- MRCP for evaluation of pancreatic cancer and regular follow up with dermatology  [de-identified] :  Report CR- received from [facility], [address] [place] [address] by Moy Briscoe on 2/8/2018 with the following    diagnosis:     FINAL DIAGNOSIS:    OVARY, RIGHT, SALPINGO-OOPHORECTOMY:          - SEROUS BORDERLINE TUMOR WITH NONINVASIVE IMPLANTS.    Comment:     I agree with your interpretation on this case of serous borderline    tumor. There are areas of increased proliferation but nothing I    would call serous carcinoma. There are noninvasive implants of the    fallopian tube and on the surface of the ovary. The sections from    the pelvic side wall and omentum also show noninvasive implants of    serous borderline tumor.\par \par Cytopathology Report       Final Diagnosis\par  LIVER LESION, LIVER LOBE, CT-GUIDED NEEDLE BIOPSY AND IMPRINT:\par  - NO MALIGNANT CELLS IDENTIFIED\par . - LIVER PARENCHYMA TISSUE WITH MACROVESICULAR AND MICROVESICULAR STEATOSIS ( 50% OF THE TISSUE). \par - IMMUNOHISTOCHEMICAL AND SPECIAL STAINS PENDING.\par

## 2020-06-01 NOTE — END OF VISIT
[FreeTextEntry3] : I was physically present for the key portions of the evaluation and management service provided.  I agree with the history and physical, and plan which I have reviewed and edited where appropriate.\par  None

## 2020-06-01 NOTE — ASSESSMENT
[FreeTextEntry1] : In summary this is a 44 year old woman with a diagnosis of borderline serous tumor of the ovary s/p WILBERT/RSO/ omentectomy. The pathology does not show invasive implants. 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 Patient was told to inform family especially children to undergo genetic testing as well.  \par \par RECOMMENDATIONS:\par S/p EGD and EUS in 3/19.\par Check  . \par Follow up PCP/derm/GI. \par Again strongly advised to inform family to undergo genetic testing. \par UTD with mammogram (due 10/2020), GYNONC f/up tomorrow. \par \par RTC in 3 months.\par \par \par Case was seen and discussed with Dr. Correa who agreed with the assessment and plan.\par

## 2020-06-08 ENCOUNTER — APPOINTMENT (OUTPATIENT)
Dept: HEPATOLOGY | Facility: CLINIC | Age: 45
End: 2020-06-08
Payer: MEDICAID

## 2020-06-08 ENCOUNTER — OUTPATIENT (OUTPATIENT)
Dept: OUTPATIENT SERVICES | Facility: HOSPITAL | Age: 45
LOS: 1 days | Discharge: HOME | End: 2020-06-08

## 2020-06-08 DIAGNOSIS — Z98.890 OTHER SPECIFIED POSTPROCEDURAL STATES: Chronic | ICD-10-CM

## 2020-06-08 PROCEDURE — 99214 OFFICE O/P EST MOD 30 MIN: CPT

## 2020-06-08 NOTE — REVIEW OF SYSTEMS
[As Noted in HPI] : as noted in HPI [Fever] : no fever [Chills] : no chills [Eye Pain] : no eye pain [Earache] : no earache [Palpitations] : no palpitations [Shortness Of Breath] : no shortness of breath [Chest Pain] : no chest pain [Arthralgias] : no arthralgias [Dysuria] : no dysuria [Confused] : no confusion

## 2020-06-08 NOTE — HISTORY OF PRESENT ILLNESS
[FreeTextEntry1] : 45 yo F with PMH of DM, Ovarian cancer s/p left oophorectomy in 2006 and RT oophorectomy and hysterotomy in 2017, Diverticulitis in 2017, H pylori gastritis called via telemedicine for follow up. \par \par Patient found to have CKDN2A mutation, she is a heterozygote and is part of the melanoma-pancreatic cancer syndrome which confers a lifetime risk 17% pancreatic cancer. Pt had an MRI liver protocol done in 2017 for evaluation of cyst in the liver that turned out to be simple cyst. EUS for pancreas dr Ferguson 3/2019 unremarkable (normal pancreas and normal size PD), EGD showed atrophic gastritis, no HP.  \par \par Pt had an episode of diverticulitis in 2017 and CT abdomen revealed nodularity of the Omentum suspicious for metastatic disease s/p WILBERT/BSO and omentectomy. The pathology did not show invasive implants. Biopsy of the liver lesion demonstrated benign lymphangioma.\par

## 2020-06-08 NOTE — ASSESSMENT
[FreeTextEntry1] : 45 yo F with PMH of DM, Ovarian cancer s/p left oophorectomy in 2006 and RT oophorectomy and hysterotomy in 2017 for serous borderline tumor stage III, Diverticulitis in 2017, H pylori gastritis called via telemedicine for follow up. \par \par Patient found to have CKDN2A mutation, she is a heterozygote and is part of the melanoma-pancreatic cancer syndrome which confers a lifetime risk 17% pancreatic cancer. Pt had an MRI liver protocol done in 2017 for evaluation of cyst in the liver that turned out to be simple cyst. EUS for pancreas dr Ferguson 3/2019 unremarkable (normal pancreas and normal size PD), EGD showed atrophic gastritis, no HP.  \par \par Pt had an episode of diverticulitis in 2017 and CT abdomen revealed nodularity of the Omentum suspicious for metastatic disease s/p WILBERT/BSO and omentectomy. The pathology did not show invasive implants. Biopsy of the liver lesion demonstrated benign lymphangioma.\par \par 1- Melanoma-pancreatic cancer syndrome \par Patient found to have CKDN2A mutation, she is a heterozygote and is part of the melanoma-pancreatic cancer syndrome which confers a lifetime risk 17% pancreatic cancer. Pt had an MRI liver protocol done in 2017 for evaluation of cyst in the liver that turned out to be simple cyst. \par bx of liver lesion 3/2018 showed micro and macrovesicular steatosis, no malignant cells  \par EUS for pancreas dr Ferguson 3/2019 unremarkable (normal pancreas and normal size PD), EGD showed atrophic gastritis, no HP.  \par oncology follow up\par has allergy to iodine contrast, so cannot order MRI pancreatic protocol (had edema and dyspnea) \par will check eus for now \par uptodate advises on alternating EUS and MRI yearly  \par check Ca19.9\par literature reports association with other cancers but recommendation are mainly to screen for pancreatic ca \par \par 2- Heartburn \par not compliant to diet, control diet \par c/w ppi \par Small fractionated meals for possible gastroparesis  \par \par 3- CRC screening \par 2014 colonoscopy dr reid diverticulosis and hemorrhoids \par literature reports association with other cancers but recommendation are mainly to screen for pancreatic ca \par Will need colonoscopy in view of recent diverticulitis  \par will give 2 weeks of miralax prior\par \par 4- H Pylori \par s/p treatment \par neg bx on 3/2019\par \par 5- BMI 41\par wt loss, healthy diet, exercise advised \par \par 6- Hard stools + straining\par said she advised against fibers by her surgeon\par recommended miralax or stool softener \par \par 7- Elevated LFts \par chronic and stable \par immune to hav\par hbv, hcv neg \par said drinks 1x per month on social events \par \par bx of liver lesion 3/2018 showed micro and macrovesicular steatosis, no malignant cells  \par 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

## 2020-06-11 ENCOUNTER — APPOINTMENT (OUTPATIENT)
Dept: SURGERY | Facility: CLINIC | Age: 45
End: 2020-06-11
Payer: MEDICAID

## 2020-06-11 VITALS
BODY MASS INDEX: 42.28 KG/M2 | SYSTOLIC BLOOD PRESSURE: 130 MMHG | TEMPERATURE: 98.7 F | HEIGHT: 57 IN | WEIGHT: 196 LBS | HEART RATE: 80 BPM | DIASTOLIC BLOOD PRESSURE: 82 MMHG

## 2020-06-11 PROCEDURE — 99214 OFFICE O/P EST MOD 30 MIN: CPT

## 2020-06-11 NOTE — ASSESSMENT
[FreeTextEntry1] : Ms. Mahesh Sheffield would like to proceed with surgery. The risks benefits and alternatives to surgery were discussed with her once again. She understands that risks included potential for bleeding as well as infection. She understands that infection could lead to additional surgery and the potential for a Colostomy (bag). She understands that there is surgery will be done minimally invasively if possible or open if necessary. All questions were answered informed consent was obtained. Ms. Mahesh Friedman needs a colonoscopy prior to surgery.   Prior to scheduling surgery we will confirm with GI that they are currently scheduling her for a colonoscopy as per their recent note.Ms. MELANY nicole will be scheduled for surgery at her earliest convenience once her colonoscopy is confirmed..

## 2020-06-11 NOTE — HISTORY OF PRESENT ILLNESS
[FreeTextEntry1] : This is a followup visit for Ms. Mahesh Friedman who is being treated for diverticular disease. She states that she continues to have abdominal pain and discomfort. She comes in today to discuss re-scheduling the surgery that was cancelled due to the COVID-19 crisis.

## 2020-06-16 ENCOUNTER — LABORATORY RESULT (OUTPATIENT)
Age: 45
End: 2020-06-16

## 2020-06-18 ENCOUNTER — TRANSCRIPTION ENCOUNTER (OUTPATIENT)
Age: 45
End: 2020-06-18

## 2020-06-18 ENCOUNTER — RESULT REVIEW (OUTPATIENT)
Age: 45
End: 2020-06-18

## 2020-06-18 ENCOUNTER — OUTPATIENT (OUTPATIENT)
Dept: OUTPATIENT SERVICES | Facility: HOSPITAL | Age: 45
LOS: 1 days | Discharge: HOME | End: 2020-06-18
Payer: COMMERCIAL

## 2020-06-18 VITALS
SYSTOLIC BLOOD PRESSURE: 125 MMHG | DIASTOLIC BLOOD PRESSURE: 81 MMHG | TEMPERATURE: 99 F | RESPIRATION RATE: 20 BRPM | HEART RATE: 79 BPM

## 2020-06-18 VITALS
SYSTOLIC BLOOD PRESSURE: 127 MMHG | HEART RATE: 75 BPM | RESPIRATION RATE: 17 BRPM | DIASTOLIC BLOOD PRESSURE: 82 MMHG | TEMPERATURE: 98 F | OXYGEN SATURATION: 98 %

## 2020-06-18 DIAGNOSIS — R74.0 NONSPECIFIC ELEVATION OF LEVELS OF TRANSAMINASE AND LACTIC ACID DEHYDROGENASE [LDH]: ICD-10-CM

## 2020-06-18 DIAGNOSIS — K57.90 DIVERTICULOSIS OF INTESTINE, PART UNSPECIFIED, WITHOUT PERFORATION OR ABSCESS WITHOUT BLEEDING: ICD-10-CM

## 2020-06-18 DIAGNOSIS — Z98.890 OTHER SPECIFIED POSTPROCEDURAL STATES: Chronic | ICD-10-CM

## 2020-06-18 DIAGNOSIS — K57.92 DIVERTICULITIS OF INTESTINE, PART UNSPECIFIED, WITHOUT PERFORATION OR ABSCESS WITHOUT BLEEDING: ICD-10-CM

## 2020-06-18 DIAGNOSIS — C25.9 MALIGNANT NEOPLASM OF PANCREAS, UNSPECIFIED: ICD-10-CM

## 2020-06-18 LAB — GLUCOSE BLDC GLUCOMTR-MCNC: 93 MG/DL — SIGNIFICANT CHANGE UP (ref 70–99)

## 2020-06-18 PROCEDURE — 88305 TISSUE EXAM BY PATHOLOGIST: CPT | Mod: 26

## 2020-06-18 PROCEDURE — 43259 EGD US EXAM DUODENUM/JEJUNUM: CPT

## 2020-06-18 PROCEDURE — 88312 SPECIAL STAINS GROUP 1: CPT | Mod: 26

## 2020-06-18 PROCEDURE — 43239 EGD BIOPSY SINGLE/MULTIPLE: CPT

## 2020-06-18 NOTE — ASU PATIENT PROFILE, ADULT - PMH
Diabetes    Diverticulitis    Diverticulosis of intestine    Fatty liver    Hypercholesteremia    Obesity    Ovarian cancer  UNSURE OF STAGE HOWEVER REPORTS IT "MINOR"  Sees Oncologist Dr. Vanessa Correa  Seizure disorder  last seizure > 6 months ago  Vertigo  > 3 months ago, not currently complaining of same (3/4/2020)

## 2020-06-18 NOTE — H&P PST ADULT - NSICDXPASTMEDICALHX_GEN_ALL_CORE_FT
PAST MEDICAL HISTORY:  Diabetes     Diverticulitis     Diverticulosis of intestine     Fatty liver     Hypercholesteremia     Obesity     Ovarian cancer UNSURE OF STAGE HOWEVER REPORTS IT "MINOR"  Sees Oncologist Dr. Vanessa Correa    Seizure disorder last seizure > 6 months ago    Vertigo > 3 months ago, not currently complaining of same (3/4/2020)

## 2020-06-18 NOTE — ASU DISCHARGE PLAN (ADULT/PEDIATRIC) - CALL YOUR DOCTOR IF YOU HAVE ANY OF THE FOLLOWING:
Pain not relieved by Medications/Nausea and vomiting that does not stop/Inability to tolerate liquids or foods/Bleeding that does not stop

## 2020-06-18 NOTE — ASU PATIENT PROFILE, ADULT - PAIN CHRONIC, PROFILE
no Recommendation Preamble: The following recommendations were made during the visit: Detail Level: Zone Recommendations (Free Text): Moisturize qam

## 2020-06-19 LAB — SURGICAL PATHOLOGY STUDY: SIGNIFICANT CHANGE UP

## 2020-06-23 DIAGNOSIS — E78.00 PURE HYPERCHOLESTEROLEMIA, UNSPECIFIED: ICD-10-CM

## 2020-06-23 DIAGNOSIS — R59.9 ENLARGED LYMPH NODES, UNSPECIFIED: ICD-10-CM

## 2020-06-23 DIAGNOSIS — Z91.041 RADIOGRAPHIC DYE ALLERGY STATUS: ICD-10-CM

## 2020-06-23 DIAGNOSIS — R94.8 ABNORMAL RESULTS OF FUNCTION STUDIES OF OTHER ORGANS AND SYSTEMS: ICD-10-CM

## 2020-06-23 DIAGNOSIS — E11.9 TYPE 2 DIABETES MELLITUS WITHOUT COMPLICATIONS: ICD-10-CM

## 2020-06-23 DIAGNOSIS — K29.50 UNSPECIFIED CHRONIC GASTRITIS WITHOUT BLEEDING: ICD-10-CM

## 2020-06-23 DIAGNOSIS — E66.9 OBESITY, UNSPECIFIED: ICD-10-CM

## 2020-06-25 ENCOUNTER — APPOINTMENT (OUTPATIENT)
Dept: INTERNAL MEDICINE | Facility: CLINIC | Age: 45
End: 2020-06-25

## 2020-06-26 ENCOUNTER — APPOINTMENT (OUTPATIENT)
Dept: GYNECOLOGIC ONCOLOGY | Facility: CLINIC | Age: 45
End: 2020-06-26

## 2020-06-28 ENCOUNTER — LABORATORY RESULT (OUTPATIENT)
Age: 45
End: 2020-06-28

## 2020-06-28 ENCOUNTER — OUTPATIENT (OUTPATIENT)
Dept: OUTPATIENT SERVICES | Facility: HOSPITAL | Age: 45
LOS: 1 days | Discharge: HOME | End: 2020-06-28

## 2020-06-28 DIAGNOSIS — Z98.890 OTHER SPECIFIED POSTPROCEDURAL STATES: Chronic | ICD-10-CM

## 2020-06-28 DIAGNOSIS — Z11.59 ENCOUNTER FOR SCREENING FOR OTHER VIRAL DISEASES: ICD-10-CM

## 2020-07-01 ENCOUNTER — TRANSCRIPTION ENCOUNTER (OUTPATIENT)
Age: 45
End: 2020-07-01

## 2020-07-01 ENCOUNTER — OUTPATIENT (OUTPATIENT)
Dept: OUTPATIENT SERVICES | Facility: HOSPITAL | Age: 45
LOS: 1 days | Discharge: HOME | End: 2020-07-01
Payer: MEDICAID

## 2020-07-01 ENCOUNTER — RESULT REVIEW (OUTPATIENT)
Age: 45
End: 2020-07-01

## 2020-07-01 VITALS
HEART RATE: 67 BPM | SYSTOLIC BLOOD PRESSURE: 111 MMHG | OXYGEN SATURATION: 99 % | DIASTOLIC BLOOD PRESSURE: 69 MMHG | TEMPERATURE: 98 F | RESPIRATION RATE: 18 BRPM

## 2020-07-01 VITALS
WEIGHT: 195.99 LBS | HEART RATE: 86 BPM | HEIGHT: 56 IN | TEMPERATURE: 98 F | DIASTOLIC BLOOD PRESSURE: 73 MMHG | RESPIRATION RATE: 18 BRPM | SYSTOLIC BLOOD PRESSURE: 124 MMHG | OXYGEN SATURATION: 97 %

## 2020-07-01 DIAGNOSIS — Z98.890 OTHER SPECIFIED POSTPROCEDURAL STATES: Chronic | ICD-10-CM

## 2020-07-01 LAB — GLUCOSE BLDC GLUCOMTR-MCNC: 100 MG/DL — HIGH (ref 70–99)

## 2020-07-01 PROCEDURE — 45380 COLONOSCOPY AND BIOPSY: CPT

## 2020-07-01 PROCEDURE — 88305 TISSUE EXAM BY PATHOLOGIST: CPT | Mod: 26

## 2020-07-01 NOTE — CHART NOTE - NSCHARTNOTEFT_GEN_A_CORE
PACU ANESTHESIA ADMISSION NOTE      Procedure:  colonoscopy  Post op diagnosis:  colon polyp    ____  Intubated  TV:______       Rate: ______      FiO2: ______    _x___  Patent Airway    _x___  Full return of protective reflexes    _x___  Full recovery from anesthesia / back to baseline status    Vitals:  T(C): 36  HR: 80  BP: 114/80  RR: 18   SpO2: 100% nc 2 l/min    Mental Status:  _x___ Awake   __x___ Alert   _____ Drowsy   _____ Sedated    Nausea/Vomiting:  _x___  NO       ______Yes,   See Post - Op Orders         Pain Scale (0-10):  __0___    Treatment: _x___ None    ____ See Post - Op/PCA Orders    Post - Operative Fluids:   __x__ Oral   ____ See Post - Op Orders    Plan: Discharge:   _x___Home       _____Floor     _____Critical Care    _____  Other:_________________    Comments: uneventful mac anesthetic, vsstable, report to endo rn.  No anesthesia issues or complications noted.  Discharge when criteria met.

## 2020-07-01 NOTE — ASU PATIENT PROFILE, ADULT - PSH
H/O abdominal hysterectomy  With Right Oophrecetomy   In December 2017  H/O colonoscopy  >3 years ago  History of cholecystectomy  10-15 years ago

## 2020-07-01 NOTE — H&P PST ADULT - HISTORY OF PRESENT ILLNESS
45 yo F with PMH of DM, Ovarian cancer s/p left oophorectomy in 2006 and RT oophorectomy and hysterotomy in 2017 for serous borderline tumor stage III, Diverticulitis in 2017, H pylori gastritis called via telemedicine for follow up.     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 Pt had an MRI liver protocol done in 2017 for evaluation of cyst in the liver that turned out to be simple cyst. EUS for pancreas dr Ferguson 3/2019 unremarkable (normal pancreas and normal size PD), EGD showed atrophic gastritis, no HP.     Pt had an episode of diverticulitis in 2017 and CT abdomen revealed nodularity of the Omentum suspicious for metastatic disease s/p WILBERT/BSO and omentectomy. The pathology did not show invasive implants. Biopsy of the liver lesion demonstrated benign lymphangioma.      CRC screening   2014 colonoscopy dr reid diverticulosis and hemorrhoids   literature reports association with other cancers but recommendation are mainly to screen for pancreatic ca   Will need colonoscopy in view of recent diverticulitis   will give 2 weeks of miralax prior

## 2020-07-07 LAB — SURGICAL PATHOLOGY STUDY: SIGNIFICANT CHANGE UP

## 2020-07-09 ENCOUNTER — OUTPATIENT (OUTPATIENT)
Dept: OUTPATIENT SERVICES | Facility: HOSPITAL | Age: 45
LOS: 1 days | Discharge: HOME | End: 2020-07-09
Payer: MEDICAID

## 2020-07-09 VITALS
WEIGHT: 196.21 LBS | HEIGHT: 56 IN | HEART RATE: 71 BPM | RESPIRATION RATE: 18 BRPM | OXYGEN SATURATION: 98 % | SYSTOLIC BLOOD PRESSURE: 137 MMHG | TEMPERATURE: 98 F | DIASTOLIC BLOOD PRESSURE: 85 MMHG

## 2020-07-09 DIAGNOSIS — Z98.890 OTHER SPECIFIED POSTPROCEDURAL STATES: Chronic | ICD-10-CM

## 2020-07-09 DIAGNOSIS — E78.00 PURE HYPERCHOLESTEROLEMIA, UNSPECIFIED: ICD-10-CM

## 2020-07-09 DIAGNOSIS — Z01.818 ENCOUNTER FOR OTHER PREPROCEDURAL EXAMINATION: ICD-10-CM

## 2020-07-09 DIAGNOSIS — K64.8 OTHER HEMORRHOIDS: ICD-10-CM

## 2020-07-09 DIAGNOSIS — Z85.43 PERSONAL HISTORY OF MALIGNANT NEOPLASM OF OVARY: ICD-10-CM

## 2020-07-09 DIAGNOSIS — R56.9 UNSPECIFIED CONVULSIONS: ICD-10-CM

## 2020-07-09 DIAGNOSIS — D12.3 BENIGN NEOPLASM OF TRANSVERSE COLON: ICD-10-CM

## 2020-07-09 DIAGNOSIS — K57.92 DIVERTICULITIS OF INTESTINE, PART UNSPECIFIED, WITHOUT PERFORATION OR ABSCESS WITHOUT BLEEDING: ICD-10-CM

## 2020-07-09 DIAGNOSIS — Z91.041 RADIOGRAPHIC DYE ALLERGY STATUS: ICD-10-CM

## 2020-07-09 DIAGNOSIS — D12.5 BENIGN NEOPLASM OF SIGMOID COLON: ICD-10-CM

## 2020-07-09 DIAGNOSIS — K76.0 FATTY (CHANGE OF) LIVER, NOT ELSEWHERE CLASSIFIED: ICD-10-CM

## 2020-07-09 DIAGNOSIS — E11.9 TYPE 2 DIABETES MELLITUS WITHOUT COMPLICATIONS: ICD-10-CM

## 2020-07-09 DIAGNOSIS — E66.9 OBESITY, UNSPECIFIED: ICD-10-CM

## 2020-07-09 DIAGNOSIS — K57.30 DIVERTICULOSIS OF LARGE INTESTINE WITHOUT PERFORATION OR ABSCESS WITHOUT BLEEDING: ICD-10-CM

## 2020-07-09 DIAGNOSIS — K57.32 DIVERTICULITIS OF LARGE INTESTINE WITHOUT PERFORATION OR ABSCESS WITHOUT BLEEDING: ICD-10-CM

## 2020-07-09 LAB
A1C WITH ESTIMATED AVERAGE GLUCOSE RESULT: 7.8 % — HIGH (ref 4–5.6)
ALBUMIN SERPL ELPH-MCNC: 4.3 G/DL — SIGNIFICANT CHANGE UP (ref 3.5–5.2)
ALP SERPL-CCNC: 173 U/L — HIGH (ref 30–115)
ALT FLD-CCNC: 66 U/L — HIGH (ref 0–41)
ANION GAP SERPL CALC-SCNC: 12 MMOL/L — SIGNIFICANT CHANGE UP (ref 7–14)
APPEARANCE UR: CLEAR — SIGNIFICANT CHANGE UP
APTT BLD: 35.3 SEC — SIGNIFICANT CHANGE UP (ref 27–39.2)
AST SERPL-CCNC: 44 U/L — HIGH (ref 0–41)
BASOPHILS # BLD AUTO: 0.02 K/UL — SIGNIFICANT CHANGE UP (ref 0–0.2)
BASOPHILS NFR BLD AUTO: 0.3 % — SIGNIFICANT CHANGE UP (ref 0–1)
BILIRUB SERPL-MCNC: <0.2 MG/DL — SIGNIFICANT CHANGE UP (ref 0.2–1.2)
BILIRUB UR-MCNC: NEGATIVE — SIGNIFICANT CHANGE UP
BLD GP AB SCN SERPL QL: SIGNIFICANT CHANGE UP
BUN SERPL-MCNC: 17 MG/DL — SIGNIFICANT CHANGE UP (ref 10–20)
CALCIUM SERPL-MCNC: 9 MG/DL — SIGNIFICANT CHANGE UP (ref 8.5–10.1)
CHLORIDE SERPL-SCNC: 104 MMOL/L — SIGNIFICANT CHANGE UP (ref 98–110)
CO2 SERPL-SCNC: 23 MMOL/L — SIGNIFICANT CHANGE UP (ref 17–32)
COLOR SPEC: YELLOW — SIGNIFICANT CHANGE UP
CREAT SERPL-MCNC: 0.5 MG/DL — LOW (ref 0.7–1.5)
DIFF PNL FLD: NEGATIVE — SIGNIFICANT CHANGE UP
EOSINOPHIL # BLD AUTO: 0.11 K/UL — SIGNIFICANT CHANGE UP (ref 0–0.7)
EOSINOPHIL NFR BLD AUTO: 1.7 % — SIGNIFICANT CHANGE UP (ref 0–8)
ESTIMATED AVERAGE GLUCOSE: 177 MG/DL — HIGH (ref 68–114)
GLUCOSE SERPL-MCNC: 104 MG/DL — HIGH (ref 70–99)
GLUCOSE UR QL: NEGATIVE — SIGNIFICANT CHANGE UP
HCT VFR BLD CALC: 39.6 % — SIGNIFICANT CHANGE UP (ref 37–47)
HGB BLD-MCNC: 12.8 G/DL — SIGNIFICANT CHANGE UP (ref 12–16)
IMM GRANULOCYTES NFR BLD AUTO: 0.3 % — SIGNIFICANT CHANGE UP (ref 0.1–0.3)
INR BLD: 1.08 RATIO — SIGNIFICANT CHANGE UP (ref 0.65–1.3)
KETONES UR-MCNC: SIGNIFICANT CHANGE UP
LEUKOCYTE ESTERASE UR-ACNC: NEGATIVE — SIGNIFICANT CHANGE UP
LYMPHOCYTES # BLD AUTO: 3.1 K/UL — SIGNIFICANT CHANGE UP (ref 1.2–3.4)
LYMPHOCYTES # BLD AUTO: 47.6 % — SIGNIFICANT CHANGE UP (ref 20.5–51.1)
MCHC RBC-ENTMCNC: 29.2 PG — SIGNIFICANT CHANGE UP (ref 27–31)
MCHC RBC-ENTMCNC: 32.3 G/DL — SIGNIFICANT CHANGE UP (ref 32–37)
MCV RBC AUTO: 90.2 FL — SIGNIFICANT CHANGE UP (ref 81–99)
MONOCYTES # BLD AUTO: 0.36 K/UL — SIGNIFICANT CHANGE UP (ref 0.1–0.6)
MONOCYTES NFR BLD AUTO: 5.5 % — SIGNIFICANT CHANGE UP (ref 1.7–9.3)
MRSA PCR RESULT.: NEGATIVE — SIGNIFICANT CHANGE UP
NEUTROPHILS # BLD AUTO: 2.9 K/UL — SIGNIFICANT CHANGE UP (ref 1.4–6.5)
NEUTROPHILS NFR BLD AUTO: 44.6 % — SIGNIFICANT CHANGE UP (ref 42.2–75.2)
NITRITE UR-MCNC: NEGATIVE — SIGNIFICANT CHANGE UP
NRBC # BLD: 0 /100 WBCS — SIGNIFICANT CHANGE UP (ref 0–0)
PH UR: 6 — SIGNIFICANT CHANGE UP (ref 5–8)
PLATELET # BLD AUTO: 281 K/UL — SIGNIFICANT CHANGE UP (ref 130–400)
POTASSIUM SERPL-MCNC: 4.5 MMOL/L — SIGNIFICANT CHANGE UP (ref 3.5–5)
POTASSIUM SERPL-SCNC: 4.5 MMOL/L — SIGNIFICANT CHANGE UP (ref 3.5–5)
PROT SERPL-MCNC: 7.3 G/DL — SIGNIFICANT CHANGE UP (ref 6–8)
PROT UR-MCNC: SIGNIFICANT CHANGE UP
PROTHROM AB SERPL-ACNC: 12.4 SEC — SIGNIFICANT CHANGE UP (ref 9.95–12.87)
RBC # BLD: 4.39 M/UL — SIGNIFICANT CHANGE UP (ref 4.2–5.4)
RBC # FLD: 13.5 % — SIGNIFICANT CHANGE UP (ref 11.5–14.5)
SODIUM SERPL-SCNC: 139 MMOL/L — SIGNIFICANT CHANGE UP (ref 135–146)
SP GR SPEC: 1.03 — HIGH (ref 1.01–1.02)
UROBILINOGEN FLD QL: SIGNIFICANT CHANGE UP
WBC # BLD: 6.51 K/UL — SIGNIFICANT CHANGE UP (ref 4.8–10.8)
WBC # FLD AUTO: 6.51 K/UL — SIGNIFICANT CHANGE UP (ref 4.8–10.8)

## 2020-07-09 PROCEDURE — 93010 ELECTROCARDIOGRAM REPORT: CPT

## 2020-07-09 NOTE — H&P PST ADULT - HISTORY OF PRESENT ILLNESS
43 yo F with PMH of DM, Ovarian cancer s/p left oophorectomy in 2006 and RT oophorectomy and hysterotomy in 2017, Diverticulitis in 2017, H pylori gastritis called via telemedicine for follow up.     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 Pt had an MRI liver protocol done in 2017 for evaluation of cyst in the liver that turned out to be simple cyst. EUS for pancreas dr Ferguson 3/2019 unremarkable (normal pancreas and normal size PD), EGD showed atrophic gastritis, no HP.     Pt had an episode of diverticulitis in 2017 and CT abdomen revealed nodularity of the Omentum suspicious for metastatic disease s/p WILBERT/BSO and omentectomy. The pathology did not show invasive implants. Biopsy of the liver lesion demonstrated benign lymphangioma.  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 Pt had an MRI liver protocol done in 2017 for evaluation of cyst in the liver that turned out to be simple cyst. EUS for pancreas dr Ferguson 3/2019 unremarkable (normal pancreas and normal size PD), EGD showed atrophic gastritis, no HP.   Pt had an episode of diverticulitis in 2017 and CT abdomen revealed nodularity of the Omentum suspicious for metastatic disease s/p WILBERT/BSO and omentectomy. The pathology did not show invasive implants. Biopsy of the liver lesion demonstrated benign lymphangioma.  07/9/2020 PAST NP Note: Patient c/o progressively worsening  diffuse abdominal pain x 3 years. Denies any c/o cp, sob, palpitations fever, cough or dysuria. No s/s or exposure to covid. Ex tolerance of 1 fos walk with out SOB. No diagnosis of KAN. 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 Pt had an MRI liver protocol done in 2017 for evaluation of cyst in the liver that turned out to be simple cyst. EUS for pancreas dr Ferguson 3/2019 unremarkable (normal pancreas and normal size PD), EGD showed atrophic gastritis, no HP.   Pt had an episode of diverticulitis in 2017 and CT abdomen revealed nodularity of the Omentum suspicious for metastatic disease s/p WILBERT/BSO and omentectomy. The pathology did not show invasive implants. Biopsy of the liver lesion demonstrated benign lymphangioma.  07/9/2020 PAST NP Note: Patient c/o progressively worsening diffuse abdominal pain x 3 years. Denies any c/o cp, sob, palpitations fever, cough or dysuria. No s/s or exposure to covid. Ex tolerance of 1 fos walk with out SOB. No diagnosis of KAN.

## 2020-07-09 NOTE — H&P PST ADULT - NSICDXPASTSURGICALHX_GEN_ALL_CORE_FT
PAST SURGICAL HISTORY:  H/O abdominal hysterectomy With Right Oophrecetomy   In December 2017    H/O colonoscopy >3 years ago    History of cholecystectomy 10-15 years ago

## 2020-07-17 ENCOUNTER — OUTPATIENT (OUTPATIENT)
Dept: OUTPATIENT SERVICES | Facility: HOSPITAL | Age: 45
LOS: 1 days | Discharge: HOME | End: 2020-07-17

## 2020-07-17 ENCOUNTER — LABORATORY RESULT (OUTPATIENT)
Age: 45
End: 2020-07-17

## 2020-07-17 ENCOUNTER — APPOINTMENT (OUTPATIENT)
Dept: GASTROENTEROLOGY | Facility: CLINIC | Age: 45
End: 2020-07-17
Payer: MEDICAID

## 2020-07-17 VITALS
HEART RATE: 83 BPM | TEMPERATURE: 98.3 F | BODY MASS INDEX: 43.15 KG/M2 | SYSTOLIC BLOOD PRESSURE: 115 MMHG | HEIGHT: 57 IN | DIASTOLIC BLOOD PRESSURE: 80 MMHG | WEIGHT: 200 LBS

## 2020-07-17 DIAGNOSIS — Z98.890 OTHER SPECIFIED POSTPROCEDURAL STATES: Chronic | ICD-10-CM

## 2020-07-17 DIAGNOSIS — Z11.59 ENCOUNTER FOR SCREENING FOR OTHER VIRAL DISEASES: ICD-10-CM

## 2020-07-17 PROCEDURE — 99214 OFFICE O/P EST MOD 30 MIN: CPT

## 2020-07-17 RX ORDER — POVIDONE-IODINE 5 %
1 AEROSOL (ML) TOPICAL ONCE
Refills: 0 | Status: DISCONTINUED | OUTPATIENT
Start: 2020-07-20 | End: 2020-07-20

## 2020-07-17 NOTE — ASSESSMENT
[FreeTextEntry1] : 45 yo F with PMH of DM, Ovarian cancer s/p left oophorectomy in 2006 and RT oophorectomy and hysterotomy in 2017, Multiple diverticulitis,, H pylori gastritis s/p eradication, CKDN2A mutation and NAFLD. 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 Pt had an MRI liver protocol done in 2017 for evaluation of cyst in the liver that turned out to be simple cyst. \par bx of liver lesion 3/2018 showed micro and macrovesicular steatosis, no malignant cells \par EUS for pancreas dr Ferguson 3/2019  and with Dr. Cabrera on 6/18/20 was unremarkable (normal pancreas and normal size PD)\par has allergy to iodine contrast, so cannot order MRI pancreatic protocol (had edema and dyspnea) \par Repeat EUS yearly \par uptodate advises on alternating EUS and MRI yearly \par literature reports association with other cancers but recommendation are mainly to screen for pancreatic ca \par \par \par # CRC screening \par 2014 colonoscopy dr reid diverticulosis and hemorrhoids \par literature reports association with other cancers but recommendation are mainly to screen for pancreatic ca \par Patient had colonoscopy on 7/1/20 which showed moderate diverticulosis and 3mm transverse colon polyp ( hyperplastic) and 3mm sigmoid polyp ( Hyperplastic)--. repeat in 7-10 years\par \par #H Pylori \par s/p treatment \par neg bx on 3/2019\par \par # BMI 41\par wt loss, healthy diet, exercise advised \par \par # Elevated LFts due to NAFLD\par chronic and stable \par immune to hav\par hbv, hcv neg \par said drinks 1x per month on social events \par bx of liver lesion 3/2018 showed micro and macrovesicular steatosis, no malignant cells \par likely multifactorial (NAFLD, drug related [lipitor, carbamazepine])\par avoid hepatotoxic drugs if possible \par wt loss, exercise, healthy diet advised, avoid alcohol \par on pioglitazone for DM +/- NAFLD \par monitor LFTs \par check Fibrosure\par \par # multiple episode of Diverticulitis \par Patient has appointment for surgery with colorectal surgeon for possible partial colectomy

## 2020-07-17 NOTE — HISTORY OF PRESENT ILLNESS
[de-identified] : 45 yo F with PMH of DM, Ovarian cancer s/p left oophorectomy in 2006 and RT oophorectomy and hysterotomy in 2017, Multiple diverticulitis,, H pylori gastritis s/p eradication, CKDN2A mutation (she is a heterozygote and is part of the melanoma-pancreatic cancer syndrome which confers a lifetime risk 17% pancreatic cancer) and NAFLD. She came for follow up after she did EUS and colonoscopy.\par She was doing EUS yearly for pancreatic cancer screening.

## 2020-07-20 ENCOUNTER — INPATIENT (INPATIENT)
Facility: HOSPITAL | Age: 45
LOS: 2 days | Discharge: HOME | End: 2020-07-23
Attending: SURGERY | Admitting: SURGERY
Payer: MEDICAID

## 2020-07-20 ENCOUNTER — RESULT REVIEW (OUTPATIENT)
Age: 45
End: 2020-07-20

## 2020-07-20 ENCOUNTER — APPOINTMENT (OUTPATIENT)
Dept: SURGERY | Facility: HOSPITAL | Age: 45
End: 2020-07-20

## 2020-07-20 VITALS
WEIGHT: 199.96 LBS | SYSTOLIC BLOOD PRESSURE: 124 MMHG | HEART RATE: 71 BPM | DIASTOLIC BLOOD PRESSURE: 79 MMHG | HEIGHT: 56 IN | TEMPERATURE: 99 F | RESPIRATION RATE: 18 BRPM

## 2020-07-20 DIAGNOSIS — Z98.890 OTHER SPECIFIED POSTPROCEDURAL STATES: Chronic | ICD-10-CM

## 2020-07-20 LAB
ANION GAP SERPL CALC-SCNC: 15 MMOL/L — HIGH (ref 7–14)
ANION GAP SERPL CALC-SCNC: 16 MMOL/L — HIGH (ref 7–14)
BASOPHILS # BLD AUTO: 0.02 K/UL — SIGNIFICANT CHANGE UP (ref 0–0.2)
BASOPHILS # BLD AUTO: 0.03 K/UL — SIGNIFICANT CHANGE UP (ref 0–0.2)
BASOPHILS NFR BLD AUTO: 0.2 % — SIGNIFICANT CHANGE UP (ref 0–1)
BASOPHILS NFR BLD AUTO: 0.3 % — SIGNIFICANT CHANGE UP (ref 0–1)
BUN SERPL-MCNC: 8 MG/DL — LOW (ref 10–20)
BUN SERPL-MCNC: 9 MG/DL — LOW (ref 10–20)
CALCIUM SERPL-MCNC: 7.9 MG/DL — LOW (ref 8.5–10.1)
CALCIUM SERPL-MCNC: 8.1 MG/DL — LOW (ref 8.5–10.1)
CHLORIDE SERPL-SCNC: 104 MMOL/L — SIGNIFICANT CHANGE UP (ref 98–110)
CHLORIDE SERPL-SCNC: 105 MMOL/L — SIGNIFICANT CHANGE UP (ref 98–110)
CO2 SERPL-SCNC: 17 MMOL/L — SIGNIFICANT CHANGE UP (ref 17–32)
CO2 SERPL-SCNC: 20 MMOL/L — SIGNIFICANT CHANGE UP (ref 17–32)
CREAT SERPL-MCNC: 0.6 MG/DL — LOW (ref 0.7–1.5)
CREAT SERPL-MCNC: 0.7 MG/DL — SIGNIFICANT CHANGE UP (ref 0.7–1.5)
EOSINOPHIL # BLD AUTO: 0 K/UL — SIGNIFICANT CHANGE UP (ref 0–0.7)
EOSINOPHIL # BLD AUTO: 0.01 K/UL — SIGNIFICANT CHANGE UP (ref 0–0.7)
EOSINOPHIL NFR BLD AUTO: 0 % — SIGNIFICANT CHANGE UP (ref 0–8)
EOSINOPHIL NFR BLD AUTO: 0.1 % — SIGNIFICANT CHANGE UP (ref 0–8)
GLUCOSE BLDC GLUCOMTR-MCNC: 135 MG/DL — HIGH (ref 70–99)
GLUCOSE BLDC GLUCOMTR-MCNC: 165 MG/DL — HIGH (ref 70–99)
GLUCOSE BLDC GLUCOMTR-MCNC: 190 MG/DL — HIGH (ref 70–99)
GLUCOSE BLDC GLUCOMTR-MCNC: 89 MG/DL — SIGNIFICANT CHANGE UP (ref 70–99)
GLUCOSE SERPL-MCNC: 131 MG/DL — HIGH (ref 70–99)
GLUCOSE SERPL-MCNC: 228 MG/DL — HIGH (ref 70–99)
HCT VFR BLD CALC: 32.7 % — LOW (ref 37–47)
HCT VFR BLD CALC: 33.3 % — LOW (ref 37–47)
HGB BLD-MCNC: 10.4 G/DL — LOW (ref 12–16)
HGB BLD-MCNC: 10.6 G/DL — LOW (ref 12–16)
IMM GRANULOCYTES NFR BLD AUTO: 0.3 % — SIGNIFICANT CHANGE UP (ref 0.1–0.3)
IMM GRANULOCYTES NFR BLD AUTO: 0.3 % — SIGNIFICANT CHANGE UP (ref 0.1–0.3)
LACTATE SERPL-SCNC: 3 MMOL/L — HIGH (ref 0.7–2)
LACTATE SERPL-SCNC: 4 MMOL/L — CRITICAL HIGH (ref 0.7–2)
LYMPHOCYTES # BLD AUTO: 1 K/UL — LOW (ref 1.2–3.4)
LYMPHOCYTES # BLD AUTO: 1.75 K/UL — SIGNIFICANT CHANGE UP (ref 1.2–3.4)
LYMPHOCYTES # BLD AUTO: 14.5 % — LOW (ref 20.5–51.1)
LYMPHOCYTES # BLD AUTO: 9.3 % — LOW (ref 20.5–51.1)
MAGNESIUM SERPL-MCNC: 1.6 MG/DL — LOW (ref 1.8–2.4)
MAGNESIUM SERPL-MCNC: 1.6 MG/DL — LOW (ref 1.8–2.4)
MCHC RBC-ENTMCNC: 28.3 PG — SIGNIFICANT CHANGE UP (ref 27–31)
MCHC RBC-ENTMCNC: 28.5 PG — SIGNIFICANT CHANGE UP (ref 27–31)
MCHC RBC-ENTMCNC: 31.8 G/DL — LOW (ref 32–37)
MCHC RBC-ENTMCNC: 31.8 G/DL — LOW (ref 32–37)
MCV RBC AUTO: 89.1 FL — SIGNIFICANT CHANGE UP (ref 81–99)
MCV RBC AUTO: 89.5 FL — SIGNIFICANT CHANGE UP (ref 81–99)
MONOCYTES # BLD AUTO: 0.8 K/UL — HIGH (ref 0.1–0.6)
MONOCYTES # BLD AUTO: 0.89 K/UL — HIGH (ref 0.1–0.6)
MONOCYTES NFR BLD AUTO: 6.6 % — SIGNIFICANT CHANGE UP (ref 1.7–9.3)
MONOCYTES NFR BLD AUTO: 8.2 % — SIGNIFICANT CHANGE UP (ref 1.7–9.3)
NEUTROPHILS # BLD AUTO: 8.86 K/UL — HIGH (ref 1.4–6.5)
NEUTROPHILS # BLD AUTO: 9.43 K/UL — HIGH (ref 1.4–6.5)
NEUTROPHILS NFR BLD AUTO: 78.3 % — HIGH (ref 42.2–75.2)
NEUTROPHILS NFR BLD AUTO: 81.9 % — HIGH (ref 42.2–75.2)
NRBC # BLD: 0 /100 WBCS — SIGNIFICANT CHANGE UP (ref 0–0)
NRBC # BLD: 0 /100 WBCS — SIGNIFICANT CHANGE UP (ref 0–0)
PHOSPHATE SERPL-MCNC: 3.5 MG/DL — SIGNIFICANT CHANGE UP (ref 2.1–4.9)
PHOSPHATE SERPL-MCNC: 3.9 MG/DL — SIGNIFICANT CHANGE UP (ref 2.1–4.9)
PLATELET # BLD AUTO: 269 K/UL — SIGNIFICANT CHANGE UP (ref 130–400)
PLATELET # BLD AUTO: 302 K/UL — SIGNIFICANT CHANGE UP (ref 130–400)
POTASSIUM SERPL-MCNC: 4.4 MMOL/L — SIGNIFICANT CHANGE UP (ref 3.5–5)
POTASSIUM SERPL-MCNC: 4.5 MMOL/L — SIGNIFICANT CHANGE UP (ref 3.5–5)
POTASSIUM SERPL-SCNC: 4.4 MMOL/L — SIGNIFICANT CHANGE UP (ref 3.5–5)
POTASSIUM SERPL-SCNC: 4.5 MMOL/L — SIGNIFICANT CHANGE UP (ref 3.5–5)
RBC # BLD: 3.67 M/UL — LOW (ref 4.2–5.4)
RBC # BLD: 3.72 M/UL — LOW (ref 4.2–5.4)
RBC # FLD: 13.7 % — SIGNIFICANT CHANGE UP (ref 11.5–14.5)
RBC # FLD: 13.8 % — SIGNIFICANT CHANGE UP (ref 11.5–14.5)
SODIUM SERPL-SCNC: 138 MMOL/L — SIGNIFICANT CHANGE UP (ref 135–146)
SODIUM SERPL-SCNC: 139 MMOL/L — SIGNIFICANT CHANGE UP (ref 135–146)
WBC # BLD: 10.81 K/UL — HIGH (ref 4.8–10.8)
WBC # BLD: 12.05 K/UL — HIGH (ref 4.8–10.8)
WBC # FLD AUTO: 10.81 K/UL — HIGH (ref 4.8–10.8)
WBC # FLD AUTO: 12.05 K/UL — HIGH (ref 4.8–10.8)

## 2020-07-20 PROCEDURE — 44139 MOBILIZATION OF COLON: CPT

## 2020-07-20 PROCEDURE — 44145 PARTIAL REMOVAL OF COLON: CPT

## 2020-07-20 PROCEDURE — 88307 TISSUE EXAM BY PATHOLOGIST: CPT | Mod: 26

## 2020-07-20 PROCEDURE — 88304 TISSUE EXAM BY PATHOLOGIST: CPT | Mod: 26

## 2020-07-20 RX ORDER — CHLORHEXIDINE GLUCONATE 213 G/1000ML
1 SOLUTION TOPICAL
Refills: 0 | Status: DISCONTINUED | OUTPATIENT
Start: 2020-07-20 | End: 2020-07-23

## 2020-07-20 RX ORDER — CHLORHEXIDINE GLUCONATE 213 G/1000ML
1 SOLUTION TOPICAL ONCE
Refills: 0 | Status: DISCONTINUED | OUTPATIENT
Start: 2020-07-20 | End: 2020-07-20

## 2020-07-20 RX ORDER — CEFOTETAN DISODIUM 1 G
2 VIAL (EA) INJECTION EVERY 12 HOURS
Refills: 0 | Status: COMPLETED | OUTPATIENT
Start: 2020-07-20 | End: 2020-07-21

## 2020-07-20 RX ORDER — ONDANSETRON 8 MG/1
4 TABLET, FILM COATED ORAL ONCE
Refills: 0 | Status: DISCONTINUED | OUTPATIENT
Start: 2020-07-20 | End: 2020-07-20

## 2020-07-20 RX ORDER — SODIUM CHLORIDE 9 MG/ML
1000 INJECTION, SOLUTION INTRAVENOUS
Refills: 0 | Status: DISCONTINUED | OUTPATIENT
Start: 2020-07-20 | End: 2020-07-21

## 2020-07-20 RX ORDER — DEXTROSE 50 % IN WATER 50 %
12.5 SYRINGE (ML) INTRAVENOUS ONCE
Refills: 0 | Status: DISCONTINUED | OUTPATIENT
Start: 2020-07-20 | End: 2020-07-23

## 2020-07-20 RX ORDER — CARBAMAZEPINE 200 MG
100 TABLET ORAL EVERY 12 HOURS
Refills: 0 | Status: DISCONTINUED | OUTPATIENT
Start: 2020-07-20 | End: 2020-07-23

## 2020-07-20 RX ORDER — ACETAMINOPHEN 500 MG
650 TABLET ORAL EVERY 6 HOURS
Refills: 0 | Status: DISCONTINUED | OUTPATIENT
Start: 2020-07-20 | End: 2020-07-23

## 2020-07-20 RX ORDER — ATORVASTATIN CALCIUM 80 MG/1
40 TABLET, FILM COATED ORAL AT BEDTIME
Refills: 0 | Status: DISCONTINUED | OUTPATIENT
Start: 2020-07-20 | End: 2020-07-23

## 2020-07-20 RX ORDER — KETOROLAC TROMETHAMINE 30 MG/ML
15 SYRINGE (ML) INJECTION EVERY 6 HOURS
Refills: 0 | Status: DISCONTINUED | OUTPATIENT
Start: 2020-07-20 | End: 2020-07-23

## 2020-07-20 RX ORDER — ACETAMINOPHEN 500 MG
1000 TABLET ORAL ONCE
Refills: 0 | Status: COMPLETED | OUTPATIENT
Start: 2020-07-20 | End: 2020-07-20

## 2020-07-20 RX ORDER — INSULIN LISPRO 100/ML
VIAL (ML) SUBCUTANEOUS EVERY 4 HOURS
Refills: 0 | Status: DISCONTINUED | OUTPATIENT
Start: 2020-07-20 | End: 2020-07-23

## 2020-07-20 RX ORDER — SODIUM CHLORIDE 9 MG/ML
1000 INJECTION, SOLUTION INTRAVENOUS
Refills: 0 | Status: DISCONTINUED | OUTPATIENT
Start: 2020-07-20 | End: 2020-07-20

## 2020-07-20 RX ORDER — HEPARIN SODIUM 5000 [USP'U]/ML
5000 INJECTION INTRAVENOUS; SUBCUTANEOUS ONCE
Refills: 0 | Status: COMPLETED | OUTPATIENT
Start: 2020-07-20 | End: 2020-07-20

## 2020-07-20 RX ORDER — HYDROMORPHONE HYDROCHLORIDE 2 MG/ML
0.5 INJECTION INTRAMUSCULAR; INTRAVENOUS; SUBCUTANEOUS
Refills: 0 | Status: DISCONTINUED | OUTPATIENT
Start: 2020-07-20 | End: 2020-07-20

## 2020-07-20 RX ORDER — HEPARIN SODIUM 5000 [USP'U]/ML
5000 INJECTION INTRAVENOUS; SUBCUTANEOUS EVERY 8 HOURS
Refills: 0 | Status: COMPLETED | OUTPATIENT
Start: 2020-07-20 | End: 2020-07-20

## 2020-07-20 RX ORDER — HYDROMORPHONE HYDROCHLORIDE 2 MG/ML
1 INJECTION INTRAMUSCULAR; INTRAVENOUS; SUBCUTANEOUS
Refills: 0 | Status: DISCONTINUED | OUTPATIENT
Start: 2020-07-20 | End: 2020-07-20

## 2020-07-20 RX ORDER — GABAPENTIN 400 MG/1
600 CAPSULE ORAL ONCE
Refills: 0 | Status: COMPLETED | OUTPATIENT
Start: 2020-07-20 | End: 2020-07-20

## 2020-07-20 RX ORDER — GLUCAGON INJECTION, SOLUTION 0.5 MG/.1ML
1 INJECTION, SOLUTION SUBCUTANEOUS ONCE
Refills: 0 | Status: DISCONTINUED | OUTPATIENT
Start: 2020-07-20 | End: 2020-07-23

## 2020-07-20 RX ORDER — DEXTROSE 50 % IN WATER 50 %
15 SYRINGE (ML) INTRAVENOUS ONCE
Refills: 0 | Status: DISCONTINUED | OUTPATIENT
Start: 2020-07-20 | End: 2020-07-23

## 2020-07-20 RX ORDER — ENOXAPARIN SODIUM 100 MG/ML
40 INJECTION SUBCUTANEOUS DAILY
Refills: 0 | Status: DISCONTINUED | OUTPATIENT
Start: 2020-07-21 | End: 2020-07-23

## 2020-07-20 RX ORDER — SODIUM CHLORIDE 9 MG/ML
1000 INJECTION INTRAMUSCULAR; INTRAVENOUS; SUBCUTANEOUS ONCE
Refills: 0 | Status: DISCONTINUED | OUTPATIENT
Start: 2020-07-20 | End: 2020-07-20

## 2020-07-20 RX ORDER — SODIUM CHLORIDE 9 MG/ML
500 INJECTION, SOLUTION INTRAVENOUS ONCE
Refills: 0 | Status: COMPLETED | OUTPATIENT
Start: 2020-07-20 | End: 2020-07-20

## 2020-07-20 RX ORDER — ONDANSETRON 8 MG/1
4 TABLET, FILM COATED ORAL ONCE
Refills: 0 | Status: COMPLETED | OUTPATIENT
Start: 2020-07-20 | End: 2020-07-20

## 2020-07-20 RX ORDER — MORPHINE SULFATE 50 MG/1
2 CAPSULE, EXTENDED RELEASE ORAL EVERY 4 HOURS
Refills: 0 | Status: DISCONTINUED | OUTPATIENT
Start: 2020-07-20 | End: 2020-07-23

## 2020-07-20 RX ORDER — BUPIVACAINE 13.3 MG/ML
30 INJECTION, SUSPENSION, LIPOSOMAL INFILTRATION ONCE
Refills: 0 | Status: DISCONTINUED | OUTPATIENT
Start: 2020-07-20 | End: 2020-07-20

## 2020-07-20 RX ORDER — SODIUM CHLORIDE 9 MG/ML
1000 INJECTION, SOLUTION INTRAVENOUS
Refills: 0 | Status: DISCONTINUED | OUTPATIENT
Start: 2020-07-20 | End: 2020-07-23

## 2020-07-20 RX ORDER — OXYCODONE HYDROCHLORIDE 5 MG/1
1 TABLET ORAL
Qty: 10 | Refills: 0
Start: 2020-07-20

## 2020-07-20 RX ORDER — PANTOPRAZOLE SODIUM 20 MG/1
40 TABLET, DELAYED RELEASE ORAL
Refills: 0 | Status: DISCONTINUED | OUTPATIENT
Start: 2020-07-20 | End: 2020-07-23

## 2020-07-20 RX ORDER — ONDANSETRON 8 MG/1
4 TABLET, FILM COATED ORAL EVERY 6 HOURS
Refills: 0 | Status: DISCONTINUED | OUTPATIENT
Start: 2020-07-20 | End: 2020-07-23

## 2020-07-20 RX ORDER — KETOROLAC TROMETHAMINE 30 MG/ML
15 SYRINGE (ML) INJECTION ONCE
Refills: 0 | Status: DISCONTINUED | OUTPATIENT
Start: 2020-07-20 | End: 2020-07-20

## 2020-07-20 RX ORDER — SODIUM CHLORIDE 9 MG/ML
1000 INJECTION INTRAMUSCULAR; INTRAVENOUS; SUBCUTANEOUS ONCE
Refills: 0 | Status: COMPLETED | OUTPATIENT
Start: 2020-07-20 | End: 2020-07-20

## 2020-07-20 RX ADMIN — HYDROMORPHONE HYDROCHLORIDE 0.5 MILLIGRAM(S): 2 INJECTION INTRAMUSCULAR; INTRAVENOUS; SUBCUTANEOUS at 14:50

## 2020-07-20 RX ADMIN — Medication 15 MILLIGRAM(S): at 23:42

## 2020-07-20 RX ADMIN — Medication 650 MILLIGRAM(S): at 23:41

## 2020-07-20 RX ADMIN — Medication 100 GRAM(S): at 17:14

## 2020-07-20 RX ADMIN — Medication 15 MILLIGRAM(S): at 17:14

## 2020-07-20 RX ADMIN — Medication 1000 MILLIGRAM(S): at 18:56

## 2020-07-20 RX ADMIN — GABAPENTIN 600 MILLIGRAM(S): 400 CAPSULE ORAL at 07:11

## 2020-07-20 RX ADMIN — SODIUM CHLORIDE 1000 MILLILITER(S): 9 INJECTION, SOLUTION INTRAVENOUS at 22:50

## 2020-07-20 RX ADMIN — ONDANSETRON 4 MILLIGRAM(S): 8 TABLET, FILM COATED ORAL at 15:45

## 2020-07-20 RX ADMIN — ATORVASTATIN CALCIUM 40 MILLIGRAM(S): 80 TABLET, FILM COATED ORAL at 21:22

## 2020-07-20 RX ADMIN — MORPHINE SULFATE 2 MILLIGRAM(S): 50 CAPSULE, EXTENDED RELEASE ORAL at 21:52

## 2020-07-20 RX ADMIN — Medication 15 MILLIGRAM(S): at 18:56

## 2020-07-20 RX ADMIN — HEPARIN SODIUM 5000 UNIT(S): 5000 INJECTION INTRAVENOUS; SUBCUTANEOUS at 07:13

## 2020-07-20 RX ADMIN — SODIUM CHLORIDE 125 MILLILITER(S): 9 INJECTION, SOLUTION INTRAVENOUS at 16:23

## 2020-07-20 RX ADMIN — MORPHINE SULFATE 2 MILLIGRAM(S): 50 CAPSULE, EXTENDED RELEASE ORAL at 21:22

## 2020-07-20 RX ADMIN — HYDROMORPHONE HYDROCHLORIDE 0.5 MILLIGRAM(S): 2 INJECTION INTRAMUSCULAR; INTRAVENOUS; SUBCUTANEOUS at 13:55

## 2020-07-20 RX ADMIN — Medication 400 MILLIGRAM(S): at 17:55

## 2020-07-20 RX ADMIN — HEPARIN SODIUM 5000 UNIT(S): 5000 INJECTION INTRAVENOUS; SUBCUTANEOUS at 23:42

## 2020-07-20 RX ADMIN — HEPARIN SODIUM 5000 UNIT(S): 5000 INJECTION INTRAVENOUS; SUBCUTANEOUS at 16:21

## 2020-07-20 RX ADMIN — Medication 1000 MILLIGRAM(S): at 07:11

## 2020-07-20 RX ADMIN — Medication 15 MILLIGRAM(S): at 20:06

## 2020-07-20 RX ADMIN — Medication 100 MILLIGRAM(S): at 17:15

## 2020-07-20 RX ADMIN — SODIUM CHLORIDE 1000 MILLILITER(S): 9 INJECTION INTRAMUSCULAR; INTRAVENOUS; SUBCUTANEOUS at 16:13

## 2020-07-20 RX ADMIN — HYDROMORPHONE HYDROCHLORIDE 0.5 MILLIGRAM(S): 2 INJECTION INTRAMUSCULAR; INTRAVENOUS; SUBCUTANEOUS at 14:39

## 2020-07-20 NOTE — CHART NOTE - NSCHARTNOTEFT_GEN_A_CORE
PACU ANESTHESIA ADMISSION NOTE      ____ Intubated  TV:______       Rate: ______      FiO2: ______    __x__ Patent Airway    ___x_ Full return of protective reflexes    ____ Full recovery from anesthesia / sedation to baseline status    Vitals:  HR 90  BP 91/52  RR 15  O2sat. 96% on 3L n/c  Temp: 36.8C      Mental Status:  __x__ Awake   ___x_ Alert   _____ Drowsy   _____ Sedated    Nausea/Vomiting: ____ Yes, See Post - Op Orders      _x__ No    Pain Scale (0-10): __3___    Treatment: ____ None    __x__ See Post - Op/PCA Orders    Post - Operative Fluids:   ____ Oral   __x__ See Post - Op Orders    Plan:  Discharge to:   ____Home       ___x__Floor      _____Critical Care    _____ Other:_________________    Comments: s/p general anesthesia with ETT. No anesthesia complications. Pt's condition is stable in PACU. Full report is given to PACU RN.

## 2020-07-20 NOTE — CHART NOTE - NSCHARTNOTEFT_GEN_A_CORE
Post Operative Note    ALFIE MOSLEY,44yFedeysi  [id number]  07-21-20 @ 04:07  Procedure: Status post laparoscopic lysis of adjesions, resection if sigmoid and descending colon.  Post Operative day #0    K57.92/46264  ^K57.92/[id number]  HTN (hypertension) (Mother)  Hypercholesteremia (Mother)  Diabetes (Father, Mother)  Handoff  MEWS Score  Obesity  Fatty liver  Diverticulitis  Diverticulosis of intestine  Vertigo  Hypercholesteremia  Seizure disorder  Ovarian cancer  Diabetes  Liver disorder  Acute diverticulitis  Acute diverticulitis  Colectomy with coloproctostomy  Mobilization, splenic flexure  Resection of descending colon  Resection, sigmoid colon, open  Laparoscopic lysis of abdominal adhesions  H/O colonoscopy  H/O abdominal hysterectomy  History of cholecystectomy    contrast media (gadolinium-based) (Short breath; Headache)    acetaminophen   Tablet .. 650 milliGRAM(s) Oral every 6 hours  atorvastatin 40 milliGRAM(s) Oral at bedtime  carBAMazepine Chewable 100 milliGRAM(s) Chew every 12 hours  cefoTEtan  IVPB 2 Gram(s) IV Intermittent every 12 hours  chlorhexidine 2% Cloths 1 Application(s) Topical <User Schedule>  dextrose 40% Gel 15 Gram(s) Oral once PRN  dextrose 5%. 1000 milliLiter(s) IV Continuous <Continuous>  dextrose 50% Injectable 12.5 Gram(s) IV Push once  enoxaparin Injectable 40 milliGRAM(s) SubCutaneous daily  glucagon  Injectable 1 milliGRAM(s) IntraMuscular once PRN  insulin lispro (HumaLOG) corrective regimen sliding scale   SubCutaneous every 4 hours  ketorolac   Injectable 15 milliGRAM(s) IV Push every 6 hours  lactated ringers. 1000 milliLiter(s) IV Continuous <Continuous>  morphine  - Injectable 2 milliGRAM(s) IV Push every 4 hours PRN  multivitamin 1 Tablet(s) Oral daily  ondansetron Injectable 4 milliGRAM(s) IV Push every 6 hours PRN  pantoprazole    Tablet 40 milliGRAM(s) Oral before breakfast          T(C): 37.1 (07-21-20 @ 01:01), Max: 37.1 (07-20-20 @ 06:27)  HR: 90 (07-21-20 @ 01:01) (71 - 93)  BP: 104/56 (07-21-20 @ 01:01) (89/55 - 124/79)  RR: 18 (07-21-20 @ 01:01) (15 - 22)  SpO2: 96% (07-20-20 @ 15:52) (91% - 97%)    07-20-20 @ 07:01  -  07-21-20 @ 04:07  --------------------------------------------------------  IN: 1650 mL / OUT: 1225 mL / NET: 425 mL        General:Alert and oriented times 3, in mild distress due to pain  Heart: Regular rate  Lungs:symmetric chest wall expansion  Abdomen: Soft , diffuse tenderness, particularly periincisional,, no peritoneal signs              Plan and assesment:  Pt. 44yFemale POD#0  - Pain management  - progress Diet  - IS  - OOB  - f/u labs & replete if necessary

## 2020-07-20 NOTE — CONSULT NOTE ADULT - SUBJECTIVE AND OBJECTIVE BOX
HPI: 43yo F admitted on 7/20 for robotic sigmoid colon resection for diverticulitis. Prior to hospitalization she was ambulating independent. Post op rehab consulted for eval      PAST MEDICAL & SURGICAL HISTORY:  Obesity  Fatty liver  Diverticulitis  Diverticulosis of intestine  Vertigo: &gt; 3 months ago, not currently complaining of same (3/4/2020)  Hypercholesteremia  Seizure disorder: last seizure &gt; 6 months ago  Ovarian cancer: UNSURE OF STAGE HOWEVER REPORTS IT &quot;MINOR&quot;  Sees Oncologist Dr. Vanessa Correa  Diabetes  H/O colonoscopy: &gt;3 years ago  H/O abdominal hysterectomy: With Right Oophrecetomy   In December 2017  History of cholecystectomy: 10-15 years ago      Hospital Course:    TODAY'S SUBJECTIVE & REVIEW OF SYMPTOMS:     Constitutional WNL   Cardio WNL   Resp WNL   GI WNL  Heme WNL  Endo WNL  Skin WNL  MSK pain  Neuro WNL  Cognitive WNL  Psych WNL      MEDICATIONS  (STANDING):  acetaminophen   Tablet .. 650 milliGRAM(s) Oral every 6 hours  acetaminophen  IVPB .. 1000 milliGRAM(s) IV Intermittent once  atorvastatin 40 milliGRAM(s) Oral at bedtime  carBAMazepine Chewable 100 milliGRAM(s) Chew every 12 hours  cefoTEtan  IVPB 2 Gram(s) IV Intermittent every 12 hours  chlorhexidine 2% Cloths 1 Application(s) Topical <User Schedule>  dextrose 5%. 1000 milliLiter(s) (50 mL/Hr) IV Continuous <Continuous>  dextrose 50% Injectable 12.5 Gram(s) IV Push once  heparin   Injectable 5000 Unit(s) SubCutaneous every 8 hours  insulin lispro (HumaLOG) corrective regimen sliding scale   SubCutaneous every 4 hours  lactated ringers. 1000 milliLiter(s) (125 mL/Hr) IV Continuous <Continuous>  multivitamin 1 Tablet(s) Oral daily  pantoprazole    Tablet 40 milliGRAM(s) Oral before breakfast    MEDICATIONS  (PRN):  dextrose 40% Gel 15 Gram(s) Oral once PRN Blood Glucose LESS THAN 70 milliGRAM(s)/deciliter  glucagon  Injectable 1 milliGRAM(s) IntraMuscular once PRN Glucose LESS THAN 70 milligrams/deciliter  morphine  - Injectable 2 milliGRAM(s) IV Push every 4 hours PRN Severe Pain (7 - 10)  ondansetron Injectable 4 milliGRAM(s) IV Push every 6 hours PRN Nausea and/or Vomiting      FAMILY HISTORY:  HTN (hypertension) (Mother)  Hypercholesteremia (Mother)  Diabetes (Father, Mother)      Allergies    contrast media (gadolinium-based) (Short breath; Headache)    Intolerances        SOCIAL HISTORY:    [  ] Etoh  [  ] Smoking  [  ] Substance abuse     Home Environment:  [  ] Home Alone  [x  ] Lives with Family  [  ] Home Health Aid    Dwelling:  [  ] Apartment  [ x ] Private House  [  ] Adult Home  [  ] Skilled Nursing Facility      [  ] Short Term  [  ] Long Term  [x  ] Stairs       Elevator [  ]    FUNCTIONAL STATUS PTA: (Check all that apply)  Ambulation: [ x  ]Independent    [  ] Dependent     [  ] Non-Ambulatory  Assistive Device: [  ] SA Cane  [  ]  Q Cane  [  ] Walker  [  ]  Wheelchair  ADL : [ x ] Independent  [  ]  Dependent       Vital Signs Last 24 Hrs  T(C): 36.1 (20 Jul 2020 15:52), Max: 37.1 (20 Jul 2020 06:27)  T(F): 97 (20 Jul 2020 15:52), Max: 98.7 (20 Jul 2020 06:27)  HR: 88 (20 Jul 2020 15:52) (71 - 93)  BP: 97/58 (20 Jul 2020 15:52) (89/55 - 124/79)  BP(mean): --  RR: 18 (20 Jul 2020 15:52) (15 - 22)  SpO2: 96% (20 Jul 2020 15:52) (91% - 97%)      PHYSICAL EXAM: Alert & Oriented X3  GENERAL: NAD, well-groomed, well-developed  HEAD:  Atraumatic, Normocephalic  CHEST/LUNG: Clear   HEART: S1S2+  ABDOMEN: Soft  EXTREMITIES:  no calf tenderness    NERVOUS SYSTEM:  Cranial Nerves 2-12 intact [  ] Abnormal  [  ]  ROM: WFL all extremities [ x ]  Abnormal [  ]  Motor Strength: WFL all extremities  [x  ]  Abnormal [  ]  Sensation: intact to light touch [x  ] Abnormal [  ]  Reflexes: Symmetric [  ]  Abnormal [  ]    FUNCTIONAL STATUS:  Bed Mobility: Independent [  ]  Supervision [  ]  Needs Assistance [x  ]  N/A [  ]  Transfers: Independent [  ]  Supervision [  ]  Needs Assistance [ x ]  N/A [  ]   Ambulation: Independent [  ]  Supervision [  ]  Needs Assistance [  ]  N/A [  ]  ADL: Independent [  ] Requires Assistance [  ] N/A [  ]      LABS:                        10.6   12.05 )-----------( 302      ( 20 Jul 2020 14:25 )             33.3     07-20    138  |  105  |  9<L>  ----------------------------<  228<H>  4.4   |  17  |  0.6<L>    Ca    7.9<L>      20 Jul 2020 14:25  Phos  3.9     07-20  Mg     1.6     07-20            RADIOLOGY & ADDITIONAL STUDIES:    Assesment:

## 2020-07-20 NOTE — BRIEF OPERATIVE NOTE - NSICDXBRIEFPROCEDURE_GEN_ALL_CORE_FT
PROCEDURES:  Laparoscopic lysis of abdominal adhesions 20-Jul-2020 13:17:15  Pal Alejandra  Resection, sigmoid colon, open 20-Jul-2020 13:18:58  Pal Alejandra  Resection of descending colon 20-Jul-2020 13:19:27  Pal Alejandra  Mobilization, splenic flexure 20-Jul-2020 13:19:38  Pal Alejandra  Colectomy with coloproctostomy 20-Jul-2020 13:20:19  Pal Alejandra

## 2020-07-20 NOTE — CONSULT NOTE ADULT - ASSESSMENT
IMPRESSION: Rehab of gait dysfunction    PRECAUTIONS: [  ] Cardiac  [  ] Respiratory  [  ] Seizures [  ] Contact Isolation  [  ] Droplet Isolation  [  ] Other    Weight Bearing Status:     RECOMMENDATION:    Out of Bed to Chair     DVT/Decubiti Prophylaxis    REHAB PLAN:     [ x  ] Bedside P/T 3-5 times a week   [   ]   Bedside O/T  2-3 times a week             [   ] No Rehab Therapy Indicated                   [   ]  Speech Therapy   Conditioning/ROM                                    ADL  Bed Mobility                                               Conditioning/ROM  Transfers                                                     Bed Mobility  Sitting /Standing Balance                         Transfers                                        Gait Training                                               Sitting/Standing Balance  Stair Training [   ]Applicable                    Home equipment Eval                                                                        Splinting  [   ] Only      GOALS:   ADL   [   ]   Independent                    Transfers  [  x ] Independent                          Ambulation  [ x  ] Independent     [  x  ] With device                            [   ]  CG                                                         [   ]  CG                                                                  [   ] CG                            [    ] Min A                                                   [   ] Min A                                                              [   ] Min  A          DISCHARGE PLAN:   [   ]  Good candidate for Intensive Rehabilitation/Hospital based                                             Will tolerate 3hrs Intensive Rehab Daily                                       [    ]  Short Term Rehab in Skilled Nursing Facility                                       [  x  ]  Home with Outpatient or  services                                         [    ]  Possible Candidate for Intensive Hospital based Rehab

## 2020-07-20 NOTE — ASU PATIENT PROFILE, ADULT - TEACHING/LEARNING FACTORS IMPACT ABILITY TO LEARN
Pt states vaginal discharge for a week that is orange in color. Denies urinary symptoms. Denies pain.  Recent unprotected sex
none

## 2020-07-20 NOTE — ASU PREOP CHECKLIST - SELECT TESTS ORDERED
Progress Notes by Ave Mo at 12/26/17 12:04 PM     Author:  Ave Mo Service:  (none) Author Type:  Medical Records Staff     Filed:  12/26/17 12:04 PM Encounter Date:  12/18/2017 Status:  Signed     :  Ave Mo (Medical Records Staff)            I AGREE[JN1.1M]      Revision History        User Key Date/Time User Provider Type Action    > JN1.1 12/26/17 12:04 PM Ave Mo Medical Records Staff Sign    M - Manual            
POCT Blood Glucose/89

## 2020-07-20 NOTE — BRIEF OPERATIVE NOTE - OPERATION/FINDINGS
did not tolerate Trendelenburg converted to open, thickened and fatty colon, preop c-scope showed pancolonic diverticula, resected sigmoid and descending colon, open splenic flexure takedown, 28 eea blue coloproctostomy with intact donuts and negative test

## 2020-07-20 NOTE — PATIENT PROFILE ADULT - SURGICAL SITE DESCRIPTION
1 midline vertical incision (staples, telfa, tegaderm) and surrounding opsites (with steristrips, telfa, tegaderm)

## 2020-07-20 NOTE — ASU PATIENT PROFILE, ADULT - PMH
Diabetes    Diverticulitis    Diverticulosis of intestine    Fatty liver    Hypercholesteremia    Obesity    Ovarian cancer  UNSURE OF STAGE HOWEVER REPORTS IT "MINOR"  Sees Oncologist Dr. Vanesas Correa  Seizure disorder  last seizure > 6 months ago  Vertigo  > 3 months ago, not currently complaining of same (3/4/2020)

## 2020-07-21 LAB
ANION GAP SERPL CALC-SCNC: 11 MMOL/L — SIGNIFICANT CHANGE UP (ref 7–14)
ANION GAP SERPL CALC-SCNC: 7 MMOL/L — SIGNIFICANT CHANGE UP (ref 7–14)
BUN SERPL-MCNC: 5 MG/DL — LOW (ref 10–20)
BUN SERPL-MCNC: 8 MG/DL — LOW (ref 10–20)
CALCIUM SERPL-MCNC: 8.2 MG/DL — LOW (ref 8.5–10.1)
CALCIUM SERPL-MCNC: 8.4 MG/DL — LOW (ref 8.5–10.1)
CHLORIDE SERPL-SCNC: 107 MMOL/L — SIGNIFICANT CHANGE UP (ref 98–110)
CHLORIDE SERPL-SCNC: 107 MMOL/L — SIGNIFICANT CHANGE UP (ref 98–110)
CO2 SERPL-SCNC: 22 MMOL/L — SIGNIFICANT CHANGE UP (ref 17–32)
CO2 SERPL-SCNC: 27 MMOL/L — SIGNIFICANT CHANGE UP (ref 17–32)
CREAT SERPL-MCNC: 0.5 MG/DL — LOW (ref 0.7–1.5)
CREAT SERPL-MCNC: 0.6 MG/DL — LOW (ref 0.7–1.5)
GLUCOSE BLDC GLUCOMTR-MCNC: 105 MG/DL — HIGH (ref 70–99)
GLUCOSE BLDC GLUCOMTR-MCNC: 121 MG/DL — HIGH (ref 70–99)
GLUCOSE BLDC GLUCOMTR-MCNC: 124 MG/DL — HIGH (ref 70–99)
GLUCOSE BLDC GLUCOMTR-MCNC: 128 MG/DL — HIGH (ref 70–99)
GLUCOSE BLDC GLUCOMTR-MCNC: 166 MG/DL — HIGH (ref 70–99)
GLUCOSE BLDC GLUCOMTR-MCNC: 169 MG/DL — HIGH (ref 70–99)
GLUCOSE BLDC GLUCOMTR-MCNC: 175 MG/DL — HIGH (ref 70–99)
GLUCOSE BLDC GLUCOMTR-MCNC: 179 MG/DL — HIGH (ref 70–99)
GLUCOSE SERPL-MCNC: 122 MG/DL — HIGH (ref 70–99)
GLUCOSE SERPL-MCNC: 132 MG/DL — HIGH (ref 70–99)
LACTATE SERPL-SCNC: 1.2 MMOL/L — SIGNIFICANT CHANGE UP (ref 0.7–2)
LACTATE SERPL-SCNC: 2 MMOL/L — SIGNIFICANT CHANGE UP (ref 0.7–2)
MAGNESIUM SERPL-MCNC: 1.6 MG/DL — LOW (ref 1.8–2.4)
MAGNESIUM SERPL-MCNC: 2.1 MG/DL — SIGNIFICANT CHANGE UP (ref 1.8–2.4)
PHOSPHATE SERPL-MCNC: 2 MG/DL — LOW (ref 2.1–4.9)
PHOSPHATE SERPL-MCNC: 4.2 MG/DL — SIGNIFICANT CHANGE UP (ref 2.1–4.9)
POTASSIUM SERPL-MCNC: 3.9 MMOL/L — SIGNIFICANT CHANGE UP (ref 3.5–5)
POTASSIUM SERPL-MCNC: 4.6 MMOL/L — SIGNIFICANT CHANGE UP (ref 3.5–5)
POTASSIUM SERPL-SCNC: 3.9 MMOL/L — SIGNIFICANT CHANGE UP (ref 3.5–5)
POTASSIUM SERPL-SCNC: 4.6 MMOL/L — SIGNIFICANT CHANGE UP (ref 3.5–5)
SODIUM SERPL-SCNC: 140 MMOL/L — SIGNIFICANT CHANGE UP (ref 135–146)
SODIUM SERPL-SCNC: 141 MMOL/L — SIGNIFICANT CHANGE UP (ref 135–146)

## 2020-07-21 PROCEDURE — 99024 POSTOP FOLLOW-UP VISIT: CPT

## 2020-07-21 RX ORDER — SODIUM CHLORIDE 9 MG/ML
1000 INJECTION, SOLUTION INTRAVENOUS
Refills: 0 | Status: DISCONTINUED | OUTPATIENT
Start: 2020-07-21 | End: 2020-07-22

## 2020-07-21 RX ORDER — MAGNESIUM SULFATE 500 MG/ML
2 VIAL (ML) INJECTION ONCE
Refills: 0 | Status: COMPLETED | OUTPATIENT
Start: 2020-07-21 | End: 2020-07-21

## 2020-07-21 RX ORDER — MAGNESIUM SULFATE 500 MG/ML
2 VIAL (ML) INJECTION ONCE
Refills: 0 | Status: DISCONTINUED | OUTPATIENT
Start: 2020-07-21 | End: 2020-07-21

## 2020-07-21 RX ADMIN — ENOXAPARIN SODIUM 40 MILLIGRAM(S): 100 INJECTION SUBCUTANEOUS at 11:56

## 2020-07-21 RX ADMIN — MORPHINE SULFATE 2 MILLIGRAM(S): 50 CAPSULE, EXTENDED RELEASE ORAL at 08:07

## 2020-07-21 RX ADMIN — MORPHINE SULFATE 2 MILLIGRAM(S): 50 CAPSULE, EXTENDED RELEASE ORAL at 20:27

## 2020-07-21 RX ADMIN — CHLORHEXIDINE GLUCONATE 1 APPLICATION(S): 213 SOLUTION TOPICAL at 05:07

## 2020-07-21 RX ADMIN — MORPHINE SULFATE 2 MILLIGRAM(S): 50 CAPSULE, EXTENDED RELEASE ORAL at 03:19

## 2020-07-21 RX ADMIN — Medication 100 MILLIGRAM(S): at 17:20

## 2020-07-21 RX ADMIN — Medication 650 MILLIGRAM(S): at 11:57

## 2020-07-21 RX ADMIN — MORPHINE SULFATE 2 MILLIGRAM(S): 50 CAPSULE, EXTENDED RELEASE ORAL at 21:00

## 2020-07-21 RX ADMIN — MORPHINE SULFATE 2 MILLIGRAM(S): 50 CAPSULE, EXTENDED RELEASE ORAL at 08:40

## 2020-07-21 RX ADMIN — MORPHINE SULFATE 2 MILLIGRAM(S): 50 CAPSULE, EXTENDED RELEASE ORAL at 14:46

## 2020-07-21 RX ADMIN — Medication 15 MILLIGRAM(S): at 12:57

## 2020-07-21 RX ADMIN — ATORVASTATIN CALCIUM 40 MILLIGRAM(S): 80 TABLET, FILM COATED ORAL at 22:05

## 2020-07-21 RX ADMIN — MORPHINE SULFATE 2 MILLIGRAM(S): 50 CAPSULE, EXTENDED RELEASE ORAL at 02:49

## 2020-07-21 RX ADMIN — Medication 650 MILLIGRAM(S): at 05:08

## 2020-07-21 RX ADMIN — ONDANSETRON 4 MILLIGRAM(S): 8 TABLET, FILM COATED ORAL at 15:34

## 2020-07-21 RX ADMIN — Medication 100 GRAM(S): at 05:09

## 2020-07-21 RX ADMIN — Medication 650 MILLIGRAM(S): at 05:09

## 2020-07-21 RX ADMIN — Medication 650 MILLIGRAM(S): at 23:45

## 2020-07-21 RX ADMIN — Medication 100 MILLIGRAM(S): at 05:09

## 2020-07-21 RX ADMIN — Medication 650 MILLIGRAM(S): at 23:49

## 2020-07-21 RX ADMIN — Medication 2: at 14:32

## 2020-07-21 RX ADMIN — Medication 1 TABLET(S): at 11:56

## 2020-07-21 RX ADMIN — Medication 650 MILLIGRAM(S): at 12:57

## 2020-07-21 RX ADMIN — Medication 15 MILLIGRAM(S): at 23:49

## 2020-07-21 RX ADMIN — SODIUM CHLORIDE 100 MILLILITER(S): 9 INJECTION, SOLUTION INTRAVENOUS at 09:56

## 2020-07-21 RX ADMIN — Medication 12.5 GRAM(S): at 02:48

## 2020-07-21 RX ADMIN — Medication 15 MILLIGRAM(S): at 11:56

## 2020-07-21 RX ADMIN — PANTOPRAZOLE SODIUM 40 MILLIGRAM(S): 20 TABLET, DELAYED RELEASE ORAL at 06:01

## 2020-07-21 RX ADMIN — Medication 15 MILLIGRAM(S): at 05:09

## 2020-07-21 NOTE — CHART NOTE - NSCHARTNOTEFT_GEN_A_CORE
Called by Labs for lactate of 3.0 @ 9:30 pm. Was given 500cc bolus of LR and repeat labs for 11:30. repeat lactate of 2.0

## 2020-07-21 NOTE — PHYSICAL THERAPY INITIAL EVALUATION ADULT - GENERAL OBSERVATIONS, REHAB EVAL
9:00-9:30. Pt is Burkinan speaking however is able to understand and speak english. Pt encountered semifowlers in bed in NAD. +Hayden, +2LO2 n/c, +IV LUE d/c by CHAYO Barker prior to tx, +Abdominal Binder. Pt agreeable to PT IE. PT IE Complete. Pt educated on seated/supine therex. Pt educated to continue ambulation with NSG staff. PT to f/u.

## 2020-07-22 LAB
ANION GAP SERPL CALC-SCNC: 11 MMOL/L — SIGNIFICANT CHANGE UP (ref 7–14)
BASOPHILS # BLD AUTO: 0.01 K/UL — SIGNIFICANT CHANGE UP (ref 0–0.2)
BASOPHILS # BLD AUTO: 0.02 K/UL — SIGNIFICANT CHANGE UP (ref 0–0.2)
BASOPHILS NFR BLD AUTO: 0.1 % — SIGNIFICANT CHANGE UP (ref 0–1)
BASOPHILS NFR BLD AUTO: 0.3 % — SIGNIFICANT CHANGE UP (ref 0–1)
BUN SERPL-MCNC: 4 MG/DL — LOW (ref 10–20)
CALCIUM SERPL-MCNC: 8.4 MG/DL — LOW (ref 8.5–10.1)
CHLORIDE SERPL-SCNC: 109 MMOL/L — SIGNIFICANT CHANGE UP (ref 98–110)
CO2 SERPL-SCNC: 26 MMOL/L — SIGNIFICANT CHANGE UP (ref 17–32)
CREAT SERPL-MCNC: 0.5 MG/DL — LOW (ref 0.7–1.5)
EOSINOPHIL # BLD AUTO: 0.14 K/UL — SIGNIFICANT CHANGE UP (ref 0–0.7)
EOSINOPHIL # BLD AUTO: 0.19 K/UL — SIGNIFICANT CHANGE UP (ref 0–0.7)
EOSINOPHIL NFR BLD AUTO: 1.8 % — SIGNIFICANT CHANGE UP (ref 0–8)
EOSINOPHIL NFR BLD AUTO: 2.6 % — SIGNIFICANT CHANGE UP (ref 0–8)
GLUCOSE BLDC GLUCOMTR-MCNC: 103 MG/DL — HIGH (ref 70–99)
GLUCOSE BLDC GLUCOMTR-MCNC: 129 MG/DL — HIGH (ref 70–99)
GLUCOSE BLDC GLUCOMTR-MCNC: 140 MG/DL — HIGH (ref 70–99)
GLUCOSE BLDC GLUCOMTR-MCNC: 144 MG/DL — HIGH (ref 70–99)
GLUCOSE BLDC GLUCOMTR-MCNC: 148 MG/DL — HIGH (ref 70–99)
GLUCOSE BLDC GLUCOMTR-MCNC: 178 MG/DL — HIGH (ref 70–99)
GLUCOSE SERPL-MCNC: 131 MG/DL — HIGH (ref 70–99)
HCT VFR BLD CALC: 26 % — LOW (ref 37–47)
HCT VFR BLD CALC: 28 % — LOW (ref 37–47)
HGB BLD-MCNC: 8.4 G/DL — LOW (ref 12–16)
HGB BLD-MCNC: 8.9 G/DL — LOW (ref 12–16)
IMM GRANULOCYTES NFR BLD AUTO: 0.4 % — HIGH (ref 0.1–0.3)
IMM GRANULOCYTES NFR BLD AUTO: 0.6 % — HIGH (ref 0.1–0.3)
LYMPHOCYTES # BLD AUTO: 1.54 K/UL — SIGNIFICANT CHANGE UP (ref 1.2–3.4)
LYMPHOCYTES # BLD AUTO: 2.65 K/UL — SIGNIFICANT CHANGE UP (ref 1.2–3.4)
LYMPHOCYTES # BLD AUTO: 20.2 % — LOW (ref 20.5–51.1)
LYMPHOCYTES # BLD AUTO: 37 % — SIGNIFICANT CHANGE UP (ref 20.5–51.1)
MAGNESIUM SERPL-MCNC: 2.1 MG/DL — SIGNIFICANT CHANGE UP (ref 1.8–2.4)
MCHC RBC-ENTMCNC: 28.6 PG — SIGNIFICANT CHANGE UP (ref 27–31)
MCHC RBC-ENTMCNC: 29.4 PG — SIGNIFICANT CHANGE UP (ref 27–31)
MCHC RBC-ENTMCNC: 31.8 G/DL — LOW (ref 32–37)
MCHC RBC-ENTMCNC: 32.3 G/DL — SIGNIFICANT CHANGE UP (ref 32–37)
MCV RBC AUTO: 90 FL — SIGNIFICANT CHANGE UP (ref 81–99)
MCV RBC AUTO: 90.9 FL — SIGNIFICANT CHANGE UP (ref 81–99)
MONOCYTES # BLD AUTO: 0.3 K/UL — SIGNIFICANT CHANGE UP (ref 0.1–0.6)
MONOCYTES # BLD AUTO: 0.42 K/UL — SIGNIFICANT CHANGE UP (ref 0.1–0.6)
MONOCYTES NFR BLD AUTO: 4.2 % — SIGNIFICANT CHANGE UP (ref 1.7–9.3)
MONOCYTES NFR BLD AUTO: 5.5 % — SIGNIFICANT CHANGE UP (ref 1.7–9.3)
NEUTROPHILS # BLD AUTO: 3.98 K/UL — SIGNIFICANT CHANGE UP (ref 1.4–6.5)
NEUTROPHILS # BLD AUTO: 5.46 K/UL — SIGNIFICANT CHANGE UP (ref 1.4–6.5)
NEUTROPHILS NFR BLD AUTO: 55.5 % — SIGNIFICANT CHANGE UP (ref 42.2–75.2)
NEUTROPHILS NFR BLD AUTO: 71.8 % — SIGNIFICANT CHANGE UP (ref 42.2–75.2)
NRBC # BLD: 0 /100 WBCS — SIGNIFICANT CHANGE UP (ref 0–0)
NRBC # BLD: 0 /100 WBCS — SIGNIFICANT CHANGE UP (ref 0–0)
PHOSPHATE SERPL-MCNC: 2.8 MG/DL — SIGNIFICANT CHANGE UP (ref 2.1–4.9)
PLATELET # BLD AUTO: 219 K/UL — SIGNIFICANT CHANGE UP (ref 130–400)
PLATELET # BLD AUTO: 229 K/UL — SIGNIFICANT CHANGE UP (ref 130–400)
POTASSIUM SERPL-MCNC: 4.1 MMOL/L — SIGNIFICANT CHANGE UP (ref 3.5–5)
POTASSIUM SERPL-SCNC: 4.1 MMOL/L — SIGNIFICANT CHANGE UP (ref 3.5–5)
RBC # BLD: 2.86 M/UL — LOW (ref 4.2–5.4)
RBC # BLD: 3.11 M/UL — LOW (ref 4.2–5.4)
RBC # FLD: 14.5 % — SIGNIFICANT CHANGE UP (ref 11.5–14.5)
RBC # FLD: 14.5 % — SIGNIFICANT CHANGE UP (ref 11.5–14.5)
SODIUM SERPL-SCNC: 146 MMOL/L — SIGNIFICANT CHANGE UP (ref 135–146)
SURGICAL PATHOLOGY STUDY: SIGNIFICANT CHANGE UP
WBC # BLD: 7.17 K/UL — SIGNIFICANT CHANGE UP (ref 4.8–10.8)
WBC # BLD: 7.61 K/UL — SIGNIFICANT CHANGE UP (ref 4.8–10.8)
WBC # FLD AUTO: 7.17 K/UL — SIGNIFICANT CHANGE UP (ref 4.8–10.8)
WBC # FLD AUTO: 7.61 K/UL — SIGNIFICANT CHANGE UP (ref 4.8–10.8)

## 2020-07-22 PROCEDURE — 99024 POSTOP FOLLOW-UP VISIT: CPT

## 2020-07-22 RX ADMIN — Medication 15 MILLIGRAM(S): at 08:37

## 2020-07-22 RX ADMIN — PANTOPRAZOLE SODIUM 40 MILLIGRAM(S): 20 TABLET, DELAYED RELEASE ORAL at 08:38

## 2020-07-22 RX ADMIN — Medication 85 MILLIMOLE(S): at 01:23

## 2020-07-22 RX ADMIN — Medication 650 MILLIGRAM(S): at 05:58

## 2020-07-22 RX ADMIN — Medication 650 MILLIGRAM(S): at 23:01

## 2020-07-22 RX ADMIN — SODIUM CHLORIDE 100 MILLILITER(S): 9 INJECTION, SOLUTION INTRAVENOUS at 08:57

## 2020-07-22 RX ADMIN — Medication 650 MILLIGRAM(S): at 11:47

## 2020-07-22 RX ADMIN — ENOXAPARIN SODIUM 40 MILLIGRAM(S): 100 INJECTION SUBCUTANEOUS at 11:17

## 2020-07-22 RX ADMIN — Medication 15 MILLIGRAM(S): at 11:59

## 2020-07-22 RX ADMIN — Medication 650 MILLIGRAM(S): at 23:00

## 2020-07-22 RX ADMIN — Medication 15 MILLIGRAM(S): at 23:00

## 2020-07-22 RX ADMIN — Medication 100 MILLIGRAM(S): at 05:58

## 2020-07-22 RX ADMIN — ATORVASTATIN CALCIUM 40 MILLIGRAM(S): 80 TABLET, FILM COATED ORAL at 21:04

## 2020-07-22 RX ADMIN — Medication 15 MILLIGRAM(S): at 18:21

## 2020-07-22 RX ADMIN — Medication 15 MILLIGRAM(S): at 13:08

## 2020-07-22 RX ADMIN — Medication 650 MILLIGRAM(S): at 17:17

## 2020-07-22 RX ADMIN — Medication 650 MILLIGRAM(S): at 06:30

## 2020-07-22 RX ADMIN — Medication 15 MILLIGRAM(S): at 23:01

## 2020-07-22 RX ADMIN — Medication 100 MILLIGRAM(S): at 17:19

## 2020-07-22 RX ADMIN — Medication 15 MILLIGRAM(S): at 05:59

## 2020-07-22 RX ADMIN — Medication 650 MILLIGRAM(S): at 11:18

## 2020-07-22 RX ADMIN — Medication 1 TABLET(S): at 11:18

## 2020-07-23 ENCOUNTER — TRANSCRIPTION ENCOUNTER (OUTPATIENT)
Age: 45
End: 2020-07-23

## 2020-07-23 VITALS
DIASTOLIC BLOOD PRESSURE: 69 MMHG | HEART RATE: 78 BPM | RESPIRATION RATE: 20 BRPM | SYSTOLIC BLOOD PRESSURE: 119 MMHG | TEMPERATURE: 98 F

## 2020-07-23 LAB
GLUCOSE BLDC GLUCOMTR-MCNC: 108 MG/DL — HIGH (ref 70–99)
GLUCOSE BLDC GLUCOMTR-MCNC: 177 MG/DL — HIGH (ref 70–99)
GLUCOSE BLDC GLUCOMTR-MCNC: 191 MG/DL — HIGH (ref 70–99)

## 2020-07-23 PROCEDURE — 99024 POSTOP FOLLOW-UP VISIT: CPT

## 2020-07-23 RX ORDER — OXYCODONE HYDROCHLORIDE 5 MG/1
1 TABLET ORAL
Qty: 5 | Refills: 0
Start: 2020-07-23 | End: 2020-07-27

## 2020-07-23 RX ADMIN — ONDANSETRON 4 MILLIGRAM(S): 8 TABLET, FILM COATED ORAL at 09:11

## 2020-07-23 RX ADMIN — MORPHINE SULFATE 2 MILLIGRAM(S): 50 CAPSULE, EXTENDED RELEASE ORAL at 09:05

## 2020-07-23 RX ADMIN — PANTOPRAZOLE SODIUM 40 MILLIGRAM(S): 20 TABLET, DELAYED RELEASE ORAL at 06:01

## 2020-07-23 RX ADMIN — ENOXAPARIN SODIUM 40 MILLIGRAM(S): 100 INJECTION SUBCUTANEOUS at 11:04

## 2020-07-23 RX ADMIN — Medication 650 MILLIGRAM(S): at 11:03

## 2020-07-23 RX ADMIN — Medication 1 TABLET(S): at 11:03

## 2020-07-23 RX ADMIN — Medication 62.5 MILLIMOLE(S): at 05:19

## 2020-07-23 RX ADMIN — CHLORHEXIDINE GLUCONATE 1 APPLICATION(S): 213 SOLUTION TOPICAL at 05:17

## 2020-07-23 RX ADMIN — Medication 15 MILLIGRAM(S): at 11:05

## 2020-07-23 RX ADMIN — Medication 15 MILLIGRAM(S): at 05:20

## 2020-07-23 RX ADMIN — Medication 15 MILLIGRAM(S): at 05:19

## 2020-07-23 RX ADMIN — Medication 650 MILLIGRAM(S): at 05:20

## 2020-07-23 RX ADMIN — Medication 650 MILLIGRAM(S): at 05:19

## 2020-07-23 RX ADMIN — Medication 100 MILLIGRAM(S): at 05:19

## 2020-07-23 NOTE — DISCHARGE NOTE NURSING/CASE MANAGEMENT/SOCIAL WORK - NSDCPEEMAIL_GEN_ALL_CORE
[facility] email [email or web address]

## 2020-07-23 NOTE — DISCHARGE NOTE PROVIDER - NSDCMRMEDTOKEN_GEN_ALL_CORE_FT
atorvastatin 40 mg oral tablet: 1 tab(s) orally once a day  carBAMazepine 100 mg oral tablet, chewable: take half a tablet every 12 hours  multivitamin: 1 tab(s) orally once a day  omeprazole 40 mg oral delayed release capsule: 1 cap(s) orally once a day  Trulicity Pen 0.75 mg/0.5 mL subcutaneous solution: 4 unit(s) subcutaneous once a week ON MONDAYS

## 2020-07-23 NOTE — DISCHARGE NOTE NURSING/CASE MANAGEMENT/SOCIAL WORK - PATIENT PORTAL LINK FT
You can access the FollowMyHealth Patient Portal offered by [facility] by registering at the following website: [email or web address]. By joining [employer]’s FollowMyHealth portal, you will also be able to view your health information using other applications (apps) compatible with our system.

## 2020-07-23 NOTE — DISCHARGE NOTE PROVIDER - HOSPITAL COURSE
Patient is a 44 female with a PMH significant for DM. ovarian cancer and diverticulitis is admitted for abdominal pain. Patient was examined and found to have constant abdominal pain that has been chronic in nature.  Patient was treating surgically and is currently s/p Colectomy with coloproctostomy mobilization, splenic flexure resection of descending colon resection and sigmoid colon, open, with Laparoscopic lysis of abdominal adhesions on 07/20/2020. Patient has tolerated liquids then eventually soft food diet very well postoperatively. Patient has ambulated well mild pain that is well controlled. Patient will be discharged 7/23 with stable vitals with continuous improvement.

## 2020-07-23 NOTE — DISCHARGE NOTE NURSING/CASE MANAGEMENT/SOCIAL WORK - NSDCPEWEB_GEN_ALL_CORE
[place] website --- [email or web address] website --- [email or web address]

## 2020-07-23 NOTE — DISCHARGE NOTE PROVIDER - NSDCFUADDINST_GEN_ALL_CORE_FT
Keep wound clean and dry, be sure to call the clinic or return to the emergency room for any facer, chills, nausea, vomiting, increasing abdominal pain or any other new and acute symptoms. Call the office upon discharge to follow up within 1-2 weeks.

## 2020-07-23 NOTE — PROGRESS NOTE ADULT - ASSESSMENT
44y Female patient admitted S/P laparoscopic lysis of adhesions, resection of sigmoid colon, resection of descending colon, colectomy, coloproctectomy.   Patient seen & examined at bedside.   In NAD & has no complaints. Tolerating diet, +ambulation, +BM/gas, voiding.      Plan:  - Continue Pain control  - Continue antibiotics  - OOB as tolerated  - Continue Incentive Spirometry  - f/u vitals  - f/u labs & replete as necessary  - DVT PPX  - GI PPX  - Speak w/ SW/CM  - Begin dispo planning for D/C
45 yo female s/p laparoscopic lysis of adhesions, resection of sigmoid colon, resection of descending colon, colectomy, coloproctectomy.    Plan and assesment:    - Pain management  - progress Diet  - IS  - OOB  - f/u labs & replete if necessary  -Dvt prophylaxis  -monitor vitals  -change wound dressings

## 2020-07-23 NOTE — PROGRESS NOTE ADULT - SUBJECTIVE AND OBJECTIVE BOX
GENERAL SURGERY PROGRESS NOTE     ALFIE MOSLEY  [address] [place] [address] day :3d  Procedure: Colectomy with coloproctostomy  Mobilization, splenic flexure  Resection of descending colon  Resection, sigmoid colon, open  Laparoscopic lysis of abdominal adhesions    Surgical Attending: Nelson Borja    Overnight events:  no acute overnight events   Patient seen & examined at bedside. In NAD & has no complaints. Tolerating diet, +ambulation, +BM/gas, voiding        T(F): 97.5 (07-23-20 @ 00:30), Max: 98.4 (07-22-20 @ 20:49)  HR: 88 (07-23-20 @ 00:30) (83 - 98)  BP: 124/62 (07-23-20 @ 00:30) (101/55 - 135/82)  ABP: --  ABP(mean): --  RR: 18 (07-23-20 @ 00:30) (18 - 20)  SpO2: --      07-21-20 @ 07:01  -  07-22-20 @ 07:00  --------------------------------------------------------  IN:    dextrose 5% + sodium chloride 0.45%.: 700 mL  Total IN: 700 mL    OUT:    Indwelling Catheter - Urethral: 800 mL    Voided: 1100 mL  Total OUT: 1900 mL    Total NET: -1200 mL      07-22-20 @ 07:01  -  07-23-20 @ 03:10  --------------------------------------------------------  IN:    dextrose 5% + sodium chloride 0.45%.: 400 mL    Oral Fluid: 1270 mL  Total IN: 1670 mL    OUT:    Voided: 1800 mL  Total OUT: 1800 mL    Total NET: -130 mL      DIET/FLUIDS: dextrose 5%. 1000 milliLiter(s) IV Continuous <Continuous>  multivitamin 1 Tablet(s) Oral daily      URINE:   07-21-20 @ 07:01  -  07-22-20 @ 07:00  --------------------------------------------------------  OUT: 800 mL       GI proph:  pantoprazole    Tablet 40 milliGRAM(s) Oral before breakfast    AC/ proph:   ABx:     PHYSICAL EXAM:  GENERAL: NAD, well-appearing  CHEST/LUNG: Clear to auscultation bilaterally  HEART: Regular rate and rhythm  ABDOMEN: Soft, Nontender, Nondistended;   EXTREMITIES:  No clubbing, cyanosis, or edema      LABS  Labs:  CAPILLARY BLOOD GLUCOSE      POCT Blood Glucose.: 103 mg/dL (22 Jul 2020 21:38)  POCT Blood Glucose.: 140 mg/dL (22 Jul 2020 17:43)  POCT Blood Glucose.: 178 mg/dL (22 Jul 2020 13:57)  POCT Blood Glucose.: 148 mg/dL (22 Jul 2020 09:55)  POCT Blood Glucose.: 129 mg/dL (22 Jul 2020 06:02)                          8.4    7.17  )-----------( 229      ( 22 Jul 2020 20:52 )             26.0       Auto Neutrophil %: 55.5 % (07-22-20 @ 20:52)  Auto Immature Granulocyte %: 0.6 % (07-22-20 @ 20:52)    07-22    146  |  109  |  4<L>  ----------------------------<  131<H>  4.1   |  26  |  0.5<L>      Calcium, Total Serum: 8.4 mg/dL (07-22-20 @ 20:52)      Lactate, Blood: 1.2 mmol/L (07-21-20 @ 22:09)  Lactate, Blood: 2.0 mmol/L (07-21-20 @ 00:39)  Lactate, Blood: 3.0 mmol/L (07-20-20 @ 20:55)  Lactate, Blood: 4.0 mmol/L (07-20-20 @ 14:25)
Progress Note: General Surgery  Patient: ALFIE MOSLEY , 44y (1975)Female   MRN: [id number]  Location: [address]  Visit: 07-20-20 Inpatient  Date: 07-21-20 @ 10:05    Procedure/Diagnosis: s/p laparoscopic lysis of adhesions, resection of sigmoid colon, resection of descending colon, colectomy, coloproctectomy.    Events/ 24h: No acute events overnight. diffused abdominal pain controlled with pain meds.    Vitals: T(F): 97 (07-21-20 @ 08:04), Max: 98.8 (07-21-20 @ 01:01)  HR: 79 (07-21-20 @ 08:04)  BP: 101/61 (07-21-20 @ 08:04) (89/55 - 123/58)  RR: 18 (07-21-20 @ 08:04)  SpO2: 96% (07-20-20 @ 15:52)    In:   07-20-20 @ 07:01  -  07-21-20 @ 07:00  --------------------------------------------------------  IN: 3150 mL      Out:   07-20-20 @ 07:01  -  07-21-20 @ 07:00  --------------------------------------------------------  OUT:    Indwelling Catheter - Urethral: 2225 mL  Total OUT: 2225 mL        Net:   07-20-20 @ 07:01  -  07-21-20 @ 07:00  --------------------------------------------------------  NET: 925 mL        Diet: Diet, Clear Liquid (07-21-20 @ 09:37)    IV Fluids: dextrose 5% + sodium chloride 0.45%. 1000 milliLiter(s) (100 mL/Hr) IV Continuous <Continuous>  dextrose 5%. 1000 milliLiter(s) (50 mL/Hr) IV Continuous <Continuous>  multivitamin 1 Tablet(s) Oral daily      Physical Examination:  General Appearance: NAD  HEENT: EOMI, sclera non-icteric.  Heart: RRR   Lungs: CTABL.   Abdomen:  Soft, incisional wound clean dry and intact, diffused moderate tenderness  Skin: Warm, dry. No jaundice.       Medications: [Standing]  acetaminophen   Tablet .. 650 milliGRAM(s) Oral every 6 hours  atorvastatin 40 milliGRAM(s) Oral at bedtime  carBAMazepine Chewable 100 milliGRAM(s) Chew every 12 hours  chlorhexidine 2% Cloths 1 Application(s) Topical <User Schedule>  dextrose 5% + sodium chloride 0.45%. 1000 milliLiter(s) (100 mL/Hr) IV Continuous <Continuous>  dextrose 5%. 1000 milliLiter(s) (50 mL/Hr) IV Continuous <Continuous>  dextrose 50% Injectable 12.5 Gram(s) IV Push once  enoxaparin Injectable 40 milliGRAM(s) SubCutaneous daily  insulin lispro (HumaLOG) corrective regimen sliding scale   SubCutaneous every 4 hours  ketorolac   Injectable 15 milliGRAM(s) IV Push every 6 hours  multivitamin 1 Tablet(s) Oral daily  pantoprazole    Tablet 40 milliGRAM(s) Oral before breakfast    DVT Prophylaxis: enoxaparin Injectable 40 milliGRAM(s) SubCutaneous daily    GI Prophylaxis: pantoprazole    Tablet 40 milliGRAM(s) Oral before breakfast    Antibiotics:   Anticoagulation:   Medications:[PRN]  dextrose 40% Gel 15 Gram(s) Oral once PRN  glucagon  Injectable 1 milliGRAM(s) IntraMuscular once PRN  morphine  - Injectable 2 milliGRAM(s) IV Push every 4 hours PRN  ondansetron Injectable 4 milliGRAM(s) IV Push every 6 hours PRN      Labs:                        10.4   10.81 )-----------( 269      ( 20 Jul 2020 20:55 )             32.7     07-21    140  |  107  |  8<L>  ----------------------------<  132<H>  4.6   |  22  |  0.6<L>    Ca    8.4<L>      21 Jul 2020 00:39  Phos  4.2     07-21  Mg     1.6     07-21                Urine/Micro:        Imaging:     no new images      Date/Time: 07-21-20 @ 10:05
Progress Note: General Surgery  Patient: ALFIE MOSLEY , 44y (1975)Female   MRN: [id number]  Location: [address]  Visit: 07-20-20 Inpatient  Date: 07-22-20 @ 02:51    Procedure/Diagnosis: s/p laparoscopic lysis of adhesions, resection of sigmoid colon, resection of descending colon, colectomy, coloproctectomy.    Events/ 24h: Patient advanced to clears, doing well and tolerating with no nausea or vomiting. Hayden removed, patient passed ToV with good urine output. Fluids were switched to D5 1/2 NS. Patient up and out of bed.     Vitals: T(F): 98.6 (07-21-20 @ 20:04), Max: 98.6 (07-21-20 @ 20:04)  HR: 97 (07-21-20 @ 20:04)  BP: 108/65 (07-21-20 @ 20:04) (101/60 - 108/65)  RR: 18 (07-21-20 @ 20:04)  SpO2: --    In:   07-20-20 @ 07:01  -  07-21-20 @ 07:00  --------------------------------------------------------  IN: 3150 mL    07-21-20 @ 07:01  -  07-22-20 @ 02:51  --------------------------------------------------------  IN: 700 mL      Out:   07-20-20 @ 07:01  -  07-21-20 @ 07:00  --------------------------------------------------------  OUT:    Indwelling Catheter - Urethral: 2225 mL  Total OUT: 2225 mL      07-21-20 @ 07:01  -  07-22-20 @ 02:51  --------------------------------------------------------  OUT:    Indwelling Catheter - Urethral: 800 mL    Voided: 800 mL  Total OUT: 1600 mL        Net:   07-20-20 @ 07:01  -  07-21-20 @ 07:00  --------------------------------------------------------  NET: 925 mL    07-21-20 @ 07:01  -  07-22-20 @ 02:51  --------------------------------------------------------  NET: -900 mL        Diet: Diet, Clear Liquid (07-21-20 @ 09:37)    IV Fluids: dextrose 5% + sodium chloride 0.45%. 1000 milliLiter(s) (100 mL/Hr) IV Continuous <Continuous>  dextrose 5%. 1000 milliLiter(s) (50 mL/Hr) IV Continuous <Continuous>  multivitamin 1 Tablet(s) Oral daily      Physical Examination:  General Appearance: NAD  HEENT: EOMI, sclera non-icteric.  Heart: RRR   Lungs: CTABL.   Abdomen: Soft, wound clean dry and intact, with mild incisional tenderness.  MSK/Extremities: Warm & well-perfused.   Skin: Warm, dry. No jaundice.       Medications: [Standing]  acetaminophen   Tablet .. 650 milliGRAM(s) Oral every 6 hours  atorvastatin 40 milliGRAM(s) Oral at bedtime  carBAMazepine Chewable 100 milliGRAM(s) Chew every 12 hours  chlorhexidine 2% Cloths 1 Application(s) Topical <User Schedule>  dextrose 5% + sodium chloride 0.45%. 1000 milliLiter(s) (100 mL/Hr) IV Continuous <Continuous>  dextrose 5%. 1000 milliLiter(s) (50 mL/Hr) IV Continuous <Continuous>  dextrose 50% Injectable 12.5 Gram(s) IV Push once  enoxaparin Injectable 40 milliGRAM(s) SubCutaneous daily  insulin lispro (HumaLOG) corrective regimen sliding scale   SubCutaneous every 4 hours  ketorolac   Injectable 15 milliGRAM(s) IV Push every 6 hours  multivitamin 1 Tablet(s) Oral daily  pantoprazole    Tablet 40 milliGRAM(s) Oral before breakfast    DVT Prophylaxis: enoxaparin Injectable 40 milliGRAM(s) SubCutaneous daily    GI Prophylaxis: pantoprazole    Tablet 40 milliGRAM(s) Oral before breakfast    Antibiotics:   Anticoagulation:   Medications:[PRN]  dextrose 40% Gel 15 Gram(s) Oral once PRN  glucagon  Injectable 1 milliGRAM(s) IntraMuscular once PRN  morphine  - Injectable 2 milliGRAM(s) IV Push every 4 hours PRN  ondansetron Injectable 4 milliGRAM(s) IV Push every 6 hours PRN      Labs:                        8.9    7.61  )-----------( 219      ( 21 Jul 2020 22:09 )             28.0     07-21    141  |  107  |  5<L>  ----------------------------<  122<H>  3.9   |  27  |  0.5<L>    Ca    8.2<L>      21 Jul 2020 22:09  Phos  2.0     07-21  Mg     2.1     07-21          Assessment:  44y Female patient admitted S/P laparoscopic lysis of adhesions, resection of sigmoid colon, resection of descending colon, colectomy, coloproctectomy.   Patient seen & examined at bedside. In NAD & has no complaints. Tolerating diet, +ambulation, +BM/gas, voiding. Phosphate repleted for hypophosphatemia.     Plan:  - Continue Pain control  - Continue antibiotics  - OOB as tolerated  - Continue Incentive Spirometry  - f/u vitals  - f/u labs & replete as necessary  - DVT PPX  - GI PPX  - Speak w/ SW/CM  - Begin dispo planning for D/C    Date/Time: 07-22-20 @ 02:51

## 2020-07-23 NOTE — DISCHARGE NOTE PROVIDER - CARE PROVIDER_API CALL
Nelson Borja J  General Surgery  [address] [place] [address]  Phone: [phone]  Fax: [phone]  Follow Up Time: 1 week

## 2020-07-27 DIAGNOSIS — K57.32 DIVERTICULITIS OF LARGE INTESTINE WITHOUT PERFORATION OR ABSCESS WITHOUT BLEEDING: ICD-10-CM

## 2020-07-27 DIAGNOSIS — K76.0 FATTY (CHANGE OF) LIVER, NOT ELSEWHERE CLASSIFIED: ICD-10-CM

## 2020-07-27 DIAGNOSIS — E11.9 TYPE 2 DIABETES MELLITUS WITHOUT COMPLICATIONS: ICD-10-CM

## 2020-07-27 DIAGNOSIS — E66.9 OBESITY, UNSPECIFIED: ICD-10-CM

## 2020-07-27 DIAGNOSIS — G57.33 LESION OF LATERAL POPLITEAL NERVE, BILATERAL LOWER LIMBS: ICD-10-CM

## 2020-07-27 DIAGNOSIS — Z15.89 GENETIC SUSCEPTIBILITY TO OTHER DISEASE: ICD-10-CM

## 2020-07-27 DIAGNOSIS — Z85.43 PERSONAL HISTORY OF MALIGNANT NEOPLASM OF OVARY: ICD-10-CM

## 2020-07-27 DIAGNOSIS — G40.909 EPILEPSY, UNSPECIFIED, NOT INTRACTABLE, WITHOUT STATUS EPILEPTICUS: ICD-10-CM

## 2020-07-27 DIAGNOSIS — I10 ESSENTIAL (PRIMARY) HYPERTENSION: ICD-10-CM

## 2020-07-27 DIAGNOSIS — E78.5 HYPERLIPIDEMIA, UNSPECIFIED: ICD-10-CM

## 2020-07-27 DIAGNOSIS — E83.39 OTHER DISORDERS OF PHOSPHORUS METABOLISM: ICD-10-CM

## 2020-07-27 DIAGNOSIS — Z87.891 PERSONAL HISTORY OF NICOTINE DEPENDENCE: ICD-10-CM

## 2020-07-28 DIAGNOSIS — K57.90 DIVERTICULOSIS OF INTESTINE, PART UNSPECIFIED, WITHOUT PERFORATION OR ABSCESS WITHOUT BLEEDING: ICD-10-CM

## 2020-07-28 DIAGNOSIS — K76.0 FATTY (CHANGE OF) LIVER, NOT ELSEWHERE CLASSIFIED: ICD-10-CM

## 2020-08-03 ENCOUNTER — APPOINTMENT (OUTPATIENT)
Dept: INTERNAL MEDICINE | Facility: CLINIC | Age: 45
End: 2020-08-03
Payer: MEDICAID

## 2020-08-03 ENCOUNTER — OUTPATIENT (OUTPATIENT)
Dept: OUTPATIENT SERVICES | Facility: HOSPITAL | Age: 45
LOS: 1 days | Discharge: HOME | End: 2020-08-03

## 2020-08-03 VITALS
DIASTOLIC BLOOD PRESSURE: 80 MMHG | HEIGHT: 57 IN | TEMPERATURE: 97 F | WEIGHT: 190 LBS | BODY MASS INDEX: 40.99 KG/M2 | HEART RATE: 90 BPM | SYSTOLIC BLOOD PRESSURE: 113 MMHG

## 2020-08-03 DIAGNOSIS — Z98.890 OTHER SPECIFIED POSTPROCEDURAL STATES: Chronic | ICD-10-CM

## 2020-08-03 PROCEDURE — 99213 OFFICE O/P EST LOW 20 MIN: CPT | Mod: GC

## 2020-08-03 NOTE — ASSESSMENT
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 \par Pt had colon surgery in july 2020 and has been recovering fine. Pt states that she has been doing fine, No active complaints. \par Pt has been follwing with GI for for surveillance/ risk of pancreatic cancer. \par Pt is scheduled to follow up with surgery tomorrow. \par \par # DMII- controlled \par - last hba1c 6.2% up from 5.7\par - c/w trulicity, pioglitazone\par - follows with endo\par - Will give referral for podiatry and ophthalmology\par - check urine microalbumin/ Creat \par \par # CKDN2A mutation\par - has been following GI, uptodate with EUS \par - had colonoscopy in july 2020 with hyperplastic polyps- next in 7 -10 years\par \par # RLE radicular sxs, cervical vs carpal tunnel\par - following with neuro\par - xray cervical degenerative changes\par \par \par # R facial swelling with droop likely secondary to Bell Palsy- resolved\par - s/p dental evaluation which was negative. CTH negative. \par - s/p 1 wk course of prednisone and valacyclovir with significant improvement in symptoms. 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 \par \par # Hx of recurrent diverticulitis\par - pt is following Colorectal surgeon, s/p surgery in july 2020\par - Following with surgery tomorrow for staple removal\par \par \par # Transaminitis, elevated alk phos has lymphangioma\par - GGT 77\par - Hx of alcohol abuse \par - liver enzyme stable but still elevated. \par - pt had liver biopsy \par \par # Pseudoseizures/Tremor\par - Follow-up with Neurology\par - c.w Carbamazepine 200 mg QD, Tylenol PRN for headaches\par \par # Peroneal tendonitis of LLE \par - Meloxicam PRN\par - she was given a boot to walk however it is too big she cannot walk with it\par - following with podiatry \par \par # HLD\par - . Total Cholesterol 285, ASCVD risk 19.7%, moderate intensity statin indicated\par - c/w lipitor 80 mg qhs \par - repeat before next visit\par \par \par # Health Maintenance\par - Mammogram (10/2019): BIRADS 2. Benign L breast, repeat referral given\par - Pap smear last in 2017. Patient has WILBERT including cervix. \par - GI: uptodate, last one in july 2020 with hyperplastic polyps\par - Flu shot Nov 2019. PPSV 23 UTD. \par - follow up in 3 months

## 2020-08-03 NOTE — HISTORY OF PRESENT ILLNESS
[FreeTextEntry1] : Pt is a 43 yo F who comes to the office for follow up. 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 \par Pt had colon surgery in july 2020 and has been recovering fine. Pt states that she has been doing fine, No active complaints. \par Pt has been following with GI for for surveillance/ risk of pancreatic cancer. \par Pt is scheduled to follow up with surgery tomorrow.

## 2020-08-03 NOTE — PHYSICAL EXAM
[No Acute Distress] : no acute distress [No Accessory Muscle Use] : no accessory muscle use [Clear to Auscultation] : lungs were clear to auscultation bilaterally [No Respiratory Distress] : no respiratory distress  [No Edema] : there was no peripheral edema [Normal S1, S2] : normal S1 and S2 [Regular Rhythm] : with a regular rhythm [Soft] : abdomen soft [No HSM] : no HSM [No Rash] : no rash [Non Tender] : non-tender

## 2020-08-03 NOTE — REVIEW OF SYSTEMS
[Abdominal Pain] : abdominal pain [Nausea] : nausea [Negative] : Respiratory [Constipation] : no constipation [Diarrhea] : diarrhea [Vomiting] : no vomiting

## 2020-08-04 ENCOUNTER — APPOINTMENT (OUTPATIENT)
Dept: SURGERY | Facility: CLINIC | Age: 45
End: 2020-08-04
Payer: MEDICAID

## 2020-08-04 VITALS
HEIGHT: 57 IN | WEIGHT: 196 LBS | TEMPERATURE: 97.6 F | HEART RATE: 99 BPM | DIASTOLIC BLOOD PRESSURE: 83 MMHG | SYSTOLIC BLOOD PRESSURE: 102 MMHG | BODY MASS INDEX: 42.28 KG/M2

## 2020-08-04 PROCEDURE — 99024 POSTOP FOLLOW-UP VISIT: CPT

## 2020-08-05 DIAGNOSIS — C25.9 MALIGNANT NEOPLASM OF PANCREAS, UNSPECIFIED: ICD-10-CM

## 2020-08-05 DIAGNOSIS — E11.65 TYPE 2 DIABETES MELLITUS WITH HYPERGLYCEMIA: ICD-10-CM

## 2020-08-05 DIAGNOSIS — E78.00 PURE HYPERCHOLESTEROLEMIA, UNSPECIFIED: ICD-10-CM

## 2020-08-09 LAB
ALBUMIN SERPL ELPH-MCNC: 4.5 G/DL
ALP BLD-CCNC: 137 U/L
ALT SERPL-CCNC: 65 U/L
ANION GAP SERPL CALC-SCNC: 15 MMOL/L
AST SERPL-CCNC: 52 U/L
BASOPHILS # BLD AUTO: 0.08 K/UL
BASOPHILS NFR BLD AUTO: 1 %
BILIRUB SERPL-MCNC: <0.2 MG/DL
BUN SERPL-MCNC: 18 MG/DL
CALCIUM SERPL-MCNC: 9.5 MG/DL
CHLORIDE SERPL-SCNC: 104 MMOL/L
CHOLEST SERPL-MCNC: 164 MG/DL
CHOLEST/HDLC SERPL: 4.8 RATIO
CO2 SERPL-SCNC: 23 MMOL/L
CREAT SERPL-MCNC: 0.5 MG/DL
CREAT SPEC-SCNC: 116 MG/DL
EOSINOPHIL # BLD AUTO: 0.45 K/UL
EOSINOPHIL NFR BLD AUTO: 5.6 %
ESTIMATED AVERAGE GLUCOSE: 146 MG/DL
GLUCOSE SERPL-MCNC: 105 MG/DL
HBA1C MFR BLD HPLC: 6.7 %
HCT VFR BLD CALC: 33.9 %
HDLC SERPL-MCNC: 34 MG/DL
HGB BLD-MCNC: 9.9 G/DL
IMM GRANULOCYTES NFR BLD AUTO: 0.4 %
LDLC SERPL CALC-MCNC: 104 MG/DL
LYMPHOCYTES # BLD AUTO: 2.43 K/UL
LYMPHOCYTES NFR BLD AUTO: 30.1 %
MAN DIFF?: NORMAL
MCHC RBC-ENTMCNC: 27.6 PG
MCHC RBC-ENTMCNC: 29.2 G/DL
MCV RBC AUTO: 94.4 FL
MICROALBUMIN 24H UR DL<=1MG/L-MCNC: 2.5 MG/DL
MICROALBUMIN/CREAT 24H UR-RTO: 21 MG/G
MONOCYTES # BLD AUTO: 0.36 K/UL
MONOCYTES NFR BLD AUTO: 4.5 %
NEUTROPHILS # BLD AUTO: 4.72 K/UL
NEUTROPHILS NFR BLD AUTO: 58.4 %
PLATELET # BLD AUTO: 545 K/UL
POTASSIUM SERPL-SCNC: 4.3 MMOL/L
PROT SERPL-MCNC: 7.2 G/DL
RBC # BLD: 3.59 M/UL
RBC # FLD: 16.1 %
SODIUM SERPL-SCNC: 142 MMOL/L
TRIGL SERPL-MCNC: 175 MG/DL
WBC # FLD AUTO: 8.07 K/UL

## 2020-08-15 ENCOUNTER — OUTPATIENT (OUTPATIENT)
Dept: OUTPATIENT SERVICES | Facility: HOSPITAL | Age: 45
LOS: 1 days | Discharge: HOME | End: 2020-08-15
Payer: MEDICAID

## 2020-08-15 ENCOUNTER — RESULT REVIEW (OUTPATIENT)
Age: 45
End: 2020-08-15

## 2020-08-15 DIAGNOSIS — Z98.890 OTHER SPECIFIED POSTPROCEDURAL STATES: Chronic | ICD-10-CM

## 2020-08-15 DIAGNOSIS — Z12.31 ENCOUNTER FOR SCREENING MAMMOGRAM FOR MALIGNANT NEOPLASM OF BREAST: ICD-10-CM

## 2020-08-15 PROCEDURE — 77067 SCR MAMMO BI INCL CAD: CPT | Mod: 26

## 2020-08-15 PROCEDURE — 77063 BREAST TOMOSYNTHESIS BI: CPT | Mod: 26

## 2020-08-25 ENCOUNTER — APPOINTMENT (OUTPATIENT)
Dept: SURGERY | Facility: CLINIC | Age: 45
End: 2020-08-25
Payer: MEDICAID

## 2020-08-25 VITALS
TEMPERATURE: 97.8 F | SYSTOLIC BLOOD PRESSURE: 122 MMHG | BODY MASS INDEX: 42.07 KG/M2 | HEART RATE: 91 BPM | HEIGHT: 57 IN | WEIGHT: 195 LBS | DIASTOLIC BLOOD PRESSURE: 76 MMHG

## 2020-08-25 LAB
FERRITIN SERPL-MCNC: 63 NG/ML
FOLATE SERPL-MCNC: >20 NG/ML
IRON SATN MFR SERPL: 13 %
IRON SERPL-MCNC: 47 UG/DL
RBC # BLD: 3.92 M/UL
RETICS # AUTO: 3.5 %
RETICS AGGREG/RBC NFR: 136.4 K/UL
TIBC SERPL-MCNC: 366 UG/DL
UIBC SERPL-MCNC: 319 UG/DL
VIT B12 SERPL-MCNC: 839 PG/ML

## 2020-08-25 PROCEDURE — 99024 POSTOP FOLLOW-UP VISIT: CPT

## 2020-08-26 NOTE — ASSESSMENT
[FreeTextEntry1] : Ms. MOSLEY is approximately a 5 week status post robotic colon resection. She looks and feels well. She has no complaints. She will see me back in 3 weeks. Certainly she'll see me sooner should any problems arise.

## 2020-08-26 NOTE — PHYSICAL EXAM
[FreeTextEntry1] : And feels well.\par \par Vitals are clear\par \par Examination abdomen reveals a soft nontender nondistended there is no guarding or rebound, her incisions are all healing well as no erythema induration or drainage or bleeding.

## 2020-08-26 NOTE — HISTORY OF PRESENT ILLNESS
[FreeTextEntry1] : This is a a postop visit for Ms. MOSLEY who is status post robotic sigmoid colon resection for diverticular disease. The patient looks and feels well, she has no complaints, she is tolerating a diet without difficulty and moving her bowels have difficulty.

## 2020-09-04 ENCOUNTER — EMERGENCY (EMERGENCY)
Facility: HOSPITAL | Age: 45
LOS: 0 days | Discharge: HOME | End: 2020-09-04
Attending: STUDENT IN AN ORGANIZED HEALTH CARE EDUCATION/TRAINING PROGRAM | Admitting: EMERGENCY MEDICINE
Payer: MEDICAID

## 2020-09-04 VITALS
OXYGEN SATURATION: 97 % | HEART RATE: 79 BPM | DIASTOLIC BLOOD PRESSURE: 98 MMHG | TEMPERATURE: 96 F | RESPIRATION RATE: 17 BRPM | WEIGHT: 190.04 LBS | HEIGHT: 56 IN | SYSTOLIC BLOOD PRESSURE: 184 MMHG

## 2020-09-04 DIAGNOSIS — Z98.890 OTHER SPECIFIED POSTPROCEDURAL STATES: Chronic | ICD-10-CM

## 2020-09-04 DIAGNOSIS — R11.0 NAUSEA: ICD-10-CM

## 2020-09-04 DIAGNOSIS — R68.83 CHILLS (WITHOUT FEVER): ICD-10-CM

## 2020-09-04 DIAGNOSIS — E78.5 HYPERLIPIDEMIA, UNSPECIFIED: ICD-10-CM

## 2020-09-04 DIAGNOSIS — R56.9 UNSPECIFIED CONVULSIONS: ICD-10-CM

## 2020-09-04 DIAGNOSIS — Z90.49 ACQUIRED ABSENCE OF OTHER SPECIFIED PARTS OF DIGESTIVE TRACT: Chronic | ICD-10-CM

## 2020-09-04 DIAGNOSIS — Z90.49 ACQUIRED ABSENCE OF OTHER SPECIFIED PARTS OF DIGESTIVE TRACT: ICD-10-CM

## 2020-09-04 DIAGNOSIS — Z90.710 ACQUIRED ABSENCE OF BOTH CERVIX AND UTERUS: ICD-10-CM

## 2020-09-04 DIAGNOSIS — R10.84 GENERALIZED ABDOMINAL PAIN: ICD-10-CM

## 2020-09-04 DIAGNOSIS — Z98.890 OTHER SPECIFIED POSTPROCEDURAL STATES: ICD-10-CM

## 2020-09-04 DIAGNOSIS — Z87.891 PERSONAL HISTORY OF NICOTINE DEPENDENCE: ICD-10-CM

## 2020-09-04 DIAGNOSIS — E11.9 TYPE 2 DIABETES MELLITUS WITHOUT COMPLICATIONS: ICD-10-CM

## 2020-09-04 DIAGNOSIS — Z91.041 RADIOGRAPHIC DYE ALLERGY STATUS: ICD-10-CM

## 2020-09-04 DIAGNOSIS — R10.9 UNSPECIFIED ABDOMINAL PAIN: ICD-10-CM

## 2020-09-04 LAB
ALBUMIN SERPL ELPH-MCNC: 4.5 G/DL — SIGNIFICANT CHANGE UP (ref 3.5–5.2)
ALP SERPL-CCNC: 130 U/L — HIGH (ref 30–115)
ALT FLD-CCNC: 52 U/L — HIGH (ref 0–41)
ANION GAP SERPL CALC-SCNC: 11 MMOL/L — SIGNIFICANT CHANGE UP (ref 7–14)
APPEARANCE UR: CLEAR — SIGNIFICANT CHANGE UP
APTT BLD: 35.1 SEC — SIGNIFICANT CHANGE UP (ref 27–39.2)
AST SERPL-CCNC: 41 U/L — SIGNIFICANT CHANGE UP (ref 0–41)
BASOPHILS # BLD AUTO: 0.03 K/UL — SIGNIFICANT CHANGE UP (ref 0–0.2)
BASOPHILS NFR BLD AUTO: 0.5 % — SIGNIFICANT CHANGE UP (ref 0–1)
BILIRUB DIRECT SERPL-MCNC: <0.2 MG/DL — SIGNIFICANT CHANGE UP (ref 0–0.2)
BILIRUB INDIRECT FLD-MCNC: SIGNIFICANT CHANGE UP MG/DL (ref 0.2–1.2)
BILIRUB SERPL-MCNC: <0.2 MG/DL — SIGNIFICANT CHANGE UP (ref 0.2–1.2)
BILIRUB UR-MCNC: NEGATIVE — SIGNIFICANT CHANGE UP
BLD GP AB SCN SERPL QL: SIGNIFICANT CHANGE UP
BUN SERPL-MCNC: 13 MG/DL — SIGNIFICANT CHANGE UP (ref 10–20)
CALCIUM SERPL-MCNC: 9.5 MG/DL — SIGNIFICANT CHANGE UP (ref 8.5–10.1)
CHLORIDE SERPL-SCNC: 108 MMOL/L — SIGNIFICANT CHANGE UP (ref 98–110)
CO2 SERPL-SCNC: 24 MMOL/L — SIGNIFICANT CHANGE UP (ref 17–32)
COLOR SPEC: YELLOW — SIGNIFICANT CHANGE UP
CREAT SERPL-MCNC: 0.5 MG/DL — LOW (ref 0.7–1.5)
DIFF PNL FLD: NEGATIVE — SIGNIFICANT CHANGE UP
EOSINOPHIL # BLD AUTO: 0.08 K/UL — SIGNIFICANT CHANGE UP (ref 0–0.7)
EOSINOPHIL NFR BLD AUTO: 1.3 % — SIGNIFICANT CHANGE UP (ref 0–8)
GLUCOSE SERPL-MCNC: 100 MG/DL — HIGH (ref 70–99)
GLUCOSE UR QL: NEGATIVE — SIGNIFICANT CHANGE UP
HCG SERPL-ACNC: <0.6 MIU/ML — SIGNIFICANT CHANGE UP
HCT VFR BLD CALC: 37 % — SIGNIFICANT CHANGE UP (ref 37–47)
HGB BLD-MCNC: 11.5 G/DL — LOW (ref 12–16)
IMM GRANULOCYTES NFR BLD AUTO: 0.3 % — SIGNIFICANT CHANGE UP (ref 0.1–0.3)
INR BLD: 1.14 RATIO — SIGNIFICANT CHANGE UP (ref 0.65–1.3)
KETONES UR-MCNC: NEGATIVE — SIGNIFICANT CHANGE UP
LACTATE SERPL-SCNC: 1 MMOL/L — SIGNIFICANT CHANGE UP (ref 0.7–2)
LEUKOCYTE ESTERASE UR-ACNC: NEGATIVE — SIGNIFICANT CHANGE UP
LIDOCAIN IGE QN: 31 U/L — SIGNIFICANT CHANGE UP (ref 7–60)
LYMPHOCYTES # BLD AUTO: 2.62 K/UL — SIGNIFICANT CHANGE UP (ref 1.2–3.4)
LYMPHOCYTES # BLD AUTO: 42 % — SIGNIFICANT CHANGE UP (ref 20.5–51.1)
MCHC RBC-ENTMCNC: 28 PG — SIGNIFICANT CHANGE UP (ref 27–31)
MCHC RBC-ENTMCNC: 31.1 G/DL — LOW (ref 32–37)
MCV RBC AUTO: 90 FL — SIGNIFICANT CHANGE UP (ref 81–99)
MONOCYTES # BLD AUTO: 0.42 K/UL — SIGNIFICANT CHANGE UP (ref 0.1–0.6)
MONOCYTES NFR BLD AUTO: 6.7 % — SIGNIFICANT CHANGE UP (ref 1.7–9.3)
NEUTROPHILS # BLD AUTO: 3.07 K/UL — SIGNIFICANT CHANGE UP (ref 1.4–6.5)
NEUTROPHILS NFR BLD AUTO: 49.2 % — SIGNIFICANT CHANGE UP (ref 42.2–75.2)
NITRITE UR-MCNC: NEGATIVE — SIGNIFICANT CHANGE UP
NRBC # BLD: 0 /100 WBCS — SIGNIFICANT CHANGE UP (ref 0–0)
PH UR: 5.5 — SIGNIFICANT CHANGE UP (ref 5–8)
PLATELET # BLD AUTO: 360 K/UL — SIGNIFICANT CHANGE UP (ref 130–400)
POTASSIUM SERPL-MCNC: 4.3 MMOL/L — SIGNIFICANT CHANGE UP (ref 3.5–5)
POTASSIUM SERPL-SCNC: 4.3 MMOL/L — SIGNIFICANT CHANGE UP (ref 3.5–5)
PROT SERPL-MCNC: 7.2 G/DL — SIGNIFICANT CHANGE UP (ref 6–8)
PROT UR-MCNC: SIGNIFICANT CHANGE UP
PROTHROM AB SERPL-ACNC: 13.1 SEC — HIGH (ref 9.95–12.87)
RBC # BLD: 4.11 M/UL — LOW (ref 4.2–5.4)
RBC # FLD: 13.8 % — SIGNIFICANT CHANGE UP (ref 11.5–14.5)
SODIUM SERPL-SCNC: 143 MMOL/L — SIGNIFICANT CHANGE UP (ref 135–146)
SP GR SPEC: 1.02 — SIGNIFICANT CHANGE UP (ref 1.01–1.02)
UROBILINOGEN FLD QL: SIGNIFICANT CHANGE UP
WBC # BLD: 6.24 K/UL — SIGNIFICANT CHANGE UP (ref 4.8–10.8)
WBC # FLD AUTO: 6.24 K/UL — SIGNIFICANT CHANGE UP (ref 4.8–10.8)

## 2020-09-04 PROCEDURE — 71046 X-RAY EXAM CHEST 2 VIEWS: CPT | Mod: 26

## 2020-09-04 PROCEDURE — 74176 CT ABD & PELVIS W/O CONTRAST: CPT | Mod: 26

## 2020-09-04 PROCEDURE — 93010 ELECTROCARDIOGRAM REPORT: CPT

## 2020-09-04 PROCEDURE — 99285 EMERGENCY DEPT VISIT HI MDM: CPT

## 2020-09-04 RX ORDER — ACETAMINOPHEN 500 MG
2 TABLET ORAL
Qty: 56 | Refills: 0
Start: 2020-09-04

## 2020-09-04 RX ORDER — SODIUM CHLORIDE 9 MG/ML
1000 INJECTION, SOLUTION INTRAVENOUS ONCE
Refills: 0 | Status: COMPLETED | OUTPATIENT
Start: 2020-09-04 | End: 2020-09-04

## 2020-09-04 RX ORDER — IOHEXOL 300 MG/ML
30 INJECTION, SOLUTION INTRAVENOUS ONCE
Refills: 0 | Status: COMPLETED | OUTPATIENT
Start: 2020-09-04 | End: 2020-09-04

## 2020-09-04 RX ORDER — MORPHINE SULFATE 50 MG/1
4 CAPSULE, EXTENDED RELEASE ORAL ONCE
Refills: 0 | Status: DISCONTINUED | OUTPATIENT
Start: 2020-09-04 | End: 2020-09-04

## 2020-09-04 RX ORDER — ONDANSETRON 8 MG/1
4 TABLET, FILM COATED ORAL ONCE
Refills: 0 | Status: COMPLETED | OUTPATIENT
Start: 2020-09-04 | End: 2020-09-04

## 2020-09-04 RX ADMIN — IOHEXOL 30 MILLILITER(S): 300 INJECTION, SOLUTION INTRAVENOUS at 12:21

## 2020-09-04 RX ADMIN — MORPHINE SULFATE 4 MILLIGRAM(S): 50 CAPSULE, EXTENDED RELEASE ORAL at 12:21

## 2020-09-04 RX ADMIN — MORPHINE SULFATE 4 MILLIGRAM(S): 50 CAPSULE, EXTENDED RELEASE ORAL at 12:36

## 2020-09-04 RX ADMIN — SODIUM CHLORIDE 1000 MILLILITER(S): 9 INJECTION, SOLUTION INTRAVENOUS at 13:23

## 2020-09-04 RX ADMIN — ONDANSETRON 4 MILLIGRAM(S): 8 TABLET, FILM COATED ORAL at 12:21

## 2020-09-04 RX ADMIN — SODIUM CHLORIDE 1000 MILLILITER(S): 9 INJECTION, SOLUTION INTRAVENOUS at 12:21

## 2020-09-04 NOTE — ED ADULT NURSE NOTE - NSIMPLEMENTINTERV_GEN_ALL_ED
Implemented All Universal Safety Interventions:  [place] to call system. Call bell, personal items and telephone within reach. Instruct patient to call for assistance. Room bathroom lighting operational. Non-slip footwear when patient is off stretcher. Physically safe environment: no spills, clutter or unnecessary equipment. Stretcher in lowest position, wheels locked, appropriate side rails in place.

## 2020-09-04 NOTE — ED PROVIDER NOTE - CARE PROVIDER_API CALL
Nelson Borja  General Surgery  [address], [address] [place] [address]  Phone: [phone]  Fax: [phone]  Follow Up Time:

## 2020-09-04 NOTE — ED PROVIDER NOTE - PHYSICAL EXAMINATION
CONSTITUTIONAL: well developed, nontoxic appearing, in no acute distress, speaking in full sentences  SKIN: warm, dry, no rash, cap refill < 2 seconds  HEENT: normocephalic, atraumatic, no conjunctival erythema, moist mucous membranes, patent airway  NECK: supple  CV:  regular rate, regular rhythm, 2+ radial pulses bilaterally  RESP: no wheezes, no rales, no rhonchi, normal work of breathing  ABD: soft, diffusely tender, nondistended, no rebound, + mild guarding, midline surgical scar clean/dry/intact  BACK: no CVA tenderness  MSK: normal ROM, no cyanosis, no edema  NEURO: alert, oriented, grossly unremarkable  PSYCH: cooperative, appropriate

## 2020-09-04 NOTE — ED ADULT NURSE NOTE - OBJECTIVE STATEMENT
Patient c.o lower abdominal pain, nausea, and chills for the past 4 days. Hx of diverticulitis, ovarian cancer, and HLD.

## 2020-09-04 NOTE — ED PROVIDER NOTE - CHIEF COMPLAINT
The patient is a 44y Female complaining of abdominal pain. Need for prophylactic measure Need for prophylactic measure

## 2020-09-04 NOTE — ED PROVIDER NOTE - NS ED ROS FT
GEN:  no fever, + chills, no generalized weakness  NEURO:  no headache, no dizziness  ENT: no sore throat, no runny nose  CV:  no chest pain, no palpitations  RESP:  no sob, no cough  GI:  + nausea, no vomiting, + abdominal pain, no diarrhea  :  no dysuria, no urinary frequency, no hematuria  MSK:  no joint pain, no edema  SKIN:  no rash, no bruising  HEME: no hematochezia, no melena

## 2020-09-04 NOTE — CONSULT NOTE ADULT - ASSESSMENT
44F w/PMHx of DM, ovarian cancer s/p left oophrectomy in 2006, and right oophrectomy and hysterectomy in 2017 for stage III cancer, and diverticulitis . On 7/20/20, patient underwent laparoscopic converted to open resection of descending and sigmoid colon with primary anastomosis for diverticulitis without complication. Patient presents to the ED with diffuse abdominal pain and report of over 10 bowel movements per day for the past 5 days    Plan: 44F w/PMHx of DM, ovarian cancer s/p left oophrectomy in 2006, and right oophrectomy and hysterectomy in 2017 for stage III cancer, and diverticulitis . On 7/20/20, patient underwent laparoscopic converted to open resection of descending and sigmoid colon with primary anastomosis for diverticulitis without complication. Patient presents to the ED with diffuse abdominal pain and report of over 10 bowel movements per day for the past 5 days    Plan:   -No evidence of leukocytosis, lactate 1.0, hgb 11.5; remainder of labs unremarkable   -Patient's abdomen is soft, nondistended, no signs of peritonitis   -Radiographic findings show postoperative changes, no evidence of pneumoperitoneum   -No indication for acute surgical intervention  -Patient should take extra strength Tylenol for pain control  -Instruct patient to follow-up in Dr. Borja's office next Thursday  -She should return to the ED if her symptoms worsen   -Plan discussed with Dr. Borja

## 2020-09-04 NOTE — ED PROVIDER NOTE - OBJECTIVE STATEMENT
43 yo F with PMHx of DM, diverticulitis, fatty liver, HLD, obesity, ovarian cancer, seizure, vertigo who presents with gradual onset of intermittent, nonradiating, diffuse abd cramping x 4 days associated with nausea, chills, 12-15 episodes of BMs/day. Sx improved after BMs, no alleviating factors. No fever, cp, sob, vomiting, dysuria, hematuria, vaginal bleeding/discharge, diarrhea, hematochezia, melena. Pt underwent colectomy with coloproctostomy, mobilization of splenic flexure, resection of descending/sigmoid colon, lysis of abdominal adhesions on 07/20/20 and called Dr. Borja today who sent her to ED.

## 2020-09-04 NOTE — ED PROVIDER NOTE - CLINICAL SUMMARY MEDICAL DECISION MAKING FREE TEXT BOX
43 yo F with PMH of DM, diverticulitis sp surgery 7/20/20, fatty liver, HLD presents to ED for abdominal pain. CT a/p, labs, and UA obtained with no remarkable findings. Pt seen by surgery and cleared for discharge. Pt tolerating PO in the ED, not in any distress on discharge. Pt given clear instructions and agreed with plan. Pt discharged.

## 2020-09-04 NOTE — ED PROVIDER NOTE - PROGRESS NOTE DETAILS
TC: 43 yo F with PMHx of DM, diverticulitis, fatty liver, HLD, obesity, ovarian cancer, seizure, vertigo, recent extensive colon resection with mobilization of splenic flexure and lysis of adhesions on 7/20 (Dr. Borja) who presents with diffuse abd pain x 4 days. No other /GI sx. Here in ED, vitals wnl. Abd diffusely tender on exam with mild guarding. Surgical scar clean/dry/intact. Ordered labs, ekg, cxr, urine, CT. Given IVF, morphine, zofran. Spoke with gen surg, will see pt. TC: Reassessed pt, reports that she feels better. Labs wnl. Ekg nonischemic. Cxr negative. Pending CT, urine. TC: CT shows postop changes without evidence of pneumoperitoneum or free fluid however oral contrast did not reach site of anastomosis. Spoke with surg, updated on CT findings, they will call back after discussion with surg attending. Mj: pt was given as a signout. Pt on reassessment has nontender abdomen. On CT no acute findings. Pt cleared by surgery, will follow up UA. If negative will dc pt with pcp follow up. TC: Ua negative. Surg recommended no acute surgical intervention at this time, recommended outpt f/u and extra strength tylenol. Sent rx for tylenol to pharmacy. Strict ED return precautions given. Pt verbalized understanding and was agreeable with plan.

## 2020-09-04 NOTE — ED PROVIDER NOTE - ATTENDING CONTRIBUTION TO CARE
43 yo F with PMH of DM, diverticulitis sp surgery 7/20/20, fatty liver, HLD presents to ED for abdominal pain. Pt called Dr. Borja and was told to come to the ED. Pt has had nausea and chills but no emesis. Associated with non-bloody diarrhea. No SOB, CP, fever.     Const: Well nourished, well developed, appears stated age  Eyes: PERRL, no conjunctival injection  HENT:  Neck supple without meningismus   CV: RRR, Warm, well-perfused extremities  RESP: CTA B/L, no tachypnea   GI: soft, diffusely tender, non-distended  MSK: No gross deformities appreciated  Skin: Warm, dry. No rashes  Neuro: Alert, CNs II-XII grossly intact. Sensation and motor function of extremities grossly intact.  Psych: Appropriate mood and affect.    Concern for diverticulitis vs perforation   Will do labs and CT

## 2020-09-04 NOTE — ED PROVIDER NOTE - PATIENT PORTAL LINK FT
You can access the FollowMyHealth Patient Portal offered by [facility] by registering at the following website: [email or web address]. By joining [facility]’s FollowMyHealth portal, you will also be able to view your health information using other applications (apps) compatible with our system.

## 2020-09-04 NOTE — ED PROVIDER NOTE - PSH
H/O abdominal hysterectomy  With Right Oophrecetomy   In December 2017  H/O colonoscopy  >3 years ago  History of cholecystectomy  10-15 years ago  History of colon resection    History of mobilization of splenic flexure

## 2020-09-04 NOTE — CONSULT NOTE ADULT - SUBJECTIVE AND OBJECTIVE BOX
ALFIE MOSLEY [id number]  44y Female    HPI: 44F w/PMHx of DM, ovarian cancer s/p left oophrectomy in 2006, and right oophrectomy and hysterectomy in 2017 for stage III cancer, and diverticulitis. On 7/20/20, patient underwent laparoscopic converted to open resection of descending and sigmoid colon (patient unable to tolerate Trendelenburg) with primary anastomosis for diverticulitis, surgical pathology consistent with diverticulosis/diverticulitis. No complications, patient was discharged on POD 3.     Patient presents to the ED with 5 days of diffuse abdominal pain and 10-14 bowel movements per day. Bowel movements were soft and nonbloody, denies diarrhea. She endorses nausea, denies any vomiting. She reports diaphoresis and chills at home. In the ED she is afebrile, hypertensive to 184/98, remainder of vitals unremarkable. No evidence of leukocytosis on labs, WBC 6.24. H&H 11.5/37, Lactate 1.0. On exam her abdomen is soft, nondistended, diffusely tender; no signs of peritonitis.     PAST MEDICAL & SURGICAL HISTORY:  Obesity  Fatty liver  Diverticulitis  Diverticulosis of intestine  Vertigo: &gt; 3 months ago, not currently complaining of same (3/4/2020)  Hypercholesteremia  Seizure disorder: last seizure &gt; 6 months ago  Ovarian cancer: UNSURE OF STAGE HOWEVER REPORTS IT &quot;MINOR&quot;  Sees Oncologist Dr. Vanessa Correa  Diabetes  History of mobilization of splenic flexure  History of colon resection  H/O colonoscopy: &gt;3 years ago  H/O abdominal hysterectomy: With Right Oophrecetomy   In December 2017  History of cholecystectomy: 10-15 years ago    Allergies  contrast media (gadolinium-based) (Short breath; Headache)    REVIEW OF SYSTEMS    [ X ] A ten-point review of systems was otherwise negative except as noted.  [ ] Due to altered mental status/intubation, subjective information were not able to be obtained from the patient. History was obtained, to the extent possible, from review of the chart and collateral sources of information.    Vital Signs Last 24 Hrs  T(C): 35.7 (04 Sep 2020 11:27), Max: 35.7 (04 Sep 2020 11:27)  T(F): 96.3 (04 Sep 2020 11:27), Max: 96.3 (04 Sep 2020 11:27)  HR: 79 (04 Sep 2020 11:27) (79 - 79)  BP: 184/98 (04 Sep 2020 11:27) (184/98 - 184/98)  RR: 17 (04 Sep 2020 11:27) (17 - 17)  SpO2: 97% (04 Sep 2020 11:27) (97% - 97%)    PHYSICAL EXAM:  GENERAL: NAD  CHEST/LUNG: Clear to auscultation bilaterally  HEART: Regular rate and rhythm  ABDOMEN: Soft, nondistended, diffusely tender, no evidence of peritonitis; midline laparotomy scar completely and well-healed   EXTREMITIES:  No clubbing, cyanosis, or edema    LABS:               11.5   6.24  )-----------( 360      ( 04 Sep 2020 11:48 )             37.0       Auto Neutrophil %: 49.2 % (09-04-20 @ 11:48)  Auto Immature Granulocyte %: 0.3 % (09-04-20 @ 11:48)    09-04    143  |  108  |  13  ----------------------------<  100<H>  4.3   |  24  |  0.5<L>    Calcium, Total Serum: 9.5 mg/dL (09-04-20 @ 11:48)    LFTs:             7.2  | <0.2 | 41       ------------------[130     ( 04 Sep 2020 11:48 )  4.5  | <0.2 | 52          Lipase:31       Lactate, Blood: 1.0 mmol/L (09-04-20 @ 11:48)    Coags:     13.10  ----< 1.14    ( 04 Sep 2020 11:48 )     35.1     RADIOLOGY & ADDITIONAL STUDIES:  < from: Xray Chest 2 Views PA/Lat (09.04.20 @ 12:57) >  Impression:  No radiographic evidence of acute cardiopulmonary disease. ALFIE MOSLEY [id number]  44y Female    HPI: 44F w/PMHx of DM, ovarian cancer s/p left oophrectomy in 2006, and right oophrectomy and hysterectomy in 2017 for stage III cancer, and diverticulitis. On 7/20/20, patient underwent laparoscopic converted to open resection of descending and sigmoid colon (patient unable to tolerate Trendelenburg) with primary anastomosis for diverticulitis, surgical pathology consistent with diverticulosis/diverticulitis. No complications, patient was discharged on POD 3.     Patient presents to the ED with 5 days of diffuse abdominal pain and 10-14 bowel movements per day. Bowel movements were soft and nonbloody, denies diarrhea. She endorses nausea, denies any vomiting. She reports diaphoresis and chills at home. In the ED she is afebrile, hypertensive to 184/98, remainder of vitals unremarkable. No evidence of leukocytosis on labs, WBC 6.24. H&H 11.5/37, Lactate 1.0. On exam her abdomen is soft, nondistended, diffusely tender; no signs of peritonitis.     PAST MEDICAL & SURGICAL HISTORY:  Obesity  Fatty liver  Diverticulitis  Diverticulosis of intestine  Vertigo: &gt; 3 months ago, not currently complaining of same (3/4/2020)  Hypercholesteremia  Seizure disorder: last seizure &gt; 6 months ago  Ovarian cancer: UNSURE OF STAGE HOWEVER REPORTS IT &quot;MINOR&quot;  Sees Oncologist Dr. Vanessa Correa  Diabetes  History of mobilization of splenic flexure  History of colon resection  H/O colonoscopy: &gt;3 years ago  H/O abdominal hysterectomy: With Right Oophrecetomy   In December 2017  History of cholecystectomy: 10-15 years ago    Allergies  contrast media (gadolinium-based) (Short breath; Headache)    REVIEW OF SYSTEMS    [ X ] A ten-point review of systems was otherwise negative except as noted.  [ ] Due to altered mental status/intubation, subjective information were not able to be obtained from the patient. History was obtained, to the extent possible, from review of the chart and collateral sources of information.    Vital Signs Last 24 Hrs  T(C): 35.7 (04 Sep 2020 11:27), Max: 35.7 (04 Sep 2020 11:27)  T(F): 96.3 (04 Sep 2020 11:27), Max: 96.3 (04 Sep 2020 11:27)  HR: 79 (04 Sep 2020 11:27) (79 - 79)  BP: 184/98 (04 Sep 2020 11:27) (184/98 - 184/98)  RR: 17 (04 Sep 2020 11:27) (17 - 17)  SpO2: 97% (04 Sep 2020 11:27) (97% - 97%)    PHYSICAL EXAM:  GENERAL: NAD  CHEST/LUNG: Clear to auscultation bilaterally  HEART: Regular rate and rhythm  ABDOMEN: Soft, nondistended, diffusely tender, no evidence of peritonitis; midline laparotomy scar completely and well-healed   EXTREMITIES:  No clubbing, cyanosis, or edema    LABS:               11.5   6.24  )-----------( 360      ( 04 Sep 2020 11:48 )             37.0       Auto Neutrophil %: 49.2 % (09-04-20 @ 11:48)  Auto Immature Granulocyte %: 0.3 % (09-04-20 @ 11:48)    09-04    143  |  108  |  13  ----------------------------<  100<H>  4.3   |  24  |  0.5<L>    Calcium, Total Serum: 9.5 mg/dL (09-04-20 @ 11:48)    LFTs:             7.2  | <0.2 | 41       ------------------[130     ( 04 Sep 2020 11:48 )  4.5  | <0.2 | 52          Lipase:31       Lactate, Blood: 1.0 mmol/L (09-04-20 @ 11:48)    Coags:     13.10  ----< 1.14    ( 04 Sep 2020 11:48 )     35.1     RADIOLOGY & ADDITIONAL STUDIES:  < from: Xray Chest 2 Views PA/Lat (09.04.20 @ 12:57) >  Impression:  No radiographic evidence of acute cardiopulmonary disease.    < from: CT Abdomen and Pelvis w/ Oral Cont (09.04.20 @ 15:53) >  IMPRESSION:  Postoperative changes without evidence of pneumoperitoneum of free pelvic fluid. Oral contrast did not reach the site of anastomosis.

## 2020-09-22 ENCOUNTER — APPOINTMENT (OUTPATIENT)
Dept: SURGERY | Facility: CLINIC | Age: 45
End: 2020-09-22
Payer: MEDICAID

## 2020-09-22 VITALS
WEIGHT: 198 LBS | BODY MASS INDEX: 42.72 KG/M2 | DIASTOLIC BLOOD PRESSURE: 70 MMHG | SYSTOLIC BLOOD PRESSURE: 126 MMHG | TEMPERATURE: 97.8 F | HEIGHT: 57 IN | HEART RATE: 103 BPM

## 2020-09-22 PROCEDURE — 99024 POSTOP FOLLOW-UP VISIT: CPT

## 2020-09-22 NOTE — ASSESSMENT
[FreeTextEntry1] : Ms. MOSLEY is approximately a 8 week status post robotic colon resection. She looks and feels well. She has no complaints. She will see me back in 4-6 weeks. Certainly she'll see me sooner should any problems arise.

## 2020-09-30 ENCOUNTER — OUTPATIENT (OUTPATIENT)
Dept: OUTPATIENT SERVICES | Facility: HOSPITAL | Age: 45
LOS: 1 days | Discharge: HOME | End: 2020-09-30

## 2020-09-30 ENCOUNTER — APPOINTMENT (OUTPATIENT)
Dept: OPHTHALMOLOGY | Facility: CLINIC | Age: 45
End: 2020-09-30

## 2020-09-30 DIAGNOSIS — Z98.890 OTHER SPECIFIED POSTPROCEDURAL STATES: Chronic | ICD-10-CM

## 2020-09-30 DIAGNOSIS — Z90.49 ACQUIRED ABSENCE OF OTHER SPECIFIED PARTS OF DIGESTIVE TRACT: Chronic | ICD-10-CM

## 2020-10-01 DIAGNOSIS — H04.123 DRY EYE SYNDROME OF BILATERAL LACRIMAL GLANDS: ICD-10-CM

## 2020-10-01 DIAGNOSIS — E11.9 TYPE 2 DIABETES MELLITUS WITHOUT COMPLICATIONS: ICD-10-CM

## 2020-10-01 DIAGNOSIS — H02.834 DERMATOCHALASIS OF LEFT UPPER EYELID: ICD-10-CM

## 2020-10-01 DIAGNOSIS — H02.831 DERMATOCHALASIS OF RIGHT UPPER EYELID: ICD-10-CM

## 2020-10-01 DIAGNOSIS — H40.013 OPEN ANGLE WITH BORDERLINE FINDINGS, LOW RISK, BILATERAL: ICD-10-CM

## 2020-10-01 NOTE — HISTORY OF PRESENT ILLNESS
[FreeTextEntry1] : Is a a postop visit for Ms. MOSLEY who is status post robotic sigmoid colon resection for diverticular disease. The patient looks and feels well, she has no complaints, she is tolerating a diet without difficulty and moving her bowels have difficulty.

## 2020-10-01 NOTE — ASSESSMENT
[FreeTextEntry1] : Ms. MOSLEY is approximately a 2 week status post robotic colon resection. She looks and feels well. She has no complaints. She will continue a low-residue diet and see me back in 2-3 weeks. Certainly she'll see me sooner should any problems arise.

## 2020-10-09 ENCOUNTER — APPOINTMENT (OUTPATIENT)
Dept: PODIATRY | Facility: CLINIC | Age: 45
End: 2020-10-09
Payer: MEDICAID

## 2020-10-09 ENCOUNTER — OUTPATIENT (OUTPATIENT)
Dept: OUTPATIENT SERVICES | Facility: HOSPITAL | Age: 45
LOS: 1 days | Discharge: HOME | End: 2020-10-09

## 2020-10-09 DIAGNOSIS — Z90.49 ACQUIRED ABSENCE OF OTHER SPECIFIED PARTS OF DIGESTIVE TRACT: Chronic | ICD-10-CM

## 2020-10-09 DIAGNOSIS — Z98.890 OTHER SPECIFIED POSTPROCEDURAL STATES: Chronic | ICD-10-CM

## 2020-10-09 PROCEDURE — 99213 OFFICE O/P EST LOW 20 MIN: CPT

## 2020-10-10 ENCOUNTER — LABORATORY RESULT (OUTPATIENT)
Age: 45
End: 2020-10-10

## 2020-10-16 ENCOUNTER — OUTPATIENT (OUTPATIENT)
Dept: OUTPATIENT SERVICES | Facility: HOSPITAL | Age: 45
LOS: 1 days | Discharge: HOME | End: 2020-10-16

## 2020-10-16 ENCOUNTER — APPOINTMENT (OUTPATIENT)
Dept: GYNECOLOGIC ONCOLOGY | Facility: CLINIC | Age: 45
End: 2020-10-16
Payer: MEDICAID

## 2020-10-16 VITALS
TEMPERATURE: 97.4 F | HEIGHT: 57 IN | WEIGHT: 198 LBS | RESPIRATION RATE: 18 BRPM | HEART RATE: 80 BPM | DIASTOLIC BLOOD PRESSURE: 83 MMHG | SYSTOLIC BLOOD PRESSURE: 139 MMHG | BODY MASS INDEX: 42.72 KG/M2

## 2020-10-16 DIAGNOSIS — Z98.890 OTHER SPECIFIED POSTPROCEDURAL STATES: Chronic | ICD-10-CM

## 2020-10-16 DIAGNOSIS — Z90.49 ACQUIRED ABSENCE OF OTHER SPECIFIED PARTS OF DIGESTIVE TRACT: Chronic | ICD-10-CM

## 2020-10-16 PROCEDURE — 99214 OFFICE O/P EST MOD 30 MIN: CPT

## 2020-10-16 NOTE — DISCUSSION/SUMMARY
[FreeTextEntry1] : 43 yo with Stage IIIA Serous Borderline ovarian tumor, s/p WILBERT, RSO, Omentectomy 12/18/2017, s/p colon resection 7/2020 for diverticulitis, no evidence of disease\par \par - Discussed signs and symptoms of ovarian cancer\par - Return to see me in 6 months

## 2020-10-16 NOTE — HISTORY OF PRESENT ILLNESS
[FreeTextEntry1] : 43 yo here for surveillance\par \par Diagnosis: Stage IIIA Serous Borderline ovarian tumor\par Surgery date: WILBERT, RSO, Omentectomy 12/18/2017\par Adjuvant treatment: None\par \par Most recent : 12/16/19 - 8\par 6/3/19 - 9\par 2/13/19 - 13\par 11/23/18 - 17\par \par Interval history:\par - 02/05/2018 MRI abdomen: 3 hepatic lesions, could be suspicious for metastatic implants, but unchanged compared to 03/2017\par - 02/23/2018 PET/CT: no increased uptake in liver lesion; uptake in right medial upper thigh\par - CT guided biopsy of liver lesion 03/08/2018 => benign benign\par - 12/2018 CT abd/pelvis: No evidence of pelvic mass or abdominal/pelvic adenopathy. Nonobstructing small bowel Childs hernia. Fatty infiltration of the liver \par - Genetic testing positive for melanoma/pancreatic cancer syndrome\par - Follows with GI. 12/2018 underwent EGD and EUS 3/2019 atrophic gastritis and normal pancreas, needs colonoscopy 2020\par - 5/28/19 Dermatology - full body skin exam, likely seborrheic dermatitis and non dysplastic nevi, f/u 6mos\par - Recurrent bouts of diverticulitis. S/p robotic colorectal surgery 7/2020\par - 9/4/2020 CT scan during ED visit, no pelvic lesions noted\par \par Today, she has no acute complaints. She denies fevers, chills, loss of appetite, pain, vaginal bleeding, urinary incontinence, or hematuria.

## 2020-10-19 NOTE — ASSESSMENT
[FreeTextEntry1] : - Patient examined, history and chart reviewed\par - peroneal tendonitis RLE\par - discussed and rx cam boot \par - PAtient to follow up in 3 weeks  \par \par \par \par

## 2020-10-19 NOTE — PHYSICAL EXAM
[General Appearance - Alert] : alert [General Appearance - In No Acute Distress] : in no acute distress [Full Pulse] : the pedal pulses are present [Edema] : there was no peripheral edema [Abnormal Walk] : normal gait [Motor Tone] : muscle strength and tone were normal [Nail Clubbing] : no clubbing  or cyanosis of the fingernails [Musculoskeletal - Swelling] : no joint swelling seen [Skin Color & Pigmentation] : normal skin color and pigmentation [Skin Turgor] : normal skin turgor [] : no rash [Normal] : normal [Normal in Appearance] : normal in appearance [Sensation] : the sensory exam was normal to light touch and pinprick [Full ROM] : with full range of motion [Deep Tendon Reflexes (DTR)] : deep tendon reflexes were 2+ and symmetric [Oriented To Time, Place, And Person] : oriented to person, place, and time [Impaired Insight] : insight and judgment were intact [No Focal Deficits] : no focal deficits [Affect] : the affect was normal [FreeTextEntry1] : ingrown toenail 1st b/l , peroneal tendonitis right foot

## 2020-10-20 DIAGNOSIS — M79.671 PAIN IN RIGHT FOOT: ICD-10-CM

## 2020-10-20 DIAGNOSIS — M79.672 PAIN IN LEFT FOOT: ICD-10-CM

## 2020-10-20 DIAGNOSIS — M77.9 ENTHESOPATHY, UNSPECIFIED: ICD-10-CM

## 2020-10-27 ENCOUNTER — OUTPATIENT (OUTPATIENT)
Dept: OUTPATIENT SERVICES | Facility: HOSPITAL | Age: 45
LOS: 1 days | Discharge: HOME | End: 2020-10-27

## 2020-10-27 ENCOUNTER — APPOINTMENT (OUTPATIENT)
Dept: HEMATOLOGY ONCOLOGY | Facility: CLINIC | Age: 45
End: 2020-10-27
Payer: COMMERCIAL

## 2020-10-27 ENCOUNTER — LABORATORY RESULT (OUTPATIENT)
Age: 45
End: 2020-10-27

## 2020-10-27 VITALS
SYSTOLIC BLOOD PRESSURE: 130 MMHG | BODY MASS INDEX: 42.93 KG/M2 | HEIGHT: 57 IN | TEMPERATURE: 98.3 F | HEART RATE: 87 BPM | DIASTOLIC BLOOD PRESSURE: 78 MMHG | WEIGHT: 199 LBS

## 2020-10-27 VITALS
HEIGHT: 57 IN | BODY MASS INDEX: 42.93 KG/M2 | DIASTOLIC BLOOD PRESSURE: 53 MMHG | SYSTOLIC BLOOD PRESSURE: 102 MMHG | WEIGHT: 199 LBS | HEART RATE: 78 BPM | TEMPERATURE: 96.3 F

## 2020-10-27 DIAGNOSIS — Z90.49 ACQUIRED ABSENCE OF OTHER SPECIFIED PARTS OF DIGESTIVE TRACT: Chronic | ICD-10-CM

## 2020-10-27 DIAGNOSIS — Z98.890 OTHER SPECIFIED POSTPROCEDURAL STATES: Chronic | ICD-10-CM

## 2020-10-27 LAB
ALBUMIN SERPL ELPH-MCNC: 4.4 G/DL
ALP BLD-CCNC: 161 U/L
ALT SERPL-CCNC: 66 U/L
ANION GAP SERPL CALC-SCNC: 11 MMOL/L
AST SERPL-CCNC: 44 U/L
BILIRUB SERPL-MCNC: <0.2 MG/DL
BUN SERPL-MCNC: 15 MG/DL
CALCIUM SERPL-MCNC: 10 MG/DL
CHLORIDE SERPL-SCNC: 103 MMOL/L
CO2 SERPL-SCNC: 27 MMOL/L
CREAT SERPL-MCNC: 0.5 MG/DL
GLUCOSE SERPL-MCNC: 207 MG/DL
HCT VFR BLD CALC: 40.8 %
HGB BLD-MCNC: 12.7 G/DL
MCHC RBC-ENTMCNC: 26.5 PG
MCHC RBC-ENTMCNC: 31.1 G/DL
MCV RBC AUTO: 85.2 FL
PLATELET # BLD AUTO: 337 K/UL
PMV BLD: 10.8 FL
POTASSIUM SERPL-SCNC: 4.4 MMOL/L
PROT SERPL-MCNC: 7.4 G/DL
RBC # BLD: 4.79 M/UL
RBC # FLD: 13.5 %
SODIUM SERPL-SCNC: 141 MMOL/L
WBC # FLD AUTO: 6.88 K/UL

## 2020-10-27 PROCEDURE — 99213 OFFICE O/P EST LOW 20 MIN: CPT

## 2020-10-28 LAB — CANCER AG125 SERPL-ACNC: 10 U/ML

## 2020-10-28 NOTE — ASSESSMENT
[FreeTextEntry1] : In summary this is a 44 year old woman with a diagnosis of borderline serous tumor of the ovary s/p WILBERT/RSO/ omentectomy. The pathology does not show invasive implants. 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 Patient was told to inform family especially children to undergo genetic testing as well.  \par \par RECOMMENDATIONS:\par S/p EGD and EUS in 3/19.\par Check  , CBC, CMP.\par Follow up PCP/derm/GI regularly. \par Again strongly advised to inform family to undergo genetic counseling h/o CKDN2A mutation.  Referred to Mahnaz Triana, genetic counselor. \par UTD with mammogram (due 10/2020), GYNONC f/up tomorrow. \par \par RTC in 6 months.\par \par Case was seen and discussed with Dr. Correa who agreed with the assessment and plan.\par

## 2020-10-29 DIAGNOSIS — Z15.89 GENETIC SUSCEPTIBILITY TO OTHER DISEASE: ICD-10-CM

## 2020-10-29 DIAGNOSIS — D39.10 NEOPLASM OF UNCERTAIN BEHAVIOR OF UNSPECIFIED OVARY: ICD-10-CM

## 2020-10-30 ENCOUNTER — NON-APPOINTMENT (OUTPATIENT)
Age: 45
End: 2020-10-30

## 2020-11-02 ENCOUNTER — APPOINTMENT (OUTPATIENT)
Dept: NUTRITION | Facility: CLINIC | Age: 45
End: 2020-11-02

## 2020-11-02 ENCOUNTER — OUTPATIENT (OUTPATIENT)
Dept: OUTPATIENT SERVICES | Facility: HOSPITAL | Age: 45
LOS: 1 days | Discharge: HOME | End: 2020-11-02

## 2020-11-02 ENCOUNTER — NON-APPOINTMENT (OUTPATIENT)
Age: 45
End: 2020-11-02

## 2020-11-02 DIAGNOSIS — Z98.890 OTHER SPECIFIED POSTPROCEDURAL STATES: Chronic | ICD-10-CM

## 2020-11-02 DIAGNOSIS — Z90.49 ACQUIRED ABSENCE OF OTHER SPECIFIED PARTS OF DIGESTIVE TRACT: Chronic | ICD-10-CM

## 2020-11-03 ENCOUNTER — APPOINTMENT (OUTPATIENT)
Dept: OPHTHALMOLOGY | Facility: CLINIC | Age: 45
End: 2020-11-03

## 2020-11-03 ENCOUNTER — OUTPATIENT (OUTPATIENT)
Dept: OUTPATIENT SERVICES | Facility: HOSPITAL | Age: 45
LOS: 1 days | Discharge: HOME | End: 2020-11-03
Payer: MEDICAID

## 2020-11-03 DIAGNOSIS — Z90.49 ACQUIRED ABSENCE OF OTHER SPECIFIED PARTS OF DIGESTIVE TRACT: Chronic | ICD-10-CM

## 2020-11-03 DIAGNOSIS — Z98.890 OTHER SPECIFIED POSTPROCEDURAL STATES: Chronic | ICD-10-CM

## 2020-11-03 PROCEDURE — 92202 OPSCPY EXTND ON/MAC DRAW: CPT

## 2020-11-03 PROCEDURE — 92014 COMPRE OPH EXAM EST PT 1/>: CPT

## 2020-11-11 ENCOUNTER — NON-APPOINTMENT (OUTPATIENT)
Age: 45
End: 2020-11-11

## 2020-11-12 DIAGNOSIS — H02.831 DERMATOCHALASIS OF RIGHT UPPER EYELID: ICD-10-CM

## 2020-11-12 DIAGNOSIS — H04.123 DRY EYE SYNDROME OF BILATERAL LACRIMAL GLANDS: ICD-10-CM

## 2020-11-12 DIAGNOSIS — H02.834 DERMATOCHALASIS OF LEFT UPPER EYELID: ICD-10-CM

## 2020-11-12 DIAGNOSIS — H40.013 OPEN ANGLE WITH BORDERLINE FINDINGS, LOW RISK, BILATERAL: ICD-10-CM

## 2020-11-12 DIAGNOSIS — E11.9 TYPE 2 DIABETES MELLITUS WITHOUT COMPLICATIONS: ICD-10-CM

## 2020-11-12 DIAGNOSIS — H05.111 GRANULOMA OF RIGHT ORBIT: ICD-10-CM

## 2020-11-17 ENCOUNTER — APPOINTMENT (OUTPATIENT)
Dept: HEMATOLOGY ONCOLOGY | Facility: CLINIC | Age: 45
End: 2020-11-17

## 2020-11-23 ENCOUNTER — APPOINTMENT (OUTPATIENT)
Dept: HEPATOLOGY | Facility: CLINIC | Age: 45
End: 2020-11-23

## 2020-11-24 ENCOUNTER — APPOINTMENT (OUTPATIENT)
Dept: OPHTHALMOLOGY | Facility: CLINIC | Age: 45
End: 2020-11-24

## 2020-12-10 ENCOUNTER — APPOINTMENT (OUTPATIENT)
Dept: NUTRITION | Facility: CLINIC | Age: 45
End: 2020-12-10

## 2020-12-11 ENCOUNTER — APPOINTMENT (OUTPATIENT)
Dept: PODIATRY | Facility: CLINIC | Age: 45
End: 2020-12-11

## 2020-12-24 ENCOUNTER — APPOINTMENT (OUTPATIENT)
Dept: INTERNAL MEDICINE | Facility: CLINIC | Age: 45
End: 2020-12-24
Payer: MEDICAID

## 2020-12-24 ENCOUNTER — OUTPATIENT (OUTPATIENT)
Dept: OUTPATIENT SERVICES | Facility: HOSPITAL | Age: 45
LOS: 1 days | Discharge: HOME | End: 2020-12-24

## 2020-12-24 VITALS
HEIGHT: 57 IN | BODY MASS INDEX: 43.15 KG/M2 | OXYGEN SATURATION: 95 % | SYSTOLIC BLOOD PRESSURE: 107 MMHG | DIASTOLIC BLOOD PRESSURE: 70 MMHG | HEART RATE: 82 BPM | WEIGHT: 200 LBS | TEMPERATURE: 98.2 F

## 2020-12-24 DIAGNOSIS — R74.01 ELEVATION OF LEVELS OF LIVER TRANSAMINASE LEVELS: ICD-10-CM

## 2020-12-24 DIAGNOSIS — Z90.49 ACQUIRED ABSENCE OF OTHER SPECIFIED PARTS OF DIGESTIVE TRACT: Chronic | ICD-10-CM

## 2020-12-24 DIAGNOSIS — E78.00 PURE HYPERCHOLESTEROLEMIA, UNSPECIFIED: ICD-10-CM

## 2020-12-24 DIAGNOSIS — Z98.890 OTHER SPECIFIED POSTPROCEDURAL STATES: Chronic | ICD-10-CM

## 2020-12-24 DIAGNOSIS — K21.9 GASTRO-ESOPHAGEAL REFLUX DISEASE WITHOUT ESOPHAGITIS: ICD-10-CM

## 2020-12-24 DIAGNOSIS — R30.0 DYSURIA: ICD-10-CM

## 2020-12-24 DIAGNOSIS — E66.01 MORBID (SEVERE) OBESITY DUE TO EXCESS CALORIES: ICD-10-CM

## 2020-12-24 DIAGNOSIS — E11.65 TYPE 2 DIABETES MELLITUS WITH HYPERGLYCEMIA: ICD-10-CM

## 2020-12-24 DIAGNOSIS — R10.84 GENERALIZED ABDOMINAL PAIN: ICD-10-CM

## 2020-12-24 PROCEDURE — 99213 OFFICE O/P EST LOW 20 MIN: CPT | Mod: GC

## 2020-12-24 RX ORDER — METRONIDAZOLE 500 MG/1
500 TABLET ORAL
Qty: 6 | Refills: 0 | Status: DISCONTINUED | COMMUNITY
Start: 2020-02-26 | End: 2020-12-24

## 2020-12-24 RX ORDER — POLYETHYLENE GLYCOL 3350 AND ELECTROLYTES WITH LEMON FLAVOR 236; 22.74; 6.74; 5.86; 2.97 G/4L; G/4L; G/4L; G/4L; G/4L
236 POWDER, FOR SOLUTION ORAL
Qty: 1 | Refills: 0 | Status: DISCONTINUED | COMMUNITY
Start: 2020-06-08 | End: 2020-12-24

## 2020-12-24 RX ORDER — MELOXICAM 15 MG/1
15 TABLET ORAL DAILY
Qty: 90 | Refills: 2 | Status: DISCONTINUED | COMMUNITY
Start: 2020-03-12 | End: 2020-12-24

## 2020-12-24 RX ORDER — PEN NEEDLE, DIABETIC 29 G X1/2"
31G X 5 MM NEEDLE, DISPOSABLE MISCELLANEOUS
Qty: 100 | Refills: 2 | Status: DISCONTINUED | COMMUNITY
Start: 2020-05-20 | End: 2020-12-24

## 2020-12-24 RX ORDER — BLOOD-GLUCOSE METER
W/DEVICE KIT MISCELLANEOUS
Qty: 1 | Refills: 0 | Status: DISCONTINUED | COMMUNITY
Start: 2020-08-03 | End: 2020-12-24

## 2020-12-24 RX ORDER — MECLIZINE HYDROCHLORIDE 12.5 MG/1
12.5 TABLET ORAL 3 TIMES DAILY
Qty: 90 | Refills: 2 | Status: DISCONTINUED | COMMUNITY
Start: 2019-03-01 | End: 2020-12-24

## 2020-12-24 RX ORDER — POLYETHYLENE GLYCOL 3350 17 G/17G
17 POWDER, FOR SOLUTION ORAL DAILY
Qty: 1 | Refills: 1 | Status: DISCONTINUED | COMMUNITY
Start: 2020-06-08 | End: 2020-12-24

## 2020-12-24 RX ORDER — MELOXICAM 15 MG/1
15 TABLET ORAL DAILY
Qty: 14 | Refills: 1 | Status: DISCONTINUED | COMMUNITY
Start: 2019-12-02 | End: 2020-12-24

## 2020-12-24 RX ORDER — NEOMYCIN SULFATE 500 MG/1
500 TABLET ORAL
Qty: 6 | Refills: 0 | Status: DISCONTINUED | COMMUNITY
Start: 2020-02-26 | End: 2020-12-24

## 2020-12-31 NOTE — REVIEW OF SYSTEMS
[Abdominal Pain] : abdominal pain [Constipation] : constipation [Heartburn] : heartburn [Dysuria] : dysuria [Headache] : headache [Fever] : no fever [Chills] : no chills [Night Sweats] : no night sweats [Recent Change In Weight] : ~T no recent weight change [Discharge] : no discharge [Redness] : no redness [Earache] : no earache [Hearing Loss] : no hearing loss [Nasal Discharge] : no nasal discharge [Chest Pain] : no chest pain [Palpitations] : no palpitations [Orthopnea] : no orthopnea [Paroxysmal Nocturnal Dyspnea] : no paroxysmal nocturnal dyspnea [Shortness Of Breath] : no shortness of breath [Wheezing] : no wheezing [Cough] : no cough [Nausea] : no nausea [Vomiting] : no vomiting [Melena] : no melena [Joint Pain] : no joint pain [Joint Stiffness] : no joint stiffness [Muscle Pain] : no muscle pain [Back Pain] : no back pain [Itching] : no itching [Memory Loss] : no memory loss

## 2020-12-31 NOTE — ASSESSMENT
[FreeTextEntry1] : #Dysuria -abdominal pain-foul smelling urine\par -will order UA, nitrofurantoin to be ordered\par \par # DMII- controlled \par - last hba1c 6.7 in 8/2020\par - c/w trulicity, pioglitazone\par - follows with endo\par -  referral for podiatry and ophthalmology was given,UTD\par - urine microalbumin/ Creat wnl\par -diabetes management on board\par \par # CKDN2A mutation\par - has been following GI, uptodate with EUS \par - had colonoscopy in july 2020 with hyperplastic polyps- next in 7 -10 years\par \par # RLE radicular sxs, cervical vs carpal tunnel\par - following with neuro\par - xray cervical degenerative changes\par \par # serous ovarian cancer\par - s/p WILBERT/RSO/Omentectomy (12/2017)\par - Tested positive for CKDN2A mutation, heterozygosity, part of melanoma-pancreatic cancer syndrome which confers 17% lifetime risk of pancreatic cancer and 14-50% melanoma\par - Was referred to Dermatology for full skin evaluation; last seen in 11/2019, diagnosed with seborrheic keratosis of nose/buttock with benign non-dysplastic nevi\par - s/p EGD and EUS on 03/2019: Atrophic gastritis, normal pancreas and normal-sized PD\par - following GYN-Onc regularly, was referred to genetic counsellor \par \par # Hx of recurrent diverticulitis\par - pt is following Colorectal surgeon, s/p surgery in july 2020\par -still complains of pain, incision site is well healed, worsens with constipation, will add a bowel regimen if constipation persists\par \par # Transaminitis, elevated alk phos and AST/ALT\par - GGT 77, still elevated 10/2020\par - Hx of alcohol abuse \par - liver enzymes still elevated. \par -Hepatology referral to be given requested by oncology\par \par \par # Pseudoseizures/Tremor\par - Follow-up with Neurology\par - c.w Carbamazepine 200 mg QD, Tylenol PRN for headaches\par \par # Peroneal tendonitis of LLE \par - Meloxicam PRN\par - following with podiatry \par \par # HLD\par - 104 LDL. Total Cholesterol 164 10/2020\par - c/w lipitor 80 mg qhs \par \par \par # Health Maintenance\par - Mammogram 8/2020 uptodate \par - Pap smear last in 2017. Patient has WILBERT including cervix. \par - GI: uptodate, last one in july 2020 with hyperplastic polyps\par - Flu shot received this year 2020. PPSV 23 UTD. 2018\par - follow up in 3 months. \par \par

## 2020-12-31 NOTE — PHYSICAL EXAM
[No Acute Distress] : no acute distress [Well Nourished] : well nourished [Well Developed] : well developed [Normal Sclera/Conjunctiva] : normal sclera/conjunctiva [Normal Outer Ear/Nose] : the outer ears and nose were normal in appearance [Normal Oropharynx] : the oropharynx was normal [No JVD] : no jugular venous distention [No Lymphadenopathy] : no lymphadenopathy [No Respiratory Distress] : no respiratory distress  [No Accessory Muscle Use] : no accessory muscle use [Normal Rate] : normal rate  [Regular Rhythm] : with a regular rhythm [Normal S1, S2] : normal S1 and S2 [No Edema] : there was no peripheral edema [Soft] : abdomen soft [No HSM] : no HSM [Normal Posterior Cervical Nodes] : no posterior cervical lymphadenopathy [Normal Anterior Cervical Nodes] : no anterior cervical lymphadenopathy [No CVA Tenderness] : no CVA  tenderness [No Spinal Tenderness] : no spinal tenderness [No Joint Swelling] : no joint swelling [Grossly Normal Strength/Tone] : grossly normal strength/tone [No Rash] : no rash [Coordination Grossly Intact] : coordination grossly intact [No Focal Deficits] : no focal deficits [Normal Affect] : the affect was normal [de-identified] : generalized tenderness

## 2020-12-31 NOTE — HISTORY OF PRESENT ILLNESS
[FreeTextEntry1] : 46 yo F here for routine 3 months follow up.  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 \par \par Pt has been following with GI for for surveillance/ risk of pancreatic cancer. Patient complains of an abdominal pain which is long standing, since the surgery in 2020 but is not at the incision site. No aggravating and relieving factors described. The pain is intermittent and worsens with constipation. \par She aslo complained of dysuria with fowl smelling urine for last 1 week.

## 2021-01-04 ENCOUNTER — APPOINTMENT (OUTPATIENT)
Dept: HEPATOLOGY | Facility: CLINIC | Age: 46
End: 2021-01-04

## 2021-01-19 ENCOUNTER — OUTPATIENT (OUTPATIENT)
Dept: OUTPATIENT SERVICES | Facility: HOSPITAL | Age: 46
LOS: 1 days | Discharge: HOME | End: 2021-01-19

## 2021-01-19 ENCOUNTER — APPOINTMENT (OUTPATIENT)
Dept: DERMATOLOGY | Facility: CLINIC | Age: 46
End: 2021-01-19
Payer: MEDICAID

## 2021-01-19 DIAGNOSIS — Z98.890 OTHER SPECIFIED POSTPROCEDURAL STATES: Chronic | ICD-10-CM

## 2021-01-19 DIAGNOSIS — Z90.49 ACQUIRED ABSENCE OF OTHER SPECIFIED PARTS OF DIGESTIVE TRACT: Chronic | ICD-10-CM

## 2021-01-19 PROCEDURE — 99212 OFFICE O/P EST SF 10 MIN: CPT | Mod: GC

## 2021-01-19 NOTE — END OF VISIT
[] : Resident [FreeTextEntry3] : \par Patient only wants specific lesions examined.\par Scattered benign appearing nevi.\par Scalp intradermal nevus\par

## 2021-01-19 NOTE — HISTORY OF PRESENT ILLNESS
[de-identified] : Pt with PMH ovarian cancer followed by Dr. Correa. 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 \par Patient was advised she needs regular follow up with dermatology.\par \par Patient returns for regular follow up, she reports that previous pigmented lesions on the bridge have resolved. She reports only applying sunscreen on her face. Pt denies any lesion on thigh or buttock. She also reports non-pruritic lesion neck and scalp. Patient reports using sunscreen whenever she goes out in the sun. \par \par Patient was recently seen by Dr. Correa and Dr. Lima, and asked to follow up with Hepatology.

## 2021-01-19 NOTE — PHYSICAL EXAM
[Alert] : alert [Oriented x 3] : ~L oriented x 3 [Well Nourished] : well nourished [Conjunctiva Non-injected] : conjunctiva non-injected [No Visual Lymphadenopathy] : no visual  lymphadenopathy [No Clubbing] : no clubbing [No Edema] : no edema [No Bromhidrosis] : no bromhidrosis [No Chromhidrosis] : no chromhidrosis [Declined] : declined [Scalp] : Scalp [FreeTextEntry3] : 5 mm raised round lesion on scalp, non-pruritic, hypopigmented, no surrounding erythema.\par \par Multiple small nevi of uniform color on face and body

## 2021-01-19 NOTE — ASSESSMENT
[FreeTextEntry1] : Patient advise to follow up in 12 months for full body skin exam, and not to scrape lesion on nose in order to better evaluate. \par \par - Scalp lesion likely intradermal nevus\par - non-dysplastic nevi; benign looking unremarkable\par - Follow up in 6 months for full body exam\par - Continue to avoid excessive sun exposure and use sunscreen when outdoors.

## 2021-01-22 ENCOUNTER — APPOINTMENT (OUTPATIENT)
Dept: OPHTHALMOLOGY | Facility: CLINIC | Age: 46
End: 2021-01-22
Payer: MEDICAID

## 2021-01-22 ENCOUNTER — APPOINTMENT (OUTPATIENT)
Dept: GASTROENTEROLOGY | Facility: CLINIC | Age: 46
End: 2021-01-22
Payer: MEDICAID

## 2021-01-22 ENCOUNTER — OUTPATIENT (OUTPATIENT)
Dept: OUTPATIENT SERVICES | Facility: HOSPITAL | Age: 46
LOS: 1 days | Discharge: HOME | End: 2021-01-22

## 2021-01-22 ENCOUNTER — OUTPATIENT (OUTPATIENT)
Dept: OUTPATIENT SERVICES | Facility: HOSPITAL | Age: 46
LOS: 1 days | Discharge: HOME | End: 2021-01-22
Payer: MEDICAID

## 2021-01-22 DIAGNOSIS — Z98.890 OTHER SPECIFIED POSTPROCEDURAL STATES: Chronic | ICD-10-CM

## 2021-01-22 DIAGNOSIS — C25.9 MALIGNANT NEOPLASM OF PANCREAS, UNSPECIFIED: ICD-10-CM

## 2021-01-22 DIAGNOSIS — Z90.49 ACQUIRED ABSENCE OF OTHER SPECIFIED PARTS OF DIGESTIVE TRACT: Chronic | ICD-10-CM

## 2021-01-22 DIAGNOSIS — K76.0 FATTY (CHANGE OF) LIVER, NOT ELSEWHERE CLASSIFIED: ICD-10-CM

## 2021-01-22 PROCEDURE — 92012 INTRM OPH EXAM EST PATIENT: CPT

## 2021-01-22 PROCEDURE — ZZZZZ: CPT

## 2021-01-22 NOTE — ASSESSMENT
[FreeTextEntry1] : 44 yo F with PMH of DM, Ovarian cancer s/p left oophorectomy in 2006 and RT oophorectomy and hysterotomy in 2017, Recurrent diverticulitis s/p partial colectomy, H pylori gastritis s/p eradication,  s/p cholecystectomy, CKDN2A mutation (she is a heterozygote and is part of the melanoma-pancreatic cancer syndrome which confers a lifetime risk 17% pancreatic cancer) and NAFLD. She was sent by Oncology for elevated LFTs.\par \par \par # Elevated LFts possiblydue to NAFLD\par chronic and stable \par immune to hav\par hbv, hcv neg \par said drinks 1x per month on social events \par bx of liver lesion 3/2018 showed micro and macrovesicular steatosis, no malignant cells \par likely multifactorial (NAFLD, drug related [lipitor, carbamazepine])\par Patient had HepC Fibrosure. Showed no Fibrosis\par \par \par Rec:\par avoid hepatotoxic drugs if possible \par wt loss, exercise, healthy diet advised, avoid alcohol \par Will send AMA, ASMA and KENDRA\par monitor LFTs \par check BUI Fibrosure\par If steatohepatitis confirmed , start Vitamin E\par Follow up with hepatalogy clinic \par \par \par # Melanoma-pancreatic cancer syndrome \par Patient found to have CKDN2A mutation, she is a heterozygote and is part of the melanoma-pancreatic cancer syndrome which confers a lifetime risk 17% pancreatic cancer. Pt had an MRI liver protocol done in 2017 for evaluation of cyst in the liver that turned out to be simple cyst. \par bx of liver lesion 3/2018 showed micro and macrovesicular steatosis, no malignant cells \par EUS for pancreas dr Ferguson 3/2019 and with Dr. Cabrera on 6/18/20 was unremarkable (normal pancreas and normal size PD)\par has allergy to  contrast, so cannot order MRI pancreatic protocol (had edema and dyspnea) \par Repeat EUS yearly   \par literature reports association with other cancers but recommendation are mainly to screen for pancreatic ca \par \par \par # CRC screening \par 2014 colonoscopy dr reid diverticulosis and hemorrhoids \par literature reports association with other cancers but recommendation are mainly to screen for pancreatic ca \par Patient had colonoscopy on 7/1/20 which showed moderate diverticulosis and 3mm transverse colon polyp ( hyperplastic) and 3mm sigmoid polyp ( Hyperplastic)\par -. repeat in 7-10 years\par \par #H Pylori \par s/p treatment \par neg bx on 3/2019\par \par # Obesity\par wt loss, healthy diet, exercise advised \par \par \par \par # multiple episode of Diverticulitis\par  s/p partial colectomy ( Robotic sigmoidectomy )

## 2021-01-22 NOTE — HISTORY OF PRESENT ILLNESS
[Heartburn] : denies heartburn [Nausea] : denies nausea [Vomiting] : denies vomiting [Diarrhea] : denies diarrhea [Constipation] : denies constipation [Yellow Skin Or Eyes (Jaundice)] : denies jaundice [Abdominal Swelling] : denies abdominal swelling [Rectal Pain] : denies rectal pain [de-identified] : Telephonic visit.\par 46 yo F with PMH of DM, Ovarian cancer s/p left oophorectomy in 2006 and RT oophorectomy and hysterotomy in 2017, Recurrent diverticulitis s/p partial colectomy, H pylori gastritis s/p eradication,  s/p cholecystectomy, CKDN2A mutation (she is a heterozygote and is part of the melanoma-pancreatic cancer syndrome which confers a lifetime risk 17% pancreatic cancer) and NAFLD. She was sent by Oncology for elevated LFTs.\par She was doing EUS yearly for pancreatic cancer screening. \par

## 2021-01-26 DIAGNOSIS — H05.111 GRANULOMA OF RIGHT ORBIT: ICD-10-CM

## 2021-01-26 DIAGNOSIS — H02.831 DERMATOCHALASIS OF RIGHT UPPER EYELID: ICD-10-CM

## 2021-01-26 DIAGNOSIS — H02.834 DERMATOCHALASIS OF LEFT UPPER EYELID: ICD-10-CM

## 2021-01-26 DIAGNOSIS — H04.123 DRY EYE SYNDROME OF BILATERAL LACRIMAL GLANDS: ICD-10-CM

## 2021-01-26 DIAGNOSIS — E11.9 TYPE 2 DIABETES MELLITUS WITHOUT COMPLICATIONS: ICD-10-CM

## 2021-01-26 DIAGNOSIS — H40.013 OPEN ANGLE WITH BORDERLINE FINDINGS, LOW RISK, BILATERAL: ICD-10-CM

## 2021-02-09 ENCOUNTER — APPOINTMENT (OUTPATIENT)
Dept: NEUROLOGY | Facility: CLINIC | Age: 46
End: 2021-02-09
Payer: MEDICAID

## 2021-02-09 ENCOUNTER — OUTPATIENT (OUTPATIENT)
Dept: OUTPATIENT SERVICES | Facility: HOSPITAL | Age: 46
LOS: 1 days | Discharge: HOME | End: 2021-02-09

## 2021-02-09 VITALS
OXYGEN SATURATION: 99 % | BODY MASS INDEX: 43.15 KG/M2 | HEART RATE: 97 BPM | HEIGHT: 57 IN | SYSTOLIC BLOOD PRESSURE: 119 MMHG | TEMPERATURE: 98.1 F | WEIGHT: 200 LBS | DIASTOLIC BLOOD PRESSURE: 80 MMHG

## 2021-02-09 DIAGNOSIS — Z90.49 ACQUIRED ABSENCE OF OTHER SPECIFIED PARTS OF DIGESTIVE TRACT: Chronic | ICD-10-CM

## 2021-02-09 DIAGNOSIS — Z98.890 OTHER SPECIFIED POSTPROCEDURAL STATES: Chronic | ICD-10-CM

## 2021-02-09 PROCEDURE — 99213 OFFICE O/P EST LOW 20 MIN: CPT | Mod: GC

## 2021-02-09 RX ORDER — CARBAMAZEPINE 100 MG/1
100 TABLET, CHEWABLE ORAL DAILY
Qty: 60 | Refills: 7 | Status: DISCONTINUED | OUTPATIENT
Start: 2020-03-10 | End: 2021-02-09

## 2021-02-09 RX ORDER — CARBAMAZEPINE 200 MG/1
200 TABLET ORAL DAILY
Qty: 90 | Refills: 2 | Status: DISCONTINUED | COMMUNITY
Start: 2020-02-12 | End: 2021-02-09

## 2021-02-09 NOTE — ASSESSMENT
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 \par \par # Headache\par - Restart carbamazepine 100 mg bid\par - routine blood work prior to next visit\par - RTC in 4 months

## 2021-02-09 NOTE — PHYSICAL EXAM

## 2021-02-09 NOTE — HISTORY OF PRESENT ILLNESS
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 She says that her headaches were controlled with carbamazepine but she ran out of it 3 months ago and since that time she has been experiencing headaches almost daily at night where the headache will eventually wake her up and will resolve with tylenol associated with mild nausea but no other neurological findings.

## 2021-02-10 DIAGNOSIS — R51.9 HEADACHE, UNSPECIFIED: ICD-10-CM

## 2021-03-01 ENCOUNTER — APPOINTMENT (OUTPATIENT)
Dept: HEPATOLOGY | Facility: CLINIC | Age: 46
End: 2021-03-01
Payer: MEDICAID

## 2021-03-01 ENCOUNTER — OUTPATIENT (OUTPATIENT)
Dept: OUTPATIENT SERVICES | Facility: HOSPITAL | Age: 46
LOS: 1 days | Discharge: HOME | End: 2021-03-01

## 2021-03-01 ENCOUNTER — NON-APPOINTMENT (OUTPATIENT)
Age: 46
End: 2021-03-01

## 2021-03-01 VITALS
HEIGHT: 57 IN | TEMPERATURE: 97.8 F | HEART RATE: 101 BPM | OXYGEN SATURATION: 98 % | DIASTOLIC BLOOD PRESSURE: 60 MMHG | BODY MASS INDEX: 43.15 KG/M2 | SYSTOLIC BLOOD PRESSURE: 129 MMHG | WEIGHT: 200 LBS

## 2021-03-01 VITALS — WEIGHT: 190 LBS | BODY MASS INDEX: 41.12 KG/M2

## 2021-03-01 DIAGNOSIS — Z90.49 ACQUIRED ABSENCE OF OTHER SPECIFIED PARTS OF DIGESTIVE TRACT: Chronic | ICD-10-CM

## 2021-03-01 DIAGNOSIS — Z98.890 OTHER SPECIFIED POSTPROCEDURAL STATES: Chronic | ICD-10-CM

## 2021-03-01 DIAGNOSIS — K21.9 GASTRO-ESOPHAGEAL REFLUX DISEASE WITHOUT ESOPHAGITIS: ICD-10-CM

## 2021-03-01 DIAGNOSIS — Z83.79 FAMILY HISTORY OF OTHER DISEASES OF THE DIGESTIVE SYSTEM: ICD-10-CM

## 2021-03-01 DIAGNOSIS — D64.9 ANEMIA, UNSPECIFIED: ICD-10-CM

## 2021-03-01 PROCEDURE — 99214 OFFICE O/P EST MOD 30 MIN: CPT

## 2021-03-01 RX ORDER — NITROFURANTOIN (MONOHYDRATE/MACROCRYSTALS) 25; 75 MG/1; MG/1
100 CAPSULE ORAL TWICE DAILY
Qty: 10 | Refills: 0 | Status: DISCONTINUED | COMMUNITY
Start: 2020-12-24 | End: 2021-03-01

## 2021-03-01 NOTE — HISTORY OF PRESENT ILLNESS
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 \par is here for follow up on elevated LFts AST 44 ALT 66  \par patient is s/p liver FNA biopsy 2017, showing steatohepatitis (biopsy done during [facility]?)\par \par patient had minimal RUQ discomfort that completely resolved, she lost 10 lbs since last seen \par no alcohol no OTC medications or vitamins

## 2021-03-01 NOTE — ASSESSMENT
[FreeTextEntry1] : # Elevated LFts possiblydue to NAFLD\par chronic and stable \par immune to hepatitis A, negative hepatitis C\par said drinks 1x per month on social events stopped\par bx of liver lesion 3/2018 showed micro and macrovesicular steatosis, no malignant cells \par likely multifactorial (NAFLD, drug related [lipitor, carbamazepine])\par Patient had HepC Fibrosure. Showed F1\par on pioglitazone for DM\par \par Rec:\par avoid hepatotoxic drugs if possible \par wt loss, exercise, healthy diet advised, avoid alcohol \par Will send AMA, ASMA anti LKM, A1AT and ceruloplasmin, Ig to complete CLD workup \par repeat LFTs \par -if serology negative start vitamin E\par -if hep B sAb neg give vaccine\par \par # Melanoma-pancreatic cancer syndrome \par Patient found to have CKDN2A mutation, she is a heterozygote and is part of the melanoma-pancreatic cancer syndrome which confers a lifetime risk 17% pancreatic cancer. Pt had an MRI liver protocol done in 2017 for evaluation of cyst in the liver that turned out to be simple cyst. \par bx of liver lesion 3/2018 showed micro and macrovesicular steatosis, no malignant cells \par EUS for pancreas dr Ferguson 3/2019 and with Dr. Cabrera on 6/18/20 was unremarkable (normal pancreas and normal size PD)\par has allergy to contrast, so cannot order MRI pancreatic protocol (had edema and dyspnea) \par \par \par plan\par -Repeat EUS in june \par -literature reports association with other cancers but recommendation are mainly to screen for pancreatic ca \par -last dermatology visit last month per patient \par \par \par # CRC screening \par 2014 colonoscopy dr reid diverticulosis and hemorrhoids \par literature reports association with other cancers but recommendation are mainly to screen for pancreatic ca \par Patient had colonoscopy on 7/1/20 which showed moderate diverticulosis and 3mm transverse colon polyp ( hyperplastic) and 3mm sigmoid polyp ( Hyperplastic)\par \par - repeat colonoscopy when doing the EUS in june\par \par #H Pylori \par s/p treatment \par neg bx on 3/2019\par \par # Obesity\par wt loss, healthy diet, exercise advised \par \par # multiple episode of Diverticulitis\par  s/p partial colectomy ( Robotic sigmoidectomy ).

## 2021-03-01 NOTE — REVIEW OF SYSTEMS
[Fever] : no fever [Chills] : no chills [Eye Pain] : no eye pain [Red Eyes] : eyes not red [Earache] : no earache [Loss Of Hearing] : no hearing loss [Chest Pain] : no chest pain [Palpitations] : no palpitations [Cough] : no cough [SOB on Exertion] : no shortness of breath during exertion [Arthralgias] : no arthralgias [Dizziness] : no dizziness [Fainting] : no fainting [Anxiety] : no anxiety [Depression] : no depression

## 2021-03-01 NOTE — PHYSICAL EXAM
[Scleral Icterus] : No Scleral Icterus [Spider Angioma] : No spider angioma(s) were observed [Asterixis] : no asterixis observed [Jaundice] : No jaundice

## 2021-03-12 ENCOUNTER — LABORATORY RESULT (OUTPATIENT)
Age: 46
End: 2021-03-12

## 2021-03-12 LAB
INR PPP: 1.17 RATIO
PT BLD: 13.5 SEC

## 2021-03-13 LAB
ALBUMIN SERPL ELPH-MCNC: 4.6 G/DL
ALP BLD-CCNC: 155 U/L
ALT SERPL-CCNC: 67 U/L
AST SERPL-CCNC: 44 U/L
BILIRUB DIRECT SERPL-MCNC: 0.1 MG/DL
BILIRUB INDIRECT SERPL-MCNC: 0.2 MG/DL
BILIRUB SERPL-MCNC: 0.3 MG/DL
PROT SERPL-MCNC: 7.6 G/DL

## 2021-03-14 LAB
A1AT SERPL-MCNC: 125 MG/DL
ALBUMIN SERPL ELPH-MCNC: 4.7 G/DL
ALP BLD-CCNC: 155 U/L
ALT SERPL-CCNC: 64 U/L
AST SERPL-CCNC: 41 U/L
BILIRUB DIRECT SERPL-MCNC: 0.1 MG/DL
BILIRUB INDIRECT SERPL-MCNC: 0.2 MG/DL
BILIRUB SERPL-MCNC: 0.3 MG/DL
CERULOPLASMIN SERPL-MCNC: 29 MG/DL
DEPRECATED KAPPA LC FREE/LAMBDA SER: 0.96 RATIO
HBV SURFACE AB SER QL: REACTIVE
IGA SER QL IEP: 463 MG/DL
IGG SER QL IEP: 1076 MG/DL
IGM SER QL IEP: 89 MG/DL
KAPPA LC CSF-MCNC: 1.6 MG/DL
KAPPA LC SERPL-MCNC: 1.54 MG/DL
MITOCHONDRIA AB SER IF-ACNC: NORMAL
MITOCHONDRIA AB SER IF-ACNC: NORMAL
PROT SERPL-MCNC: 7.5 G/DL
SMOOTH MUSCLE AB SER QL IF: NORMAL

## 2021-03-15 LAB — LKM AB SER QL IF: <20.1 UNITS

## 2021-03-16 LAB
AST SERPL W P-5'-P-CCNC: 47 IU/L
CHOLEST SERPL-MCNC: 249 MG/DL
COMMENT:: NORMAL
FIBROSIS STAGE SERPL QL: NORMAL
FIBROSURE ALPHA 2 MACROGLOBULINS: 184 MG/DL
FIBROSURE ALT (SGPT): 70 IU/L
FIBROSURE APOLIPOPROTEIN A1: 102 MG/DL
FIBROSURE GGT: 48 IU/L
FIBROSURE HAPTOGLOBIN: 175 MG/DL
FIBROSURE SCORING: NORMAL
FIBROSURE TOTAL BILIRUBIN: 0.1 MG/DL
GLUCOSE SERPL-MCNC: 97 MG/DL
INTERPRETATIONS:: NORMAL
LIVER FIBR SCORE SERPL CALC.FIBROSURE: 0.08
NASH SCORING: NORMAL
NECROINFLAMMATORY ACT GRADE SERPL QL: NORMAL
NECROINFLAMMATORY ACT SCORE SERPL: 0.5 (ref 0.25–?)
SERVICE CMNT-IMP: NORMAL
STEATOSIS GRADE: NORMAL
STEATOSIS GRADING: NORMAL
STEATOSIS SCORE: 0.85
TRIGL SERPL-MCNC: 189 MG/DL

## 2021-03-17 LAB
ANA SER IF-ACNC: NEGATIVE
SOLUBLE LIVER IGG SER IA-ACNC: 0.5

## 2021-03-26 NOTE — ED ADULT NURSE NOTE - BREATH SOUNDS, MLM
[FreeTextEntry1] : Referring Physician: Dr. Candido Rodriguez\par \par March 2018: diagnosed with new-onset atrial fibrillation and atrial flutter. underwent OUMOU/DCCV unsuccessful, followed by conversion to sinus on Amiodarone. took Amiodarone for ~2 months.\par EF was low when in AF with RVR. improved after DC cardioversion\par Had severe skin reaction to Eliquis; followed by skin bleeding and discontinuation of DOAC\par September 2019: recurrence of AF with RVR and worsening EF. treated with rate control.\par EF worsened.\par She underwent AF ablation on 2/3/2021\par She has no AF since ablation.\par She has no chest pain, no shortness of breath at rest, no orthopnea, no PND. She denies dizziness, lightheadedness and syncope. She has mild exertional symptoms. She presents for evaluation.\par \par \par CARDIAC TESTING:\par ECG (3/22/2021): sinus rhythm at 59 bpm, non-sp T wave abnormalities\par ECG (12/8/2020): Atrial Fibrillation at 118 bpm, non-sp T wave abnormalities.\par Echo (9/23/2020): EF 25-30%; RA mod dilated, LA severely dilated. \par Catheter Ablation (2/3/2021): RF ablation of AF: PVI + posterior wall isolation + CTI ablation
Clear

## 2021-04-05 ENCOUNTER — APPOINTMENT (OUTPATIENT)
Dept: HEPATOLOGY | Facility: CLINIC | Age: 46
End: 2021-04-05
Payer: MEDICAID

## 2021-04-05 ENCOUNTER — APPOINTMENT (OUTPATIENT)
Dept: INTERNAL MEDICINE | Facility: CLINIC | Age: 46
End: 2021-04-05
Payer: MEDICAID

## 2021-04-05 ENCOUNTER — OUTPATIENT (OUTPATIENT)
Dept: OUTPATIENT SERVICES | Facility: HOSPITAL | Age: 46
LOS: 1 days | Discharge: HOME | End: 2021-04-05

## 2021-04-05 VITALS
OXYGEN SATURATION: 98 % | SYSTOLIC BLOOD PRESSURE: 112 MMHG | HEIGHT: 57 IN | DIASTOLIC BLOOD PRESSURE: 80 MMHG | BODY MASS INDEX: 37.32 KG/M2 | TEMPERATURE: 97.2 F | HEART RATE: 92 BPM | WEIGHT: 173 LBS

## 2021-04-05 VITALS
WEIGHT: 188 LBS | BODY MASS INDEX: 40.56 KG/M2 | HEIGHT: 57 IN | TEMPERATURE: 98 F | HEART RATE: 78 BPM | DIASTOLIC BLOOD PRESSURE: 70 MMHG | OXYGEN SATURATION: 98 % | SYSTOLIC BLOOD PRESSURE: 120 MMHG

## 2021-04-05 DIAGNOSIS — Z90.49 ACQUIRED ABSENCE OF OTHER SPECIFIED PARTS OF DIGESTIVE TRACT: Chronic | ICD-10-CM

## 2021-04-05 DIAGNOSIS — D64.9 ANEMIA, UNSPECIFIED: ICD-10-CM

## 2021-04-05 DIAGNOSIS — R10.11 RIGHT UPPER QUADRANT PAIN: ICD-10-CM

## 2021-04-05 DIAGNOSIS — R51.9 HEADACHE, UNSPECIFIED: ICD-10-CM

## 2021-04-05 DIAGNOSIS — Z98.890 OTHER SPECIFIED POSTPROCEDURAL STATES: Chronic | ICD-10-CM

## 2021-04-05 PROCEDURE — 99214 OFFICE O/P EST MOD 30 MIN: CPT | Mod: GC

## 2021-04-05 RX ORDER — PIOGLITAZONE HYDROCHLORIDE 30 MG/1
30 TABLET ORAL
Qty: 30 | Refills: 5 | Status: DISCONTINUED | OUTPATIENT
Start: 2020-05-20 | End: 2021-04-05

## 2021-04-05 RX ORDER — OMEPRAZOLE 20 MG/1
20 CAPSULE, DELAYED RELEASE ORAL DAILY
Qty: 30 | Refills: 5 | Status: DISCONTINUED | COMMUNITY
Start: 2020-12-24 | End: 2021-04-05

## 2021-04-05 RX ORDER — MULTIVITAMIN
TABLET ORAL DAILY
Qty: 30 | Refills: 0 | Status: DISCONTINUED | COMMUNITY
Start: 2020-12-24 | End: 2021-04-05

## 2021-04-06 DIAGNOSIS — D64.9 ANEMIA, UNSPECIFIED: ICD-10-CM

## 2021-04-06 DIAGNOSIS — Z15.89 GENETIC SUSCEPTIBILITY TO OTHER DISEASE: ICD-10-CM

## 2021-04-06 DIAGNOSIS — R51.9 HEADACHE, UNSPECIFIED: ICD-10-CM

## 2021-04-06 DIAGNOSIS — K57.90 DIVERTICULOSIS OF INTESTINE, PART UNSPECIFIED, WITHOUT PERFORATION OR ABSCESS WITHOUT BLEEDING: ICD-10-CM

## 2021-04-06 DIAGNOSIS — K76.0 FATTY (CHANGE OF) LIVER, NOT ELSEWHERE CLASSIFIED: ICD-10-CM

## 2021-04-06 DIAGNOSIS — E78.5 HYPERLIPIDEMIA, UNSPECIFIED: ICD-10-CM

## 2021-04-07 NOTE — HISTORY OF PRESENT ILLNESS
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

## 2021-04-07 NOTE — ASSESSMENT
[FreeTextEntry1] : # Elevated LFts possibly due to NAFLD\par - chronic and stable likely multifactorial (NAFLD, drug related [lipitor, carbamazepine])\par - immune to hepatitis A and B\par - negative hepatitis C\par - CLD work up negative\par - BUI fibrosure score F0, no need for vit E\par - bx of liver lesion 3/2018 showed micro and macrovesicular steatosis, no malignant cells \par avoid hepatotoxic drugs if possible \par - wt loss, exercise, healthy diet advised, avoid alcohol \par \par # Melanoma-pancreatic cancer syndrome \par Patient found to have CKDN2A mutation, she is a heterozygote and is part of the melanoma-pancreatic cancer syndrome which confers a lifetime risk 17% pancreatic cancer. Pt had an MRI liver protocol done in 2017 for evaluation of cyst in the liver that turned out to be simple cyst. \par bx of liver lesion 3/2018 showed micro and macrovesicular steatosis, no malignant cells \par EUS for pancreas dr Ferguson 3/2019 and with Dr. Cabrera on 6/18/20 was unremarkable (normal pancreas and normal size PD)\par has allergy to contrast, so cannot order MRI pancreatic protocol (had edema and dyspnea) \par \par plan\par -Repeat EUS in june \par -literature reports association with other cancers but recommendation are mainly to screen for pancreatic ca \par -last dermatology visit last month per patient \par \par \par # CRC screening \par 2014 colonoscopy dr reid diverticulosis and hemorrhoids \par literature reports association with other cancers but recommendation are mainly to screen for pancreatic ca \par Patient had colonoscopy on 7/1/20 which showed moderate diverticulosis and 3mm transverse colon polyp ( hyperplastic) and 3mm sigmoid polyp ( Hyperplastic)\par \par - repeat colonoscopy when doing the EUS in june\par \par #H Pylori \par s/p treatment \par neg bx on 3/2019\par \par # Obesity\par wt loss, healthy diet, exercise advised \par \par # multiple episode of Diverticulitis\par  s/p partial colectomy ( Robotic sigmoidectomy ). \par \par

## 2021-04-08 ENCOUNTER — OUTPATIENT (OUTPATIENT)
Dept: OUTPATIENT SERVICES | Facility: HOSPITAL | Age: 46
LOS: 1 days | Discharge: HOME | End: 2021-04-08

## 2021-04-08 ENCOUNTER — APPOINTMENT (OUTPATIENT)
Dept: PODIATRY | Facility: CLINIC | Age: 46
End: 2021-04-08
Payer: MEDICAID

## 2021-04-08 DIAGNOSIS — Z98.890 OTHER SPECIFIED POSTPROCEDURAL STATES: Chronic | ICD-10-CM

## 2021-04-08 DIAGNOSIS — Z90.49 ACQUIRED ABSENCE OF OTHER SPECIFIED PARTS OF DIGESTIVE TRACT: Chronic | ICD-10-CM

## 2021-04-08 PROCEDURE — 99213 OFFICE O/P EST LOW 20 MIN: CPT

## 2021-04-13 NOTE — PHYSICAL EXAM
[General Appearance - Alert] : alert [General Appearance - In No Acute Distress] : in no acute distress [Full Pulse] : the pedal pulses are present [Edema] : there was no peripheral edema [Abnormal Walk] : normal gait [Nail Clubbing] : no clubbing  or cyanosis of the fingernails [Musculoskeletal - Swelling] : no joint swelling seen [Motor Tone] : muscle strength and tone were normal [Skin Color & Pigmentation] : normal skin color and pigmentation [Skin Turgor] : normal skin turgor [] : no rash [Normal in Appearance] : normal in appearance [Normal] : normal [Full ROM] : with full range of motion [FreeTextEntry1] : ingrown toenail 1st b/l , peroneal tendonitis right foot [Deep Tendon Reflexes (DTR)] : deep tendon reflexes were 2+ and symmetric [Sensation] : the sensory exam was normal to light touch and pinprick [No Focal Deficits] : no focal deficits [Oriented To Time, Place, And Person] : oriented to person, place, and time [Impaired Insight] : insight and judgment were intact [Affect] : the affect was normal

## 2021-04-16 ENCOUNTER — APPOINTMENT (OUTPATIENT)
Dept: GYNECOLOGIC ONCOLOGY | Facility: CLINIC | Age: 46
End: 2021-04-16
Payer: MEDICAID

## 2021-04-16 ENCOUNTER — OUTPATIENT (OUTPATIENT)
Dept: OUTPATIENT SERVICES | Facility: HOSPITAL | Age: 46
LOS: 1 days | Discharge: HOME | End: 2021-04-16

## 2021-04-16 VITALS
BODY MASS INDEX: 39.26 KG/M2 | SYSTOLIC BLOOD PRESSURE: 120 MMHG | DIASTOLIC BLOOD PRESSURE: 76 MMHG | WEIGHT: 182 LBS | HEIGHT: 57 IN | HEART RATE: 80 BPM | RESPIRATION RATE: 18 BRPM

## 2021-04-16 DIAGNOSIS — Z98.890 OTHER SPECIFIED POSTPROCEDURAL STATES: Chronic | ICD-10-CM

## 2021-04-16 DIAGNOSIS — M76.10 PSOAS TENDINITIS, UNSPECIFIED HIP: ICD-10-CM

## 2021-04-16 DIAGNOSIS — Z90.49 ACQUIRED ABSENCE OF OTHER SPECIFIED PARTS OF DIGESTIVE TRACT: Chronic | ICD-10-CM

## 2021-04-16 DIAGNOSIS — M72.2 PLANTAR FASCIAL FIBROMATOSIS: ICD-10-CM

## 2021-04-16 DIAGNOSIS — M79.10 MYALGIA, UNSPECIFIED SITE: ICD-10-CM

## 2021-04-16 PROCEDURE — 99214 OFFICE O/P EST MOD 30 MIN: CPT

## 2021-04-16 NOTE — DISCUSSION/SUMMARY
[FreeTextEntry1] : 46 yo CKDN2A mutation heterozygous, h/o Stage IIIA Serous Borderline ovarian tumor, s/p WILBERT, RSO, Omentectomy 12/18/2017, s/p colon resection 7/2020 for diverticulitis, no evidence of disease\par \par - Discussed signs and symptoms of ovarian cancer\par - Discussed CKDN2A diagnosis, she is following with her GI doctor and was told no other followup is required.\par - Sent for \par - Return to see me in 6 months.

## 2021-04-16 NOTE — HISTORY OF PRESENT ILLNESS
[FreeTextEntry1] : 46 yo here for surveillance\par \par Diagnosis: Stage IIIA Serous Borderline ovarian tumor\par Surgery date: WILBERT, RSO, Omentectomy 12/18/2017\par Adjuvant treatment: None\par \par Most recent  10/2020 - 10 \par CKDN2A mutation heterozygous (part of the melanoma-pancreatic cancer syndrome, lifetime risk 17% pancreatic cancer)\par \par Today, she has no acute complaints. She denies fevers, chills, loss of appetite, pain, vaginal bleeding, urinary incontinence, or hematuria.

## 2021-04-17 LAB — CANCER AG125 SERPL-ACNC: 8 U/ML

## 2021-04-19 ENCOUNTER — OUTPATIENT (OUTPATIENT)
Dept: OUTPATIENT SERVICES | Facility: HOSPITAL | Age: 46
LOS: 1 days | Discharge: HOME | End: 2021-04-19
Payer: MEDICAID

## 2021-04-19 ENCOUNTER — APPOINTMENT (OUTPATIENT)
Dept: OPHTHALMOLOGY | Facility: CLINIC | Age: 46
End: 2021-04-19

## 2021-04-19 DIAGNOSIS — Z98.890 OTHER SPECIFIED POSTPROCEDURAL STATES: Chronic | ICD-10-CM

## 2021-04-19 DIAGNOSIS — Z90.49 ACQUIRED ABSENCE OF OTHER SPECIFIED PARTS OF DIGESTIVE TRACT: Chronic | ICD-10-CM

## 2021-04-19 PROCEDURE — 92012 INTRM OPH EXAM EST PATIENT: CPT

## 2021-04-21 DIAGNOSIS — H04.123 DRY EYE SYNDROME OF BILATERAL LACRIMAL GLANDS: ICD-10-CM

## 2021-04-21 DIAGNOSIS — H02.831 DERMATOCHALASIS OF RIGHT UPPER EYELID: ICD-10-CM

## 2021-04-21 DIAGNOSIS — H02.834 DERMATOCHALASIS OF LEFT UPPER EYELID: ICD-10-CM

## 2021-04-21 DIAGNOSIS — H11.153 PINGUECULA, BILATERAL: ICD-10-CM

## 2021-04-23 ENCOUNTER — RESULT REVIEW (OUTPATIENT)
Age: 46
End: 2021-04-23

## 2021-04-23 ENCOUNTER — OUTPATIENT (OUTPATIENT)
Dept: OUTPATIENT SERVICES | Facility: HOSPITAL | Age: 46
LOS: 1 days | Discharge: HOME | End: 2021-04-23
Payer: MEDICAID

## 2021-04-23 DIAGNOSIS — Z98.890 OTHER SPECIFIED POSTPROCEDURAL STATES: Chronic | ICD-10-CM

## 2021-04-23 DIAGNOSIS — R10.11 RIGHT UPPER QUADRANT PAIN: ICD-10-CM

## 2021-04-23 DIAGNOSIS — Z90.49 ACQUIRED ABSENCE OF OTHER SPECIFIED PARTS OF DIGESTIVE TRACT: Chronic | ICD-10-CM

## 2021-04-23 PROCEDURE — 76705 ECHO EXAM OF ABDOMEN: CPT | Mod: 26

## 2021-04-29 ENCOUNTER — APPOINTMENT (OUTPATIENT)
Dept: OPHTHALMOLOGY | Facility: CLINIC | Age: 46
End: 2021-04-29

## 2021-04-29 ENCOUNTER — OUTPATIENT (OUTPATIENT)
Dept: OUTPATIENT SERVICES | Facility: HOSPITAL | Age: 46
LOS: 1 days | Discharge: HOME | End: 2021-04-29
Payer: MEDICAID

## 2021-04-29 DIAGNOSIS — Z90.49 ACQUIRED ABSENCE OF OTHER SPECIFIED PARTS OF DIGESTIVE TRACT: Chronic | ICD-10-CM

## 2021-04-29 DIAGNOSIS — D39.10 NEOPLASM OF UNCERTAIN BEHAVIOR OF UNSPECIFIED OVARY: ICD-10-CM

## 2021-04-29 DIAGNOSIS — Z98.890 OTHER SPECIFIED POSTPROCEDURAL STATES: Chronic | ICD-10-CM

## 2021-04-29 PROCEDURE — 92012 INTRM OPH EXAM EST PATIENT: CPT

## 2021-05-03 NOTE — HISTORY OF PRESENT ILLNESS
[[employer] ] : provided by [employer]   [FreeTextEntry1] : [id number] [de-identified] : 45 year old female with PMHx of DM2, Ovarian cancer s/p left oophorectomy in 2006 and RT oophorectomy and hysterotomy in 2017, recurrent diverticulitis s/p partial colectomy, H pylori gastritis s/p eradication, s/p cholecystectomy, CKDN2A mutation, NAFLD presenting for follow-up.\par Reports right upper quadrant abdominal pain, worse with inspiration, 8/10 in intensity, occurs every few days, no relationship to food, no relieving or exacerbating factors. \par

## 2021-05-03 NOTE — REVIEW OF SYSTEMS
[Fever] : no fever [Chills] : no chills [Fatigue] : no fatigue [Discharge] : no discharge [Pain] : no pain [Hearing Loss] : no hearing loss [Earache] : no earache [Chest Pain] : no chest pain [Palpitations] : no palpitations [Shortness Of Breath] : no shortness of breath [Wheezing] : no wheezing [Cough] : no cough [Abdominal Pain] : no abdominal pain [Constipation] : no constipation [Nausea] : no nausea [Vomiting] : no vomiting [Heartburn] : no heartburn [Melena] : no melena [Dysuria] : no dysuria [Hematuria] : no hematuria [Joint Pain] : no joint pain [Joint Stiffness] : no joint stiffness [Back Pain] : no back pain [Anxiety] : no anxiety [Insomnia] : no insomnia

## 2021-05-03 NOTE — ASSESSMENT
[FreeTextEntry1] : 45 year old female with PMHx of DM2, Ovarian cancer s/p left oophorectomy in 2006 and RT oophorectomy and hysterotomy in 2017, recurrent diverticulitis s/p partial colectomy, H pylori gastritis s/p eradication, s/p cholecystectomy, CKDN2A mutation, NAFLD presenting for follow-up.\par \par #Right upper quadrant abdominal pain\par -Patient is s/p cholecystectomy \par -Will get RUQ U/S\par -Check LFTs, acute hepatitis panel\par \par # Elevated LFTs possibly due to NAFLD\par -chronic and stable likely multifactorial (NAFLD, drug related [lipitor, carbamazepine])\par -immune to hepatitis A and B\par -negative hepatitis C\par -CLD work up negative\par -BUI fibrosure score F0, no need for vit E\par -bx of liver lesion 3/2018 showed micro and macrovesicular steatosis, no malignant cells \par avoid hepatotoxic drugs if possible \par -wt loss, exercise, healthy diet advised, avoid alcohol \par \par #DMII- controlled \par -last hba1c 6.9\par -c/w trulicity\par -referral for podiatry and ophthalmology was given,UTD\par -urine microalbumin/ Creat wnl\par \par #CKDN2A mutation\par -has been following GI, uptodate with EUS \par -Tested positive for CKDN2A mutation, heterozygosity, part of melanoma-pancreatic cancer syndrome which confers 17% lifetime risk of pancreatic cancer and 14-50% melanoma\par -Needs regular pancreatic imaging, but allergic to Gadolinum contrast, so scheduled for EUS with GI\par \par #Serous ovarian cancer\par -s/p WILBERT/RSO/Omentectomy (12/2017)\par -Has dermatology referral \par -Heme/Onc follow-up\par \par #Transaminitis, elevated alk phos and AST/ALT\par -Hx of alcohol abuse \par -liver enzymes still elevated. \par -Hepatology referral to be given requested by oncology\par \par #Pseudoseizures/Tremor\par -Follow-up with Neurology\par -c.w Carbamazepine 200 mg QD\par \par #HLD\par -, Cholesterol 247\par -Was off statin\par -Will restart Lipitor 40mg qhs\par \par #Hx of recurrent diverticulitis\par -pt is following Colorectal surgeon, s/p surgery in july 2020\par \par #Health Maintenance\par -Mammogram 8/2020 uptodate \par -Pap smear: last in 2017. Patient has WILBERT including cervix. \par -GI: Scheduled for repeat colonscopy\par -Flu shot received this year 2020. PPSV 23 UTD. 2018\par -follow up in 6 months. \par \par

## 2021-05-03 NOTE — PHYSICAL EXAM
[No Acute Distress] : no acute distress [Well Nourished] : well nourished [Well Developed] : well developed [PERRL] : pupils equal round and reactive to light [Normal Outer Ear/Nose] : the outer ears and nose were normal in appearance [Normal Oropharynx] : the oropharynx was normal [No JVD] : no jugular venous distention [No Lymphadenopathy] : no lymphadenopathy [No Respiratory Distress] : no respiratory distress  [No Accessory Muscle Use] : no accessory muscle use [Clear to Auscultation] : lungs were clear to auscultation bilaterally [Normal Rate] : normal rate  [Regular Rhythm] : with a regular rhythm [Normal S1, S2] : normal S1 and S2 [Soft] : abdomen soft [Non-distended] : non-distended [No CVA Tenderness] : no CVA  tenderness [No Spinal Tenderness] : no spinal tenderness [No Rash] : no rash [de-identified] : Right upper quadrant tenderness on deep palpation, no peritoneal signs

## 2021-05-06 ENCOUNTER — APPOINTMENT (OUTPATIENT)
Dept: HEMATOLOGY ONCOLOGY | Facility: CLINIC | Age: 46
End: 2021-05-06

## 2021-05-07 ENCOUNTER — APPOINTMENT (OUTPATIENT)
Dept: OPHTHALMOLOGY | Facility: CLINIC | Age: 46
End: 2021-05-07

## 2021-05-17 ENCOUNTER — APPOINTMENT (OUTPATIENT)
Dept: PODIATRY | Facility: CLINIC | Age: 46
End: 2021-05-17
Payer: MEDICAID

## 2021-05-17 ENCOUNTER — OUTPATIENT (OUTPATIENT)
Dept: OUTPATIENT SERVICES | Facility: HOSPITAL | Age: 46
LOS: 1 days | Discharge: HOME | End: 2021-05-17

## 2021-05-17 DIAGNOSIS — Z98.890 OTHER SPECIFIED POSTPROCEDURAL STATES: Chronic | ICD-10-CM

## 2021-05-17 DIAGNOSIS — Z90.49 ACQUIRED ABSENCE OF OTHER SPECIFIED PARTS OF DIGESTIVE TRACT: Chronic | ICD-10-CM

## 2021-05-17 PROCEDURE — 20551 NJX 1 TENDON ORIGIN/INSJ: CPT | Mod: RT

## 2021-05-17 NOTE — ASSESSMENT
[FreeTextEntry1] : - Patient examined, history and chart reviewed\par injection performed with 1% lidocaine and Betamethasone for a total of 1.5cc. Injection performed under sterile technique \par right foot \par

## 2021-05-17 NOTE — PHYSICAL EXAM
[General Appearance - Alert] : alert [General Appearance - In No Acute Distress] : in no acute distress [Full Pulse] : the pedal pulses are present [Edema] : there was no peripheral edema [Abnormal Walk] : normal gait [Musculoskeletal - Swelling] : no joint swelling seen [Nail Clubbing] : no clubbing  or cyanosis of the fingernails [Motor Tone] : muscle strength and tone were normal [Skin Color & Pigmentation] : normal skin color and pigmentation [Skin Turgor] : normal skin turgor [] : no rash [Normal in Appearance] : normal in appearance [Normal] : normal [Full ROM] : with full range of motion [FreeTextEntry1] : ingrown toenail 1st b/l , peroneal tendonitis right foot [Deep Tendon Reflexes (DTR)] : deep tendon reflexes were 2+ and symmetric [Sensation] : the sensory exam was normal to light touch and pinprick [No Focal Deficits] : no focal deficits [Oriented To Time, Place, And Person] : oriented to person, place, and time [Impaired Insight] : insight and judgment were intact [Affect] : the affect was normal

## 2021-05-21 DIAGNOSIS — M72.2 PLANTAR FASCIAL FIBROMATOSIS: ICD-10-CM

## 2021-05-21 DIAGNOSIS — E11.65 TYPE 2 DIABETES MELLITUS WITH HYPERGLYCEMIA: ICD-10-CM

## 2021-06-08 ENCOUNTER — APPOINTMENT (OUTPATIENT)
Dept: NEUROLOGY | Facility: CLINIC | Age: 46
End: 2021-06-08

## 2021-06-21 ENCOUNTER — OUTPATIENT (OUTPATIENT)
Dept: OUTPATIENT SERVICES | Facility: HOSPITAL | Age: 46
LOS: 1 days | Discharge: HOME | End: 2021-06-21

## 2021-06-21 ENCOUNTER — APPOINTMENT (OUTPATIENT)
Dept: PODIATRY | Facility: CLINIC | Age: 46
End: 2021-06-21
Payer: MEDICAID

## 2021-06-21 DIAGNOSIS — Z98.890 OTHER SPECIFIED POSTPROCEDURAL STATES: Chronic | ICD-10-CM

## 2021-06-21 DIAGNOSIS — Z90.49 ACQUIRED ABSENCE OF OTHER SPECIFIED PARTS OF DIGESTIVE TRACT: Chronic | ICD-10-CM

## 2021-06-21 PROCEDURE — 20551 NJX 1 TENDON ORIGIN/INSJ: CPT

## 2021-06-22 DIAGNOSIS — Z01.21 ENCOUNTER FOR DENTAL EXAMINATION AND CLEANING WITH ABNORMAL FINDINGS: ICD-10-CM

## 2021-06-28 ENCOUNTER — LABORATORY RESULT (OUTPATIENT)
Age: 46
End: 2021-06-28

## 2021-06-28 ENCOUNTER — OUTPATIENT (OUTPATIENT)
Dept: OUTPATIENT SERVICES | Facility: HOSPITAL | Age: 46
LOS: 1 days | Discharge: HOME | End: 2021-06-28

## 2021-06-28 DIAGNOSIS — Z98.890 OTHER SPECIFIED POSTPROCEDURAL STATES: Chronic | ICD-10-CM

## 2021-06-28 DIAGNOSIS — Z11.59 ENCOUNTER FOR SCREENING FOR OTHER VIRAL DISEASES: ICD-10-CM

## 2021-06-28 DIAGNOSIS — Z90.49 ACQUIRED ABSENCE OF OTHER SPECIFIED PARTS OF DIGESTIVE TRACT: Chronic | ICD-10-CM

## 2021-06-30 ENCOUNTER — NON-APPOINTMENT (OUTPATIENT)
Age: 46
End: 2021-06-30

## 2021-07-04 ENCOUNTER — TRANSCRIPTION ENCOUNTER (OUTPATIENT)
Age: 46
End: 2021-07-04

## 2021-07-19 ENCOUNTER — OUTPATIENT (OUTPATIENT)
Dept: OUTPATIENT SERVICES | Facility: HOSPITAL | Age: 46
LOS: 1 days | Discharge: HOME | End: 2021-07-19

## 2021-07-19 ENCOUNTER — LABORATORY RESULT (OUTPATIENT)
Age: 46
End: 2021-07-19

## 2021-07-19 DIAGNOSIS — Z98.890 OTHER SPECIFIED POSTPROCEDURAL STATES: Chronic | ICD-10-CM

## 2021-07-19 DIAGNOSIS — Z11.59 ENCOUNTER FOR SCREENING FOR OTHER VIRAL DISEASES: ICD-10-CM

## 2021-07-19 DIAGNOSIS — Z90.49 ACQUIRED ABSENCE OF OTHER SPECIFIED PARTS OF DIGESTIVE TRACT: Chronic | ICD-10-CM

## 2021-07-22 ENCOUNTER — OUTPATIENT (OUTPATIENT)
Dept: OUTPATIENT SERVICES | Facility: HOSPITAL | Age: 46
LOS: 1 days | Discharge: HOME | End: 2021-07-22
Payer: MEDICAID

## 2021-07-22 ENCOUNTER — RESULT REVIEW (OUTPATIENT)
Age: 46
End: 2021-07-22

## 2021-07-22 ENCOUNTER — TRANSCRIPTION ENCOUNTER (OUTPATIENT)
Age: 46
End: 2021-07-22

## 2021-07-22 VITALS
DIASTOLIC BLOOD PRESSURE: 86 MMHG | HEIGHT: 60 IN | SYSTOLIC BLOOD PRESSURE: 142 MMHG | TEMPERATURE: 98 F | RESPIRATION RATE: 19 BRPM | HEART RATE: 70 BPM | WEIGHT: 179.9 LBS

## 2021-07-22 VITALS
RESPIRATION RATE: 18 BRPM | TEMPERATURE: 98 F | SYSTOLIC BLOOD PRESSURE: 134 MMHG | OXYGEN SATURATION: 97 % | DIASTOLIC BLOOD PRESSURE: 2 MMHG | HEART RATE: 86 BPM

## 2021-07-22 DIAGNOSIS — Z98.890 OTHER SPECIFIED POSTPROCEDURAL STATES: Chronic | ICD-10-CM

## 2021-07-22 DIAGNOSIS — Z90.49 ACQUIRED ABSENCE OF OTHER SPECIFIED PARTS OF DIGESTIVE TRACT: Chronic | ICD-10-CM

## 2021-07-22 LAB — GLUCOSE BLDC GLUCOMTR-MCNC: 85 MG/DL — SIGNIFICANT CHANGE UP (ref 70–99)

## 2021-07-22 PROCEDURE — 88312 SPECIAL STAINS GROUP 1: CPT | Mod: 26

## 2021-07-22 PROCEDURE — 43239 EGD BIOPSY SINGLE/MULTIPLE: CPT | Mod: XS

## 2021-07-22 PROCEDURE — 43259 EGD US EXAM DUODENUM/JEJUNUM: CPT

## 2021-07-22 PROCEDURE — 45380 COLONOSCOPY AND BIOPSY: CPT | Mod: XS

## 2021-07-22 PROCEDURE — 88305 TISSUE EXAM BY PATHOLOGIST: CPT | Mod: 26

## 2021-07-22 RX ORDER — ATORVASTATIN CALCIUM 80 MG/1
1 TABLET, FILM COATED ORAL
Qty: 0 | Refills: 0 | DISCHARGE

## 2021-07-22 NOTE — ASU PREOP CHECKLIST - COMMENTS
patient refused interpretation services. patient can understand and speak english. RN told patient if she does not understand to let her know and we will have to use the .

## 2021-07-22 NOTE — ASU DISCHARGE PLAN (ADULT/PEDIATRIC) - D. IF YOU HAD ANY TYPE OF SEDATION, YOU MAY EXPERIENCE LIGHTHEADEDNESS, DIZZINESS, OR SLEEPINESS FOLLOWING YOUR PROCEDURE. A RESPONSIBLE ADULT SHOULD STAY WITH YOU FOR AT LEAST 24 HOURS FOLLOWING YOUR PROCEDURE.
(X88.001) Keratoconjunct sicca, not specified as Sjogren's, bilateral - Assesment : Examination revealed Dry Eye Syndrome - Plan : Monitor for changes. ATs recommended 2-3+ times per day and prn. Call with any vision changes. Statement Selected

## 2021-07-22 NOTE — ASU PATIENT PROFILE, ADULT - LANGUAGE ASSISTANCE NEEDED
patient refused interpretation services. patient can understand and speak english. RN told patient if she does not understand to let her know and we will have to use the ./No-Patient/Caregiver offered and refused free interpretation services.

## 2021-07-22 NOTE — H&P PST ADULT - NSICDXPASTSURGICALHX_GEN_ALL_CORE_FT
PAST SURGICAL HISTORY:  H/O abdominal hysterectomy With Right Oophrecetomy   In December 2017    H/O colonoscopy >3 years ago    History of cholecystectomy 10-15 years ago    History of colon resection     History of mobilization of splenic flexure

## 2021-07-22 NOTE — CHART NOTE - NSCHARTNOTEFT_GEN_A_CORE
PACU ANESTHESIA ADMISSION NOTE      Procedure: EGD EUS,Colonoscopy  Post op diagnosis:      ____  Intubated  TV:______       Rate: ______      FiO2: ______    _x___  Patent Airway    _x___  Full return of protective reflexes    _x___  Full recovery from anesthesia / back to baseline status    Vitals  SPO2:-97% on room air  HR:-94  RR:-14  B.P:-98/54  TEMP:-97.9    Mental Status:  _x___ Awake   ___x_ Alert   _____ Drowsy   _____ Sedated    Nausea/Vomiting:  _x___  NO       ______Yes,   See Post - Op Orders         Pain Scale (0-10):  __0___    Treatment: _x___ None    __x__ See Post - Op/PCA Orders    Post - Operative Fluids:   ___ Oral   ____x See Post - Op Orders    Plan: Discharge:   __x_Home       ____Floor     _____Critical Care    _____  Other:_________________    Comments:  Report endorsed to RN in pacu  Vitals stable  No anesthesia issues or complications noted.  Discharge to patient to  home when criteria met.

## 2021-07-26 LAB
SURGICAL PATHOLOGY STUDY: SIGNIFICANT CHANGE UP
SURGICAL PATHOLOGY STUDY: SIGNIFICANT CHANGE UP

## 2021-07-27 ENCOUNTER — NON-APPOINTMENT (OUTPATIENT)
Age: 46
End: 2021-07-27

## 2021-07-28 DIAGNOSIS — K64.8 OTHER HEMORRHOIDS: ICD-10-CM

## 2021-07-28 DIAGNOSIS — Z12.89 ENCOUNTER FOR SCREENING FOR MALIGNANT NEOPLASM OF OTHER SITES: ICD-10-CM

## 2021-07-28 DIAGNOSIS — K64.4 RESIDUAL HEMORRHOIDAL SKIN TAGS: ICD-10-CM

## 2021-07-28 DIAGNOSIS — K29.50 UNSPECIFIED CHRONIC GASTRITIS WITHOUT BLEEDING: ICD-10-CM

## 2021-07-28 DIAGNOSIS — K57.30 DIVERTICULOSIS OF LARGE INTESTINE WITHOUT PERFORATION OR ABSCESS WITHOUT BLEEDING: ICD-10-CM

## 2021-07-28 DIAGNOSIS — K63.5 POLYP OF COLON: ICD-10-CM

## 2021-07-28 DIAGNOSIS — Z86.010 PERSONAL HISTORY OF COLONIC POLYPS: ICD-10-CM

## 2021-07-28 DIAGNOSIS — Z90.49 ACQUIRED ABSENCE OF OTHER SPECIFIED PARTS OF DIGESTIVE TRACT: ICD-10-CM

## 2021-07-30 ENCOUNTER — OUTPATIENT (OUTPATIENT)
Dept: OUTPATIENT SERVICES | Facility: HOSPITAL | Age: 46
LOS: 1 days | Discharge: HOME | End: 2021-07-30
Payer: MEDICAID

## 2021-07-30 ENCOUNTER — APPOINTMENT (OUTPATIENT)
Dept: GASTROENTEROLOGY | Facility: CLINIC | Age: 46
End: 2021-07-30
Payer: MEDICAID

## 2021-07-30 ENCOUNTER — NON-APPOINTMENT (OUTPATIENT)
Age: 46
End: 2021-07-30

## 2021-07-30 ENCOUNTER — APPOINTMENT (OUTPATIENT)
Dept: INTERNAL MEDICINE | Facility: CLINIC | Age: 46
End: 2021-07-30
Payer: MEDICAID

## 2021-07-30 ENCOUNTER — OUTPATIENT (OUTPATIENT)
Dept: OUTPATIENT SERVICES | Facility: HOSPITAL | Age: 46
LOS: 1 days | Discharge: HOME | End: 2021-07-30

## 2021-07-30 VITALS
HEART RATE: 87 BPM | DIASTOLIC BLOOD PRESSURE: 83 MMHG | BODY MASS INDEX: 40.78 KG/M2 | HEIGHT: 57 IN | SYSTOLIC BLOOD PRESSURE: 117 MMHG | WEIGHT: 189 LBS | TEMPERATURE: 97.8 F | OXYGEN SATURATION: 93 %

## 2021-07-30 VITALS
DIASTOLIC BLOOD PRESSURE: 84 MMHG | TEMPERATURE: 98.1 F | HEART RATE: 90 BPM | HEIGHT: 57 IN | SYSTOLIC BLOOD PRESSURE: 119 MMHG | OXYGEN SATURATION: 96 % | WEIGHT: 189 LBS | BODY MASS INDEX: 40.78 KG/M2

## 2021-07-30 DIAGNOSIS — Z90.49 ACQUIRED ABSENCE OF OTHER SPECIFIED PARTS OF DIGESTIVE TRACT: Chronic | ICD-10-CM

## 2021-07-30 DIAGNOSIS — Z98.890 OTHER SPECIFIED POSTPROCEDURAL STATES: Chronic | ICD-10-CM

## 2021-07-30 PROCEDURE — 99213 OFFICE O/P EST LOW 20 MIN: CPT | Mod: GC

## 2021-07-30 PROCEDURE — 99214 OFFICE O/P EST MOD 30 MIN: CPT | Mod: GC

## 2021-07-30 PROCEDURE — 93010 ELECTROCARDIOGRAM REPORT: CPT

## 2021-07-30 NOTE — PHYSICAL EXAM
[No Acute Distress] : no acute distress [Well Nourished] : well nourished [No JVD] : no jugular venous distention [Supple] : supple [No Respiratory Distress] : no respiratory distress  [No Accessory Muscle Use] : no accessory muscle use [Clear to Auscultation] : lungs were clear to auscultation bilaterally [Normal Rate] : normal rate  [Regular Rhythm] : with a regular rhythm [No Edema] : there was no peripheral edema [Pedal Pulses Present] : the pedal pulses are present [Normal] : soft, non-tender, non-distended, no masses palpated, no HSM and normal bowel sounds

## 2021-07-30 NOTE — HISTORY OF PRESENT ILLNESS
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 She overall feels well.\par \par EGD/EUS July 2021:  \par  Erythema in the antrum compatible with non-erosive gastritis. (Biopsy no HP)\par  EUS with heterogeneous pancreatic parenchyma and a normal non-dilated PD \par throughout. 1 peripancreatic LN measuring 1 cm, round, well circumscribed, \par homogeneous, and benign appearing. Otherwise normal EUS.\par \par Colonoscopy: small hyperplastic polyps, good prep\par

## 2021-07-30 NOTE — ASSESSMENT
[FreeTextEntry1] : 45 year old female with PMHx of DM2, Ovarian cancer s/p left oophorectomy in 2006 and RT oophorectomy and hysterotomy in 2017, recurrent diverticulitis s/p partial colectomy, H pylori gastritis s/p eradication, s/p cholecystectomy, CKDN2A mutation, NAFLD presenting for preop clearance for rotator cuff tear.\par \par #Preop clearance\par - RCRI 0 points. 3.9% 30-day risk of death, MI, or cardiac arrest \par -CXR- wnl\par -CBC, BMP, WNL\par -EKG : NSR w/ incomplete RBBB. \par -Moderate risk for surgery \par proceed as scheduled\par \par \par #DMII- controlled \par -last hba1c 6.1\par -c/w trulicity\par -urine microalbumin/ Creat wnl\par \par \par \par #HLD\par -, Cholesterol 247\par -t Lipitor 40mg qhs\par \par #Hx of recurrent diverticulitis\par -pt is following Colorectal surgeon, s/p surgery in july 2020\par \par #Health Maintenance\par -Mammogram 8/2020 uptodate \par -Pap smear: last in 2017. Patient has WILBERT including cervix. \par -GI: Scheduled for repeat colonscopy\par -Flu shot received this year 2020. PPSV 23 UTD. 2018\par -follow up in 6 months.

## 2021-07-30 NOTE — ASSESSMENT
[FreeTextEntry1] : \par # Elevated LFts possibly due to NAFLD: resolving\par - chronic and stable likely NAFLD\par - immune to hepatitis A and B\par - negative hepatitis C\par - CLD work up negative\par - BUI fibrosure score F0, no need for vit E\par - bx of liver lesion 3/2018 showed micro and macrovesicular steatosis, no malignant cells, no fibrosis\par \par Rec:\par - wt loss, exercise, healthy diet advised, avoid alcohol \par \par # Melanoma-pancreatic cancer syndrome \par Patient found to have CKDN2A mutation, she is a heterozygote and is part of the melanoma-pancreatic cancer syndrome which confers a lifetime risk 17% pancreatic cancer. \par EUS for pancreas dr Ferguson 3/2019 and with Dr. Cabrera on 6/18/20 was unremarkable (normal pancreas and normal size PD)\par EUS July 2021: 1 cm peripancreatic LN, no biopsies\par has allergy to contrast, so cannot order MRI pancreatic protocol (had edema and dyspnea) \par \par plan\par -Repeat EUS in Jan 2022 as per reports from last EUS \par -last dermatology visit last month per patient \par \par \par # CRC screening \par 2014 colonoscopy dr reid diverticulosis and hemorrhoids \par literature reports association with other cancers but recommendation are mainly to screen for pancreatic ca \par Patient had colonoscopy on 7/1/20 which showed moderate diverticulosis and 3mm transverse colon polyp ( hyperplastic) and 3mm sigmoid polyp ( Hyperplastic)\par 7/2021: small HP polyp, good prep\par \par - repeat colonoscopy in 5 years (No data to support more frequent screening for these patients)\par \par \par \par \par

## 2021-07-30 NOTE — HISTORY OF PRESENT ILLNESS
[FreeTextEntry1] : Follow up visit \par  [id number] [de-identified] : 45 year old female with PMHx of DM2, Ovarian cancer s/p left oophorectomy in 2006 and RT oophorectomy and hysterotomy in 2017, recurrent diverticulitis s/p partial colectomy, H pylori gastritis s/p eradication, s/p cholecystectomy, CKDN2A mutation, NAFLD presenting for preop clear

## 2021-07-30 NOTE — REVIEW OF SYSTEMS
[Fever] : no fever [Chills] : no chills [Eye Pain] : no eye pain [Red Eyes] : eyes not red [Nosebleeds] : no nosebleeds [Nasal Discharge] : no nasal discharge [Chest Pain] : no chest pain [Palpitations] : no palpitations [Cough] : no cough [SOB on Exertion] : no shortness of breath during exertion [Constipation] : no constipation [Diarrhea] : no diarrhea [Joint Swelling] : no joint swelling [Joint Stiffness] : no joint stiffness [Dizziness] : no dizziness [Fainting] : no fainting

## 2021-07-30 NOTE — PHYSICAL EXAM
[General Appearance - Alert] : alert [General Appearance - In No Acute Distress] : in no acute distress [Sclera] : the sclera and conjunctiva were normal [Outer Ear] : the ears and nose were normal in appearance [Hearing Threshold Finger Rub Not Weld] : hearing was normal [Exaggerated Use Of Accessory Muscles For Inspiration] : no accessory muscle use [No CVA Tenderness] : no ~M costovertebral angle tenderness [No Spinal Tenderness] : no spinal tenderness [Abnormal Walk] : normal gait [Nail Clubbing] : no clubbing  or cyanosis of the fingernails [Skin Color & Pigmentation] : normal skin color and pigmentation [] : no rash [Oriented To Time, Place, And Person] : oriented to person, place, and time

## 2021-08-02 DIAGNOSIS — R73.03 PREDIABETES: ICD-10-CM

## 2021-08-02 DIAGNOSIS — Z00.00 ENCOUNTER FOR GENERAL ADULT MEDICAL EXAMINATION WITHOUT ABNORMAL FINDINGS: ICD-10-CM

## 2021-08-02 DIAGNOSIS — Z01.818 ENCOUNTER FOR OTHER PREPROCEDURAL EXAMINATION: ICD-10-CM

## 2021-08-02 DIAGNOSIS — K76.0 FATTY (CHANGE OF) LIVER, NOT ELSEWHERE CLASSIFIED: ICD-10-CM

## 2021-08-18 ENCOUNTER — APPOINTMENT (OUTPATIENT)
Dept: OPHTHALMOLOGY | Facility: CLINIC | Age: 46
End: 2021-08-18

## 2021-08-30 NOTE — HEALTH RISK ASSESSMENT
[Intercurrent ED visits] : went to ED [No falls in past year] : Patient reported no falls in the past year [0] : 2) Feeling down, depressed, or hopeless: Not at all (0) [] : No [de-identified] : Neurology, GI [de-identified] : 3-4 vaporizers [de-identified] : 3 rachell on weekend 57F AIDS, COPD, multiple cancers who presented due to medication non-adherence and is now much improved. Biggest issue is overall health condition as she appears in severe protein calorie malnutrition, has significant sacral ulcer, and uncontrolled HIV. Discussed with her briefly GOC and CODE status, initially she was full code but after discussion given likely complications with chest compressions she said she would want to think about it. Ultimately, she would benefit from placement in a facility for wound care and continued nutritional support. Medically she would be optimized for discharge pending follow up on HIV labs to ensure no underlying overt infection.

## 2021-10-04 ENCOUNTER — OUTPATIENT (OUTPATIENT)
Dept: OUTPATIENT SERVICES | Facility: HOSPITAL | Age: 46
LOS: 1 days | Discharge: HOME | End: 2021-10-04

## 2021-10-04 ENCOUNTER — NON-APPOINTMENT (OUTPATIENT)
Age: 46
End: 2021-10-04

## 2021-10-04 ENCOUNTER — APPOINTMENT (OUTPATIENT)
Dept: HEPATOLOGY | Facility: CLINIC | Age: 46
End: 2021-10-04
Payer: MEDICAID

## 2021-10-04 VITALS
SYSTOLIC BLOOD PRESSURE: 122 MMHG | BODY MASS INDEX: 40.03 KG/M2 | TEMPERATURE: 96.6 F | DIASTOLIC BLOOD PRESSURE: 85 MMHG | HEART RATE: 67 BPM | OXYGEN SATURATION: 99 % | WEIGHT: 185 LBS

## 2021-10-04 DIAGNOSIS — Z98.890 OTHER SPECIFIED POSTPROCEDURAL STATES: Chronic | ICD-10-CM

## 2021-10-04 DIAGNOSIS — Z90.49 ACQUIRED ABSENCE OF OTHER SPECIFIED PARTS OF DIGESTIVE TRACT: Chronic | ICD-10-CM

## 2021-10-04 DIAGNOSIS — K05.6 PERIODONTAL DISEASE, UNSPECIFIED: ICD-10-CM

## 2021-10-04 PROCEDURE — 99214 OFFICE O/P EST MOD 30 MIN: CPT | Mod: GC

## 2021-10-04 NOTE — PHYSICAL EXAM
[General Appearance - Alert] : alert [General Appearance - In No Acute Distress] : in no acute distress [Auscultation Breath Sounds / Voice Sounds] : lungs were clear to auscultation bilaterally [Heart Rate And Rhythm] : heart rate was normal and rhythm regular [Heart Sounds] : normal S1 and S2 [Heart Sounds Gallop] : no gallops [Murmurs] : no murmurs [Heart Sounds Pericardial Friction Rub] : no pericardial rub [Bowel Sounds] : normal bowel sounds [Abdomen Soft] : soft [Abdomen Tenderness] : non-tender [Abdomen Mass (___ Cm)] : no abdominal mass palpated [No CVA Tenderness] : no ~M costovertebral angle tenderness [No Spinal Tenderness] : no spinal tenderness [Abnormal Walk] : normal gait [Nail Clubbing] : no clubbing  or cyanosis of the fingernails [Musculoskeletal - Swelling] : no joint swelling seen [Motor Tone] : muscle strength and tone were normal [Skin Color & Pigmentation] : normal skin color and pigmentation [Skin Turgor] : normal skin turgor [] : no rash

## 2021-10-04 NOTE — HISTORY OF PRESENT ILLNESS
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 Pt states she has been trying to loose weight and lost around 15 lbs. She states she did not get a refill of her cholesterol mediction for the passt few months. Other than that she is on overall good state of health

## 2021-10-04 NOTE — ASSESSMENT
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 Pt states she has been trying to loose weight and lost around 15 lbs. She states she did not get a refill of her cholesterol medication for the passt few months. Other than that she is on overall good state of health\par \par #NAFLD/BUI\par - LFts wnl except for ALP of 152 overall downtrending from 170\par - immune to HA and HB. negative HCV\par - BUI Fibrosure F0 - no vitamin E.  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 \par bx of liver lesion 3/2018 showed micro and macrovesicular steatosis, no malignant cells \par - EUS 2019, 2020: normal pancreas. normal PD\par - EUS 2021: 1cm peripancreatic lymphnode - not biopsied\par - Repeat EUS in 6 months\par - Pt cannot have MRI w/ pancreatic protocol as pt has allergy to contast (Dyspnea and edema)\par -literature reports association with other cancers but recommendation are mainly to screen for pancreatic ca \par - uptodate with dermatology\par \par # CRC screening \par literature reports association with other cancers but recommendation are mainly to screen for pancreatic ca \par - colonoscopy on 7/1/20 which showed moderate diverticulosis and 3mm transverse colon polyp ( hyperplastic) and 3mm sigmoid polyp ( Hyperplastic)\par - CF on 7/21:  small HP polyp, good prep\par - repeat colonoscopy in 2026\par \par #H Pylori Gastritis - resolved\par s/p treatment \par neg bx on 3/2019\par \par # Obesity\par wt loss, healthy diet, exercise advised \par \par # multiple episode of Diverticulitis\par  s/p partial colectomy ( Robotic sigmoidectomy ). \par

## 2021-10-04 NOTE — END OF VISIT
[] : Fellow [Time Spent: ___ minutes] : I have spent [unfilled] minutes of time on the encounter. [FreeTextEntry3] : 45 year old CKDN2 A gene mutation HLD T2DM with biopsy proven BUI seen in follow up. \par Mild elevation of ALP which can be seen in BUI.\par Patient has lost weight.\par Will check fibroscan on follow up.\par Advised weight loss with diet and exercise.\par No Vitamin E as diabetic .

## 2021-10-06 PROBLEM — R56.9 PSEUDOSEIZURES: Status: ACTIVE | Noted: 2019-01-08

## 2021-11-09 ENCOUNTER — LABORATORY RESULT (OUTPATIENT)
Age: 46
End: 2021-11-09

## 2021-11-09 ENCOUNTER — APPOINTMENT (OUTPATIENT)
Dept: HEMATOLOGY ONCOLOGY | Facility: CLINIC | Age: 46
End: 2021-11-09
Payer: MEDICAID

## 2021-11-09 ENCOUNTER — OUTPATIENT (OUTPATIENT)
Dept: OUTPATIENT SERVICES | Facility: HOSPITAL | Age: 46
LOS: 1 days | Discharge: HOME | End: 2021-11-09

## 2021-11-09 VITALS
SYSTOLIC BLOOD PRESSURE: 133 MMHG | BODY MASS INDEX: 43.19 KG/M2 | HEIGHT: 56 IN | HEART RATE: 100 BPM | TEMPERATURE: 98.1 F | WEIGHT: 192 LBS | DIASTOLIC BLOOD PRESSURE: 87 MMHG

## 2021-11-09 DIAGNOSIS — Z98.890 OTHER SPECIFIED POSTPROCEDURAL STATES: Chronic | ICD-10-CM

## 2021-11-09 DIAGNOSIS — Z90.49 ACQUIRED ABSENCE OF OTHER SPECIFIED PARTS OF DIGESTIVE TRACT: Chronic | ICD-10-CM

## 2021-11-09 LAB
HCT VFR BLD CALC: 41.1 %
HGB BLD-MCNC: 13.5 G/DL
MCHC RBC-ENTMCNC: 28.1 PG
MCHC RBC-ENTMCNC: 32.8 G/DL
MCV RBC AUTO: 85.4 FL
PLATELET # BLD AUTO: 318 K/UL
PMV BLD: 10.2 FL
RBC # BLD: 4.81 M/UL
RBC # FLD: 14 %
WBC # FLD AUTO: 9.16 K/UL

## 2021-11-09 PROCEDURE — 99213 OFFICE O/P EST LOW 20 MIN: CPT

## 2021-11-10 ENCOUNTER — OUTPATIENT (OUTPATIENT)
Dept: OUTPATIENT SERVICES | Facility: HOSPITAL | Age: 46
LOS: 1 days | Discharge: HOME | End: 2021-11-10

## 2021-11-10 ENCOUNTER — APPOINTMENT (OUTPATIENT)
Dept: PODIATRY | Facility: CLINIC | Age: 46
End: 2021-11-10
Payer: MEDICAID

## 2021-11-10 ENCOUNTER — APPOINTMENT (OUTPATIENT)
Dept: ENDOCRINOLOGY | Facility: CLINIC | Age: 46
End: 2021-11-10

## 2021-11-10 VITALS
BODY MASS INDEX: 43.19 KG/M2 | HEIGHT: 56 IN | SYSTOLIC BLOOD PRESSURE: 132 MMHG | HEART RATE: 97 BPM | DIASTOLIC BLOOD PRESSURE: 83 MMHG | TEMPERATURE: 98.5 F | OXYGEN SATURATION: 99 % | WEIGHT: 192 LBS

## 2021-11-10 DIAGNOSIS — M79.672 PAIN IN LEFT FOOT: ICD-10-CM

## 2021-11-10 DIAGNOSIS — Z98.890 OTHER SPECIFIED POSTPROCEDURAL STATES: Chronic | ICD-10-CM

## 2021-11-10 DIAGNOSIS — Z90.49 ACQUIRED ABSENCE OF OTHER SPECIFIED PARTS OF DIGESTIVE TRACT: Chronic | ICD-10-CM

## 2021-11-10 DIAGNOSIS — B35.3 TINEA PEDIS: ICD-10-CM

## 2021-11-10 LAB
ALBUMIN SERPL ELPH-MCNC: 4.6 G/DL
ALP BLD-CCNC: 165 U/L
ALT SERPL-CCNC: 61 U/L
ANION GAP SERPL CALC-SCNC: 18 MMOL/L
AST SERPL-CCNC: 38 U/L
BILIRUB SERPL-MCNC: <0.2 MG/DL
BUN SERPL-MCNC: 17 MG/DL
CALCIUM SERPL-MCNC: 9.8 MG/DL
CANCER AG125 SERPL-ACNC: 9 U/ML
CHLORIDE SERPL-SCNC: 103 MMOL/L
CO2 SERPL-SCNC: 20 MMOL/L
CREAT SERPL-MCNC: 0.6 MG/DL
GLUCOSE SERPL-MCNC: 91 MG/DL
POTASSIUM SERPL-SCNC: 4.3 MMOL/L
PROT SERPL-MCNC: 7.7 G/DL
SODIUM SERPL-SCNC: 141 MMOL/L

## 2021-11-10 PROCEDURE — 99213 OFFICE O/P EST LOW 20 MIN: CPT

## 2021-11-10 RX ORDER — BLOOD-GLUCOSE METER
W/DEVICE EACH MISCELLANEOUS
Qty: 1 | Refills: 0 | Status: ACTIVE | COMMUNITY
Start: 2021-11-10 | End: 1900-01-01

## 2021-11-10 NOTE — HISTORY OF PRESENT ILLNESS
[FreeTextEntry1] : 46 year old female with PMHx of DM2, Ovarian cancer s/p left oophorectomy in 2006 and RT oophorectomy and hysterotomy in 2017, recurrent diverticulitis s/p partial colectomy, H pylori gastritis s/p eradication, s/p cholecystectomy, CKDN2A mutation, NAFLD presenting for follow-up. Patient on Trulicity for DM control. Ran out of prescription about 2 weeks ago. Also states that her finger-stick glucose monitoring machine is not working. Prior to this she was compliant. Last A1C was 6.1 in April 2021. No reported numbness or tingling.

## 2021-11-10 NOTE — RESULTS/DATA
[FreeTextEntry1] : Emily Accession Number : [id number]\par VENTURABLACKLARRYLANDALFIE               1\par Surgical Final Report\par \par Final Diagnosis\par Transverse colon polyp, biopsy:\par - Hyperplastic polyp.\par \par Verified by: Vannesa Orellana M.D.\par (Electronic Signature)\par Reported on: 07/26/21 18:03 EDT, [address] [place] [address],[facility] [id number]\par \par \par Cerner Accession Number : [id number]\par RIVERALARRYLANDALFIE               1\par \par Surgical Final Report\par \par Final Diagnosis\par Stomach, antral biopsy:\par - Fragments of antrum type gastric mucosa showing mild chronic\par gastritis without activity.\par - Giemsa stain fails to reveal Helicobacter pylori in this\par material.\par \par Verified by: Vannesa Orellana M.D.\par (Electronic Signature)\par Reported on: 07/26/21 17:59 EDT, [address] [place] [address],[facility] [address]\par Phone: [phone]   Fax: [phone]\par _________________________________________________________________\par \par Clinical History\par EGD\par \par Specimen(s) Submitted\par Antrum biopsy\par \par Gross Description\par The specimen is received in formalin, labeled "antrum" and\par consists of two fragments of tan soft tissue, each measuring 0.2\par cm in greatest dimension.  The specimen is submitted entirely.\par (1 block)\par \par Specimen was received and underwent gross examination at [facility], [address] [place] [address],[place] [address].\par \par 07/23/2021 13:14:10 EDT ns\par \par Perioperative Diagnosis\par Dysphagia\par \par Postoperative Diagnosis\par Gastritis\par \par  \par \par Phone: [phone]   Fax: [phone]\par _________________________________________________________________\par \par Clinical History\par Colonoscopy\par COVID (-)\par \par Specimen(s) Submitted\par Transverse colon polyp\par \par Gross Description\par The specimen is received in formalin, labeled "transverse colon\par polyp" and consists of one fragment of tan soft tissue, measuring\par 0.6 cm in greatest dimension.  The specimen is submitted\par entirely.  (1 block)\par \par Specimen was received and underwent gross examination at [facility], [address] [place] [address]\[place] [address].\par \par 07/23/2021 13:10:02 EDT ns\par \par Perioperative Diagnosis\par Screening colonoscopy\par \par Postoperative Diagnosis\par Transverse colon polyp (resected

## 2021-11-10 NOTE — ASSESSMENT
[FreeTextEntry1] : 46 year old female with PMHx of DM2, Ovarian cancer s/p left oophorectomy in 2006 and RT oophorectomy and hysterotomy in 2017, recurrent diverticulitis s/p partial colectomy, H pylori gastritis s/p eradication, s/p cholecystectomy, CKDN2A mutation, NAFLD presenting for follow-up. \par \par #) DM \par - Patient reports some nausea with trulicity \par - Will reduce dose of truilicity to 0.75 and begin Jardiance\par - Will send test strips, lancets, and glucose monitor\par \par #) Obesity \par - Bariatric surgery referral \par \par #)RTC in 6 months.  [Diabetes Foot Care] : diabetes foot care [Long Term Vascular Complications] : long term vascular complications of diabetes [Carbohydrate Consistent Diet] : carbohydrate consistent diet [Importance of Diet and Exercise] : importance of diet and exercise to improve glycemic control, achieve weight loss and improve cardiovascular health [Exercise/Effect on Glucose] : exercise/effect on glucose [Hypoglycemia Management] : hypoglycemia management [Retinopathy Screening] : Patient was referred to ophthalmology for retinopathy screening

## 2021-11-10 NOTE — ASSESSMENT
[FreeTextEntry1] : Patient examined, history and chart reviewed\par Patient complains of thickened painful elongated brittle nails x 10\par Patient has tried OTC medication which has failed \par Patient would like to try an alternative to topical medication \par Discussed with patient possible complication of using oral terbinafine\par Patient aware of side effects\par Patient given rx for LFT \par \par \par \par

## 2021-11-10 NOTE — ASSESSMENT
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 The pathology does not show invasive implants. 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 Patient was told to inform family especially children to undergo genetic testing as well.  \par \par RECOMMENDATIONS:\par Previous notes reviewed and all relevant laboratory and radiology results discussed with Dr Correa and communicated to the patient.\par \par -- Will do  , CBC, CMP.\par -- Follow up PCP/derm/GI regularly as recommended.\par -- Strongly advised to inform family to undergo genetic counseling for h/o CKDN2A mutation.  Referred to genetic counselor Mahnaz Triana, phone number provided.\par -- Continue lifestyle modification with diet and exercise, weight loss emphasized.\par -- Follow up with PCP on 12/2021 as scheduled for health maintenance.\par \par RTC in 6 months.\par \par Case was seen and discussed with Dr. Correa who agreed with the assessment and plan.\par

## 2021-11-10 NOTE — HISTORY OF PRESENT ILLNESS
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  (SEE MICROSCOPIC DESCRIPTION FOR SYNOPTIC REPORT)\par \par       -    TUMOR IS PRESENT IN THE PARATUBAL TISSUE AND ON THE OVARIAN\par            SURFACE.\par \par       -    SCATTERED FOCI SUSPICIOUS OF STROMAL MICROINVASION, LARGEST\par            FOCUS MEASURING 4 MM. (SLIDE A4)\par \par       -    PATHOLOGIC STAGE (pTNM):  pT(m)2b pNX\par    ONE FOCUS SUGGESTIVE OF LOW GRADE SEROUS CARCINOMA. (0.6 MM;\par            SLIDE A7)\par \par There were no post operative complications.\par She C/o hot flashes.\par C/O pain in the abdomen, which is diffuse.\par No weight loss.\par No vaginal bleeding\par No fever.\par Operative report was reviewed. No residual disease noted.\par \par The pathology slides had been sent for 2nd opinion and result is noted below. No invasion was identified.\par \par Patient also had a liver biopsy which demonstrated normal liver parenchyma which is noted below.  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 Patient needs evaluation with EUS +/- MRCP for evaluation of pancreatic cancer and regular follow up with dermatology  [de-identified] :  Report CR- received from [facility], [address] [place] [address] by Moy Briscoe on 2/8/2018 with the following    diagnosis:     FINAL DIAGNOSIS:    OVARY, RIGHT, SALPINGO-OOPHORECTOMY:          - SEROUS BORDERLINE TUMOR WITH NONINVASIVE IMPLANTS.    Comment:     I agree with your interpretation on this case of serous borderline    tumor. There are areas of increased proliferation but nothing I    would call serous carcinoma. There are noninvasive implants of the    fallopian tube and on the surface of the ovary. The sections from    the pelvic side wall and omentum also show noninvasive implants of    serous borderline tumor.\par \par Cytopathology Report       Final Diagnosis\par  LIVER LESION, LIVER LOBE, CT-GUIDED NEEDLE BIOPSY AND IMPRINT:\par  - NO MALIGNANT CELLS IDENTIFIED\par . - LIVER PARENCHYMA TISSUE WITH MACROVESICULAR AND MICROVESICULAR STEATOSIS ( 50% OF THE TISSUE). \par - IMMUNOHISTOCHEMICAL AND SPECIAL STAINS PENDING.\par  [de-identified] : C/O Pain in left shoulder and left breast as before.\par She had a PET scan and she is scheduled for liver biopsy later this week.\par \par 3/26/18\par Patient here for follow up complains of symptoms including hot flashes and abdominal pain in the RUQ at the side of liver biopsy. Patient denies any changes in bowel habits, vaginal bleeding. She had a patient had a liver biopsy which was normal showing normal liver parenchyma. 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 Patient was told to inform family especially children to undergo genetic testing as well.  Patient was told to follow up with Dr Weinberg of GI for EUS of the pancreas and consideration of a MRCP in the future. The patient was also recommended to see a dermatologist for full skin evaluation. \par \par 7/5/18\par She has been drinking heavy ( 10 shots of tequila  each weekend).\par She has an appointment with GI next week.\par Has not seen dermatology.\par Denies abdominal pain, no vaginal bleeding\par \par 10/4/18:\par Had headaches and had MRI brain few weeks ago.\par C/o dizziness and blurry vision. \par On keppra started by neurology for possible convulsions, started a week ago. She says that she doesn't feel good on Keppra. \par Denies fever, nausea, vomiting, chest pain, SOB, abdominal pain, bowel and bladder problems.\par Due for EGD and EUS on 10/10/18\par Due for mammo in Oct 2018. \par Due for VEEG. \par \par 2/13/19:\par Patient here for follow up, feeling well, no new complaints.  Patient denies abdominal pain or bloating, denies vaginal bleeding or discharge.  She had a CT scan which was normal and she is scheduled with GYN ONC on Friday.  She saw dermatologist as well as GI.  EUS was scheduled and apparently cancelled after the patient was found to have food in her stomach.  She states its now scheduled for end of March.  Patient also being followed for pseudoseizures by neurology.\par \par 6/3/19:\par Doing well. No major complaints.\par Denies fever, nausea, vomiting, chest pain, SOB, abdominal pain, bowel and bladder problems.\par Seen by Dermatology and gynonc. \par Pt had an EGD and EUS done in March 2019  that revealed atrophic gastritis and EUS revealed normal pancreas and normal sized PD\par Due for mammo this week. \par \par \par 12/16/19\par Pt is here for follow up.\par States she is going for surgery for cecal diverticulitis.\par No vaginal bleeding.\par Has been following with GI for EUS for pancreatic ca screening.\par \par 05/28/2020\par ALFIE MOSLEY a 44 year F is here today for follow up of ovarian cancer and melanoma pancreatic cancer syndrome. \par Was referred to Dermatology for full skin evaluation; last seen in 11/2019, diagnosed with seborrheic keratosis of nose/buttock with benign non-dysplastic nevi\par Last EUD, EUS 3/29 normal pancreas. Next f/up with GI next week.\par As per pt all of her f/up appt were postponed due to COVID.\par Colorectal surgery for cecal diverticulitis was  postponed. \par Denies vaginal bleeding, reports chronic intermittent abdominal pain. 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 \par She had her colonoscopy 7/2020 and endoscopy 6/2020 with Dr. Cabrera- no suspicious findings.\par Patient's family did not go for genetic testing yet and patient did not have genetic counseling either. She did not follow up with Dr Weinberg of GI for EUS of the pancreas and consideration of a MRCP in the future. The patient was also recommended to see a dermatologist for full skin evaluation which she sees on a regular basis..\par She had robotic resection done by Dr. 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

## 2021-11-12 ENCOUNTER — NON-APPOINTMENT (OUTPATIENT)
Age: 46
End: 2021-11-12

## 2021-11-12 ENCOUNTER — APPOINTMENT (OUTPATIENT)
Dept: GASTROENTEROLOGY | Facility: CLINIC | Age: 46
End: 2021-11-12
Payer: MEDICAID

## 2021-11-12 ENCOUNTER — OUTPATIENT (OUTPATIENT)
Dept: OUTPATIENT SERVICES | Facility: HOSPITAL | Age: 46
LOS: 1 days | Discharge: HOME | End: 2021-11-12

## 2021-11-12 VITALS
HEART RATE: 80 BPM | HEIGHT: 56 IN | SYSTOLIC BLOOD PRESSURE: 135 MMHG | OXYGEN SATURATION: 99 % | BODY MASS INDEX: 43.64 KG/M2 | TEMPERATURE: 97.7 F | DIASTOLIC BLOOD PRESSURE: 84 MMHG | WEIGHT: 194 LBS

## 2021-11-12 DIAGNOSIS — Z90.49 ACQUIRED ABSENCE OF OTHER SPECIFIED PARTS OF DIGESTIVE TRACT: Chronic | ICD-10-CM

## 2021-11-12 DIAGNOSIS — Z98.890 OTHER SPECIFIED POSTPROCEDURAL STATES: Chronic | ICD-10-CM

## 2021-11-12 PROCEDURE — 99214 OFFICE O/P EST MOD 30 MIN: CPT | Mod: GC

## 2021-11-12 NOTE — ASSESSMENT
[FreeTextEntry1] : \par # Elevated LFts possibly due to NAFLD: resolving\par - chronic and stable likely NAFLD\par - immune to hepatitis A and B\par - negative hepatitis C\par - CLD work up negative\par - BUI fibrosure score F0, no need for vit E\par - bx of liver lesion 3/2018 showed micro and macrovesicular steatosis, no malignant cells, no fibrosis\par Rec:\par - wt loss, exercise, healthy diet advised, avoid alcohol \par \par # Melanoma-pancreatic cancer syndrome \par Patient found to have CKDN2A mutation, she is a heterozygote and is part of the melanoma-pancreatic cancer syndrome which confers a lifetime risk 17% pancreatic cancer. \par EUS for pancreas dr Ferguson 3/2019 and with Dr. Cabrera on 6/18/20 was unremarkable (normal pancreas and normal size PD)\par EUS July 2021: 1 cm peripancreatic LN, no biopsies\par has allergy to contrast, so cannot order MRI pancreatic protocol (had edema and dyspnea) \par \par plan\par -Repeat EUS in 1 month\par -last dermatology this yr as per patient \par \par \par # CRC screening \par 2014 colonoscopy dr kingsleyayed diverticulosis and hemorrhoids \par literature reports association with other cancers but recommendation are mainly to screen for pancreatic ca \par Patient had colonoscopy on 7/1/20 which showed moderate diverticulosis and 3mm transverse colon polyp ( hyperplastic) and 3mm sigmoid polyp ( Hyperplastic)\par 7/2021: small HP polyp, good prep\par \par - repeat colonoscopy in 5 years (No data to support more frequent screening for these patients)\par

## 2021-11-12 NOTE — HISTORY OF PRESENT ILLNESS
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

## 2021-11-12 NOTE — PHYSICAL EXAM
[General Appearance - Alert] : alert [] : no respiratory distress [Respiration, Rhythm And Depth] : normal respiratory rhythm and effort [Auscultation Breath Sounds / Voice Sounds] : lungs were clear to auscultation bilaterally [Apical Impulse] : the apical impulse was normal [Heart Sounds] : normal S1 and S2

## 2021-11-15 DIAGNOSIS — Z15.09 GENETIC SUSCEPTIBILITY TO OTHER MALIGNANT NEOPLASM: ICD-10-CM

## 2021-11-15 DIAGNOSIS — C25.9 MALIGNANT NEOPLASM OF PANCREAS, UNSPECIFIED: ICD-10-CM

## 2021-11-15 DIAGNOSIS — D39.10 NEOPLASM OF UNCERTAIN BEHAVIOR OF UNSPECIFIED OVARY: ICD-10-CM

## 2021-11-17 DIAGNOSIS — M79.672 PAIN IN LEFT FOOT: ICD-10-CM

## 2021-11-17 DIAGNOSIS — B35.3 TINEA PEDIS: ICD-10-CM

## 2021-11-17 DIAGNOSIS — M79.671 PAIN IN RIGHT FOOT: ICD-10-CM

## 2021-11-17 DIAGNOSIS — B35.1 TINEA UNGUIUM: ICD-10-CM

## 2021-11-23 ENCOUNTER — APPOINTMENT (OUTPATIENT)
Dept: OPHTHALMOLOGY | Facility: CLINIC | Age: 46
End: 2021-11-23

## 2021-11-24 ENCOUNTER — APPOINTMENT (OUTPATIENT)
Dept: PODIATRY | Facility: CLINIC | Age: 46
End: 2021-11-24

## 2021-12-05 ENCOUNTER — RX RENEWAL (OUTPATIENT)
Age: 46
End: 2021-12-05

## 2021-12-07 ENCOUNTER — OUTPATIENT (OUTPATIENT)
Dept: OUTPATIENT SERVICES | Facility: HOSPITAL | Age: 46
LOS: 1 days | Discharge: HOME | End: 2021-12-07

## 2021-12-07 ENCOUNTER — NON-APPOINTMENT (OUTPATIENT)
Age: 46
End: 2021-12-07

## 2021-12-07 ENCOUNTER — APPOINTMENT (OUTPATIENT)
Dept: DERMATOLOGY | Facility: CLINIC | Age: 46
End: 2021-12-07
Payer: MEDICAID

## 2021-12-07 VITALS
TEMPERATURE: 97.1 F | HEIGHT: 56 IN | WEIGHT: 196 LBS | OXYGEN SATURATION: 98 % | SYSTOLIC BLOOD PRESSURE: 118 MMHG | DIASTOLIC BLOOD PRESSURE: 81 MMHG | HEART RATE: 93 BPM | BODY MASS INDEX: 44.09 KG/M2

## 2021-12-07 DIAGNOSIS — Z98.890 OTHER SPECIFIED POSTPROCEDURAL STATES: Chronic | ICD-10-CM

## 2021-12-07 DIAGNOSIS — L70.0 ACNE VULGARIS: ICD-10-CM

## 2021-12-07 DIAGNOSIS — Z90.49 ACQUIRED ABSENCE OF OTHER SPECIFIED PARTS OF DIGESTIVE TRACT: Chronic | ICD-10-CM

## 2021-12-07 PROCEDURE — 99213 OFFICE O/P EST LOW 20 MIN: CPT | Mod: GC

## 2021-12-07 NOTE — ASSESSMENT
[FreeTextEntry1] : Pt with PMH ovarian cancer with CKDN2A mutation followed by Dr. Correa presenting to the dermatology clinic for a full body skin exam. She is found to have scattereddiscrete erythematous nodules along axilla and also on her abdomen\par \par #Hidradenitis suppurativa Stage 1\par #Acne vulgaris\par - Apply clindamycin gel on spots to prevent from getting worse\par - Discussed with patient about lifestyle modifications including weight loss\par \par #FBSE\par - no malignant lesions seen on exam\par - RTC in 6 month for FBSE\par \par

## 2021-12-07 NOTE — PHYSICAL EXAM
[Full Body Skin Exam Performed] : performed [FreeTextEntry3] : On the face are hyperpigmented papules, macules. Along the back and upper extremities are multiple hyperpigmented macules and patches. \par \par On the left axilla and left lower abdomen, she has an erythematous nodule that is tender to palpation.\par \par On her abdomen are multiple linear striae\par \par On her LE are multiple visible superficial veins

## 2021-12-07 NOTE — HISTORY OF PRESENT ILLNESS
[FreeTextEntry1] : 6-month follow up  [de-identified] : Pt with PMH ovarian cancer followed by Dr. Correa. 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 \par Patient was advised she needs regular follow up with dermatology.\par \par Patient returns for regular follow up, she reports that she has "black heads". Also reports having bumps on her neck and axilla that have pus come out that have resolved now. Patient reports using sunscreen (50 SPF) whenever she goes out in the sun still. Denies fam hx of skin cancer.

## 2021-12-08 ENCOUNTER — APPOINTMENT (OUTPATIENT)
Dept: SURGERY | Facility: CLINIC | Age: 46
End: 2021-12-08
Payer: MEDICAID

## 2021-12-08 VITALS
SYSTOLIC BLOOD PRESSURE: 120 MMHG | HEIGHT: 55.75 IN | WEIGHT: 194.6 LBS | DIASTOLIC BLOOD PRESSURE: 90 MMHG | HEART RATE: 89 BPM | BODY MASS INDEX: 43.78 KG/M2 | OXYGEN SATURATION: 98 % | TEMPERATURE: 97.4 F

## 2021-12-08 PROCEDURE — 99214 OFFICE O/P EST MOD 30 MIN: CPT
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
## 2021-12-08 NOTE — CONSULT LETTER
[Dear  ___] : Dear  [unfilled], [Courtesy Letter:] : I had the pleasure of seeing your patient, [unfilled], in my office today. [Please see my note below.] : Please see my note below. [Sincerely,] : Sincerely, [FreeTextEntry3] : Jessica Hurd MD FACS\par Bariatric & Minimally Invasive Surgery\par [facility]\par [phone]

## 2021-12-08 NOTE — PHYSICAL EXAM
[Obese, well nourished, in no acute distress] : obese, well nourished, in no acute distress [Normal] : affect appropriate [de-identified] : midline scar, laparoscopic scars in RUQ and LLQ [de-identified] : no CVA tenderness B/L

## 2021-12-08 NOTE — ASSESSMENT
[FreeTextEntry1] : 45yo female with PMHx of HLD, DM (A1C 6.5), fatty liver, pancreatic cysts, previous cholecystectomy, diverticulitis s/p sigmoidectomy, ?seizures (episodes of shaking while sleeping), h/o ovarian cancer s/p WILBERT BSO, and class III obesity BMI 44 presenting for consultation of weight loss surgery/management.\par -discussed surgical options - gastric band, sleeve gastrectomy, nisha-en-Y gastric bypass\par -discussed potential surgical complications for each option - including bleeding, infection, pain, hernia, leak, stricture, and blood clots\par -discussed smoking of any kind (cigarettes, marijuana, e-cigarettes) is prohibited\par -at this time, the patient is interested in SLEEVE GASTRECTOMY\par -I informed her that there may be findings on EGD that disqualify her from sleeve, particularly harmon's esophagus.\par -GERD - will recommend pre-operative EGD, will Rx PPI in perioperative period and monitor for resolution/progression of symptoms post-operatively\par -seizures - requires neurology clearance\par -pancreatic cysts - explained to patient that she must have further work up for pancreatic lesions prior to bariatric surgery, as that take priority, will follow up EUS\par -pre-operative preparation - will include PCP evaluation, cardiac evaluation, pulmonary evaluation, EGD, nutritional evaluation (4 months), labs

## 2021-12-09 ENCOUNTER — NON-APPOINTMENT (OUTPATIENT)
Age: 46
End: 2021-12-09

## 2021-12-13 ENCOUNTER — OUTPATIENT (OUTPATIENT)
Dept: OUTPATIENT SERVICES | Facility: HOSPITAL | Age: 46
LOS: 1 days | Discharge: HOME | End: 2021-12-13

## 2021-12-13 ENCOUNTER — NON-APPOINTMENT (OUTPATIENT)
Age: 46
End: 2021-12-13

## 2021-12-13 ENCOUNTER — APPOINTMENT (OUTPATIENT)
Dept: INTERNAL MEDICINE | Facility: CLINIC | Age: 46
End: 2021-12-13
Payer: MEDICAID

## 2021-12-13 VITALS
OXYGEN SATURATION: 98 % | DIASTOLIC BLOOD PRESSURE: 99 MMHG | HEIGHT: 55 IN | BODY MASS INDEX: 45.36 KG/M2 | SYSTOLIC BLOOD PRESSURE: 146 MMHG | HEART RATE: 69 BPM | WEIGHT: 196 LBS | TEMPERATURE: 97.5 F

## 2021-12-13 DIAGNOSIS — Z98.890 OTHER SPECIFIED POSTPROCEDURAL STATES: Chronic | ICD-10-CM

## 2021-12-13 DIAGNOSIS — Z90.49 ACQUIRED ABSENCE OF OTHER SPECIFIED PARTS OF DIGESTIVE TRACT: Chronic | ICD-10-CM

## 2021-12-13 PROCEDURE — 99214 OFFICE O/P EST MOD 30 MIN: CPT | Mod: GC

## 2021-12-13 RX ORDER — CARBAMAZEPINE 100 MG/1
100 TABLET, CHEWABLE ORAL TWICE DAILY
Qty: 30 | Refills: 4 | Status: DISCONTINUED | COMMUNITY
Start: 2021-02-09 | End: 2021-12-13

## 2021-12-13 NOTE — PHYSICAL EXAM
[No Acute Distress] : no acute distress [Well Nourished] : well nourished [Normal Sclera/Conjunctiva] : normal sclera/conjunctiva [PERRL] : pupils equal round and reactive to light [EOMI] : extraocular movements intact [No Respiratory Distress] : no respiratory distress  [No Accessory Muscle Use] : no accessory muscle use [Clear to Auscultation] : lungs were clear to auscultation bilaterally [Normal Rate] : normal rate  [Regular Rhythm] : with a regular rhythm [No Edema] : there was no peripheral edema [Soft] : abdomen soft [Non Tender] : non-tender [Non-distended] : non-distended [No Spinal Tenderness] : no spinal tenderness [No Joint Swelling] : no joint swelling [Grossly Normal Strength/Tone] : grossly normal strength/tone [No Rash] : no rash [No Focal Deficits] : no focal deficits

## 2021-12-20 DIAGNOSIS — Z23 ENCOUNTER FOR IMMUNIZATION: ICD-10-CM

## 2021-12-20 DIAGNOSIS — Z01.818 ENCOUNTER FOR OTHER PREPROCEDURAL EXAMINATION: ICD-10-CM

## 2021-12-20 DIAGNOSIS — M54.9 DORSALGIA, UNSPECIFIED: ICD-10-CM

## 2021-12-24 ENCOUNTER — LABORATORY RESULT (OUTPATIENT)
Age: 46
End: 2021-12-24

## 2021-12-27 ENCOUNTER — TRANSCRIPTION ENCOUNTER (OUTPATIENT)
Age: 46
End: 2021-12-27

## 2021-12-27 ENCOUNTER — RESULT REVIEW (OUTPATIENT)
Age: 46
End: 2021-12-27

## 2021-12-27 ENCOUNTER — OUTPATIENT (OUTPATIENT)
Dept: OUTPATIENT SERVICES | Facility: HOSPITAL | Age: 46
LOS: 1 days | Discharge: HOME | End: 2021-12-27
Payer: MEDICAID

## 2021-12-27 VITALS — HEART RATE: 72 BPM | SYSTOLIC BLOOD PRESSURE: 143 MMHG | DIASTOLIC BLOOD PRESSURE: 90 MMHG | RESPIRATION RATE: 21 BRPM

## 2021-12-27 VITALS
WEIGHT: 199.96 LBS | DIASTOLIC BLOOD PRESSURE: 90 MMHG | HEART RATE: 70 BPM | SYSTOLIC BLOOD PRESSURE: 128 MMHG | TEMPERATURE: 97 F | RESPIRATION RATE: 18 BRPM | HEIGHT: 56 IN

## 2021-12-27 DIAGNOSIS — Z98.890 OTHER SPECIFIED POSTPROCEDURAL STATES: Chronic | ICD-10-CM

## 2021-12-27 DIAGNOSIS — Z90.49 ACQUIRED ABSENCE OF OTHER SPECIFIED PARTS OF DIGESTIVE TRACT: Chronic | ICD-10-CM

## 2021-12-27 PROCEDURE — 88305 TISSUE EXAM BY PATHOLOGIST: CPT | Mod: 26

## 2021-12-27 PROCEDURE — 43259 EGD US EXAM DUODENUM/JEJUNUM: CPT

## 2021-12-27 PROCEDURE — 88312 SPECIAL STAINS GROUP 1: CPT | Mod: 26

## 2021-12-27 PROCEDURE — 43239 EGD BIOPSY SINGLE/MULTIPLE: CPT | Mod: XU

## 2021-12-27 NOTE — CHART NOTE - NSCHARTNOTEFT_GEN_A_CORE
PACU ANESTHESIA ADMISSION NOTE      Procedure:   Post op diagnosis:      ____  Intubated  TV:______       Rate: ______      FiO2: ______    _x___  Patent Airway    _x___  Full return of protective reflexes    _x___  Full recovery from anesthesia / back to baseline status    Vitals:  SpO2: --97  BP  136/75  P  79  R 16    Mental Status:  _x___ Awake   _____ Alert   _____ Drowsy   _____ Sedated    Nausea/Vomiting:  _x___  NO       ______Yes,   See Post - Op Orders         Pain Scale (0-10):  __0___    Treatment: _x___ None    ____ See Post - Op/PCA Orders    Post - Operative Fluids:   __x__ Oral   ____ See Post - Op Orders    Plan: Discharge:   _x___Home       _____Floor     _____Critical Care    _____  Other:_________________    Comments:  No anesthesia issues or complications noted.  Discharge when criteria met.

## 2021-12-27 NOTE — ASU PATIENT PROFILE, ADULT - FALL HARM RISK - UNIVERSAL INTERVENTIONS
Bed in lowest position, wheels locked, appropriate side rails in place/Call bell, personal items and telephone in reach/Instruct patient to call for assistance before getting out of bed or chair/Non-slip footwear when patient is out of bed/[place] to call system/Physically safe environment - no spills, clutter or unnecessary equipment/Purposeful Proactive Rounding/Room/bathroom lighting operational, light cord in reach

## 2021-12-27 NOTE — ASU DISCHARGE PLAN (ADULT/PEDIATRIC) - NS MD DC FALL RISK RISK
For information on Fall & Injury Prevention, visit: [email or web address] OR  [email or web address] OR  https://www.cdc.gov/steadi/patient.html

## 2021-12-28 LAB — SURGICAL PATHOLOGY STUDY: SIGNIFICANT CHANGE UP

## 2021-12-29 DIAGNOSIS — K20.90 ESOPHAGITIS, UNSPECIFIED WITHOUT BLEEDING: ICD-10-CM

## 2021-12-29 DIAGNOSIS — I10 ESSENTIAL (PRIMARY) HYPERTENSION: ICD-10-CM

## 2021-12-29 DIAGNOSIS — K29.50 UNSPECIFIED CHRONIC GASTRITIS WITHOUT BLEEDING: ICD-10-CM

## 2021-12-29 DIAGNOSIS — Z12.89 ENCOUNTER FOR SCREENING FOR MALIGNANT NEOPLASM OF OTHER SITES: ICD-10-CM

## 2022-01-03 ENCOUNTER — APPOINTMENT (OUTPATIENT)
Dept: SURGERY | Facility: CLINIC | Age: 47
End: 2022-01-03
Payer: MEDICAID

## 2022-01-03 VITALS — HEIGHT: 55.75 IN

## 2022-01-03 PROCEDURE — ZZZZZ: CPT

## 2022-01-03 NOTE — REVIEW OF SYSTEMS
[Joint Pain] : joint pain [Back Pain] : back pain [Fever] : no fever [Chills] : no chills [Pain] : no pain [Redness] : no redness [Earache] : no earache [Hearing Loss] : no hearing loss [Sore Throat] : no sore throat [Chest Pain] : no chest pain [Palpitations] : no palpitations [Leg Claudication] : no leg claudication [Shortness Of Breath] : no shortness of breath [Wheezing] : no wheezing [Abdominal Pain] : no abdominal pain [Nausea] : no nausea [Vomiting] : no vomiting [Joint Stiffness] : no joint stiffness [Skin Rash] : no skin rash [FreeTextEntry9] : right wrist ,bilateral knee

## 2022-01-03 NOTE — HISTORY OF PRESENT ILLNESS
[FreeTextEntry1] : 45 year old female with PMHx of DM2, Ovarian cancer s/p left oophorectomy in 2006 and RT oophorectomy and hysterotomy in 2017, recurrent diverticulitis s/p partial colectomy, H pylori gastritis s/p eradication, s/p cholecystectomy, CKDN2A mutation, NAFLD presenting for follow up visit ,refills and preop workup before bariatric surgery. [de-identified] : 45 year old female with PMHx of DM2, Ovarian cancer s/p left oophorectomy in 2006 and RT oophorectomy and hysterotomy in 2017, recurrent diverticulitis s/p partial colectomy, H pylori gastritis s/p eradication, s/p cholecystectomy, CKDN2A mutation, NAFLD presenting for follow up visit ,refills and preop workup before bariatric surgery.\par The patient reports neck pain that radiates to the right arm ,7/10 in severity. also reports intermittant lower back pain .\par The patient was taking antiseizure meds for 2-3 years ,and stopped 8months ago on her own. she said it caused her headaches ,and has only had one seizure since according to her.\par She did not go for her rotator cuff tear surgery ,after RTA .\par Today presents for workup before bariatric surgery in 4months.\par Recieved her flu shot today.

## 2022-01-03 NOTE — ASSESSMENT
[FreeTextEntry1] : \par 45 year old female with PMHx of DM2, Ovarian cancer s/p left oophorectomy in 2006 and RT oophorectomy and hysterotomy in 2017, recurrent diverticulitis s/p partial colectomy, H pylori gastritis s/p eradication, s/p cholecystectomy, CKDN2A mutation, NAFLD presenting for\par \par #Preop clearance for bariatric sx\par -to be done in 4months after workup\par - f/u in 3months\par \par #Rotaor cuff tear ,after RTA \par - was scheduled for sufgery ,had pre op workup the last visit \par - surgery was not done according to her\par \par #Cervical radiculopathy\par -Neck pain for the last 5months ,radiating to the right arm\par -no weakness or numbness \par -normal strength and sensation\par \par #seizure vs psuedoseizures\par -stopped meds on her own 8months ago ,took med for 2-3 years according to her\par -states the meds caused her headaches.\par -reports a she had a focal seizure once ,shaky hands in the morning\par \par #DMII- controlled \par -last hba1c 6.9 march 2021\par -c/w trulicity\par \par #HLD\par -, Cholesterol 203\par -t Lipitor 40mg qhs\par \par #Hx of recurrent diverticulitis\par -pt is following Colorectal surgeon, s/p surgery in july 2020\par -repeat colonoscopy in 5 years.\par \par #Health Maintenance\par -Mammogram 8/2020 uptodate \par -Pap smear: last in 2017. Patient has WILBERT including cervix. \par - colonscopy 2020 ,repeat in 5years\par -Flu shot received today. PPSV 23 UTD. 2018\par -follow up in 6 months.

## 2022-01-18 ENCOUNTER — APPOINTMENT (OUTPATIENT)
Dept: GASTROENTEROLOGY | Facility: CLINIC | Age: 47
End: 2022-01-18

## 2022-01-20 NOTE — ASU PREOP CHECKLIST - BMI (KG/M2)
38.6 Ketoconazole Counseling:   Patient counseled regarding improving absorption with orange juice.  Adverse effects include but are not limited to breast enlargement, headache, diarrhea, nausea, upset stomach, liver function test abnormalities, taste disturbance, and stomach pain.  There is a rare possibility of liver failure that can occur when taking ketoconazole. The patient understands that monitoring of LFTs may be required, especially at baseline. The patient verbalized understanding of the proper use and possible adverse effects of ketoconazole.  All of the patient's questions and concerns were addressed.

## 2022-01-28 ENCOUNTER — APPOINTMENT (OUTPATIENT)
Dept: GASTROENTEROLOGY | Facility: CLINIC | Age: 47
End: 2022-01-28
Payer: MEDICAID

## 2022-01-28 ENCOUNTER — OUTPATIENT (OUTPATIENT)
Dept: OUTPATIENT SERVICES | Facility: HOSPITAL | Age: 47
LOS: 1 days | Discharge: HOME | End: 2022-01-28

## 2022-01-28 ENCOUNTER — NON-APPOINTMENT (OUTPATIENT)
Age: 47
End: 2022-01-28

## 2022-01-28 DIAGNOSIS — Z98.890 OTHER SPECIFIED POSTPROCEDURAL STATES: Chronic | ICD-10-CM

## 2022-01-28 DIAGNOSIS — Z90.49 ACQUIRED ABSENCE OF OTHER SPECIFIED PARTS OF DIGESTIVE TRACT: Chronic | ICD-10-CM

## 2022-01-28 PROCEDURE — 99213 OFFICE O/P EST LOW 20 MIN: CPT | Mod: GC,95

## 2022-02-01 ENCOUNTER — OUTPATIENT (OUTPATIENT)
Dept: OUTPATIENT SERVICES | Facility: HOSPITAL | Age: 47
LOS: 1 days | Discharge: HOME | End: 2022-02-01

## 2022-02-01 ENCOUNTER — APPOINTMENT (OUTPATIENT)
Dept: NEUROLOGY | Facility: CLINIC | Age: 47
End: 2022-02-01
Payer: MEDICAID

## 2022-02-01 VITALS
BODY MASS INDEX: 45.59 KG/M2 | SYSTOLIC BLOOD PRESSURE: 123 MMHG | OXYGEN SATURATION: 96 % | HEIGHT: 55 IN | DIASTOLIC BLOOD PRESSURE: 87 MMHG | TEMPERATURE: 98.1 F | WEIGHT: 197 LBS | HEART RATE: 93 BPM

## 2022-02-01 DIAGNOSIS — Z15.09 GENETIC SUSCEPTIBILITY TO OTHER MALIGNANT NEOPLASM: ICD-10-CM

## 2022-02-01 DIAGNOSIS — Z90.49 ACQUIRED ABSENCE OF OTHER SPECIFIED PARTS OF DIGESTIVE TRACT: Chronic | ICD-10-CM

## 2022-02-01 DIAGNOSIS — Z98.890 OTHER SPECIFIED POSTPROCEDURAL STATES: Chronic | ICD-10-CM

## 2022-02-01 DIAGNOSIS — M50.90 CERVICAL DISC DISORDER, UNSPECIFIED, UNSPECIFIED CERVICAL REGION: ICD-10-CM

## 2022-02-01 DIAGNOSIS — K76.0 FATTY (CHANGE OF) LIVER, NOT ELSEWHERE CLASSIFIED: ICD-10-CM

## 2022-02-01 DIAGNOSIS — R74.01 ELEVATION OF LEVELS OF LIVER TRANSAMINASE LEVELS: ICD-10-CM

## 2022-02-01 PROCEDURE — 99213 OFFICE O/P EST LOW 20 MIN: CPT

## 2022-02-01 NOTE — END OF VISIT
[] : Resident [Time Spent: ___ minutes] : I have spent [unfilled] minutes of time on the encounter. [FreeTextEntry3] : Patients history, notes, labs, imaging, vitals and meds reviewed personally.\par Patient here for sleep study.\par She has no Excessive daytime sleepiness and has obesity.\par She does have headaches and cervical spine disease however it is well controlled with Tylenol\par No snoring reported from famiy.\par Exam A+Ox3, language and attention normal\par CN 2-12 normal except slight right eye ptosis (patients says normal), No drift, power 5/5, Temp, Vib symmetric, FTN NL, gait normal\par \par Plan as above

## 2022-02-07 ENCOUNTER — NON-APPOINTMENT (OUTPATIENT)
Age: 47
End: 2022-02-07

## 2022-02-07 ENCOUNTER — APPOINTMENT (OUTPATIENT)
Dept: SURGERY | Facility: CLINIC | Age: 47
End: 2022-02-07
Payer: MEDICAID

## 2022-02-07 VITALS — HEIGHT: 55.75 IN | BODY MASS INDEX: 44.32 KG/M2 | WEIGHT: 197 LBS

## 2022-02-07 PROCEDURE — ZZZZZ: CPT

## 2022-02-15 ENCOUNTER — APPOINTMENT (OUTPATIENT)
Dept: CARDIOLOGY | Facility: CLINIC | Age: 47
End: 2022-02-15
Payer: MEDICAID

## 2022-02-15 VITALS
TEMPERATURE: 98.2 F | HEIGHT: 56 IN | BODY MASS INDEX: 43.87 KG/M2 | WEIGHT: 195 LBS | HEART RATE: 83 BPM | SYSTOLIC BLOOD PRESSURE: 124 MMHG | DIASTOLIC BLOOD PRESSURE: 88 MMHG

## 2022-02-15 VITALS — SYSTOLIC BLOOD PRESSURE: 124 MMHG | DIASTOLIC BLOOD PRESSURE: 84 MMHG

## 2022-02-15 DIAGNOSIS — F17.200 NICOTINE DEPENDENCE, UNSPECIFIED, UNCOMPLICATED: ICD-10-CM

## 2022-02-15 DIAGNOSIS — Z87.891 PERSONAL HISTORY OF NICOTINE DEPENDENCE: ICD-10-CM

## 2022-02-15 PROCEDURE — 93000 ELECTROCARDIOGRAM COMPLETE: CPT

## 2022-02-15 PROCEDURE — 99204 OFFICE O/P NEW MOD 45 MIN: CPT

## 2022-02-15 RX ORDER — CLOTRIMAZOLE AND BETAMETHASONE DIPROPIONATE 10; .5 MG/G; MG/G
1-0.05 CREAM TOPICAL
Qty: 1 | Refills: 10 | Status: DISCONTINUED | COMMUNITY
Start: 2021-06-21 | End: 2022-02-15

## 2022-02-15 RX ORDER — POLYETHYLENE GLYCOL 3350 17 G/17G
17 POWDER, FOR SOLUTION ORAL
Qty: 1 | Refills: 0 | Status: DISCONTINUED | COMMUNITY
Start: 2021-04-05 | End: 2022-02-15

## 2022-02-15 RX ORDER — LANCETS 30 GAUGE
EACH MISCELLANEOUS
Qty: 100 | Refills: 2 | Status: DISCONTINUED | OUTPATIENT
Start: 2020-05-28 | End: 2022-02-15

## 2022-02-15 RX ORDER — ATORVASTATIN CALCIUM 40 MG/1
40 TABLET, FILM COATED ORAL
Qty: 30 | Refills: 5 | Status: DISCONTINUED | COMMUNITY
Start: 2021-10-04 | End: 2022-02-15

## 2022-02-15 RX ORDER — NYSTATIN 100MM UNIT
POWDER (EA) MISCELLANEOUS
Qty: 1 | Refills: 5 | Status: DISCONTINUED | COMMUNITY
Start: 2021-06-21 | End: 2022-02-15

## 2022-02-15 NOTE — ASSESSMENT
[FreeTextEntry1] : Difficulty breathing - during procedure\par Hyperlipidemia - unknown if controlled\par Obesity - chronic\par DM - unknown of controlled\par Probable KAN on exam

## 2022-02-15 NOTE — PHYSICAL EXAM
[Well Developed] : well developed [Well Nourished] : well nourished [No Acute Distress] : no acute distress [Normal Conjunctiva] : normal conjunctiva [Normal Venous Pressure] : normal venous pressure [No Carotid Bruit] : no carotid bruit [Normal S1, S2] : normal S1, S2 [No Murmur] : no murmur [No Rub] : no rub [No Gallop] : no gallop [Clear Lung Fields] : clear lung fields [Good Air Entry] : good air entry [No Respiratory Distress] : no respiratory distress  [Soft] : abdomen soft [Non Tender] : non-tender [No Masses/organomegaly] : no masses/organomegaly [Normal Bowel Sounds] : normal bowel sounds [Normal Gait] : normal gait [No Edema] : no edema [No Cyanosis] : no cyanosis [No Clubbing] : no clubbing [No Varicosities] : no varicosities [No Rash] : no rash [No Skin Lesions] : no skin lesions [Moves all extremities] : moves all extremities [No Focal Deficits] : no focal deficits [Normal Speech] : normal speech [Alert and Oriented] : alert and oriented [Normal memory] : normal memory [de-identified] : Fundi - disk margins are sharp, AV nicking and increased light reflex are noted. [de-identified] : Mallampati score is 4

## 2022-02-15 NOTE — HISTORY OF PRESENT ILLNESS
[FreeTextEntry1] : 46 year old female came in for preoperative evaluation prior to undergoing a procedure to evaluate her pancreas or a bariatric procedure.  The patient does not know what procedure she is going for.  The patient was interrogated with a .  She does not know which physician is doing the procedure.  The patient has a problem with breathing when she had a prior procedure done. The patient denies chest pains, shortness of breath, palpitations, dizziness, loss of consciousness, or swelling of the ankles.\par The patient smoked 3 cigarettes per day for 15 years and quit 3 years ago.  The patient never vaped.  The patient denies substance abuse.  The patient has DM and high cholesterol.  The patient denies HTN.  The patient does snore.  She has difficulty sleeping.  She does not doze off during the day.\par \par \par PMHX:\par \par Difficulty breathing - during procedure\par Hyperlipidemia - unknown if controlled\par Obesity - chronic\par DM - unknown of controlled\par Probable KAN on exam\par \par Medications reviewed and reconciled with patient.  Patient is compliant with taking appropriate medications at appropriate doses at appropriate intervals without missing doses.\par \par

## 2022-02-15 NOTE — DISCUSSION/SUMMARY
[With Me] : with me [FreeTextEntry3] : After testing [FreeTextEntry1] : Ekg results were reviewed.\par Last visit notes from multiple physicians were reviewed.\par \par \par Patient needs to bring documentation from referring MD to explain specific concerns and what procedure the patient is going to be going for.\par \par Fasting CBC, BMP, LFTs, Lipid Profile, HgbA1c, CRP, UA, Urine Microalbumin, Mg, Ca, TSH, T3, T4 prior to next visit\par Echocardiogram - difficulty breathing\par Refer to pulmonary for KAN evaluation and difficulty breathing\par f/u after above\par \par Number/complexity of problems -  Mod - 1 undiagnosed new problem with uncertain prognosis\par Amount/complexity of data -history, exam,  ekg reviewed\par Risk of complications - Mod\par \par \par

## 2022-02-15 NOTE — ASSESSMENT
[FreeTextEntry1] : 44 yo F with PMH of DM, Ovarian cancer s/p left oophorectomy in 2006 and RT oophorectomy and hysterotomy in 2017, Recurrent diverticulitis s/p partial colectomy, H pylori gastritis s/p eradication, s/p cholecystectomy, CKDN2A mutation (she is a heterozygote and is part of the melanoma-pancreatic cancer syndrome which confers a lifetime risk 17% pancreatic cancer) and NAFLD, liver FNA biopsy 2017, showing steatohepatitis (no fibrosis, F0)  presents for telephonic visit. Pt is planned for bariatric surgery and needs GI clearenc.e Pt had EUS done in 12/21 - egd portion was complete and EUS could not be completed as pt became tachypneic and hypertensive and procedure was aborted. Pt had last CF in 2021 and removed 2 hyperplastic polyps.\par  \par #Anticipated bariatric surgery\par #Melanoma-pancreatic cancer syndrome \par - Patient found to have CKDN2A mutation, she is a heterozygote and is part of the melanoma-pancreatic cancer syndrome which confers a lifetime risk 17% pancreatic cancer. \par - EUS for pancreas dr Ferguson 3/2019 and with Dr. Cabrera on 6/18/20 was unremarkable (normal pancreas and normal size PD)\par - EUS July 2021: 1 cm peripancreatic LN, no biopsies\par - has allergy to contrast, so cannot order MRI pancreatic protocol (had edema and dyspnea) \par \par Rec\par - pulmonary clearance for repeat EGD and EUS for GI clearance for bariatric surgery\par - Best to avoid Patricia En Y bypass as pt will need serial EUS (access to evaluation pancreas will be difficult with Patricia En Y) to monitor for pancreatic cancer given her genetic history. Best to opt for sleeve gastrectomy\par - Pt will need to be electively intubated for EUS and pt agrees if pulmonary deems it necessary\par - counselled on diet and exercise\par - pt follows up with derm annually\par \par # Elevated LFts possibly due to NAFLD: Stable\par - chronic and stable likely NAFLD\par - immune to hepatitis A and B\par - negative hepatitis C\par - CLD work up negative\par - BUI fibrosure score F0, no need for vit E\par - bx of liver lesion 3/2018 showed micro and macrovesicular steatosis, no malignant cells, no fibrosis\par \par Rec:\par - wt loss, exercise, healthy diet advised, avoid alcohol \par \par # CRC screening \par 2014 colonoscopy dr reid diverticulosis and hemorrhoids \par literature reports association with other cancers but recommendation are mainly to screen for pancreatic ca \par Patient had colonoscopy on 7/1/20 which showed moderate diverticulosis and 3mm transverse colon polyp ( hyperplastic) and 3mm sigmoid polyp ( Hyperplastic)\par 7/2021: small HP polyp, good prep\par \par Rec\par - repeat colonoscopy in 3 years (No data to support more frequent screening for these patients)\par . \par

## 2022-02-15 NOTE — END OF VISIT
[] : Fellow [FreeTextEntry3] : pt with CKDN2A mutation, which confers lifetime 17% risk of pancreatic cancer. pt is being considered for bariatric surgery. pt high risk for endoscopic procedures - last EGD pt became tachypneic and hypertensive and procedure was aborted. pt has allergy to contrast and cannot have MRI with pancreatic protocol. will need pulm clearance to repeat EGD/EUS prior to bariatric surgery. would opt for sleeve as RYGB would make it difficult for her to get repeat EUS for pancreatic cancer screening. pt with elevated LFTS - likely NAFLD, counseled on weight loss and being considered for bariatric surgery. CRC screening - repeat in 3 years (last 2021, good prep, small hyperplastic polyp)

## 2022-02-15 NOTE — HISTORY OF PRESENT ILLNESS
[Home] : at home, [unfilled] , at the time of the visit. [Medical Office: ([facility])___] : at the medical office located in  [de-identified] : 44 yo F with PMH of DM, Ovarian cancer s/p left oophorectomy in 2006 and RT oophorectomy and hysterotomy in 2017, Recurrent diverticulitis s/p partial colectomy, H pylori gastritis s/p eradication, s/p cholecystectomy, CKDN2A mutation (she is a heterozygote and is part of the melanoma-pancreatic cancer syndrome which confers a lifetime risk 17% pancreatic cancer) and NAFLD, liver FNA biopsy 2017, showing steatohepatitis (no fibrosis, F0)  presents for telephonic visit. Pt is planned for bariatric surgery and needs GI clearenc.e Pt had EUS done in 12/21 - egd portion was complete and EUS could not be completed as pt became tachypneic and hypertensive and procedure was aborted. Pt had last CF in 2021 and removed 2 hyperplastic polyps.\par

## 2022-02-22 LAB
ALBUMIN SERPL ELPH-MCNC: 4.7 G/DL
ALP BLD-CCNC: 228 U/L
ALT SERPL-CCNC: 127 U/L
ANION GAP SERPL CALC-SCNC: 13 MMOL/L
AST SERPL-CCNC: 85 U/L
BASOPHILS # BLD AUTO: 0.02 K/UL
BASOPHILS NFR BLD AUTO: 0.3 %
BILIRUB DIRECT SERPL-MCNC: 0.1 MG/DL
BILIRUB INDIRECT SERPL-MCNC: 0.2 MG/DL
BILIRUB SERPL-MCNC: 0.3 MG/DL
BUN SERPL-MCNC: 13 MG/DL
CALCIUM SERPL-MCNC: 9.7 MG/DL
CHLORIDE SERPL-SCNC: 103 MMOL/L
CHOLEST SERPL-MCNC: 180 MG/DL
CO2 SERPL-SCNC: 22 MMOL/L
CREAT SERPL-MCNC: 0.43 MG/DL
CREAT SPEC-SCNC: 157 MG/DL
CRP SERPL HS-MCNC: 6.87 MG/L
EOSINOPHIL # BLD AUTO: 0.08 K/UL
EOSINOPHIL NFR BLD AUTO: 1.3 %
ESTIMATED AVERAGE GLUCOSE: 223 MG/DL
GLUCOSE SERPL-MCNC: 226 MG/DL
HBA1C MFR BLD HPLC: 9.4 %
HCT VFR BLD CALC: 42.5 %
HDLC SERPL-MCNC: 33 MG/DL
HGB BLD-MCNC: 13.4 G/DL
IMM GRANULOCYTES NFR BLD AUTO: 0.3 %
LDLC SERPL CALC-MCNC: 114 MG/DL
LYMPHOCYTES # BLD AUTO: 2.49 K/UL
LYMPHOCYTES NFR BLD AUTO: 39.7 %
MAGNESIUM SERPL-MCNC: 1.9 MG/DL
MAN DIFF?: NORMAL
MCHC RBC-ENTMCNC: 27.6 PG
MCHC RBC-ENTMCNC: 31.5 GM/DL
MCV RBC AUTO: 87.6 FL
MICROALBUMIN 24H UR DL<=1MG/L-MCNC: 3.3 MG/DL
MICROALBUMIN/CREAT 24H UR-RTO: 21 MG/G
MONOCYTES # BLD AUTO: 0.36 K/UL
MONOCYTES NFR BLD AUTO: 5.7 %
NEUTROPHILS # BLD AUTO: 3.3 K/UL
NEUTROPHILS NFR BLD AUTO: 52.7 %
NONHDLC SERPL-MCNC: 147 MG/DL
PLATELET # BLD AUTO: 301 K/UL
POTASSIUM SERPL-SCNC: 4.5 MMOL/L
PROT SERPL-MCNC: 7.6 G/DL
RBC # BLD: 4.85 M/UL
RBC # FLD: 14.2 %
SODIUM SERPL-SCNC: 138 MMOL/L
T3 SERPL-MCNC: 183 NG/DL
T4 SERPL-MCNC: 10.8 UG/DL
TRIGL SERPL-MCNC: 164 MG/DL
TSH SERPL-ACNC: 1.65 UIU/ML
WBC # FLD AUTO: 6.27 K/UL

## 2022-02-28 ENCOUNTER — OUTPATIENT (OUTPATIENT)
Dept: OUTPATIENT SERVICES | Facility: HOSPITAL | Age: 47
LOS: 1 days | Discharge: HOME | End: 2022-02-28

## 2022-02-28 DIAGNOSIS — Z98.890 OTHER SPECIFIED POSTPROCEDURAL STATES: Chronic | ICD-10-CM

## 2022-02-28 DIAGNOSIS — Z90.49 ACQUIRED ABSENCE OF OTHER SPECIFIED PARTS OF DIGESTIVE TRACT: Chronic | ICD-10-CM

## 2022-02-28 DIAGNOSIS — K05.6 PERIODONTAL DISEASE, UNSPECIFIED: ICD-10-CM

## 2022-03-01 ENCOUNTER — APPOINTMENT (OUTPATIENT)
Dept: CARDIOLOGY | Facility: CLINIC | Age: 47
End: 2022-03-01
Payer: MEDICAID

## 2022-03-01 PROCEDURE — 93306 TTE W/DOPPLER COMPLETE: CPT

## 2022-03-04 ENCOUNTER — APPOINTMENT (OUTPATIENT)
Dept: GASTROENTEROLOGY | Facility: CLINIC | Age: 47
End: 2022-03-04
Payer: MEDICAID

## 2022-03-04 ENCOUNTER — NON-APPOINTMENT (OUTPATIENT)
Age: 47
End: 2022-03-04

## 2022-03-04 ENCOUNTER — OUTPATIENT (OUTPATIENT)
Dept: OUTPATIENT SERVICES | Facility: HOSPITAL | Age: 47
LOS: 1 days | Discharge: HOME | End: 2022-03-04

## 2022-03-04 DIAGNOSIS — K76.0 FATTY (CHANGE OF) LIVER, NOT ELSEWHERE CLASSIFIED: ICD-10-CM

## 2022-03-04 DIAGNOSIS — Z15.09 GENETIC SUSCEPTIBILITY TO OTHER MALIGNANT NEOPLASM: ICD-10-CM

## 2022-03-04 DIAGNOSIS — R10.84 GENERALIZED ABDOMINAL PAIN: ICD-10-CM

## 2022-03-04 DIAGNOSIS — Z98.890 OTHER SPECIFIED POSTPROCEDURAL STATES: Chronic | ICD-10-CM

## 2022-03-04 DIAGNOSIS — R74.01 ELEVATION OF LEVELS OF LIVER TRANSAMINASE LEVELS: ICD-10-CM

## 2022-03-04 DIAGNOSIS — Z90.49 ACQUIRED ABSENCE OF OTHER SPECIFIED PARTS OF DIGESTIVE TRACT: Chronic | ICD-10-CM

## 2022-03-04 DIAGNOSIS — Z12.11 ENCOUNTER FOR SCREENING FOR MALIGNANT NEOPLASM OF COLON: ICD-10-CM

## 2022-03-04 DIAGNOSIS — K57.92 DIVERTICULITIS OF INTESTINE, PART UNSPECIFIED, W/OUT PERFORATION OR ABSCESS W/OUT BLEEDING: ICD-10-CM

## 2022-03-04 DIAGNOSIS — K57.90 DIVERTICULOSIS OF INTESTINE, PART UNSPECIFIED, WITHOUT PERFORATION OR ABSCESS WITHOUT BLEEDING: ICD-10-CM

## 2022-03-04 PROCEDURE — 99214 OFFICE O/P EST MOD 30 MIN: CPT | Mod: GC

## 2022-03-04 NOTE — PHYSICAL EXAM
[General Appearance - Alert] : alert [General Appearance - In No Acute Distress] : in no acute distress [General Appearance - Well-Appearing] : healthy appearing [Sclera] : the sclera and conjunctiva were normal [Outer Ear] : the ears and nose were normal in appearance [Neck Appearance] : the appearance of the neck was normal [] : no respiratory distress [Exaggerated Use Of Accessory Muscles For Inspiration] : no accessory muscle use [Auscultation Breath Sounds / Voice Sounds] : lungs were clear to auscultation bilaterally [Apical Impulse] : the apical impulse was normal [Heart Sounds] : normal S1 and S2 [Heart Sounds Gallop] : no gallops [Bowel Sounds] : normal bowel sounds [Abdomen Soft] : soft [Abdomen Tenderness] : non-tender [Abdomen Mass (___ Cm)] : no abdominal mass palpated [No CVA Tenderness] : no ~M costovertebral angle tenderness [Skin Color & Pigmentation] : normal skin color and pigmentation

## 2022-03-04 NOTE — REVIEW OF SYSTEMS
[Fever] : no fever [Recent Weight Gain (___ Lbs)] : no recent weight gain [Recent Weight Loss (___ Lbs)] : no recent weight loss [Eye Pain] : no eye pain [Earache] : no earache [Shortness Of Breath] : no shortness of breath [Cough] : no cough [SOB on Exertion] : no shortness of breath during exertion [Abdominal Pain] : no abdominal pain [Vomiting] : no vomiting [Constipation] : no constipation [Diarrhea] : no diarrhea [Heartburn] : no heartburn [Melena] : no melena [Confused] : no confusion

## 2022-03-04 NOTE — HISTORY OF PRESENT ILLNESS
[Heartburn] : denies heartburn [Nausea] : denies nausea [Diarrhea] : denies diarrhea [Constipation] : denies constipation [Yellow Skin Or Eyes (Jaundice)] : denies jaundice [Abdominal Pain] : denies abdominal pain [Abdominal Swelling] : denies abdominal swelling [Rectal Pain] : denies rectal pain [de-identified] : 47 yo F with PMH of DM, Ovarian cancer s/p left oophorectomy in 2006 and RT oophorectomy and hysterotomy in 2017, Recurrent diverticulitis s/p partial colectomy, H pylori gastritis s/p eradication, s/p cholecystectomy, CKDN2A mutation (she is a heterozygote and is part of the melanoma-pancreatic cancer syndrome which confers a lifetime risk 17% pancreatic cancer) and NAFLD, liver FNA biopsy 2017, showing steatohepatitis (no fibrosis, F0) presents for follow up. Pt is planned for bariatric surgery and needs GI pre bariatric evaluation.  Pt had EUS done in 12/21 - egd portion was complete and EUS could not be completed as pt became tachypneic and hypertensive and procedure was aborted. Pt had last CF in 2021 and removed 2 hyperplastic polyps. Patient was supposed to have pulmonary evaluation first before repeating EUS \par  \par

## 2022-03-07 ENCOUNTER — OUTPATIENT (OUTPATIENT)
Dept: OUTPATIENT SERVICES | Facility: HOSPITAL | Age: 47
LOS: 1 days | Discharge: HOME | End: 2022-03-07

## 2022-03-07 DIAGNOSIS — Z98.890 OTHER SPECIFIED POSTPROCEDURAL STATES: Chronic | ICD-10-CM

## 2022-03-07 DIAGNOSIS — Z90.49 ACQUIRED ABSENCE OF OTHER SPECIFIED PARTS OF DIGESTIVE TRACT: Chronic | ICD-10-CM

## 2022-03-08 ENCOUNTER — APPOINTMENT (OUTPATIENT)
Dept: CARDIOLOGY | Facility: CLINIC | Age: 47
End: 2022-03-08
Payer: MEDICAID

## 2022-03-14 ENCOUNTER — APPOINTMENT (OUTPATIENT)
Dept: CARDIOLOGY | Facility: CLINIC | Age: 47
End: 2022-03-14
Payer: MEDICAID

## 2022-03-14 VITALS
WEIGHT: 188 LBS | OXYGEN SATURATION: 99 % | SYSTOLIC BLOOD PRESSURE: 126 MMHG | HEART RATE: 58 BPM | BODY MASS INDEX: 42.29 KG/M2 | HEIGHT: 56 IN | RESPIRATION RATE: 18 BRPM | TEMPERATURE: 97.8 F | DIASTOLIC BLOOD PRESSURE: 90 MMHG

## 2022-03-14 VITALS — SYSTOLIC BLOOD PRESSURE: 120 MMHG | DIASTOLIC BLOOD PRESSURE: 80 MMHG

## 2022-03-14 DIAGNOSIS — R06.89 OTHER ABNORMALITIES OF BREATHING: ICD-10-CM

## 2022-03-14 PROCEDURE — 93000 ELECTROCARDIOGRAM COMPLETE: CPT

## 2022-03-14 PROCEDURE — 99214 OFFICE O/P EST MOD 30 MIN: CPT

## 2022-03-14 NOTE — HISTORY OF PRESENT ILLNESS
[FreeTextEntry1] : 46 year old female came in for preoperative evaluation prior to undergoing she thinks weight loss surgery.  The patient denies chest pains or shortness of breath.\par \par \par PMHX:\par \par Difficulty breathing - during procedure\par Hyperlipidemia - controlled\par Obesity - chronic\par DM - poorly controlled\par Probable KAN on exam\par Hepatitis - - on lab work\par \par Medications reviewed and reconciled with patient.  Patient is compliant with taking appropriate medications at appropriate doses at appropriate intervals without missing doses.\par \par

## 2022-03-14 NOTE — DISCUSSION/SUMMARY
[With PCP] : with ~his/her~ primary care provider [FreeTextEntry1] : Ekg results were reviewed.\par Recent lab results were reviewed.\par Last visit note reviewed.\par Echocardiogram results were reviewed.\par \par \par Refer to PMD for treatment of possible Hepatitis and poorly controlled DM prior to having any procedure performed.  \par \par Pulmonary evaluation for KAN\par \par The patient has an intermediate risk medical profile to undergo an intermediate risk procedure (bariatric surgery).  The risk includes the risk of complications, morbidity and mortality relating to the procedure and postoperative period.  This is provided her underlying medical problems are corrected by PMD and appropriate medical specialists.\par \par No further cardiac evaluation at this time.\par \par \par Number/complexity of problems -  Mod - 1 undiagnosed new problem with uncertain prognosis\par Amount/complexity of data -history, exam,  ekg reviewed, labs reviewed, echo reviewd\par Risk of complications - Mod\par \par \par

## 2022-03-17 ENCOUNTER — APPOINTMENT (OUTPATIENT)
Dept: INTERNAL MEDICINE | Facility: CLINIC | Age: 47
End: 2022-03-17
Payer: MEDICAID

## 2022-03-17 ENCOUNTER — NON-APPOINTMENT (OUTPATIENT)
Age: 47
End: 2022-03-17

## 2022-03-17 ENCOUNTER — OUTPATIENT (OUTPATIENT)
Dept: OUTPATIENT SERVICES | Facility: HOSPITAL | Age: 47
LOS: 1 days | Discharge: HOME | End: 2022-03-17

## 2022-03-17 VITALS
HEIGHT: 56 IN | SYSTOLIC BLOOD PRESSURE: 119 MMHG | DIASTOLIC BLOOD PRESSURE: 84 MMHG | BODY MASS INDEX: 42.29 KG/M2 | WEIGHT: 188 LBS | HEART RATE: 82 BPM | TEMPERATURE: 98.1 F | OXYGEN SATURATION: 100 %

## 2022-03-17 DIAGNOSIS — Z90.49 ACQUIRED ABSENCE OF OTHER SPECIFIED PARTS OF DIGESTIVE TRACT: Chronic | ICD-10-CM

## 2022-03-17 DIAGNOSIS — Z98.890 OTHER SPECIFIED POSTPROCEDURAL STATES: Chronic | ICD-10-CM

## 2022-03-17 LAB
ALBUMIN SERPL ELPH-MCNC: 4.7 G/DL
ALP BLD-CCNC: 152 U/L
ALT SERPL-CCNC: 44 U/L
ANION GAP SERPL CALC-SCNC: 13 MMOL/L
APPEARANCE: CLEAR
AST SERPL-CCNC: 29 U/L
BASOPHILS # BLD AUTO: 0.04 K/UL
BASOPHILS NFR BLD AUTO: 0.6 %
BILIRUB SERPL-MCNC: 0.2 MG/DL
BILIRUBIN URINE: NEGATIVE
BLOOD URINE: NEGATIVE
BUN SERPL-MCNC: 18 MG/DL
CALCIUM SERPL-MCNC: 10.2 MG/DL
CHLORIDE SERPL-SCNC: 107 MMOL/L
CHOLEST SERPL-MCNC: 203 MG/DL
CO2 SERPL-SCNC: 23 MMOL/L
COLOR: YELLOW
CREAT SERPL-MCNC: 0.54 MG/DL
EOSINOPHIL # BLD AUTO: 0.11 K/UL
EOSINOPHIL NFR BLD AUTO: 1.6 %
ESTIMATED AVERAGE GLUCOSE: 128 MG/DL
GLUCOSE QUALITATIVE U: NEGATIVE
GLUCOSE SERPL-MCNC: 86 MG/DL
HBA1C MFR BLD HPLC: 6.1 %
HCT VFR BLD CALC: 43.6 %
HDLC SERPL-MCNC: 32 MG/DL
HGB BLD-MCNC: 13.1 G/DL
IMM GRANULOCYTES NFR BLD AUTO: 0.1 %
KETONES URINE: NEGATIVE
LDLC SERPL CALC-MCNC: 147 MG/DL
LEUKOCYTE ESTERASE URINE: NEGATIVE
LYMPHOCYTES # BLD AUTO: 2.96 K/UL
LYMPHOCYTES NFR BLD AUTO: 43.3 %
MAN DIFF?: NORMAL
MCHC RBC-ENTMCNC: 27.1 PG
MCHC RBC-ENTMCNC: 30 GM/DL
MCV RBC AUTO: 90.3 FL
MONOCYTES # BLD AUTO: 0.35 K/UL
MONOCYTES NFR BLD AUTO: 5.1 %
NEUTROPHILS # BLD AUTO: 3.37 K/UL
NEUTROPHILS NFR BLD AUTO: 49.3 %
NITRITE URINE: NEGATIVE
NONHDLC SERPL-MCNC: 170 MG/DL
PH URINE: 5.5
PLATELET # BLD AUTO: 327 K/UL
POTASSIUM SERPL-SCNC: 4.7 MMOL/L
PROT SERPL-MCNC: 7.5 G/DL
PROTEIN URINE: NEGATIVE
RBC # BLD: 4.83 M/UL
RBC # FLD: 15.1 %
SODIUM SERPL-SCNC: 143 MMOL/L
SPECIFIC GRAVITY URINE: 1.03
TRIGL SERPL-MCNC: 118 MG/DL
TSH SERPL-ACNC: 1.17 UIU/ML
UROBILINOGEN URINE: NORMAL
WBC # FLD AUTO: 6.84 K/UL

## 2022-03-17 PROCEDURE — 99214 OFFICE O/P EST MOD 30 MIN: CPT | Mod: GC

## 2022-03-17 RX ORDER — DULAGLUTIDE 1.5 MG/.5ML
1.5 INJECTION, SOLUTION SUBCUTANEOUS
Qty: 1 | Refills: 5 | Status: DISCONTINUED | COMMUNITY
Start: 2018-08-28 | End: 2021-11-10

## 2022-03-18 ENCOUNTER — NON-APPOINTMENT (OUTPATIENT)
Age: 47
End: 2022-03-18

## 2022-03-18 NOTE — HISTORY OF PRESENT ILLNESS
[FreeTextEntry1] : referral to Sleep study  [de-identified] : Ms Aragon is a 46 ALBIN w a PMH of DM (A1c = 6.1 4/2021), ovarian cancer (s/p oophorectomy 2006, hysterectomy 2017), recurrent Diverticulosis (s/p partial collectomy, small hyperplastic polyp 7/2020), Focal seizure X1 (stopped meds in June 2021 due to HA), back pain, cervical radiculopathy w neck pain radiating to her right arm (no weakness). Patient is here for a referral for Sleep Study for Pre-Op evaluation and risk stratification for Gastric Bypass.

## 2022-03-18 NOTE — ASSESSMENT
[FreeTextEntry1] : Ms Aragon is a 46 ALBIN here for a referral for a  Sleep Study prior to her Gastric Bypass surgery. Patient denies: HA, dizziness, numbness/tingling in fingers/toes, weakness, recent falls, syncope, abnormal gate, diminished sensations, dysphagia, family history of neurological disorders/brain cancers. Patient has a remote history of possible 1x seizure (hands were shaking uncontrollably) but has been off medication since 6/2021 w/o difficulty or reoccurrence. Of note patient had COVID 3 weeks ago but did not require hospitalization or oxygen.

## 2022-03-18 NOTE — REVIEW OF SYSTEMS
[Vision Problems] : vision problems [Muscle Pain] : muscle pain [Itching] : Itching [Eyesight Problems] : eyesight problems [Joint Stiffness] : joint stiffness [Chills] : no chills [Feeling Poorly] : not feeling poorly [Feeling Tired] : not feeling tired [Recent Weight Gain (___ Lbs)] : no recent weight gain [Recent Weight Loss (___ Lbs)] : no recent weight loss [Confused or Disoriented] : no confusion [Memory Lapses or Loss] : no memory loss [Decr. Concentrating Ability] : no decrease in concentrating ability [Difficulty with Language] : no ~M difficulty with language [Changed Thought Patterns] : no change in thought patterns [Repeating Questions] : no repeated questioning about recent events [Facial Weakness] : no facial weakness [Arm Weakness] : no arm weakness [Hand Weakness] : no hand weakness [Leg Weakness] : no leg weakness [Poor Coordination] : good coordination [Difficulty Writing] : no difficulty writing [Difficulties in Speech] : no speech difficulties [Numbness] : no numbness [Tingling] : no tingling [Abnormal Sensation] : no abnormal sensation [Hypersensitivity] : no hypersensitivity [Seizures] : no convulsions [Lightheadedness] : no lightheadedness [Vertigo] : no vertigo [Suicidal] : not suicidal [Sleep Disturbances] : no sleep disturbances [Depression] : no depression [Change In Personality] : no personality change [Eye Pain] : no eye pain [Red Eyes] : eyes not red [Discharge From Eyes] : no purulent discharge from the eyes [Dry Eyes] : no dryness of the eyes [Loss Of Hearing] : no hearing loss [Nosebleeds] : no nosebleeds [Hoarseness] : no hoarseness [SOB on Exertion] : no shortness of breath during exertion [PND] : no PND [Constipation] : no constipation [Melena] : no melena [Pelvic Pain] : no pelvic pain [Arthralgias] : no arthralgias [Joint Pain] : no joint pain [Joint Swelling] : no joint swelling [Limb Pain] : no limb pain [Limb Swelling] : no limb swelling [Skin Lesions] : no skin lesions [Skin Wound] : no skin wound [Change In A Mole] : no change in a mole [Proptosis] : no proptosis [Hot Flashes] : no hot flashes [Muscle Weakness] : no muscle weakness [Deepening Of The Voice] : no deepening of the voice [Feelings Of Weakness] : no feelings of weakness [Easy Bruising] : no tendency for easy bruising [FreeTextEntry1] : No day time sleepiness, has never been told she snores or has apneic spells  [Fever] : no fever [Fatigue] : no fatigue [Night Sweats] : no night sweats [Recent Change In Weight] : ~T no recent weight change [Discharge] : no discharge [Pain] : no pain [Itching] : no itching [Earache] : no earache [Hearing Loss] : no hearing loss [Nosebleed] : no nosebleeds [Nasal Discharge] : no nasal discharge [Chest Pain] : no chest pain [Palpitations] : no palpitations [Leg Claudication] : no leg claudication [Orthopnea] : no orthopnea [Shortness Of Breath] : no shortness of breath [Wheezing] : no wheezing [Cough] : no cough [Abdominal Pain] : no abdominal pain [Nausea] : no nausea [Diarrhea] : diarrhea [Vomiting] : no vomiting [Heartburn] : no heartburn [Dysuria] : no dysuria [Incontinence] : no incontinence [Frequency] : no frequency [Nail Changes] : no nail changes [Hair Changes] : no hair changes [Skin Rash] : no skin rash [Headache] : no headache [Dizziness] : no dizziness [Fainting] : no fainting [Confusion] : no confusion [Memory Loss] : no memory loss [Unsteady Walking] : no ataxia [Insomnia] : no insomnia [Anxiety] : no anxiety [Easy Bleeding] : no easy bleeding [Swollen Glands] : no swollen glands [FreeTextEntry3] : no DM meds for 2 weeks +blurry vistion  [FreeTextEntry9] : cervical pain w radiation 1-3x per week, responds to tylenol, BL-UE metacarpo and phylangial joint pain and welling  [de-identified] : dry skin  [de-identified] : no HA since COVID

## 2022-03-18 NOTE — REVIEW OF SYSTEMS
[Vision Problems] : vision problems [Muscle Pain] : muscle pain [Itching] : Itching [Eyesight Problems] : eyesight problems [Joint Stiffness] : joint stiffness [Chills] : no chills [Feeling Poorly] : not feeling poorly [Feeling Tired] : not feeling tired [Recent Weight Gain (___ Lbs)] : no recent weight gain [Recent Weight Loss (___ Lbs)] : no recent weight loss [Confused or Disoriented] : no confusion [Memory Lapses or Loss] : no memory loss [Decr. Concentrating Ability] : no decrease in concentrating ability [Difficulty with Language] : no ~M difficulty with language [Changed Thought Patterns] : no change in thought patterns [Repeating Questions] : no repeated questioning about recent events [Facial Weakness] : no facial weakness [Arm Weakness] : no arm weakness [Hand Weakness] : no hand weakness [Leg Weakness] : no leg weakness [Poor Coordination] : good coordination [Difficulty Writing] : no difficulty writing [Difficulties in Speech] : no speech difficulties [Numbness] : no numbness [Tingling] : no tingling [Abnormal Sensation] : no abnormal sensation [Hypersensitivity] : no hypersensitivity [Seizures] : no convulsions [Lightheadedness] : no lightheadedness [Vertigo] : no vertigo [Suicidal] : not suicidal [Sleep Disturbances] : no sleep disturbances [Depression] : no depression [Change In Personality] : no personality change [Eye Pain] : no eye pain [Red Eyes] : eyes not red [Discharge From Eyes] : no purulent discharge from the eyes [Dry Eyes] : no dryness of the eyes [Loss Of Hearing] : no hearing loss [Nosebleeds] : no nosebleeds [Hoarseness] : no hoarseness [SOB on Exertion] : no shortness of breath during exertion [PND] : no PND [Constipation] : no constipation [Melena] : no melena [Pelvic Pain] : no pelvic pain [Arthralgias] : no arthralgias [Joint Pain] : no joint pain [Joint Swelling] : no joint swelling [Limb Pain] : no limb pain [Limb Swelling] : no limb swelling [Skin Lesions] : no skin lesions [Skin Wound] : no skin wound [Change In A Mole] : no change in a mole [Proptosis] : no proptosis [Hot Flashes] : no hot flashes [Muscle Weakness] : no muscle weakness [Deepening Of The Voice] : no deepening of the voice [Feelings Of Weakness] : no feelings of weakness [Easy Bruising] : no tendency for easy bruising [FreeTextEntry1] : No day time sleepiness, has never been told she snores or has apneic spells  [Fever] : no fever [Fatigue] : no fatigue [Night Sweats] : no night sweats [Recent Change In Weight] : ~T no recent weight change [Discharge] : no discharge [Pain] : no pain [Itching] : no itching [Earache] : no earache [Hearing Loss] : no hearing loss [Nosebleed] : no nosebleeds [Nasal Discharge] : no nasal discharge [Chest Pain] : no chest pain [Palpitations] : no palpitations [Leg Claudication] : no leg claudication [Orthopnea] : no orthopnea [Shortness Of Breath] : no shortness of breath [Wheezing] : no wheezing [Cough] : no cough [Abdominal Pain] : no abdominal pain [Nausea] : no nausea [Diarrhea] : diarrhea [Vomiting] : no vomiting [Heartburn] : no heartburn [Dysuria] : no dysuria [Incontinence] : no incontinence [Frequency] : no frequency [Nail Changes] : no nail changes [Hair Changes] : no hair changes [Skin Rash] : no skin rash [Headache] : no headache [Dizziness] : no dizziness [Fainting] : no fainting [Confusion] : no confusion [Memory Loss] : no memory loss [Unsteady Walking] : no ataxia [Insomnia] : no insomnia [Anxiety] : no anxiety [Easy Bleeding] : no easy bleeding [Swollen Glands] : no swollen glands [FreeTextEntry3] : no DM meds for 2 weeks +blurry vistion  [FreeTextEntry9] : cervical pain w radiation 1-3x per week, responds to tylenol, BL-UE metacarpo and phylangial joint pain and welling  [de-identified] : dry skin  [de-identified] : no HA since COVID

## 2022-03-18 NOTE — PHYSICAL EXAM
[No Acute Distress] : no acute distress [Well-Appearing] : well-appearing [Normal Sclera/Conjunctiva] : normal sclera/conjunctiva [PERRL] : pupils equal round and reactive to light [EOMI] : extraocular movements intact [Normal Outer Ear/Nose] : the outer ears and nose were normal in appearance [No JVD] : no jugular venous distention [Supple] : supple [No Respiratory Distress] : no respiratory distress  [No Accessory Muscle Use] : no accessory muscle use [Normal Rate] : normal rate  [Regular Rhythm] : with a regular rhythm [Normal S1, S2] : normal S1 and S2 [No Carotid Bruits] : no carotid bruits [No Abdominal Bruit] : a ~M bruit was not heard ~T in the abdomen [No Edema] : there was no peripheral edema [No Joint Swelling] : no joint swelling [Grossly Normal Strength/Tone] : grossly normal strength/tone [No Rash] : no rash [Coordination Grossly Intact] : coordination grossly intact [No Focal Deficits] : no focal deficits [Normal Gait] : normal gait [Deep Tendon Reflexes (DTR)] : deep tendon reflexes were 2+ and symmetric [Speech Grossly Normal] : speech grossly normal [Normal Affect] : the affect was normal [Normal Mood] : the mood was normal [General Appearance - Alert] : alert [General Appearance - In No Acute Distress] : in no acute distress [Oriented To Time, Place, And Person] : oriented to person, place, and time [Impaired Insight] : insight and judgment were intact [Affect] : the affect was normal [Memory Recent] : recent memory was not impaired [Person] : oriented to person [Place] : oriented to place [Time] : oriented to time [Short Term Intact] : short term memory intact [Remote Intact] : remote memory intact [Span Intact] : the attention span was normal [Concentration Intact] : normal concentrating ability [Visual Intact] : visual attention was ~T not ~L decreased [Repeating Phrases] : no difficulty repeating a phrase [Fluency] : fluency intact [Comprehension] : comprehension intact [Current Events] : adequate knowledge of current events [Cranial Nerves Optic (II)] : visual acuity intact bilaterally,  visual fields full to confrontation, pupils equal round and reactive to light [Cranial Nerves Oculomotor (III)] : extraocular motion intact [Cranial Nerves Trigeminal (V)] : facial sensation intact symmetrically [Cranial Nerves Facial (VII)] : face symmetrical [Cranial Nerves Vestibulocochlear (VIII)] : hearing was intact bilaterally [Cranial Nerves Glossopharyngeal (IX)] : tongue and palate midline [Cranial Nerves Accessory (XI - Cranial And Spinal)] : head turning and shoulder shrug symmetric [Cranial Nerves Hypoglossal (XII)] : there was no tongue deviation with protrusion [Motor Strength] : muscle strength was normal in all four extremities [Motor Handedness Right-Handed] : the patient is right hand dominant [Sensation Tactile Decrease] : light touch was intact [Sensation Vibration Decrease] : vibration was intact [Balance] : balance was intact [Sclera] : the sclera and conjunctiva were normal [PERRL With Normal Accommodation] : pupils were equal in size, round, reactive to light, with normal accommodation [Extraocular Movements] : extraocular movements were intact [Full Visual Field] : full visual field [Neck Appearance] : the appearance of the neck was normal [Neck Cervical Mass (___cm)] : no neck mass was observed [Jugular Venous Distention Increased] : there was no jugular-venous distention [Exaggerated Use Of Accessory Muscles For Inspiration] : no accessory muscle use [Heart Rate And Rhythm] : heart rate was normal and rhythm regular [Heart Sounds] : normal S1 and S2 [Murmurs] : no murmurs [Bowel Sounds] : normal bowel sounds [Abdomen Soft] : soft [No CVA Tenderness] : no ~M costovertebral angle tenderness [No Spinal Tenderness] : no spinal tenderness [Abnormal Walk] : normal gait [Nail Clubbing] : no clubbing  or cyanosis of the fingernails [Involuntary Movements] : no involuntary movements were seen [Musculoskeletal - Swelling] : no joint swelling seen [Motor Tone] : muscle strength and tone were normal [Skin Color & Pigmentation] : normal skin color and pigmentation [] : no rash [FreeTextEntry1] : Class III obese  [Motor Strength Upper Extremities Bilaterally] : strength was normal in both upper extremities [Motor Strength Lower Extremities Bilaterally] : strength was normal in both lower extremities [Romberg's Sign] : Romberg's sign was negtive [Hyperesthesia] : no hyperesthesia [Coordination - Dysmetria Impaired Finger-to-Nose Bilateral] : not present [Coordination - Dysmetria Impaired Heel-to-Shin Bilateral] : not present [de-identified] : obese class III [de-identified] : CN II-XII intact, Cerebellar exam normal, strength +5 throughout

## 2022-03-18 NOTE — PLAN
[FreeTextEntry1] : -f/u sleep study\par \par No need at this time for further Neurological evaluation or treatment

## 2022-03-18 NOTE — HISTORY OF PRESENT ILLNESS
[FreeTextEntry1] : referral to Sleep study  [de-identified] : Ms Aragon is a 46 ALBIN w a PMH of DM (A1c = 6.1 4/2021), ovarian cancer (s/p oophorectomy 2006, hysterectomy 2017), recurrent Diverticulosis (s/p partial collectomy, small hyperplastic polyp 7/2020), Focal seizure X1 (stopped meds in June 2021 due to HA), back pain, cervical radiculopathy w neck pain radiating to her right arm (no weakness). Patient is here for a referral for Sleep Study for Pre-Op evaluation and risk stratification for Gastric Bypass.

## 2022-03-18 NOTE — PHYSICAL EXAM
[No Acute Distress] : no acute distress [Well-Appearing] : well-appearing [Normal Sclera/Conjunctiva] : normal sclera/conjunctiva [PERRL] : pupils equal round and reactive to light [EOMI] : extraocular movements intact [Normal Outer Ear/Nose] : the outer ears and nose were normal in appearance [No JVD] : no jugular venous distention [Supple] : supple [No Respiratory Distress] : no respiratory distress  [No Accessory Muscle Use] : no accessory muscle use [Normal Rate] : normal rate  [Regular Rhythm] : with a regular rhythm [Normal S1, S2] : normal S1 and S2 [No Carotid Bruits] : no carotid bruits [No Abdominal Bruit] : a ~M bruit was not heard ~T in the abdomen [No Edema] : there was no peripheral edema [No Joint Swelling] : no joint swelling [Grossly Normal Strength/Tone] : grossly normal strength/tone [No Rash] : no rash [Coordination Grossly Intact] : coordination grossly intact [No Focal Deficits] : no focal deficits [Normal Gait] : normal gait [Deep Tendon Reflexes (DTR)] : deep tendon reflexes were 2+ and symmetric [Speech Grossly Normal] : speech grossly normal [Normal Affect] : the affect was normal [Normal Mood] : the mood was normal [General Appearance - Alert] : alert [General Appearance - In No Acute Distress] : in no acute distress [Oriented To Time, Place, And Person] : oriented to person, place, and time [Impaired Insight] : insight and judgment were intact [Affect] : the affect was normal [Memory Recent] : recent memory was not impaired [Person] : oriented to person [Place] : oriented to place [Time] : oriented to time [Short Term Intact] : short term memory intact [Remote Intact] : remote memory intact [Span Intact] : the attention span was normal [Concentration Intact] : normal concentrating ability [Visual Intact] : visual attention was ~T not ~L decreased [Repeating Phrases] : no difficulty repeating a phrase [Fluency] : fluency intact [Comprehension] : comprehension intact [Current Events] : adequate knowledge of current events [Cranial Nerves Optic (II)] : visual acuity intact bilaterally,  visual fields full to confrontation, pupils equal round and reactive to light [Cranial Nerves Oculomotor (III)] : extraocular motion intact [Cranial Nerves Trigeminal (V)] : facial sensation intact symmetrically [Cranial Nerves Facial (VII)] : face symmetrical [Cranial Nerves Vestibulocochlear (VIII)] : hearing was intact bilaterally [Cranial Nerves Glossopharyngeal (IX)] : tongue and palate midline [Cranial Nerves Accessory (XI - Cranial And Spinal)] : head turning and shoulder shrug symmetric [Cranial Nerves Hypoglossal (XII)] : there was no tongue deviation with protrusion [Motor Strength] : muscle strength was normal in all four extremities [Motor Handedness Right-Handed] : the patient is right hand dominant [Sensation Tactile Decrease] : light touch was intact [Sensation Vibration Decrease] : vibration was intact [Balance] : balance was intact [Sclera] : the sclera and conjunctiva were normal [PERRL With Normal Accommodation] : pupils were equal in size, round, reactive to light, with normal accommodation [Extraocular Movements] : extraocular movements were intact [Full Visual Field] : full visual field [Neck Appearance] : the appearance of the neck was normal [Neck Cervical Mass (___cm)] : no neck mass was observed [Jugular Venous Distention Increased] : there was no jugular-venous distention [Exaggerated Use Of Accessory Muscles For Inspiration] : no accessory muscle use [Heart Rate And Rhythm] : heart rate was normal and rhythm regular [Heart Sounds] : normal S1 and S2 [Murmurs] : no murmurs [Bowel Sounds] : normal bowel sounds [Abdomen Soft] : soft [No CVA Tenderness] : no ~M costovertebral angle tenderness [No Spinal Tenderness] : no spinal tenderness [Abnormal Walk] : normal gait [Nail Clubbing] : no clubbing  or cyanosis of the fingernails [Involuntary Movements] : no involuntary movements were seen [Musculoskeletal - Swelling] : no joint swelling seen [Motor Tone] : muscle strength and tone were normal [Skin Color & Pigmentation] : normal skin color and pigmentation [] : no rash [FreeTextEntry1] : Class III obese  [Motor Strength Upper Extremities Bilaterally] : strength was normal in both upper extremities [Motor Strength Lower Extremities Bilaterally] : strength was normal in both lower extremities [Romberg's Sign] : Romberg's sign was negtive [Hyperesthesia] : no hyperesthesia [Coordination - Dysmetria Impaired Finger-to-Nose Bilateral] : not present [Coordination - Dysmetria Impaired Heel-to-Shin Bilateral] : not present [de-identified] : obese class III [de-identified] : CN II-XII intact, Cerebellar exam normal, strength +5 throughout

## 2022-03-21 ENCOUNTER — APPOINTMENT (OUTPATIENT)
Dept: SURGERY | Facility: CLINIC | Age: 47
End: 2022-03-21
Payer: MEDICAID

## 2022-03-21 VITALS — HEIGHT: 56 IN | WEIGHT: 188 LBS | BODY MASS INDEX: 42.29 KG/M2

## 2022-03-21 PROCEDURE — ZZZZZ: CPT

## 2022-03-21 NOTE — PLAN
[FreeTextEntry1] : 45 year old female with PMHx of DM2, Ovarian cancer s/p left oophorectomy in 2006 and RT oophorectomy and hysterotomy in 2017, recurrent diverticulitis s/p partial colectomy, H pylori gastritis s/p eradication, s/p cholecystectomy, CKDN2A mutation, NAFLD presenting for\par \par #Preop clearance for bariatric sx\par - need better A1c control prior to surgical clearance \par - follow up in 2 months \par \par #DMII- controlled \par -A1c 9.4 > 8.6% \par -c/w trulicity & Jardiance (increase dose to 25 mg)\par - need A1c ~7% to be cleared for surgery \par \par #HLD\par - \par - c/w Lipitor 40mg qhs\par \par #seizure vs psuedoseizures\par -stopped meds on her own 8months ago ,took med for 2-3 years according to her\par -states the meds caused her headaches.\par -reports a she had a focal seizure once ,shaky hands in the morning\par \par #Hx of recurrent diverticulitis\par -pt is following Colorectal surgeon, s/p surgery in july 2020\par -repeat colonoscopy in 5 years.\par \par #Health Maintenance\par - Mammogram 8/2020- referral given \par - Pap smear: last in 2017- follow up w/ GYN\par - Colonscopy 2020 ,repeat in 5 years\par - Flu Shot UTD PPSV 23 UTD. 2018\par - follow up in 6 months

## 2022-03-21 NOTE — HISTORY OF PRESENT ILLNESS
[de-identified] : 45 year old female with PMHx of DM2, Ovarian cancer s/p left oophorectomy in 2006 and RT oophorectomy and hysterotomy in 2017, recurrent diverticulitis s/p partial colectomy, H pylori gastritis s/p eradication, s/p cholecystectomy, CKDN2A mutation, NAFLD presenting for follow up visit ,refills and preop workup before bariatric surgery.\par

## 2022-03-23 ENCOUNTER — APPOINTMENT (OUTPATIENT)
Dept: NUTRITION | Facility: CLINIC | Age: 47
End: 2022-03-23

## 2022-03-23 ENCOUNTER — OUTPATIENT (OUTPATIENT)
Dept: OUTPATIENT SERVICES | Facility: HOSPITAL | Age: 47
LOS: 1 days | Discharge: HOME | End: 2022-03-23

## 2022-03-23 ENCOUNTER — APPOINTMENT (OUTPATIENT)
Dept: ENDOCRINOLOGY | Facility: CLINIC | Age: 47
End: 2022-03-23

## 2022-03-23 VITALS
HEART RATE: 78 BPM | BODY MASS INDEX: 42.74 KG/M2 | WEIGHT: 190 LBS | DIASTOLIC BLOOD PRESSURE: 84 MMHG | SYSTOLIC BLOOD PRESSURE: 125 MMHG | HEIGHT: 56 IN

## 2022-03-23 DIAGNOSIS — Z98.890 OTHER SPECIFIED POSTPROCEDURAL STATES: Chronic | ICD-10-CM

## 2022-03-23 DIAGNOSIS — Z90.49 ACQUIRED ABSENCE OF OTHER SPECIFIED PARTS OF DIGESTIVE TRACT: Chronic | ICD-10-CM

## 2022-03-23 NOTE — HISTORY OF PRESENT ILLNESS
[FreeTextEntry1] : 45 year old female with PMHx of DM2, Ovarian cancer s/p left oophorectomy in 2006 and RT oophorectomy and hysterotomy in 2017, recurrent diverticulitis s/p partial colectomy, H pylori gastritis s/p eradication, s/p cholecystectomy, CKDN2A mutation, NAFLD presenting for DM evaluation. She needs better DM control to get pre surgical clearance.

## 2022-03-23 NOTE — REASON FOR VISIT
[Follow - Up] : a follow-up visit [DM Type 2] : DM Type 2 [Weight Management/Obesity] : weight management/obesity (2) assistive person

## 2022-03-23 NOTE — ASSESSMENT
[FreeTextEntry1] : \par #DMII- controlled \par -A1c  8.6% 3/2022\par -c/w trulicity (dose doubled 3 mg)  & Jardiance  25 mg, dose recently increased, \par - need A1c ~7% to be cleared for surgery per medicine team\par -weight loss modifications discussed\par -diabetes education ordered\par -check a1c in 3 months\par -Ophthalmology and podiatry follow up

## 2022-03-28 ENCOUNTER — APPOINTMENT (OUTPATIENT)
Dept: PODIATRY | Facility: CLINIC | Age: 47
End: 2022-03-28
Payer: MEDICAID

## 2022-03-28 ENCOUNTER — OUTPATIENT (OUTPATIENT)
Dept: OUTPATIENT SERVICES | Facility: HOSPITAL | Age: 47
LOS: 1 days | Discharge: HOME | End: 2022-03-28

## 2022-03-28 DIAGNOSIS — Z98.890 OTHER SPECIFIED POSTPROCEDURAL STATES: Chronic | ICD-10-CM

## 2022-03-28 DIAGNOSIS — M79.671 PAIN IN RIGHT FOOT: ICD-10-CM

## 2022-03-28 DIAGNOSIS — M72.2 PLANTAR FASCIAL FIBROMATOSIS: ICD-10-CM

## 2022-03-28 DIAGNOSIS — Z90.49 ACQUIRED ABSENCE OF OTHER SPECIFIED PARTS OF DIGESTIVE TRACT: Chronic | ICD-10-CM

## 2022-03-28 DIAGNOSIS — E11.42 TYPE 2 DIABETES MELLITUS WITH DIABETIC POLYNEUROPATHY: ICD-10-CM

## 2022-03-28 PROCEDURE — 99213 OFFICE O/P EST LOW 20 MIN: CPT

## 2022-03-30 ENCOUNTER — NON-APPOINTMENT (OUTPATIENT)
Age: 47
End: 2022-03-30

## 2022-03-30 DIAGNOSIS — E66.01 MORBID (SEVERE) OBESITY DUE TO EXCESS CALORIES: ICD-10-CM

## 2022-03-30 DIAGNOSIS — E78.5 HYPERLIPIDEMIA, UNSPECIFIED: ICD-10-CM

## 2022-03-30 DIAGNOSIS — Z00.00 ENCOUNTER FOR GENERAL ADULT MEDICAL EXAMINATION WITHOUT ABNORMAL FINDINGS: ICD-10-CM

## 2022-03-30 DIAGNOSIS — E11.9 TYPE 2 DIABETES MELLITUS WITHOUT COMPLICATIONS: ICD-10-CM

## 2022-03-30 DIAGNOSIS — E11.65 TYPE 2 DIABETES MELLITUS WITH HYPERGLYCEMIA: ICD-10-CM

## 2022-03-30 LAB
25(OH)D3 SERPL-MCNC: 15 NG/ML
ALBUMIN SERPL ELPH-MCNC: 4.7 G/DL
ALP BLD-CCNC: 155 U/L
ALT SERPL-CCNC: 91 U/L
ANION GAP SERPL CALC-SCNC: 15 MMOL/L
APTT BLD: 39.7 SEC
AST SERPL-CCNC: 54 U/L
BASOPHILS # BLD AUTO: 0.03 K/UL
BASOPHILS NFR BLD AUTO: 0.4 %
BILIRUB SERPL-MCNC: 0.3 MG/DL
BUN SERPL-MCNC: 17 MG/DL
CALCIUM SERPL-MCNC: 9.6 MG/DL
CHLORIDE SERPL-SCNC: 102 MMOL/L
CHOLEST SERPL-MCNC: 181 MG/DL
CO2 SERPL-SCNC: 23 MMOL/L
CREAT SERPL-MCNC: 0.5 MG/DL
EGFR: 117 ML/MIN/1.73M2
EOSINOPHIL # BLD AUTO: 0.13 K/UL
EOSINOPHIL NFR BLD AUTO: 1.8 %
ESTIMATED AVERAGE GLUCOSE: 200 MG/DL
FERRITIN SERPL-MCNC: 75 NG/ML
FOLATE RBC-MCNC: 1009 NG/ML
GGT SERPL-CCNC: 50 U/L
GLUCOSE SERPL-MCNC: 114 MG/DL
HBA1C MFR BLD HPLC: 8.6 %
HCT VFR BLD CALC: 44.2 %
HCT VFR BLD CALC: 46.9 %
HDLC SERPL-MCNC: 32 MG/DL
HGB BLD-MCNC: 13.9 G/DL
IMM GRANULOCYTES NFR BLD AUTO: 0.1 %
INR PPP: 1.04 RATIO
IRON SATN MFR SERPL: 14 %
IRON SERPL-MCNC: 52 UG/DL
LDLC SERPL CALC-MCNC: 119 MG/DL
LYMPHOCYTES # BLD AUTO: 3.23 K/UL
LYMPHOCYTES NFR BLD AUTO: 44.2 %
MAN DIFF?: NORMAL
MCHC RBC-ENTMCNC: 28 PG
MCHC RBC-ENTMCNC: 31.4 G/DL
MCV RBC AUTO: 88.9 FL
MONOCYTES # BLD AUTO: 0.38 K/UL
MONOCYTES NFR BLD AUTO: 5.2 %
NEUTROPHILS # BLD AUTO: 3.52 K/UL
NEUTROPHILS NFR BLD AUTO: 48.3 %
NONHDLC SERPL-MCNC: 149 MG/DL
PLATELET # BLD AUTO: 298 K/UL
POTASSIUM SERPL-SCNC: 4.7 MMOL/L
PROT SERPL-MCNC: 7.6 G/DL
PT BLD: 12 SEC
RBC # BLD: 4.97 M/UL
RBC # FLD: 14.6 %
SODIUM SERPL-SCNC: 140 MMOL/L
T3FREE SERPL-MCNC: 3.71 PG/ML
T4 FREE SERPL-MCNC: 1.3 NG/DL
TIBC SERPL-MCNC: 371 UG/DL
TRIGL SERPL-MCNC: 193 MG/DL
TSH SERPL-ACNC: 2.25 UIU/ML
UIBC SERPL-MCNC: 319 UG/DL
VIT A SERPL-MCNC: 33 UG/DL
VIT B1 SERPL-MCNC: 137.7 NMOL/L
VIT B12 SERPL-MCNC: 679 PG/ML
VIT C SERPL-MCNC: 0.5 MG/DL
WBC # FLD AUTO: 7.3 K/UL

## 2022-03-30 RX ORDER — ERGOCALCIFEROL 1.25 MG/1
1.25 MG CAPSULE, LIQUID FILLED ORAL
Qty: 4 | Refills: 3 | Status: ACTIVE | COMMUNITY
Start: 2022-03-30 | End: 1900-01-01

## 2022-04-04 ENCOUNTER — APPOINTMENT (OUTPATIENT)
Dept: HEPATOLOGY | Facility: CLINIC | Age: 47
End: 2022-04-04
Payer: MEDICAID

## 2022-04-04 ENCOUNTER — OUTPATIENT (OUTPATIENT)
Dept: OUTPATIENT SERVICES | Facility: HOSPITAL | Age: 47
LOS: 1 days | Discharge: HOME | End: 2022-04-04

## 2022-04-04 VITALS
HEART RATE: 76 BPM | HEIGHT: 56 IN | DIASTOLIC BLOOD PRESSURE: 80 MMHG | BODY MASS INDEX: 42.74 KG/M2 | SYSTOLIC BLOOD PRESSURE: 120 MMHG | OXYGEN SATURATION: 98 % | WEIGHT: 190 LBS

## 2022-04-04 DIAGNOSIS — Z98.890 OTHER SPECIFIED POSTPROCEDURAL STATES: Chronic | ICD-10-CM

## 2022-04-04 DIAGNOSIS — Z90.49 ACQUIRED ABSENCE OF OTHER SPECIFIED PARTS OF DIGESTIVE TRACT: Chronic | ICD-10-CM

## 2022-04-04 DIAGNOSIS — K76.0 FATTY (CHANGE OF) LIVER, NOT ELSEWHERE CLASSIFIED: ICD-10-CM

## 2022-04-04 DIAGNOSIS — E66.01 MORBID (SEVERE) OBESITY DUE TO EXCESS CALORIES: ICD-10-CM

## 2022-04-04 DIAGNOSIS — R74.8 ABNORMAL LEVELS OF OTHER SERUM ENZYMES: ICD-10-CM

## 2022-04-04 PROCEDURE — 99214 OFFICE O/P EST MOD 30 MIN: CPT | Mod: GC

## 2022-04-04 NOTE — ASSESSMENT
[FreeTextEntry1] : NAFLD/ possible BUI\par - LFts trending down \par - immune to HA and HB. negative HCV\par - BUI FibroSURE F0 - no vitamin E. (0.5)\par - - bx of liver lesion 3/2018 showed micro and macrovesicular steatosis, no malignant cells \par avoid hepatotoxic drugs if possible \par - wt loss, exercise, healthy diet advised, avoid alcohol \par - will obtain annual fibroscan \par -will check alpha one antitrypsin levels \par -patient is being evaluated for bariatric surgery \par \par # Melanoma-pancreatic cancer syndrome \par - Patient found to have CKDN2A mutation, she is a heterozygote and is part of the melanoma-pancreatic cancer syndrome which confers a lifetime risk 17% pancreatic cancer.\par - MRI liver protocol done in 2017 for evaluation of cyst in the liver that turned out to be simple cyst. \par bx of liver lesion 3/2018 showed micro and macrovesicular steatosis, no malignant cells \par - EUS 2019, 2020: normal pancreas. normal PD\par - EUS 2021: 1cm peripancreatic lymph node - not biopsied\par -EUS dec 2022  incomplete secondary to respiratory distress\par REC: Repeat EUS in room 5 with possible intubation for adequate exam\par \par # Obesity\par wt loss, healthy diet, exercise advised \par awaiting bariatric surgery\par \par # CRC screening \par literature reports association with other cancers but recommendation are mainly to screen for pancreatic ca \par - colonoscopy on 7/1/20 which showed moderate diverticulosis and 3mm transverse colon polyp ( hyperplastic) and 3mm sigmoid polyp ( Hyperplastic)\par - CF on 7/21: small HP polyp, good prep\par - repeat colonoscopy in 2026\par \par \par \par \par  \par

## 2022-04-04 NOTE — END OF VISIT
[] : Fellow [FreeTextEntry3] : Seen with Dr TUAN Escobar \par 46 year old with morbid obesity CKDN2A mutation, borderline serous tumor of ovary seen for elevated liver enzymes and NAFLD.\par Elevated liver enzymes -  mixed pattern since 2018. Patient had liver biopsy in 2018 as part as work up of ovarian mass which reported only hepatic steatosis. \par Obese. Abdomen soft non tender\par NAFLD \par Fib 4 - 0.87 Fibrosis Traci 0-1\par Liver biopsy reports only steatosis and no steatohepatitis. Will discuss biopsy with Dr Ashby pathologist. \par Will obtain fibroscan, check A1AT phenotype.\par Immune to hepatitis A and B. \par Followup with results.

## 2022-04-04 NOTE — PHYSICAL EXAM
[General Appearance - Alert] : alert [General Appearance - Well-Appearing] : healthy appearing [Sclera] : the sclera and conjunctiva were normal [Outer Ear] : the ears and nose were normal in appearance [] : no respiratory distress [Exaggerated Use Of Accessory Muscles For Inspiration] : no accessory muscle use [Heart Sounds] : normal S1 and S2 [Bowel Sounds] : normal bowel sounds [Abdomen Soft] : soft [Abdomen Tenderness] : non-tender [Abdomen Mass (___ Cm)] : no abdominal mass palpated [No CVA Tenderness] : no ~M costovertebral angle tenderness [Skin Color & Pigmentation] : normal skin color and pigmentation [Oriented To Time, Place, And Person] : oriented to person, place, and time

## 2022-04-04 NOTE — REVIEW OF SYSTEMS
[Recent Weight Loss (___ Lbs)] : recent [unfilled] ~Ulb weight loss [Fever] : no fever [Eye Pain] : no eye pain [Earache] : no earache [Shortness Of Breath] : no shortness of breath [Cough] : no cough [SOB on Exertion] : no shortness of breath during exertion [Abdominal Pain] : no abdominal pain [Vomiting] : no vomiting [Constipation] : no constipation [Diarrhea] : no diarrhea [Heartburn] : no heartburn [Melena] : no melena

## 2022-04-04 NOTE — HISTORY OF PRESENT ILLNESS
[Heartburn] : denies heartburn [Nausea] : denies nausea [Vomiting] : denies vomiting [Diarrhea] : denies diarrhea [Constipation] : denies constipation [Yellow Skin Or Eyes (Jaundice)] : denies jaundice [Abdominal Pain] : denies abdominal pain [Abdominal Swelling] : denies abdominal swelling [Rectal Pain] : denies rectal pain [de-identified] : 47 yo F with PMH of DM, Ovarian cancer s/p left oophorectomy in 2006 and RT oophorectomy and hysterotomy in 2017, Recurrent diverticulitis s/p partial colectomy, H pylori gastritis s/p eradication, s/p cholecystectomy, CKDN2A mutation (she is a heterozygote and is part of the melanoma-pancreatic cancer syndrome which confers a lifetime risk 17% pancreatic cancer) and NAFLD, liver FNA biopsy 2017, showing steatosis (no fibrosis, F0) presents for follow up. Pt is planned for bariatric surgery and needs GI pre bariatric evaluation. Pt had EUS done in 12/21 - egd portion was complete and EUS could not be completed as pt became tachypneic and hypertensive and procedure was aborted. Pt had last CF in 2021 and removed 2 hyperplastic polyps. Patient was supposed to have pulmonary evaluation first before repeating EUS .\par Patient is being  follow in Hepatology clinic for BUI . Patient denies GI complaints, last LFTs in MARCH 15 shows improved Alkaline phosphatase to 155 , ALT  to  91 and AST to 54 compared to Feb \par  \par

## 2022-04-05 ENCOUNTER — RESULT REVIEW (OUTPATIENT)
Age: 47
End: 2022-04-05

## 2022-04-05 ENCOUNTER — OUTPATIENT (OUTPATIENT)
Dept: OUTPATIENT SERVICES | Facility: HOSPITAL | Age: 47
LOS: 1 days | Discharge: HOME | End: 2022-04-05
Payer: MEDICAID

## 2022-04-05 DIAGNOSIS — Z98.890 OTHER SPECIFIED POSTPROCEDURAL STATES: Chronic | ICD-10-CM

## 2022-04-05 DIAGNOSIS — Z90.49 ACQUIRED ABSENCE OF OTHER SPECIFIED PARTS OF DIGESTIVE TRACT: Chronic | ICD-10-CM

## 2022-04-05 DIAGNOSIS — R10.9 UNSPECIFIED ABDOMINAL PAIN: ICD-10-CM

## 2022-04-05 DIAGNOSIS — Z12.31 ENCOUNTER FOR SCREENING MAMMOGRAM FOR MALIGNANT NEOPLASM OF BREAST: ICD-10-CM

## 2022-04-05 PROCEDURE — 77063 BREAST TOMOSYNTHESIS BI: CPT | Mod: 26

## 2022-04-05 PROCEDURE — 76700 US EXAM ABDOM COMPLETE: CPT | Mod: 26

## 2022-04-05 PROCEDURE — 77067 SCR MAMMO BI INCL CAD: CPT | Mod: 26

## 2022-04-13 ENCOUNTER — APPOINTMENT (OUTPATIENT)
Dept: NEUROPSYCHOLOGY | Facility: CLINIC | Age: 47
End: 2022-04-13

## 2022-04-13 ENCOUNTER — OUTPATIENT (OUTPATIENT)
Dept: OUTPATIENT SERVICES | Facility: HOSPITAL | Age: 47
LOS: 1 days | Discharge: HOME | End: 2022-04-13

## 2022-04-13 DIAGNOSIS — Z98.890 OTHER SPECIFIED POSTPROCEDURAL STATES: Chronic | ICD-10-CM

## 2022-04-13 DIAGNOSIS — Z90.49 ACQUIRED ABSENCE OF OTHER SPECIFIED PARTS OF DIGESTIVE TRACT: Chronic | ICD-10-CM

## 2022-04-13 DIAGNOSIS — F50.9 EATING DISORDER, UNSPECIFIED: ICD-10-CM

## 2022-04-20 NOTE — PHYSICAL EXAM
[General Appearance - Alert] : alert [General Appearance - In No Acute Distress] : in no acute distress [Full Pulse] : the pedal pulses are present [Edema] : there was no peripheral edema [Abnormal Walk] : normal gait [Nail Clubbing] : no clubbing  or cyanosis of the fingernails [Musculoskeletal - Swelling] : no joint swelling seen [Motor Tone] : muscle strength and tone were normal [Skin Color & Pigmentation] : normal skin color and pigmentation [Skin Turgor] : normal skin turgor [] : no rash [Normal in Appearance] : normal in appearance [Normal] : normal [Full ROM] : with full range of motion [Deep Tendon Reflexes (DTR)] : deep tendon reflexes were 2+ and symmetric [Sensation] : the sensory exam was normal to light touch and pinprick [No Focal Deficits] : no focal deficits [Oriented To Time, Place, And Person] : oriented to person, place, and time [Impaired Insight] : insight and judgment were intact [Affect] : the affect was normal [FreeTextEntry1] : Mild Incurved Nail of B/L Hallux; Medial Aspect;

## 2022-04-20 NOTE — ASSESSMENT
[FreeTextEntry1] : Assessment:\par Diabetic Footcare and Risk Assessment:\par \par Plan:\par Patient was seen and evaluated;\par Advised to limit tight wearing shoes; to limit pressure on incurved nails on B/L Hallux;\par Advised to be cautious of open wounds or lesions due to diabetic status;\par Advised to return back to clinic in 1 year; \par \par \par \par

## 2022-04-25 ENCOUNTER — APPOINTMENT (OUTPATIENT)
Dept: SURGERY | Facility: CLINIC | Age: 47
End: 2022-04-25
Payer: MEDICAID

## 2022-04-25 VITALS — BODY MASS INDEX: 41.61 KG/M2 | HEIGHT: 56 IN | WEIGHT: 185 LBS

## 2022-04-25 PROCEDURE — ZZZZZ: CPT

## 2022-04-26 ENCOUNTER — NON-APPOINTMENT (OUTPATIENT)
Age: 47
End: 2022-04-26

## 2022-04-29 ENCOUNTER — LABORATORY RESULT (OUTPATIENT)
Age: 47
End: 2022-04-29

## 2022-05-02 ENCOUNTER — TRANSCRIPTION ENCOUNTER (OUTPATIENT)
Age: 47
End: 2022-05-02

## 2022-05-02 ENCOUNTER — RESULT REVIEW (OUTPATIENT)
Age: 47
End: 2022-05-02

## 2022-05-02 ENCOUNTER — OUTPATIENT (OUTPATIENT)
Dept: OUTPATIENT SERVICES | Facility: HOSPITAL | Age: 47
LOS: 1 days | Discharge: HOME | End: 2022-05-02
Payer: MEDICAID

## 2022-05-02 VITALS
HEIGHT: 56 IN | SYSTOLIC BLOOD PRESSURE: 122 MMHG | RESPIRATION RATE: 20 BRPM | DIASTOLIC BLOOD PRESSURE: 79 MMHG | HEART RATE: 74 BPM | WEIGHT: 184.97 LBS | TEMPERATURE: 97 F

## 2022-05-02 VITALS — SYSTOLIC BLOOD PRESSURE: 112 MMHG | DIASTOLIC BLOOD PRESSURE: 64 MMHG | HEART RATE: 77 BPM

## 2022-05-02 DIAGNOSIS — Z90.49 ACQUIRED ABSENCE OF OTHER SPECIFIED PARTS OF DIGESTIVE TRACT: Chronic | ICD-10-CM

## 2022-05-02 DIAGNOSIS — Z98.890 OTHER SPECIFIED POSTPROCEDURAL STATES: Chronic | ICD-10-CM

## 2022-05-02 LAB — GLUCOSE BLDC GLUCOMTR-MCNC: 83 MG/DL — SIGNIFICANT CHANGE UP (ref 70–99)

## 2022-05-02 PROCEDURE — 43239 EGD BIOPSY SINGLE/MULTIPLE: CPT | Mod: XU

## 2022-05-02 PROCEDURE — 88305 TISSUE EXAM BY PATHOLOGIST: CPT | Mod: 26

## 2022-05-02 PROCEDURE — 88189 FLOWCYTOMETRY/READ 16 & >: CPT

## 2022-05-02 PROCEDURE — 43232 ESOPHAGOSCOPY W/US NEEDLE BX: CPT

## 2022-05-02 PROCEDURE — 88173 CYTOPATH EVAL FNA REPORT: CPT | Mod: 26

## 2022-05-02 PROCEDURE — 88307 TISSUE EXAM BY PATHOLOGIST: CPT | Mod: 26

## 2022-05-02 NOTE — ASU DISCHARGE PLAN (ADULT/PEDIATRIC) - NS MD DC FALL RISK RISK
For information on Fall & Injury Prevention, visit: [email or web address] OR  [email or web address] OR  https://www.cdc.gov/steadi/patient.html

## 2022-05-02 NOTE — ASU PATIENT PROFILE, ADULT - NSICDXPASTMEDICALHX_GEN_ALL_CORE_FT
PAST MEDICAL HISTORY:  Diabetes     Diverticulitis     Diverticulosis of intestine     Fatty liver     Hypercholesteremia     Obesity     Ovarian cancer UNSURE OF STAGE HOWEVER REPORTS IT "MINOR"  Sees Oncologist Dr. Vanessa Correa    Seizure disorder last seizure > 6 months ago    Sleep apnea     Vertigo > 3 months ago, not currently complaining of same (3/4/2020)

## 2022-05-02 NOTE — CHART NOTE - NSCHARTNOTEFT_GEN_A_CORE
PACU ANESTHESIA ADMISSION NOTE      Procedure: EUS  Post op diagnosis:      ____  Intubated  TV:______       Rate: ______      FiO2: ______    _x___  Patent Airway    _x___  Full return of protective reflexes    _x___  Full recovery from anesthesia / back to baseline status    Vitals  SPO2:-98%  HR:-70  RR:-12  B.P:-111/67  TEMP:-98.2    Mental Status:  _x___ Awake   ___x_ Alert   _____ Drowsy   _____ Sedated    Nausea/Vomiting:  _x___  NO       ______Yes,   See Post - Op Orders         Pain Scale (0-10):  __0___    Treatment: _x___ None    __x__ See Post - Op/PCA Orders    Post - Operative Fluids:   ___ Oral   ____x See Post - Op Orders    Plan: Discharge:   _x__Home       ____Floor     _____Critical Care    _____  Other:_________________    Comments:  Report endorsed to RN in pacu  Vitals stable  No anesthesia issues or complications noted.  Discharge to patient to floor / home when criteria met.

## 2022-05-04 LAB
SURGICAL PATHOLOGY STUDY: SIGNIFICANT CHANGE UP
SURGICAL PATHOLOGY STUDY: SIGNIFICANT CHANGE UP
TM INTERPRETATION: SIGNIFICANT CHANGE UP

## 2022-05-05 PROBLEM — G47.30 SLEEP APNEA, UNSPECIFIED: Chronic | Status: ACTIVE | Noted: 2022-05-02

## 2022-05-06 DIAGNOSIS — K20.90 ESOPHAGITIS, UNSPECIFIED WITHOUT BLEEDING: ICD-10-CM

## 2022-05-06 DIAGNOSIS — R59.9 ENLARGED LYMPH NODES, UNSPECIFIED: ICD-10-CM

## 2022-05-06 DIAGNOSIS — Z12.89 ENCOUNTER FOR SCREENING FOR MALIGNANT NEOPLASM OF OTHER SITES: ICD-10-CM

## 2022-05-06 LAB — NON-GYNECOLOGICAL CYTOLOGY STUDY: SIGNIFICANT CHANGE UP

## 2022-05-23 RX ORDER — CLINDAMYCIN PHOSPHATE 1 G/10ML
1 GEL TOPICAL TWICE DAILY
Qty: 1 | Refills: 1 | Status: DISCONTINUED | COMMUNITY
Start: 2021-12-07 | End: 2022-05-23

## 2022-05-24 ENCOUNTER — RESULT REVIEW (OUTPATIENT)
Age: 47
End: 2022-05-24

## 2022-05-24 ENCOUNTER — OUTPATIENT (OUTPATIENT)
Dept: OUTPATIENT SERVICES | Facility: HOSPITAL | Age: 47
LOS: 1 days | Discharge: HOME | End: 2022-05-24
Payer: MEDICAID

## 2022-05-24 VITALS
HEART RATE: 72 BPM | TEMPERATURE: 98 F | DIASTOLIC BLOOD PRESSURE: 72 MMHG | WEIGHT: 191.58 LBS | SYSTOLIC BLOOD PRESSURE: 106 MMHG | HEIGHT: 56 IN | OXYGEN SATURATION: 97 % | RESPIRATION RATE: 14 BRPM

## 2022-05-24 DIAGNOSIS — Z01.818 ENCOUNTER FOR OTHER PREPROCEDURAL EXAMINATION: ICD-10-CM

## 2022-05-24 DIAGNOSIS — Z98.890 OTHER SPECIFIED POSTPROCEDURAL STATES: Chronic | ICD-10-CM

## 2022-05-24 DIAGNOSIS — E66.01 MORBID (SEVERE) OBESITY DUE TO EXCESS CALORIES: ICD-10-CM

## 2022-05-24 DIAGNOSIS — Z90.49 ACQUIRED ABSENCE OF OTHER SPECIFIED PARTS OF DIGESTIVE TRACT: Chronic | ICD-10-CM

## 2022-05-24 LAB
25(OH)D3 SERPL-MCNC: 27 NG/ML
A1C WITH ESTIMATED AVERAGE GLUCOSE RESULT: 7 % — HIGH (ref 4–5.6)
ALBUMIN SERPL ELPH-MCNC: 4.5 G/DL
ALBUMIN SERPL ELPH-MCNC: 4.5 G/DL — SIGNIFICANT CHANGE UP (ref 3.5–5.2)
ALP BLD-CCNC: 173 U/L
ALP SERPL-CCNC: 171 U/L — HIGH (ref 30–115)
ALT FLD-CCNC: 67 U/L — HIGH (ref 0–41)
ALT SERPL-CCNC: 63 U/L
ANION GAP SERPL CALC-SCNC: 15 MMOL/L — HIGH (ref 7–14)
ANION GAP SERPL CALC-SCNC: 16 MMOL/L
APTT BLD: 34.5 SEC — SIGNIFICANT CHANGE UP (ref 27–39.2)
AST SERPL-CCNC: 31 U/L
AST SERPL-CCNC: 33 U/L — SIGNIFICANT CHANGE UP (ref 0–41)
BASOPHILS # BLD AUTO: 0.03 K/UL
BASOPHILS # BLD AUTO: 0.03 K/UL — SIGNIFICANT CHANGE UP (ref 0–0.2)
BASOPHILS NFR BLD AUTO: 0.4 %
BASOPHILS NFR BLD AUTO: 0.4 % — SIGNIFICANT CHANGE UP (ref 0–1)
BILIRUB SERPL-MCNC: 0.3 MG/DL
BILIRUB SERPL-MCNC: 0.3 MG/DL — SIGNIFICANT CHANGE UP (ref 0.2–1.2)
BLD GP AB SCN SERPL QL: SIGNIFICANT CHANGE UP
BUN SERPL-MCNC: 18 MG/DL — SIGNIFICANT CHANGE UP (ref 10–20)
BUN SERPL-MCNC: 21 MG/DL
CALCIUM SERPL-MCNC: 9.6 MG/DL — SIGNIFICANT CHANGE UP (ref 8.5–10.1)
CALCIUM SERPL-MCNC: 9.8 MG/DL
CHLORIDE SERPL-SCNC: 103 MMOL/L — SIGNIFICANT CHANGE UP (ref 98–110)
CHLORIDE SERPL-SCNC: 108 MMOL/L
CHOLEST SERPL-MCNC: 142 MG/DL
CO2 SERPL-SCNC: 22 MMOL/L
CO2 SERPL-SCNC: 24 MMOL/L — SIGNIFICANT CHANGE UP (ref 17–32)
CREAT SERPL-MCNC: 0.6 MG/DL
CREAT SERPL-MCNC: 0.6 MG/DL — LOW (ref 0.7–1.5)
EGFR: 112 ML/MIN/1.73M2
EGFR: 112 ML/MIN/1.73M2 — SIGNIFICANT CHANGE UP
EOSINOPHIL # BLD AUTO: 0.09 K/UL
EOSINOPHIL # BLD AUTO: 0.09 K/UL — SIGNIFICANT CHANGE UP (ref 0–0.7)
EOSINOPHIL NFR BLD AUTO: 1.1 %
EOSINOPHIL NFR BLD AUTO: 1.1 % — SIGNIFICANT CHANGE UP (ref 0–8)
ESTIMATED AVERAGE GLUCOSE: 154 MG/DL — HIGH (ref 68–114)
ESTIMATED AVERAGE GLUCOSE: 157 MG/DL
GLUCOSE SERPL-MCNC: 107 MG/DL
GLUCOSE SERPL-MCNC: 71 MG/DL — SIGNIFICANT CHANGE UP (ref 70–99)
HBA1C MFR BLD HPLC: 7.1 %
HCT VFR BLD CALC: 45.4 %
HCT VFR BLD CALC: 45.4 % — SIGNIFICANT CHANGE UP (ref 37–47)
HDLC SERPL-MCNC: 34 MG/DL
HGB BLD-MCNC: 13.9 G/DL
HGB BLD-MCNC: 14.2 G/DL — SIGNIFICANT CHANGE UP (ref 12–16)
IMM GRANULOCYTES NFR BLD AUTO: 0.3 %
IMM GRANULOCYTES NFR BLD AUTO: 0.3 % — SIGNIFICANT CHANGE UP (ref 0.1–0.3)
INR BLD: 1.07 RATIO — SIGNIFICANT CHANGE UP (ref 0.65–1.3)
LDLC SERPL CALC-MCNC: 80 MG/DL
LYMPHOCYTES # BLD AUTO: 3.02 K/UL
LYMPHOCYTES # BLD AUTO: 3.08 K/UL — SIGNIFICANT CHANGE UP (ref 1.2–3.4)
LYMPHOCYTES # BLD AUTO: 39.2 % — SIGNIFICANT CHANGE UP (ref 20.5–51.1)
LYMPHOCYTES NFR BLD AUTO: 38 %
MAN DIFF?: NORMAL
MCHC RBC-ENTMCNC: 27.3 PG
MCHC RBC-ENTMCNC: 27.4 PG — SIGNIFICANT CHANGE UP (ref 27–31)
MCHC RBC-ENTMCNC: 30.6 G/DL
MCHC RBC-ENTMCNC: 31.3 G/DL — LOW (ref 32–37)
MCV RBC AUTO: 87.5 FL — SIGNIFICANT CHANGE UP (ref 81–99)
MCV RBC AUTO: 89 FL
MONOCYTES # BLD AUTO: 0.38 K/UL
MONOCYTES # BLD AUTO: 0.46 K/UL — SIGNIFICANT CHANGE UP (ref 0.1–0.6)
MONOCYTES NFR BLD AUTO: 4.8 %
MONOCYTES NFR BLD AUTO: 5.9 % — SIGNIFICANT CHANGE UP (ref 1.7–9.3)
NEUTROPHILS # BLD AUTO: 4.17 K/UL — SIGNIFICANT CHANGE UP (ref 1.4–6.5)
NEUTROPHILS # BLD AUTO: 4.41 K/UL
NEUTROPHILS NFR BLD AUTO: 53.1 % — SIGNIFICANT CHANGE UP (ref 42.2–75.2)
NEUTROPHILS NFR BLD AUTO: 55.4 %
NONHDLC SERPL-MCNC: 108 MG/DL
NRBC # BLD: 0 /100 WBCS — SIGNIFICANT CHANGE UP (ref 0–0)
PLATELET # BLD AUTO: 324 K/UL
PLATELET # BLD AUTO: 349 K/UL — SIGNIFICANT CHANGE UP (ref 130–400)
POTASSIUM SERPL-MCNC: 3.9 MMOL/L — SIGNIFICANT CHANGE UP (ref 3.5–5)
POTASSIUM SERPL-SCNC: 3.9 MMOL/L — SIGNIFICANT CHANGE UP (ref 3.5–5)
POTASSIUM SERPL-SCNC: 4.9 MMOL/L
PROT SERPL-MCNC: 7.7 G/DL
PROT SERPL-MCNC: 7.7 G/DL — SIGNIFICANT CHANGE UP (ref 6–8)
PROTHROM AB SERPL-ACNC: 12.3 SEC — SIGNIFICANT CHANGE UP (ref 9.95–12.87)
RBC # BLD: 5.1 M/UL
RBC # BLD: 5.19 M/UL — SIGNIFICANT CHANGE UP (ref 4.2–5.4)
RBC # FLD: 14.2 % — SIGNIFICANT CHANGE UP (ref 11.5–14.5)
RBC # FLD: 14.5 %
SODIUM SERPL-SCNC: 142 MMOL/L — SIGNIFICANT CHANGE UP (ref 135–146)
SODIUM SERPL-SCNC: 146 MMOL/L
TRIGL SERPL-MCNC: 142 MG/DL
TSH SERPL-ACNC: 1.85 UIU/ML
WBC # BLD: 7.85 K/UL — SIGNIFICANT CHANGE UP (ref 4.8–10.8)
WBC # FLD AUTO: 7.85 K/UL — SIGNIFICANT CHANGE UP (ref 4.8–10.8)
WBC # FLD AUTO: 7.95 K/UL

## 2022-05-24 PROCEDURE — 93010 ELECTROCARDIOGRAM REPORT: CPT

## 2022-05-24 PROCEDURE — 71046 X-RAY EXAM CHEST 2 VIEWS: CPT | Mod: 26

## 2022-05-24 RX ORDER — DULAGLUTIDE 4.5 MG/.5ML
0 INJECTION, SOLUTION SUBCUTANEOUS
Qty: 0 | Refills: 3 | DISCHARGE

## 2022-05-24 RX ORDER — SIMVASTATIN 20 MG/1
1 TABLET, FILM COATED ORAL
Qty: 0 | Refills: 0 | DISCHARGE

## 2022-05-24 RX ORDER — OMEPRAZOLE 10 MG/1
1 CAPSULE, DELAYED RELEASE ORAL
Qty: 0 | Refills: 0 | DISCHARGE

## 2022-05-24 NOTE — H&P PST ADULT - HISTORY OF PRESENT ILLNESS
46y Female presents today for presurgical testing for LAPAROSCOPIC SLEEVE GASTRECTOMY. Patient reports being overweight for all of her life, despite multiple failed attempts at weight loss with diet and exercise. She states that due to being overweight, she is less active and has health problems such as fatty liver, and diabetes and wishes to lose weight to live a healthier life. She denies pain and offers no other complaints at this time.   Patient denies any CP, palpitations, SOB, cough, or dysuria. No recent URI or UTI.  Stated exercise tolerance is FOS 1  KAN screen reviewed    Patient denies any recent personal exposure to COVID19. Denies any sick contacts. Patient reports travel to [place] from 5/10-5/23/22. Patient was instructed to quarantine until after procedure.    Anesthesia Alert  NO--Difficult Airway  NO--History of neck surgery or radiation  NO--Limited ROM of neck  NO--History of Malignant hyperthermia  NO--Personal or family history of Pseudocholinesterase deficiency.  YES--Prior Anesthesia Complication - PONV  NO--Latex Allergy  NO--Loose teeth  NO--History of Rheumatoid Arthritis  YES--KAN - USES CPAP  NO--Bleeding risk  NO--Other_____    Patient states that this is their complete medical history and list of medications.  Patient verbalized understanding of instructions given during this visit and was given the opportunity to ask questions and have them answered. They were instructed to follow up with their surgeon/surgeon's office with any questions regarding their procedure.

## 2022-05-24 NOTE — H&P PST ADULT - CARDIOVASCULAR
Occupational Therapy Evaluation Entered On:  5/3/2018 18:22     Performed On:  5/3/2018 18:12  by Marty Lawrence               Billing   OT Evaluation 45 min mod complexity :   1    OT TA Direct each 15 min :   1    Marty Lawrence - 5/3/2018 18:12    Diagnosis and Problem   (As Of: 5/3/2018 18:23:01 CDT)   Problems(Active)    Age related macular degeneration (SNOMED CT  :031819306 )  Name of Problem:   Age related macular degeneration ; Recorder:   Irina Malik; Confirmation:   Confirmed ; Classification:   History ; Code:   [id number] ; Contributor System:   PowerChart ; Last Updated:   3/26/2014 9:15  ; Life Cycle Date:   3/26/2014 ; Life Cycle Status:   Active ; Vocabulary:   SNOMED CT   ; Comments:        3/26/2014 9:15 - Irina Malik  treated with surgery      Alzheimer disease (SNOMED CT  :[id number] )  Name of Problem:   Alzheimer disease ; Recorder:   Cami Scott; Confirmation:   Confirmed ; Classification:   History ; Code:   [id number] ; Contributor System:   PowerChart ; Last Updated:   4/29/2018 17:32  ; Life Cycle Date:   4/29/2018 ; Life Cycle Status:   Active ; Vocabulary:   SNOMED CT        Arthritis (SNOMED CT  :6933072 )  Name of Problem:   Arthritis ; Recorder:   Irina Malik; Confirmation:   Confirmed ; Classification:   History ; Code:   [id number] ; Contributor System:   PowerChart ; Last Updated:   3/26/2014 9:15  ; Life Cycle Date:   3/26/2014 ; Life Cycle Status:   Active ; Vocabulary:   SNOMED CT        Dementia (SNOMED CT  :17431925 )  Name of Problem:   Dementia ; Recorder:   Irina Malik; Confirmation:   Confirmed ; Classification:   History ; Code:   [id number] ; Contributor System:   PowerChart ; Last Updated:   3/26/2014 9:15  ; Life Cycle Date:   3/26/2014 ; Life Cycle Status:   Active ; Vocabulary:   SNOMED CT        H/O: psoriasis (SNOMED CT  :130857662 )  Name of Problem:   H/O: psoriasis ; Recorder:    Irina Malik; Confirmation:   Confirmed ; Classification:   History ; Code:   [id number] ; Contributor System:   PowerChart ; Last Updated:   3/26/2014 9:15  ; Life Cycle Date:   3/26/2014 ; Life Cycle Status:   Active ; Vocabulary:   SNOMED CT        Lazy eye (SNOMED CT  :634497375 )  Name of Problem:   Lazy eye ; Recorder:   Irina Malik; Confirmation:   Confirmed ; Classification:   History ; Code:   [id number] ; Contributor System:   PowerChart ; Last Updated:   3/26/2014 9:19  ; Life Cycle Date:   3/26/2014 ; Life Cycle Status:   Active ; Vocabulary:   SNOMED CT   ; Comments:        3/26/2014 9:19 - Irina Malik  left eye      Peripheral edema (SNOMED CT  :992239701 )  Name of Problem:   Peripheral edema ; Recorder:   Irina Malik; Confirmation:   Confirmed ; Classification:   History ; Code:   [id number] ; Contributor System:   PowerChart ; Last Updated:   3/26/2014 9:16  ; Life Cycle Date:   3/26/2014 ; Life Cycle Status:   Active ; Vocabulary:   SNOMED CT   ; Comments:        3/26/2014 9:16 - Irina Malik  furosemide recently prescribed; is improving        Diagnoses(Active)    Altered mental state  Date:   4/29/2018 ; Diagnosis Type:   Discharge ; Confirmation:   Confirmed ; Clinical Dx:   Altered mental state ; Classification:   Medical ; Clinical Service:   Emergency medicine ; Code:   ICD-10-CM ; Probability:   0 ; Diagnosis Code:   R41.82      Cholecystitis  Date:   4/29/2018 ; Diagnosis Type:   Discharge ; Confirmation:   Confirmed ; Clinical Dx:   Cholecystitis ; Classification:   Medical ; Clinical Service:   Emergency medicine ; Code:   ICD-10-CM ; Probability:   0 ; Diagnosis Code:   K81.9        Procedure History        -    Procedure History   (As Of: 5/3/2018 18:23:01 CDT)     Procedure Dt/Tm:   1949 ; Anesthesia Minutes:   0 ; Procedure Name:   Appendectomy ; Procedure Minutes:   0 ; Last Reviewed Dt/Tm:   5/3/2018 18:17:28 CDT            Procedure Dt/Tm:   1976 ;  Anesthesia Minutes:   0 ; Procedure Name:   Lumbar spinal fusion ; Procedure Minutes:   0 ; Last Reviewed Dt/Tm:   5/3/2018 18:17:28 CDT            Procedure Dt/Tm:   1988 ; Anesthesia Minutes:   0 ; Procedure Name:   RIH - Right inguinal hernia ; Procedure Minutes:   0 ; Last Reviewed Dt/Tm:   5/3/2018 18:17:28 CDT            Procedure Dt/Tm:   1997 ; Anesthesia Minutes:   0 ; Procedure Name:   RIH - Right inguinal hernia ; Procedure Minutes:   0 ; Comments:     3/26/2014 09:12 - Irina Malik  with mesh ; Last Reviewed Dt/Tm:   5/3/2018 18:17:28 CDT            Procedure Dt/Tm:   2003 ; Anesthesia Minutes:   0 ; Procedure Name:   Eye ; Procedure Minutes:   0 ; Comments:     3/26/2014 09:11 - Irina Malik  surgery for macular degeneration ; Last Reviewed Dt/Tm:   5/3/2018 18:17:28 CDT            Procedure Dt/Tm:   4/9/2014 07:31:00 CDT ; Location:   Surgery ; Provider:   Suraj Mcdonald DO; Anesthesia Type:   Spinal ; :   Lg Martinez MD; Anesthesia Minutes:   0 ; Procedure Name:   Hip Total Arthroplasty Anterior Approach (Right) ; Procedure Minutes:   45 ; Comments:     4/9/2014 08:33 - Carlos Eduardo Lobo  auto-populated from documented surgical case ; Clinical Service:   Surgery ; Last Reviewed Dt/Tm:   5/3/2018 18:17:28 CDT            Procedure Dt/Tm:   5/1/2018 16:55:00 CDT ; Location:   Surgery ; Provider:   Qian DELACRUZ, Yash Talley; Anesthesia Type:   General ; :   Tiffanie Polanco CRNA; Anesthesia Minutes:   0 ; Procedure Name:   Cholecystectomy Laparoscopic (None) ; Procedure Minutes:   95 ; Comments:     5/1/2018 18:52 - Noe Rich  auto-populated from documented surgical case ; Clinical Service:   Surgery ; Last Reviewed Dt/Tm:   5/3/2018 18:17:28 CDT            Family History   Family History   (As Of: 5/3/2018 18:23:02 CDT)   Sibling:   Relation:   Sibling ;           Nomenclature:   Cancer - unknown origin ; Value:   Positive            OT Patient History   Chart/Pertinent  Medical Hx Reviewed :   Yes   Therapy Benefits/Risks Discussed :   Yes   OT Orders :   OT Eval and treat   Precautions :   ANTHONY drain (R) LQ, fall risk   Patient Goal :   Did not state   OT Diagnosis :   ADL deficit d/t weakness, decreased activity tolerance.   Living Arrangements :   Lives with family   (Comment: Wife Florinda, 3 daughters w/ 2 living in [facility]. [Marty LawrenceTung - 5/3/2018 18:12 ] )   Patient's Prior Level of Function Per :   Chart   (Comment: Obtained from PT evaluation, no family present during OT eval today. [Marty Lawrence YULI - 5/3/2018 18:12 ] )   Living Environment :   One-story house   Marty Lawrence YULI - 5/3/2018 18:12    Home Stairs Grid   Number of Stairs to Enter Home :    1                 Mike Lawrencegely ROLDAN - 5/3/2018 18:12          Transfers :   Independent   (Comment: Supervision from wife [Marty LawrenceTung - 5/3/2018 18:12 ] )   Locomotion :   Independent without an assistive device   Distance :   Household   OT Assistive Device :   Walker with wheels   OT Bathroom Set Up :   Walk-in shower, Other: shower chair, RTS   Mike Lawrencegely ROLDAN - 5/3/2018 18:12    Bathing - ADL - OT Patient History   Bathing :   Ind   Dressing :   Ind   Toileting :   Ind   Meal Prep :   Dependent   Household Tasks :   Dependent   Grocery Shopping :   Dependent   Driving :   Dependent   (Comment: SBA from wife, (A) for dynamic LB ADL's PRN. [HowardMike petersongely ROLDAN - 5/3/2018 18:12 ] )   Patient Assessment   Orientation :   Not oriented to place, Not oriented to time   Orientation Comments :   Alert, Other: Restless   Safety Awareness Impairment :   Yes   (Comment: d/t cognitive impairment [HowardMike petersongely ROLDAN - 5/3/2018 18:12 ] )   Visual Fields :   Intact   Visual Aids :   None   Hearing Deficit :   Yes   (Comment: Jamul [HowardMike petersongely ROLDAN - 5/3/2018 18:12 ] )   Pain Symptoms :   Yes   (Comment: Patient grimacing throughout session, denies pain when asked about it. [HowardMikegely ROLDAN - 5/3/2018 18:12 ] )   Oxygen Therapy :   Room  air   Patient's Equipment :   IV line - right UE   Marty LawrenceTung - 5/3/2018 18:12    VTE Prevention Equipment Grid   SCDs :   Bilateral LE   Marty Lawrence - 5/3/2018 18:12    Patient Safety Equipment :   All four bed siderails engaged, Bed check alarm engaged, Other: Sitter present   OT Skin Integrity Factors :   Hospital bed   Marty Lawrence - 5/3/2018 18:12    Musculoskeletal Assessment   Motor Skills   Gross Motor Skills, Left :   WFL   Gross Motor Skills, Right :   WFL   Fine Motor Skills, Left :   WFL   Fine Motor Skills, Right :   WFL   Marty Lawrence - 5/3/2018 18:12    Hand Dominance :   Right   Comments and Special Tests :   UB ROM generally WFL demo'd during spontaneous movement. Patient did not understand MMT   Muscle Tone :   Normal   Marty Lawrence - 5/3/2018 18:12    Rolling to Left - Functional Mobility   Supine to Sitting :   Max assist   Sitting to Supine :   Max assist   Sitting to Standing :   Max assist   (Comment: w/ RW [Marty LawrenceTung - 5/3/2018 18:12 ] )   Standing to Sitting :   Max assist   (Comment: w/ RW [Marty LawrenceTung - 5/3/2018 18:12 ] )   Marty LawrenceTung - 5/3/2018 18:12    Feeding :   Min assist   Grooming :   Min assist   Hygiene :   Max assist   Toilet Transfer :   Max assist   Bathing :   Max assist   Dressing: UE :   Min assist   Dressing: LE :   Max assist   Marty LawrenceTung - 5/3/2018 18:12    Gait Grid   Assistive Device :    Rolling walker - standard              Distance :    2 ft              Level of Assistance :    Moderate assistance - one person assist              Weight Bearing Status :    Full weight bearing              Location :    Bedside                Marty LawrenceTung - 5/3/2018 18:12          Endurance/Activity Level :   Poor   Endurance/Activity Level Comments :   Limited by cognition   CVA Patient :   No   Marty Lawrence - 5/3/2018 18:12    Balance   KU Balance Scale Performed - Sitting :   Yes   KU Balance Scale Performed - Standing :   Yes   [place] Sitting Balance Scale :   1+ = Supports self with upper extremities but requires therapist assistance.  Patient performs > 50% of effort (minimal assist).   [facility] Standing Balance Scale :   1 = Supports self with upper extremities but requires therapist assistance.  Patient performs 25-50% of effort (moderate assist).   Marty Lawrence - 5/3/2018 18:12    Treatments and Interventions   Treatments and Interventions Checklist :   Initial evaluation, Therapeutic transfer training, Dynamic sitting balance activities, Dynamic standing balance activities, Functional activities, Energy conservation/work simplification, ADL self care training   Patient/Family Education :   Yes   Marty Lawrence - 5/3/2018 18:12    Education   General Patient Education Powergrid   Topics :    Plan of care   Safety           Topic details :    Purpose of occupational therapy, POC and d/c options.     Safe transfer techniques w/ appropriate use of AD during functional mobility. Verbal cueing for safety.             Marty Lawrence - 5/3/2018 18:12   Marty Lawrence - 5/3/2018 18:12         Learning Style Preference Adult Grid   Patient :   Verbal explanation, Demonstration   Family :   None   Marty Lawrence - 5/3/2018 18:12    Barriers to Learning :   Cognitive deficit, Emotional state   Preferred Communication Mode :   Verbal   Language for Written Material :   English   Marty Lawrence - 5/3/2018 18:12    Assessment and Goals   Assessment :   Patient is an 84 year old male who was admitted to [facility] on 4/29/2018 for increasing weakness and confusion. Patient is a/p laparoscopic cholecystectomy on 5/1/2018. Patient's PMHx is significant for HTN. Alzheimer's dementia, colon CA, prostate CA, (R) LE DVT (identified 2 mos ago), spinal fusion and hip replacement. Patient participated in a moderate complexity evaluation today. Patient lives w/ his wife Florinda in a one story home. Patient has 3 daughters, 2 of which  live in the [facility]. Prior to admission patient was SUPervision for most mobility and ADL's, required increased (A) recently d/t progressive weakness. Patient currently presents w/ decline from PLOF secondary to weakness and decreased functional activity tolerance. Patient would benefit from continuing to receive skilled OT while admitted to [facility] to prevent functional decline and initiate active participation in BADL's. Recommend subacute rehab to maximize functional independence.    Progressive mobility recommendation: Up to commode w/ staff (A) x2, RW and GB. Up to chair if sitter is present.    Total time: 35 minutes  OT Eval - 15 minutes  Ther act - 20 minutes, 1 unit       Rehab Potential :   Fair   Follow-Up OT Recommendations :   Subacute rehab   Marty Lawrence - 5/3/2018 18:12    OT Goals Grid   Problem :    Commode Transfer   Grooming           Patient will... :    Complete commode transfer w/ min (A) x1, RW and GB.   Complete grooming tasks w/ min (A) while sitting EOB, minimal verbal cueing.           Timeframe :    2-3 days   2-3 days             Marty Lawrence - 5/3/2018 18:12   Marty Lawrence - 5/3/2018 18:12         OT Plan/Recommendations :   Therapeutic exercise, Bed mobility, Transfer training, ADL self care training, Functional activities, Endurance training, Education of patient/family   Frequency :   Daily x 3 days per week   Location of OT Intervention :   Bedside   Home Exercise Program :   To be determined   OT Care Plan Discussed With :   Patient   Equipment Needed? :   To be determined   Additional Referral Recommendations - OT :   PT, ST   Marty Lawrence - 5/3/2018 18:12            Regular rate & rhythm, normal S1, S2; no murmurs, gallops or rubs; no S3, S4

## 2022-05-24 NOTE — H&P PST ADULT - REASON FOR ADMISSION
Case Type: InpatientSuite: OR [place]Proceduralist: Jessica Hurd  Confirmed Surgery DateTime: 06- - 0:00PAST DateTime: 05- - 11:30Procedure: LAPAROSCOPIC SLEEVE GASTRECTOMY  ERP?: YesLaterality: N/ALength of Procedure: 120 Minutes  Anesthesia Type: General

## 2022-05-24 NOTE — H&P PST ADULT - NSICDXPASTMEDICALHX_GEN_ALL_CORE_FT
PAST MEDICAL HISTORY:  Diabetes     Diverticulitis     Diverticulosis of intestine     Fatty liver     Hypercholesteremia     Obesity     Ovarian cancer UNSURE OF STAGE HOWEVER REPORTS IT "MINOR"  Sees Oncologist Dr. Vanessa Correa    Seizure disorder last seizure > 6 months ago. No longer on medications    Sleep apnea     Vertigo > 3 months ago, not currently complaining of same (3/4/2020)

## 2022-05-25 ENCOUNTER — APPOINTMENT (OUTPATIENT)
Dept: SURGERY | Facility: CLINIC | Age: 47
End: 2022-05-25
Payer: MEDICAID

## 2022-05-25 VITALS
DIASTOLIC BLOOD PRESSURE: 82 MMHG | WEIGHT: 192 LBS | BODY MASS INDEX: 43.19 KG/M2 | OXYGEN SATURATION: 97 % | HEART RATE: 93 BPM | HEIGHT: 56 IN | SYSTOLIC BLOOD PRESSURE: 120 MMHG | TEMPERATURE: 97 F

## 2022-05-25 DIAGNOSIS — R74.8 ABNORMAL LEVELS OF OTHER SERUM ENZYMES: ICD-10-CM

## 2022-05-25 LAB
HAV IGM SER QL: NONREACTIVE
HBV CORE IGM SER QL: NONREACTIVE
HBV SURFACE AG SER QL: NONREACTIVE
HCV AB SER QL: NONREACTIVE
HCV S/CO RATIO: 0.11 S/CO

## 2022-05-25 PROCEDURE — 99213 OFFICE O/P EST LOW 20 MIN: CPT

## 2022-05-25 PROCEDURE — 99243 OFF/OP CNSLTJ NEW/EST LOW 30: CPT

## 2022-05-25 NOTE — REASON FOR VISIT
[Follow-Up Visit] : a follow-up visit for [Morbid Obesity (BMI>40)] : morbid obesity (bmi>40) [[employer] ] : provided by [employer]   [FreeTextEntry2] : Patient presents for Preoperative Bariatric visit [Interpreters_IDNumber] : [id number] [Interpreters_FullName] : Lloyd [TWNoteComboBox1] : Hungarian

## 2022-05-25 NOTE — ASSESSMENT
[FreeTextEntry1] : ALFIE MOSLEY is a 46 year female seen today for Preoperative Bariatric follow up visit. All medications were reviewed with patient and instructions were given in respect to medications to take on the day of surgery as well as postoperatively.  Written instructions and materials were provided.  All questions were answered.\par \par

## 2022-05-25 NOTE — HISTORY OF PRESENT ILLNESS
[de-identified] : ALFIE MOSLEY  underwent extensive preoperative workup and is scheduled to have Laparoscopic Sleeve Gastrectomy on 6/9/2022. Patient will benefit from surgery to improve her quality of life and for management of her comorbid conditions.\par At this preoperative visit, we discussed the risks, benefits and alternatives to weight loss surgery. We specifically discussed the risks of anesthesia including cardiac and pulmonary complications, DVT/PE, pneumonia, leaks, infection, death, bleeding, ulcers, and need for additional operations have been reviewed. We discussed the importance of a consistent diet and exercise regimen in regards to weight loss and maintenance as well. The importance of lifelong mineral and vitamin supplementation was also reviewed with the patient. The patient was given ample opportunity to ask questions and all questions were answered\par \par

## 2022-05-25 NOTE — PHYSICAL EXAM
[Normal] : affect appropriate [de-identified] : Mallampati IV [de-identified] : Soft, nondistended

## 2022-05-25 NOTE — PLAN
[FreeTextEntry1] : Plan: Proceed with surgery on 6/9/2022.\par          RTO for postop visit.\par          Call with concerns.

## 2022-05-26 ENCOUNTER — NON-APPOINTMENT (OUTPATIENT)
Age: 47
End: 2022-05-26

## 2022-05-26 ENCOUNTER — OUTPATIENT (OUTPATIENT)
Dept: OUTPATIENT SERVICES | Facility: HOSPITAL | Age: 47
LOS: 1 days | Discharge: HOME | End: 2022-05-26

## 2022-05-26 ENCOUNTER — APPOINTMENT (OUTPATIENT)
Dept: INTERNAL MEDICINE | Facility: CLINIC | Age: 47
End: 2022-05-26
Payer: MEDICAID

## 2022-05-26 VITALS
OXYGEN SATURATION: 98 % | DIASTOLIC BLOOD PRESSURE: 81 MMHG | HEIGHT: 56 IN | SYSTOLIC BLOOD PRESSURE: 128 MMHG | HEART RATE: 68 BPM | BODY MASS INDEX: 43.19 KG/M2 | WEIGHT: 192 LBS

## 2022-05-26 DIAGNOSIS — Z98.890 OTHER SPECIFIED POSTPROCEDURAL STATES: Chronic | ICD-10-CM

## 2022-05-26 DIAGNOSIS — B37.3 CANDIDIASIS OF VULVA AND VAGINA: ICD-10-CM

## 2022-05-26 DIAGNOSIS — Z90.49 ACQUIRED ABSENCE OF OTHER SPECIFIED PARTS OF DIGESTIVE TRACT: Chronic | ICD-10-CM

## 2022-05-26 DIAGNOSIS — Z01.818 ENCOUNTER FOR OTHER PREPROCEDURAL EXAMINATION: ICD-10-CM

## 2022-05-26 PROBLEM — G40.909 EPILEPSY, UNSPECIFIED, NOT INTRACTABLE, WITHOUT STATUS EPILEPTICUS: Chronic | Status: ACTIVE | Noted: 2019-03-28

## 2022-05-26 PROCEDURE — 99214 OFFICE O/P EST MOD 30 MIN: CPT | Mod: GC

## 2022-05-26 RX ORDER — GABAPENTIN 250 MG/5ML
250 SOLUTION ORAL EVERY 8 HOURS
Qty: 15 | Refills: 0 | Status: DISCONTINUED | COMMUNITY
Start: 2022-05-24 | End: 2022-05-26

## 2022-05-26 RX ORDER — EMPAGLIFLOZIN 25 MG/1
25 TABLET, FILM COATED ORAL DAILY
Qty: 90 | Refills: 1 | Status: DISCONTINUED | COMMUNITY
Start: 2021-11-10 | End: 2022-05-26

## 2022-05-31 ENCOUNTER — OUTPATIENT (OUTPATIENT)
Dept: OUTPATIENT SERVICES | Facility: HOSPITAL | Age: 47
LOS: 1 days | Discharge: HOME | End: 2022-05-31

## 2022-05-31 ENCOUNTER — APPOINTMENT (OUTPATIENT)
Dept: NEUROLOGY | Facility: CLINIC | Age: 47
End: 2022-05-31

## 2022-05-31 DIAGNOSIS — Z98.890 OTHER SPECIFIED POSTPROCEDURAL STATES: Chronic | ICD-10-CM

## 2022-05-31 DIAGNOSIS — Z90.49 ACQUIRED ABSENCE OF OTHER SPECIFIED PARTS OF DIGESTIVE TRACT: Chronic | ICD-10-CM

## 2022-06-03 ENCOUNTER — OUTPATIENT (OUTPATIENT)
Dept: OUTPATIENT SERVICES | Facility: HOSPITAL | Age: 47
LOS: 1 days | Discharge: HOME | End: 2022-06-03

## 2022-06-03 DIAGNOSIS — Z90.49 ACQUIRED ABSENCE OF OTHER SPECIFIED PARTS OF DIGESTIVE TRACT: Chronic | ICD-10-CM

## 2022-06-03 DIAGNOSIS — Z98.890 OTHER SPECIFIED POSTPROCEDURAL STATES: Chronic | ICD-10-CM

## 2022-06-06 ENCOUNTER — LABORATORY RESULT (OUTPATIENT)
Age: 47
End: 2022-06-06

## 2022-06-08 NOTE — PHYSICAL EXAM
[No Acute Distress] : no acute distress [Well Nourished] : well nourished [Normal Sclera/Conjunctiva] : normal sclera/conjunctiva [Normal Outer Ear/Nose] : the outer ears and nose were normal in appearance [No Respiratory Distress] : no respiratory distress  [No Accessory Muscle Use] : no accessory muscle use [Clear to Auscultation] : lungs were clear to auscultation bilaterally [Normal Rate] : normal rate  [Regular Rhythm] : with a regular rhythm [Normal S1, S2] : normal S1 and S2 [No Edema] : there was no peripheral edema [Soft] : abdomen soft [Non Tender] : non-tender [No HSM] : no HSM [Normal Bowel Sounds] : normal bowel sounds [No CVA Tenderness] : no CVA  tenderness [No Spinal Tenderness] : no spinal tenderness [de-identified] : distention

## 2022-06-08 NOTE — ASSESSMENT
[FreeTextEntry1] : 45 year old female with PMHx of DM2, Ovarian cancer s/p left oophorectomy in 2006 and RT oophorectomy and hysterotomy in 2017, recurrent diverticulitis s/p partial colectomy, H pylori gastritis s/p eradication, s/p cholecystectomy, CKDN2A mutation, NAFLD presenting for follow up.\par \par #Preop clearance for sleeve gastrectomy\par - previously required better a1c control for preop\par - A1c now improved from 8.6 -->7.1\par - gets SOB walking up 2 flights of stairs\par - has seen cardiology - Dr. Brown\par - moderate risk surgery, medically cleared for surgery\par \par #vaginal itching\par - likely side effect of Jardiance\par - will dc Jardiance and switch to pioglitazone for diabetes control\par - prescribed fluconazole x 1 dose\par \par #DMII- controlled \par - A1c 9.4 > 8.6% --> 7.1% (5/2022)\par - c/w trulicity 3mg weekly injection\par - stopping Jardiance today for side effect with vaginal itching\par - restart pioglitazone 15mg daily instead\par - following with endo\par - UTD with podiatry. referral for opthomalogist\par \par #HLD\par - LDL 80mg 5/2022\par - c/w Lipitor 40mg qhs\par \par #NAFLD\par - US abdomen: hepatic steatosis\par - following with hepatology\par \par #seizure vs psuedoseizures\par -stopped meds on her own 8months ago ,took med for 2-3 years according to her\par -states the meds caused her headaches.\par -reports a she had a focal seizure once ,shaky hands in the morning\par \par #Hx of recurrent diverticulitis\par -pt is following Colorectal surgeon, s/p surgery in july 2020\par -repeat colonoscopy in 5 years.\par \par #Ovarian CA s/p bilateral oophorectomy and hysterectomy\par -following with GYN\par \par #Health Maintenance\par - Mammogram 4/2022- BIRADS 2 \par - Pap smear: last in 2017- follows with GYN\par - Colonoscopy 2020- diverticulosis and hyperplastic polyps; repeat in 2026\par - PPSV 23 UTD- 2018\par - follow up in 3 months. routine blood work 1 week prior

## 2022-06-08 NOTE — HISTORY OF PRESENT ILLNESS
[FreeTextEntry1] : follow up [de-identified] :  #[id number]\par \par 45 year old female with PMHx of DM2, Ovarian cancer s/p left oophorectomy in 2006 and RT oophorectomy and hysterotomy in 2017, recurrent diverticulitis s/p partial colectomy, H pylori gastritis s/p eradication, s/p cholecystectomy, CKDN2A mutation, NAFLD presenting for 3 month follow up visit and follow up on lab work.\par \par She is not endorsing any acute complaints or issues. Lab work, US abdomen findings discussed with pt. Has appt for bariatric surgery on 6/9/2022.\par \par Reports that since she started taking Jardiance, she's been having vaginal itching and tried using her daughter's cream that was prescribed by another gyn.

## 2022-06-09 ENCOUNTER — TRANSCRIPTION ENCOUNTER (OUTPATIENT)
Age: 47
End: 2022-06-09

## 2022-06-09 ENCOUNTER — INPATIENT (INPATIENT)
Facility: HOSPITAL | Age: 47
LOS: 1 days | Discharge: HOME | End: 2022-06-11
Attending: STUDENT IN AN ORGANIZED HEALTH CARE EDUCATION/TRAINING PROGRAM | Admitting: STUDENT IN AN ORGANIZED HEALTH CARE EDUCATION/TRAINING PROGRAM
Payer: MEDICAID

## 2022-06-09 ENCOUNTER — APPOINTMENT (OUTPATIENT)
Dept: SURGERY | Facility: HOSPITAL | Age: 47
End: 2022-06-09

## 2022-06-09 ENCOUNTER — RESULT REVIEW (OUTPATIENT)
Age: 47
End: 2022-06-09

## 2022-06-09 VITALS
HEIGHT: 56 IN | OXYGEN SATURATION: 100 % | DIASTOLIC BLOOD PRESSURE: 68 MMHG | RESPIRATION RATE: 16 BRPM | HEART RATE: 83 BPM | TEMPERATURE: 98 F | WEIGHT: 195.99 LBS | SYSTOLIC BLOOD PRESSURE: 114 MMHG

## 2022-06-09 DIAGNOSIS — Z90.49 ACQUIRED ABSENCE OF OTHER SPECIFIED PARTS OF DIGESTIVE TRACT: Chronic | ICD-10-CM

## 2022-06-09 DIAGNOSIS — Z98.890 OTHER SPECIFIED POSTPROCEDURAL STATES: Chronic | ICD-10-CM

## 2022-06-09 LAB
ANION GAP SERPL CALC-SCNC: 15 MMOL/L — HIGH (ref 7–14)
BASOPHILS # BLD AUTO: 0.01 K/UL — SIGNIFICANT CHANGE UP (ref 0–0.2)
BASOPHILS NFR BLD AUTO: 0.1 % — SIGNIFICANT CHANGE UP (ref 0–1)
BUN SERPL-MCNC: 13 MG/DL — SIGNIFICANT CHANGE UP (ref 10–20)
CALCIUM SERPL-MCNC: 9.5 MG/DL — SIGNIFICANT CHANGE UP (ref 8.5–10.1)
CHLORIDE SERPL-SCNC: 101 MMOL/L — SIGNIFICANT CHANGE UP (ref 98–110)
CO2 SERPL-SCNC: 20 MMOL/L — SIGNIFICANT CHANGE UP (ref 17–32)
CREAT SERPL-MCNC: 0.6 MG/DL — LOW (ref 0.7–1.5)
EGFR: 112 ML/MIN/1.73M2 — SIGNIFICANT CHANGE UP
EOSINOPHIL # BLD AUTO: 0 K/UL — SIGNIFICANT CHANGE UP (ref 0–0.7)
EOSINOPHIL NFR BLD AUTO: 0 % — SIGNIFICANT CHANGE UP (ref 0–8)
GLUCOSE BLDC GLUCOMTR-MCNC: 116 MG/DL — HIGH (ref 70–99)
GLUCOSE BLDC GLUCOMTR-MCNC: 137 MG/DL — HIGH (ref 70–99)
GLUCOSE BLDC GLUCOMTR-MCNC: 139 MG/DL — HIGH (ref 70–99)
GLUCOSE BLDC GLUCOMTR-MCNC: 164 MG/DL — HIGH (ref 70–99)
GLUCOSE SERPL-MCNC: 136 MG/DL — HIGH (ref 70–99)
HCT VFR BLD CALC: 42.1 % — SIGNIFICANT CHANGE UP (ref 37–47)
HGB BLD-MCNC: 14 G/DL — SIGNIFICANT CHANGE UP (ref 12–16)
IMM GRANULOCYTES NFR BLD AUTO: 0.3 % — SIGNIFICANT CHANGE UP (ref 0.1–0.3)
LYMPHOCYTES # BLD AUTO: 1.61 K/UL — SIGNIFICANT CHANGE UP (ref 1.2–3.4)
LYMPHOCYTES # BLD AUTO: 14.7 % — LOW (ref 20.5–51.1)
MAGNESIUM SERPL-MCNC: 2.3 MG/DL — SIGNIFICANT CHANGE UP (ref 1.8–2.4)
MCHC RBC-ENTMCNC: 27.9 PG — SIGNIFICANT CHANGE UP (ref 27–31)
MCHC RBC-ENTMCNC: 33.3 G/DL — SIGNIFICANT CHANGE UP (ref 32–37)
MCV RBC AUTO: 83.9 FL — SIGNIFICANT CHANGE UP (ref 81–99)
MONOCYTES # BLD AUTO: 0.66 K/UL — HIGH (ref 0.1–0.6)
MONOCYTES NFR BLD AUTO: 6 % — SIGNIFICANT CHANGE UP (ref 1.7–9.3)
NEUTROPHILS # BLD AUTO: 8.64 K/UL — HIGH (ref 1.4–6.5)
NEUTROPHILS NFR BLD AUTO: 78.9 % — HIGH (ref 42.2–75.2)
NRBC # BLD: 0 /100 WBCS — SIGNIFICANT CHANGE UP (ref 0–0)
PHOSPHATE SERPL-MCNC: 3.4 MG/DL — SIGNIFICANT CHANGE UP (ref 2.1–4.9)
PLATELET # BLD AUTO: 314 K/UL — SIGNIFICANT CHANGE UP (ref 130–400)
POTASSIUM SERPL-MCNC: 4.6 MMOL/L — SIGNIFICANT CHANGE UP (ref 3.5–5)
POTASSIUM SERPL-SCNC: 4.6 MMOL/L — SIGNIFICANT CHANGE UP (ref 3.5–5)
RBC # BLD: 5.02 M/UL — SIGNIFICANT CHANGE UP (ref 4.2–5.4)
RBC # FLD: 14.1 % — SIGNIFICANT CHANGE UP (ref 11.5–14.5)
SODIUM SERPL-SCNC: 136 MMOL/L — SIGNIFICANT CHANGE UP (ref 135–146)
WBC # BLD: 10.95 K/UL — HIGH (ref 4.8–10.8)
WBC # FLD AUTO: 10.95 K/UL — HIGH (ref 4.8–10.8)

## 2022-06-09 PROCEDURE — 93010 ELECTROCARDIOGRAM REPORT: CPT

## 2022-06-09 PROCEDURE — 99291 CRITICAL CARE FIRST HOUR: CPT | Mod: 24,25

## 2022-06-09 PROCEDURE — 88305 TISSUE EXAM BY PATHOLOGIST: CPT | Mod: 26

## 2022-06-09 PROCEDURE — 43775 LAP SLEEVE GASTRECTOMY: CPT

## 2022-06-09 RX ORDER — MAGNESIUM SULFATE 500 MG/ML
2 VIAL (ML) INJECTION ONCE
Refills: 0 | Status: COMPLETED | OUTPATIENT
Start: 2022-06-09 | End: 2022-06-09

## 2022-06-09 RX ORDER — GLUCAGON INJECTION, SOLUTION 0.5 MG/.1ML
1 INJECTION, SOLUTION SUBCUTANEOUS ONCE
Refills: 0 | Status: DISCONTINUED | OUTPATIENT
Start: 2022-06-09 | End: 2022-06-09

## 2022-06-09 RX ORDER — DEXTROSE 50 % IN WATER 50 %
25 SYRINGE (ML) INTRAVENOUS ONCE
Refills: 0 | Status: DISCONTINUED | OUTPATIENT
Start: 2022-06-09 | End: 2022-06-11

## 2022-06-09 RX ORDER — PROCHLORPERAZINE MALEATE 5 MG
10 TABLET ORAL ONCE
Refills: 0 | Status: COMPLETED | OUTPATIENT
Start: 2022-06-09 | End: 2022-06-09

## 2022-06-09 RX ORDER — SCOPALAMINE 1 MG/3D
1 PATCH, EXTENDED RELEASE TRANSDERMAL ONCE
Refills: 0 | Status: COMPLETED | OUTPATIENT
Start: 2022-06-09 | End: 2022-06-09

## 2022-06-09 RX ORDER — MEPERIDINE HYDROCHLORIDE 50 MG/ML
12.5 INJECTION INTRAMUSCULAR; INTRAVENOUS; SUBCUTANEOUS
Refills: 0 | Status: DISCONTINUED | OUTPATIENT
Start: 2022-06-09 | End: 2022-06-09

## 2022-06-09 RX ORDER — SODIUM CHLORIDE 9 MG/ML
1000 INJECTION, SOLUTION INTRAVENOUS
Refills: 0 | Status: DISCONTINUED | OUTPATIENT
Start: 2022-06-09 | End: 2022-06-09

## 2022-06-09 RX ORDER — ONDANSETRON 8 MG/1
4 TABLET, FILM COATED ORAL EVERY 6 HOURS
Refills: 0 | Status: DISCONTINUED | OUTPATIENT
Start: 2022-06-09 | End: 2022-06-09

## 2022-06-09 RX ORDER — ENOXAPARIN SODIUM 100 MG/ML
40 INJECTION SUBCUTANEOUS EVERY 24 HOURS
Refills: 0 | Status: DISCONTINUED | OUTPATIENT
Start: 2022-06-10 | End: 2022-06-11

## 2022-06-09 RX ORDER — SODIUM CHLORIDE 9 MG/ML
1000 INJECTION, SOLUTION INTRAVENOUS
Refills: 0 | Status: DISCONTINUED | OUTPATIENT
Start: 2022-06-09 | End: 2022-06-11

## 2022-06-09 RX ORDER — ENOXAPARIN SODIUM 100 MG/ML
40 INJECTION SUBCUTANEOUS ONCE
Refills: 0 | Status: COMPLETED | OUTPATIENT
Start: 2022-06-09 | End: 2022-06-09

## 2022-06-09 RX ORDER — ACETAMINOPHEN 500 MG
1000 TABLET ORAL ONCE
Refills: 0 | Status: COMPLETED | OUTPATIENT
Start: 2022-06-09 | End: 2022-06-10

## 2022-06-09 RX ORDER — DEXTROSE 50 % IN WATER 50 %
15 SYRINGE (ML) INTRAVENOUS ONCE
Refills: 0 | Status: DISCONTINUED | OUTPATIENT
Start: 2022-06-09 | End: 2022-06-09

## 2022-06-09 RX ORDER — HYDROMORPHONE HYDROCHLORIDE 2 MG/ML
1 INJECTION INTRAMUSCULAR; INTRAVENOUS; SUBCUTANEOUS
Refills: 0 | Status: DISCONTINUED | OUTPATIENT
Start: 2022-06-09 | End: 2022-06-09

## 2022-06-09 RX ORDER — ACETAMINOPHEN 500 MG
1000 TABLET ORAL ONCE
Refills: 0 | Status: DISCONTINUED | OUTPATIENT
Start: 2022-06-09 | End: 2022-06-09

## 2022-06-09 RX ORDER — INSULIN LISPRO 100/ML
VIAL (ML) SUBCUTANEOUS
Refills: 0 | Status: DISCONTINUED | OUTPATIENT
Start: 2022-06-09 | End: 2022-06-11

## 2022-06-09 RX ORDER — GABAPENTIN 400 MG/1
100 CAPSULE ORAL THREE TIMES A DAY
Refills: 0 | Status: DISCONTINUED | OUTPATIENT
Start: 2022-06-09 | End: 2022-06-11

## 2022-06-09 RX ORDER — KETOROLAC TROMETHAMINE 30 MG/ML
15 SYRINGE (ML) INJECTION EVERY 8 HOURS
Refills: 0 | Status: DISCONTINUED | OUTPATIENT
Start: 2022-06-09 | End: 2022-06-11

## 2022-06-09 RX ORDER — DEXTROSE 50 % IN WATER 50 %
12.5 SYRINGE (ML) INTRAVENOUS ONCE
Refills: 0 | Status: DISCONTINUED | OUTPATIENT
Start: 2022-06-09 | End: 2022-06-09

## 2022-06-09 RX ORDER — PANTOPRAZOLE SODIUM 20 MG/1
40 TABLET, DELAYED RELEASE ORAL DAILY
Refills: 0 | Status: DISCONTINUED | OUTPATIENT
Start: 2022-06-09 | End: 2022-06-11

## 2022-06-09 RX ORDER — BUPIVACAINE 13.3 MG/ML
20 INJECTION, SUSPENSION, LIPOSOMAL INFILTRATION ONCE
Refills: 0 | Status: DISCONTINUED | OUTPATIENT
Start: 2022-06-09 | End: 2022-06-09

## 2022-06-09 RX ORDER — HYDROMORPHONE HYDROCHLORIDE 2 MG/ML
0.5 INJECTION INTRAMUSCULAR; INTRAVENOUS; SUBCUTANEOUS
Refills: 0 | Status: DISCONTINUED | OUTPATIENT
Start: 2022-06-09 | End: 2022-06-09

## 2022-06-09 RX ORDER — ATORVASTATIN CALCIUM 80 MG/1
40 TABLET, FILM COATED ORAL AT BEDTIME
Refills: 0 | Status: DISCONTINUED | OUTPATIENT
Start: 2022-06-09 | End: 2022-06-11

## 2022-06-09 RX ORDER — DEXTROSE 50 % IN WATER 50 %
25 SYRINGE (ML) INTRAVENOUS ONCE
Refills: 0 | Status: DISCONTINUED | OUTPATIENT
Start: 2022-06-09 | End: 2022-06-09

## 2022-06-09 RX ORDER — ONDANSETRON 8 MG/1
4 TABLET, FILM COATED ORAL ONCE
Refills: 0 | Status: COMPLETED | OUTPATIENT
Start: 2022-06-09 | End: 2022-06-09

## 2022-06-09 RX ADMIN — ONDANSETRON 4 MILLIGRAM(S): 8 TABLET, FILM COATED ORAL at 09:45

## 2022-06-09 RX ADMIN — ATORVASTATIN CALCIUM 40 MILLIGRAM(S): 80 TABLET, FILM COATED ORAL at 22:41

## 2022-06-09 RX ADMIN — Medication 15 MILLIGRAM(S): at 21:30

## 2022-06-09 RX ADMIN — Medication 15 MILLIGRAM(S): at 16:00

## 2022-06-09 RX ADMIN — Medication 10 MILLIGRAM(S): at 12:55

## 2022-06-09 RX ADMIN — HYDROMORPHONE HYDROCHLORIDE 1 MILLIGRAM(S): 2 INJECTION INTRAMUSCULAR; INTRAVENOUS; SUBCUTANEOUS at 10:30

## 2022-06-09 RX ADMIN — SCOPALAMINE 1 PATCH: 1 PATCH, EXTENDED RELEASE TRANSDERMAL at 07:14

## 2022-06-09 RX ADMIN — SODIUM CHLORIDE 125 MILLILITER(S): 9 INJECTION, SOLUTION INTRAVENOUS at 10:06

## 2022-06-09 RX ADMIN — Medication 25 GRAM(S): at 12:55

## 2022-06-09 RX ADMIN — ENOXAPARIN SODIUM 40 MILLIGRAM(S): 100 INJECTION SUBCUTANEOUS at 07:14

## 2022-06-09 RX ADMIN — PANTOPRAZOLE SODIUM 40 MILLIGRAM(S): 20 TABLET, DELAYED RELEASE ORAL at 11:31

## 2022-06-09 RX ADMIN — SCOPALAMINE 1 PATCH: 1 PATCH, EXTENDED RELEASE TRANSDERMAL at 17:32

## 2022-06-09 RX ADMIN — HYDROMORPHONE HYDROCHLORIDE 1 MILLIGRAM(S): 2 INJECTION INTRAMUSCULAR; INTRAVENOUS; SUBCUTANEOUS at 10:43

## 2022-06-09 RX ADMIN — Medication 15 MILLIGRAM(S): at 15:05

## 2022-06-09 RX ADMIN — Medication 0: at 11:11

## 2022-06-09 NOTE — PATIENT PROFILE ADULT - BRAND OF COVID-19 VACCINATION
Returned call to [employer] [facility] re: positive margins. I told them to assume negative margins to be able to run dcisionRT testing. Moderna dose 1 and 2

## 2022-06-09 NOTE — ASU PATIENT PROFILE, ADULT - FALL HARM RISK - UNIVERSAL INTERVENTIONS
Bed in lowest position, wheels locked, appropriate side rails in place/Call bell, personal items and telephone in reach/Instruct patient to call for assistance before getting out of bed or chair/Non-slip footwear when patient is out of bed/[place] to call system/Physically safe environment - no spills, clutter or unnecessary equipment/Purposeful Proactive Rounding/Room/bathroom lighting operational, light cord in reach
Declines

## 2022-06-09 NOTE — PATIENT PROFILE ADULT - FALL HARM RISK - HARM RISK INTERVENTIONS

## 2022-06-09 NOTE — BRIEF OPERATIVE NOTE - NSICDXBRIEFPROCEDURE_GEN_ALL_CORE_FT
PROCEDURES:  Laparoscopic sleeve gastrectomy 09-Jun-2022 09:23:37  Marcy Thapa, transversus abdominis plane, bilateral 09-Jun-2022 09:23:43  Marcy Thapa

## 2022-06-09 NOTE — ASU PATIENT PROFILE, ADULT - PATIENT'S GENDER IDENTITY
[de-identified] : sinus headache,congestion and postnasal drip [FreeTextEntry6] : 13 y/o presents with sore throat yesterday, random sharp ear pain (both), and sinus congestion. Afebrile. 1-2 week history of allergy llike symptoms. responded to claritin. in the past 2 days her congestion has increased. post nasl drip. dry scratch throat, ear pain today. afebrile. frontal pressure Female

## 2022-06-09 NOTE — CONSULT NOTE ADULT - ATTENDING COMMENTS
Critical Care: [id number]   This patient has a high probability of sudden, clinically significant deterioration, which requires the highest level of physician preparedness to intervene urgently. I managed/supervised life or organ supporting interventions that required frequent physician assessment. I devoted my full attention in the ICU to the direct care of this patient for the period of time indicated below. Time I spent with the family or surrogate(s) is included only if the patient was incapable of providing the necessary information or participating in medical decision making. Time devoted to teaching and to any procedures I billed separately is not included.     YEISONELIASBETALFIE SHERWOOD 46y Female admitted to [ ] SICU /[ ] SDU with morbid obesity S/P gastric restrictive procedure, KAN. Patient presented with hemodynamically labile and with high risk for airway obstruction, electrolytes abnormality   Patient is examined and evaluated at the bedside with SICU team. Treatment plan discussed with SICU team, nurses and primary team.   Chest X-ray and all relevant studies reviewed during rounds.  Will continue hemodynamic monitoring as per protocol in SICU.    Neuro:  GCS [15 ]   [x ]  Neurovascular checks as per SICU protocol                 [ ] 3% NaCl                  Paralysis [ ] Yes  [x ]  No      Sedated/Pain control with                 [ ] Dilaudid drip, [ ]  Ketamine, [ ] Fentanyl, [ ] Propofol, [ ] Precedex, [ ] Versed, [ ] Ativan,                           [ ] OxyContin standing,  [ ] OxyContin PRN, [x ] Dilaudid PRN pushes, [ ] Fentanyl PRN pushes, [ ] PCA,                [x ] Tylenol IV/PO, [x ] Gabapentin, [x ]  Ketorolac, [ ] Tramadol,  [ ] Lidoderm Patch       Other Medications               [ ] Seroquel, [ ] Zyprexa, [ ] Haldol, [ ] Zyprexa,  [ ] Clonazepam [ ] Xanax, [ ] Versed/Ativan PRN, [ ] Valium [x ] None               [ ] Robaxin   [ ] Baclofen  [ ] Flexeril               [ ] Keppra  [ ] Lamictal  [ ] Depakote  [ ] Dilantin               [ ] CIWA (Ativan/Valium/ Librium)  CV: continue to monitor  On pressors [ ]  Yes  [ x]  No          [ ]  Levophed, [ ] Nicho-Synephrine, [ ] Vasopressin, [ ]  Epinephrine          [ ] Dobutamine, [ ] Milrinone, [ ]  Midodrine,  [ ] Others    Other Cardiac Meds          [ ] Amiodarone IV/PO, [ ] Digoxin, [ ] Cardizem drip, [ ] Cleviprex drip, [ ] Esmolol drip  Respiratory: Acute respiratory insufficiency -> continue monitoring                        None Invasive Support  [x ] Incentive Spirometer                                  [ ] BiPAP   [ ] CPAP/NIV   [ ] HFNC   [ ] NR Face Mask   [x ] NC  [ ] Trach Caller                        Ventilatory support  [ ] Yes [x ] No   [ ] SBT                                  [ ] PC    [ ] VC   [ ] AC   [ ] BiVent/APRV   [ ]CPAP   GI  [x ]  bowel regiment on hold  [x ] BM none [ ] Flatus              [ ] Ostomy        Prophylaxis [x ] PPI  [ ] H2 Blockers  [ ] Others  Nutrition: continue   [x ] Diet as per bariatric protocol  [ ] TPN/PPN   [ ] calories count   [ ]  Tube Feeds     Renal: Continue I&Os monitoring, Hayden catheter  [x ] Yes,  [ ]   No ,  [ ] Consideration for discontinuation [ ] Taxes cath/PrimaFit  [ ] TOV       Lytes/Acid-base: replete hypokalemia, hypomagnesemia, hypocalcemia, hypophosphatemia        IV Fluids   [x ] LR@125ml/hr,  [ ] NS  [ ] D5W1/2NS [ ] Bicarbonate  [ ] Albumin   ID: leukocytosis -> continue to monitor:         IV Abx [x ] Yes david-OP, [ ] No;  ID consulted [ ] Yes, [x ] No        Cultures send  [ ] Respiratory   [ ] Blood   [ ] Urine  [ ] Fluids  [ ] Tissue  [x ]  None  Lines:   [ ] Right   [ ] Left  [ ] Bilateral                     [ ] Subclavian TLC        [ ]  Internal Jugular TLC     [ ]  Femoral TLC                     [ ] Subclavian Cordis    [ ] Internal Jugular Cordis   [ ] Femoral Cordis                     [ ] HD catheter    [ ] PICC     [ ]  Midline   [ x] Peripheral IVs                                                 [ ] Right   [ ] Left   [x ] None                     [ ] Radial A-Line    [ ] Femoral A-Line   [ ] Axillary A-Line               Heme: continue to evaluate for acute blood loss anemia- trend Hg/Hct                     AC Reversal Indicated [ ] Yes  [x ] No                    Transfused  indicated  [ ] Yes, [x ] No    [ ] PRBCs   [ ] Platelets   [ ] FFPs   [ ] Cryoprecipitate                    Should be started on or continued with  following  [ ] Yes,  [ x] No                               [ ] Lovenox  [ ] Coumadin  [ ] Heparin drip  [ ] NOVACs  [ ] ASA  [ ] Antiplatelets   Endocrine: Prevent and treat hyperglycemia with insulin as needed,                                 Insuline drip [ ] Yes, [ x] No   PV: follow pulse exam  Skin: decub precautions  DVT Prophylaxis:  [x ] SCDs  [ ] Heparin SQ  [x ] Lovenox  SQ  Stress Gastritis Prophylaxis: PPI/H2 Blockers if indicated  Mobility: patient is evaluated at the bedside with mobility team and the goals for today are discussed with PT [x ]    PATIENT/FAMILY/SURROGATE CONFERENCE:  [ x] Yes with patient at the bedside. [ ] No  PURPOSE: To obtain necessary information, To discuss treatment options under consideration today.    I saw and evaluated the patient personally. I have reviewed and agree with note above. Treatment plan discussed with SICU team, nurses and primary team at the time of the multidisciplinary rounds. The above note is NOT written at the time of rounds and will reflect all changes throughout management of the patient for the day note is written for.    Cat Estrada MD, FACS  Trauma/ACS/SCC Attending

## 2022-06-09 NOTE — CONSULT NOTE ADULT - ASSESSMENT
Assessment & Plan  46y Female s/p laparoscopic sleeve gastrectomy.     NEURO:  #Acute pain-controlled with   - IV tylenol, gabapentin, toradol   #h/o seizures not on any medications      RESP:   #h/o KAN     Oxygenation-wean off NC to RA as tolerated    Activity- ambulate as tolerated     Encourage IS      CARDS:   #h/o HLD  - continue home atorvastatin     #post op EKG: prolonged qtc 481  - 2gm Mg    GI/NUTR:   #h/o diverticulitis s/p colon resection     Diet, Bariatric Clear Liquid    GI Prophylaxis- Protonix    Bowel regimen-last bowel movement        Drug Dosing Weight  Height (cm): 142.2 (09 Jun 2022 07:23)  Weight (kg): 88.9 (09 Jun 2022 07:23)  BMI (kg/m2): 44 (09 Jun 2022 07:23)  BSA (m2): 1.76 (09 Jun 2022 07:23)    /RENAL:     Monitor UO- no shrestha, f/u voiding    HEME/ONC:   #h/o ovarian cancer s/p WILBERT, right oophrectomy   DVT prophylaxis-LVX qD, SCDs    ID:  No antibiotics    ENDO:  #h/o DM   - ISS     LINES/DRAINS:  PIV,  Shrestha     Advanced Directives: Presumed Full Code    HCP/Emergency Contact-    DISPO:    SDU, d/w Dr. Estrada Assessment & Plan  46y Female s/p laparoscopic sleeve gastrectomy.     NEURO:  #Acute pain-controlled with   - IV tylenol, gabapentin, toradol   #h/o seizures not on any medications      RESP:   #h/o KAN     Oxygenation-wean off NC to RA as tolerated    Activity- ambulate as tolerated     Encourage IS      CARDS:   #h/o HLD  - continue home atorvastatin     #post op EKG: prolonged qtc 481  - 2gm Mg    GI/NUTR:   #h/o diverticulitis s/p colon resection     Diet, Bariatric Clear Liquid    GI Prophylaxis- Protonix    Bowel regimen-last bowel movement        Drug Dosing Weight  Height (cm): 142.2 (09 Jun 2022 07:23)  Weight (kg): 88.9 (09 Jun 2022 07:23)  BMI (kg/m2): 44 (09 Jun 2022 07:23)  BSA (m2): 1.76 (09 Jun 2022 07:23)    /RENAL:     Monitor UO- no shrestha, f/u voiding    HEME/ONC:   #h/o ovarian cancer s/p WILBERT, right oophrectomy   DVT prophylaxis-LVX qD, SCDs    ID:  No antibiotics    ENDO:  #h/o DM   - ISS     LINES/DRAINS:  PIV    Advanced Directives: Presumed Full Code    HCP/Emergency Contact-    DISPO:    SDU, d/w Dr. Estrada

## 2022-06-09 NOTE — PATIENT PROFILE ADULT - NSPROPTRIGHTNOTIFY_GEN_A_NUR
01/17/20 1011 Wound Leg lower Left;Lateral  
Date First Assessed/Time First Assessed: 12/13/19 0854   Present on Hospital Admission: Yes  Primary Wound Type: Traumatic  Location: Leg lower  Wound Location Orientation: Left;Lateral  
Dressing Status Removed Dressing Type Gauze;Gauze wrap (lynda) Wound Length (cm) 0.7 cm Wound Width (cm) 0.4 cm Wound Depth (cm) 0.1 cm Wound Surface Area (cm^2) 0.28 cm^2 Wound Volume (cm^3) 0.03 cm^3 Condition of [facility] Condition of Edges Open Drainage Amount Small Drainage Color Serosanguinous Wound Odor None Tara-wound Assessment Pink Cleansing and Cleansing Agents  Normal saline Visit Vitals /74 Pulse 67 Temp 97.5 °F (36.4 °C) Resp 18 declines

## 2022-06-10 ENCOUNTER — TRANSCRIPTION ENCOUNTER (OUTPATIENT)
Age: 47
End: 2022-06-10

## 2022-06-10 LAB
ANION GAP SERPL CALC-SCNC: 13 MMOL/L — SIGNIFICANT CHANGE UP (ref 7–14)
BASOPHILS # BLD AUTO: 0.04 K/UL — SIGNIFICANT CHANGE UP (ref 0–0.2)
BASOPHILS NFR BLD AUTO: 0.4 % — SIGNIFICANT CHANGE UP (ref 0–1)
BUN SERPL-MCNC: 13 MG/DL — SIGNIFICANT CHANGE UP (ref 10–20)
CALCIUM SERPL-MCNC: 8.9 MG/DL — SIGNIFICANT CHANGE UP (ref 8.5–10.1)
CHLORIDE SERPL-SCNC: 108 MMOL/L — SIGNIFICANT CHANGE UP (ref 98–110)
CO2 SERPL-SCNC: 22 MMOL/L — SIGNIFICANT CHANGE UP (ref 17–32)
CREAT SERPL-MCNC: 0.5 MG/DL — LOW (ref 0.7–1.5)
EGFR: 117 ML/MIN/1.73M2 — SIGNIFICANT CHANGE UP
EOSINOPHIL # BLD AUTO: 0.22 K/UL — SIGNIFICANT CHANGE UP (ref 0–0.7)
EOSINOPHIL NFR BLD AUTO: 2.4 % — SIGNIFICANT CHANGE UP (ref 0–8)
GLUCOSE BLDC GLUCOMTR-MCNC: 121 MG/DL — HIGH (ref 70–99)
GLUCOSE BLDC GLUCOMTR-MCNC: 124 MG/DL — HIGH (ref 70–99)
GLUCOSE SERPL-MCNC: 105 MG/DL — HIGH (ref 70–99)
HCT VFR BLD CALC: 33.5 % — LOW (ref 37–47)
HGB BLD-MCNC: 11.1 G/DL — LOW (ref 12–16)
IMM GRANULOCYTES NFR BLD AUTO: 0.2 % — SIGNIFICANT CHANGE UP (ref 0.1–0.3)
LYMPHOCYTES # BLD AUTO: 2.88 K/UL — SIGNIFICANT CHANGE UP (ref 1.2–3.4)
LYMPHOCYTES # BLD AUTO: 32.1 % — SIGNIFICANT CHANGE UP (ref 20.5–51.1)
MAGNESIUM SERPL-MCNC: 2.1 MG/DL — SIGNIFICANT CHANGE UP (ref 1.8–2.4)
MCHC RBC-ENTMCNC: 27.5 PG — SIGNIFICANT CHANGE UP (ref 27–31)
MCHC RBC-ENTMCNC: 33.1 G/DL — SIGNIFICANT CHANGE UP (ref 32–37)
MCV RBC AUTO: 83.1 FL — SIGNIFICANT CHANGE UP (ref 81–99)
MONOCYTES # BLD AUTO: 0.67 K/UL — HIGH (ref 0.1–0.6)
MONOCYTES NFR BLD AUTO: 7.5 % — SIGNIFICANT CHANGE UP (ref 1.7–9.3)
NEUTROPHILS # BLD AUTO: 5.15 K/UL — SIGNIFICANT CHANGE UP (ref 1.4–6.5)
NEUTROPHILS NFR BLD AUTO: 57.4 % — SIGNIFICANT CHANGE UP (ref 42.2–75.2)
NRBC # BLD: 0 /100 WBCS — SIGNIFICANT CHANGE UP (ref 0–0)
PHOSPHATE SERPL-MCNC: 2.8 MG/DL — SIGNIFICANT CHANGE UP (ref 2.1–4.9)
PLATELET # BLD AUTO: 266 K/UL — SIGNIFICANT CHANGE UP (ref 130–400)
POTASSIUM SERPL-MCNC: 3.9 MMOL/L — SIGNIFICANT CHANGE UP (ref 3.5–5)
POTASSIUM SERPL-SCNC: 3.9 MMOL/L — SIGNIFICANT CHANGE UP (ref 3.5–5)
RBC # BLD: 4.03 M/UL — LOW (ref 4.2–5.4)
RBC # FLD: 14.6 % — HIGH (ref 11.5–14.5)
SODIUM SERPL-SCNC: 143 MMOL/L — SIGNIFICANT CHANGE UP (ref 135–146)
WBC # BLD: 8.98 K/UL — SIGNIFICANT CHANGE UP (ref 4.8–10.8)
WBC # FLD AUTO: 8.98 K/UL — SIGNIFICANT CHANGE UP (ref 4.8–10.8)

## 2022-06-10 PROCEDURE — 99291 CRITICAL CARE FIRST HOUR: CPT | Mod: 24,25

## 2022-06-10 PROCEDURE — 99024 POSTOP FOLLOW-UP VISIT: CPT

## 2022-06-10 RX ORDER — PROCHLORPERAZINE MALEATE 5 MG
10 TABLET ORAL ONCE
Refills: 0 | Status: COMPLETED | OUTPATIENT
Start: 2022-06-10 | End: 2022-06-10

## 2022-06-10 RX ORDER — PROCHLORPERAZINE MALEATE 5 MG
10 TABLET ORAL ONCE
Refills: 0 | Status: DISCONTINUED | OUTPATIENT
Start: 2022-06-10 | End: 2022-06-10

## 2022-06-10 RX ADMIN — ATORVASTATIN CALCIUM 40 MILLIGRAM(S): 80 TABLET, FILM COATED ORAL at 22:50

## 2022-06-10 RX ADMIN — Medication 15 MILLIGRAM(S): at 08:57

## 2022-06-10 RX ADMIN — Medication 10 MILLIGRAM(S): at 09:11

## 2022-06-10 RX ADMIN — Medication 400 MILLIGRAM(S): at 02:49

## 2022-06-10 RX ADMIN — Medication 10 MILLIGRAM(S): at 13:51

## 2022-06-10 RX ADMIN — PANTOPRAZOLE SODIUM 40 MILLIGRAM(S): 20 TABLET, DELAYED RELEASE ORAL at 13:49

## 2022-06-10 RX ADMIN — ENOXAPARIN SODIUM 40 MILLIGRAM(S): 100 INJECTION SUBCUTANEOUS at 06:20

## 2022-06-10 RX ADMIN — Medication 15 MILLIGRAM(S): at 18:09

## 2022-06-10 NOTE — PROGRESS NOTE ADULT - ASSESSMENT
A/P:   46y F POD1 lap sleeve  -Continue donavan clears  -PRN compazine for nausea  -Ambulate, IS  -DVT ppx, protonix  -Will reassess this afternoon for improvement in her nausea    Marcy Thapa MD  [facility] Fellow    [place] TEAM SPECTRA [id number]

## 2022-06-10 NOTE — DISCHARGE NOTE PROVIDER - NSDCCPCAREPLAN_GEN_ALL_CORE_FT
PRINCIPAL DISCHARGE DIAGNOSIS  Diagnosis: Morbid obesity  Assessment and Plan of Treatment: s/p laparoscopic sleeve gastrectomy 6/9/2022

## 2022-06-10 NOTE — PROGRESS NOTE ADULT - ASSESSMENT
Assessment & Plan  46y Female s/p laparoscopic sleeve gastrectomy.     NEURO:  #Acute pain-controlled with   - IV tylenol, gabapentin, toradol   #h/o seizures not on any medications      RESP:   #h/o KAN     Oxygenation-wean off NC to RA as tolerated    Activity- ambulate as tolerated     Encourage IS      CARDS:   #h/o HLD  - continue home atorvastatin     #post op EKG: prolonged qtc 481  - 2gm Mg    GI/NUTR:   #h/o diverticulitis s/p colon resection     Diet, Bariatric Clear Liquid    GI Prophylaxis- Protonix    Bowel regimen-last bowel movement        Drug Dosing Weight  Height (cm): 142.2 (09 Jun 2022 07:23)  Weight (kg): 88.9 (09 Jun 2022 07:23)  BMI (kg/m2): 44 (09 Jun 2022 07:23)  BSA (m2): 1.76 (09 Jun 2022 07:23)    /RENAL:     Monitor UO- no shrestha, f/u voiding    HEME/ONC:   #h/o ovarian cancer s/p WILBERT, right oophrectomy   DVT prophylaxis-LVX qD, SCDs    ID:  No antibiotics    ENDO:  #h/o DM   - ISS     LINES/DRAINS:  PIV    Advanced Directives: Presumed Full Code    HCP/Emergency Contact-    DISPO:    SDU, d/w Dr. Estrada         Assessment & Plan  46y Female s/p laparoscopic sleeve gastrectomy.     NEURO:  #Acute pain-controlled with   - IV tylenol, gabapentin, toradol   #h/o seizures not on any medications    RESP:   #h/o KAN     Oxygenation-wean off NC to RA as tolerated    Activity- ambulate as tolerated     Encourage IS    CARDS:   #h/o HLD  - continue home atorvastatin     #post op EKG: prolonged qtc 481  - 2gm Mg    GI/NUTR:   #h/o diverticulitis s/p colon resection     Diet, Bariatric Clear Liquid.  episode of vomiting this am --> compazine 10mg IVx1    GI Prophylaxis- Protonix    Bowel regimen-none    /RENAL:     Monitor UO- no shrestha, voiding  -IVF: LR @ 125/hr    Labs:          BUN/Cr- 13/0.6  -->          Electrolytes-Na 136 // K 4.6 // Mg 2.3 //  Phos 3.4 (06-09 @ 21:38)    HEME/ONC:   #h/o ovarian cancer s/p WILBERT, right oophrectomy   DVT prophylaxis-LVX qD, SCDs  -Hb 14    ID:  No antibiotics  -WBC 10.9, afberile    ENDO:  #h/o DM   - ISS   --164    LINES/DRAINS:  PIV    Advanced Directives: Presumed Full Code    HCP/Emergency Contact-    DISPO:    SDU,         Assessment & Plan  46y Female s/p laparoscopic sleeve gastrectomy.     NEURO:  #Acute pain-controlled with   - IV tylenol, gabapentin, toradol   #h/o seizures not on any medications    RESP:   #h/o KAN on CPAP @ night    Oxygenation-on RA this am, satting well    Activity- ambulate as tolerated     Encourage IS    CARDS:   #h/o HLD  - continue home atorvastatin     #post op EKG: prolonged qtc 481  - 2gm Mg    GI/NUTR:   #h/o diverticulitis s/p colon resection     Diet, Bariatric Clear Liquid.  episode of vomiting this am --> compazine 10mg IVx1    GI Prophylaxis- Protonix    Bowel regimen-none    /RENAL:     Monitor UO- no shrestha, voiding  -IVF: LR @ 125/hr    Labs:          BUN/Cr- 13/0.6  -->          Electrolytes-Na 136 // K 4.6 // Mg 2.3 //  Phos 3.4 (06-09 @ 21:38)    HEME/ONC:   #h/o ovarian cancer s/p WILBERT, right oophrectomy   DVT prophylaxis-LVX qD, SCDs  -Hb 14    ID:  No antibiotics  -WBC 10.9, afberile    ENDO:  #h/o DM   - ISS   --164    LINES/DRAINS:  PIV    Advanced Directives: Presumed Full Code    HCP/Emergency Contact-    DISPO:    SDU,

## 2022-06-10 NOTE — DISCHARGE NOTE PROVIDER - NSDCFUSCHEDAPPT_GEN_ALL_CORE_FT
[facility]ician Counts include 234 beds at the [facility]  Derm [address]  Scheduled Appointment: 06/14/2022    Jessica Hurd  [facility] Physician Counts include 234 beds at the [facility]  GENSURG [address]  Scheduled Appointment: 06/15/2022    Bel Correa  [facility] Physician Counts include 234 beds at the [facility]  HemOnc [address]  Scheduled Appointment: 06/21/2022    [facility]  Hepatology  [place]  Scheduled Appointment: 07/11/2022    [facility]  Gastro  [address]  Scheduled Appointment: 08/19/2022    Jesus Lima  [facility] Physician Counts include 234 beds at the [facility]  INTMED [address]  Scheduled Appointment: 08/29/2022

## 2022-06-10 NOTE — CHART NOTE - NSCHARTNOTEFT_GEN_A_CORE
SICU PA Transfer Note:    47 y/o F with PMHx of HLD, DM-II, KAN on CPAP, ovarian cancer s/p WILBERT with right oopherectomy, h/o seizures, diverticulitis, s/p colon resection, morbid obesity with BMI 44, s/p elective laparoscopic sleeve gastrectomy.    NEURO:  #Acute pain-controlled with   - IV tylenol, gabapentin, toradol   #h/o seizures not on any medications    RESP:   #h/o KAN on CPAP @ night    Oxygenation-on RA this am, satting well    Activity- ambulate as tolerated     Encourage IS    CARDS:   #h/o HLD  - continue home atorvastatin     #post op EKG: prolonged qtc 481  - 2gm Mg    GI/NUTR:   #h/o diverticulitis s/p colon resection     Diet, Bariatric Clear Liquid, episode of vomiting this am and afternoon--> compazine 10mg IM x 2    GI Prophylaxis- Protonix    Bowel regimen-none    /RENAL:     Monitor UO- no shrestha, voiding  -IVF: LR @ 125/hr    Labs:          BUN/Cr- 13/0.6  -->          Electrolytes-Na 136 // K 4.6 // Mg 2.3 //  Phos 3.4 (06-09 @ 21:38)    HEME/ONC:   #h/o ovarian cancer s/p WILBERT, right oophrectomy   DVT prophylaxis-LVX qD, SCDs  -Hb 14    ID:  No antibiotics  -WBC 10.9, afberile    ENDO:  #h/o DM   - ISS   --164    LINES/DRAINS:  PIV    Advanced Directives: Presumed Full Code    HCP/Emergency Contact-    DISPO:  downgrade to floor   -Sign-out given to Dr. Medel from [employer] on 6/10/22 @ 4:40pm
Post Operative Note  Patient: ALFIE MOSLEY 46y (1975) Female   MRN: [id number]  Location: [address]  Visit: 06-09-22 Inpatient  Date: 06-09-22 @ 15:33    Procedure: 46F S/P Laparoscopic sleeve gastrectomy and BL TAP blocks    Subjective:   Nausea: yes, Vomiting:  no, Ambulating: yes, Flatus:  no  Pain Assessment: Patient is complaining of mild epigastric abdominal pain that is appropriate for post-operative course.     Objective:  Vitals: T(F): 97.3 (06-09-22 @ 13:15), Max: 98 (06-09-22 @ 12:00)  HR: 89 (06-09-22 @ 15:27)  BP: 149/88 (06-09-22 @ 13:15) (114/68 - 153/92)  RR: 29 (06-09-22 @ 15:27)  SpO2: 98% (06-09-22 @ 15:27)  Vent Settings:     In:   06-09-22 @ 07:01  -  06-09-22 @ 15:33  --------------------------------------------------------  IN: 905 mL      IV Fluids: lactated ringers. 1000 milliLiter(s) (125 mL/Hr) IV Continuous <Continuous>      Out:   06-09-22 @ 07:01  -  06-09-22 @ 15:33  --------------------------------------------------------  OUT: 120 mL      EBL:     Voided Urine:   06-09-22 @ 07:01  -  06-09-22 @ 15:33  --------------------------------------------------------  OUT: 120 mL    Physical Examination:  General Appearance: NAD  HEENT: EOMI, sclera non-icteric.  Heart: RRR  Lungs: CTABL  Abdomen:  Soft, minimally tender, appropriate for post-op course, nondistended. No rigidity, guarding, or rebound tenderness.   MSK/Extremities: Warm & well-perfused. Peripheral pulses intact.  Skin: Warm, dry. No jaundice.   Incisions/Wounds: Dressings in place, clean, dry and intact, no signs of infection/active bleeding/drainage    Medications: [Standing]  acetaminophen   IVPB .. 1000 milliGRAM(s) IV Intermittent once PRN  atorvastatin Oral Tab/Cap - Peds 40 milliGRAM(s) Oral at bedtime  dextrose 50% Injectable 25 Gram(s) IV Push once  gabapentin Solution 100 milliGRAM(s) Oral three times a day PRN  insulin lispro (ADMELOG) corrective regimen sliding scale   SubCutaneous three times a day before meals  ketorolac   Injectable 15 milliGRAM(s) IV Push every 8 hours PRN  lactated ringers. 1000 milliLiter(s) IV Continuous <Continuous>  ondansetron Injectable 4 milliGRAM(s) IV Push every 6 hours PRN  pantoprazole  Injectable 40 milliGRAM(s) IV Push daily    Medications: [PRN]  acetaminophen   IVPB .. 1000 milliGRAM(s) IV Intermittent once PRN  atorvastatin Oral Tab/Cap - Peds 40 milliGRAM(s) Oral at bedtime  dextrose 50% Injectable 25 Gram(s) IV Push once  gabapentin Solution 100 milliGRAM(s) Oral three times a day PRN  insulin lispro (ADMELOG) corrective regimen sliding scale   SubCutaneous three times a day before meals  ketorolac   Injectable 15 milliGRAM(s) IV Push every 8 hours PRN  lactated ringers. 1000 milliLiter(s) IV Continuous <Continuous>  ondansetron Injectable 4 milliGRAM(s) IV Push every 6 hours PRN  pantoprazole  Injectable 40 milliGRAM(s) IV Push daily      DVT PROPHYLAXIS:   GI PROPHYLAXIS: pantoprazole  Injectable 40 milliGRAM(s) IV Push daily    ANTICOAGULATION:   ANTIBIOTICS:      Imaging:  No post-op imaging studies    Assessment:  46yF s/p laparoscopic sleeve gastrectomy and BL TAP blocks    Plan:  - Monitor vitals  - Monitor post-op labs and replete as necessary  - Monitor for bowel function  - Encourage ambulation as tolerated  - Monitor urine output  - Monitor wound and dressing for changes, redress as needed.    Date/Time: 06-09-22 @ 15:33

## 2022-06-10 NOTE — PROGRESS NOTE ADULT - SUBJECTIVE AND OBJECTIVE BOX
ALFIE MOSLEY  [id number]  46y Female    Indication for ICU admission:    Admit Date:06-09-22  ICU Date:  OR Date:    contrast media (gadolinium-based) (Short breath; Headache)    PAST MEDICAL & SURGICAL HISTORY:  Diabetes    Ovarian cancer  UNSURE OF STAGE HOWEVER REPORTS IT &quot;MINOR&quot;  Sees Oncologist Dr. Vanessa Correa    Seizure disorder  last seizure &gt; 6 months ago. No longer on medications    Hypercholesteremia    Vertigo  &gt; 3 months ago, not currently complaining of same (3/4/2020)    Diverticulosis of intestine    Diverticulitis    Fatty liver    Obesity    Sleep apnea    History of cholecystectomy  10-15 years ago    H/O abdominal hysterectomy  With Right Oophrecetomy   In December 2017    H/O colonoscopy  &gt;3 years ago    History of colon resection    History of mobilization of splenic flexure      Home Medications:  ATORVASTATIN 40 MG TABLET: TAKE 1 TABLET BY MOUTH EVERYDAY AT BEDTIME (09 Jun 2022 06:58)  DAILY-KHUSHI TABLET: TAKE 1 TABLET BY MOUTH EVERY DAY (09 Jun 2022 06:58)  JARDIANCE 10MG TAB:  (09 Jun 2022 06:58)  OMEPRAZOLE 20MG CAP:  (09 Jun 2022 06:58)  Trulicity Pen 0.75 mg/0.5 mL subcutaneous solution: 4 unit(s) subcutaneous once a week ON MONDAYS (09 Jun 2022 06:58)        24HRS EVENT:  6/9  Night:  NAE    Day  Prolonged qtc --> 2gm Mg  compazine x1           DVT PTX:     GI PTX:pantoprazole  Injectable 40 milliGRAM(s) IV Push daily      ***Tubes/Lines/Drains  ***  Peripheral IV  Central Venous Line     	Date   Arterial Line		                Date   [] PICC:         	[] Midline		[] Mediport             Urinary Catheter		Indication: Strict I&O    Date Placed:       REVIEW OF SYSTEMS  [x ] A ten-point review of systems was otherwise negative except as noted.  [ ] Due to altered mental status/intubation, subjective information were not able to be obtained from the patient. History was obtained, to the extent possible, from review of the chart and collateral sources of information.     ALFIE MCDONOUGHSYLVESTER  [id number]  46y Female    Indication for ICU admission:    Admit Date:06-09-22  ICU Date:  OR Date:    contrast media (gadolinium-based) (Short breath; Headache)    PAST MEDICAL & SURGICAL HISTORY:  Diabetes    Ovarian cancer  UNSURE OF STAGE HOWEVER REPORTS IT &quot;MINOR&quot;  Sees Oncologist Dr. Vanessa Correa    Seizure disorder  last seizure &gt; 6 months ago. No longer on medications    Hypercholesteremia    Vertigo  &gt; 3 months ago, not currently complaining of same (3/4/2020)    Diverticulosis of intestine    Diverticulitis    Fatty liver    Obesity    Sleep apnea    History of cholecystectomy  10-15 years ago    H/O abdominal hysterectomy  With Right Oophrecetomy   In December 2017    H/O colonoscopy  &gt;3 years ago    History of colon resection    History of mobilization of splenic flexure      Home Medications:  ATORVASTATIN 40 MG TABLET: TAKE 1 TABLET BY MOUTH EVERYDAY AT BEDTIME (09 Jun 2022 06:58)  DAILY-KHUSHI TABLET: TAKE 1 TABLET BY MOUTH EVERY DAY (09 Jun 2022 06:58)  JARDIANCE 10MG TAB:  (09 Jun 2022 06:58)  OMEPRAZOLE 20MG CAP:  (09 Jun 2022 06:58)  Trulicity Pen 0.75 mg/0.5 mL subcutaneous solution: 4 unit(s) subcutaneous once a week ON MONDAYS (09 Jun 2022 06:58)        24HRS EVENT:  6/9  Night:  NAE    Day  Prolonged qtc --> 2gm Mg  compazine x1         DVT PTX: Lovx 40mg    GI PTX:pantoprazole  Injectable 40 milliGRAM(s) IV Push daily      ***Tubes/Lines/Drains  ***  Peripheral IV      REVIEW OF SYSTEMS  [x ] A ten-point review of systems was otherwise negative except as noted.  [ ] Due to altered mental status/intubation, subjective information were not able to be obtained from the patient. History was obtained, to the extent possible, from review of the chart and collateral sources of information.        CURRENT MEDS:  Neurologic Medications  gabapentin Solution 100 milliGRAM(s) Oral three times a day PRN pain  ketorolac   Injectable 15 milliGRAM(s) IV Push every 8 hours PRN Moderate Pain (4 - 6)  prochlorperazine   Injectable 10 milliGRAM(s) IV Push once    Respiratory Medications    Cardiovascular Medications    Gastrointestinal Medications  lactated ringers. 1000 milliLiter(s) IV Continuous <Continuous>  pantoprazole  Injectable 40 milliGRAM(s) IV Push daily    Genitourinary Medications    Hematologic/Oncologic Medications  enoxaparin Injectable 40 milliGRAM(s) SubCutaneous every 24 hours    Antimicrobial/Immunologic Medications    Endocrine/Metabolic Medications  atorvastatin Oral Tab/Cap - Peds 40 milliGRAM(s) Oral at bedtime  dextrose 50% Injectable 25 Gram(s) IV Push once  insulin lispro (ADMELOG) corrective regimen sliding scale   SubCutaneous three times a day before meals    Topical/Other Medications      ICU Vital Signs Last 24 Hrs  T(C): 35.9 (10 Dagoberto 2022 07:43), Max: 37 (09 Jun 2022 23:00)  T(F): 96.7 (10 Dagoberto 2022 07:43), Max: 98.6 (09 Jun 2022 23:00)  HR: 64 (10 Dagoberto 2022 04:00) (64 - 89)  BP: 134/72 (10 Dagoberto 2022 04:00) (127/79 - 153/92)  BP(mean): 96 (10 Dagoberto 2022 04:00) (96 - 113)  ABP: --  ABP(mean): --  RR: 24 (10 Dagoberto 2022 04:00) (16 - 34)  SpO2: 99% (10 Dagoberto 2022 04:00) (92% - 100%)        I&O's Detail    09 Jun 2022 07:01  -  10 Dagoberto 2022 07:00  --------------------------------------------------------  IN:    Lactated Ringers: 375 mL    Lactated Ringers: 1750 mL    Oral Fluid: 540 mL  Total IN: 2665 mL    OUT:    Voided (mL): 2080 mL  Total OUT: 2080 mL    Total NET: 585 mL        I&O's Summary    09 Jun 2022 07:01  -  10 Dagoberto 2022 07:00  --------------------------------------------------------  IN: 2665 mL / OUT: 2080 mL / NET: 585 mL        LABS:  CAPILLARY BLOOD GLUCOSE    POCT Blood Glucose.: 124 mg/dL (10 Dagoberto 2022 06:12)  POCT Blood Glucose.: 137 mg/dL (09 Jun 2022 17:46)  POCT Blood Glucose.: 139 mg/dL (09 Jun 2022 11:07)  POCT Blood Glucose.: 164 mg/dL (09 Jun 2022 10:00)                          14.0   10.95 )-----------( 314      ( 09 Jun 2022 21:38 )             42.1       Auto Neutrophil %: 78.9 % (06-09-22 @ 21:38)  Auto Immature Granulocyte %: 0.3 % (06-09-22 @ 21:38)    06-09    136  |  101  |  13  ----------------------------<  136<H>  4.6   |  20  |  0.6<L>      Calcium, Total Serum: 9.5 mg/dL (06-09-22 @ 21:38)          PHYSICAL EXAM:    General/Neuro  GCS:   15      Deficits:  alert & oriented x 3, no focal deficits  Lungs:  clear to auscultation, Normal expansion/effort.   Cardiovascular : S1, S2.  Regular rate and rhythm.    Cardiac Rhythm: Normal Sinus Rhythm  GI: Abdomen soft, Non-tender, Non-distended.    Wound: lap band sites c/d/i  Extremities: Extremities warm, well-perfused.  No Peripheral edema b/l LE  Derm: Good skin turgor, no skin breakdown.    : No Hayden catheter.

## 2022-06-10 NOTE — PROGRESS NOTE ADULT - SUBJECTIVE AND OBJECTIVE BOX
Surgery Progress Note       Patient: ALFIE MOSLEY , 46y ([birth year])Female   MRN: [id number]  Location: [address]  Visit: 06-09-22 Inpatient  Date: 06-10-22 @ 09:32        Admitted :06-09-22 (1d)  LOS: 1d    Procedure/Dx/Injuries: POD1 lap sleeve    Events of past 24 hours: Emesis x 2 since surgery, once this AM. Has been drinking 3-4oz still. Walking, pain controlled, voiding.   QTC was 480 so zofran held- compazine ordered      Vitals:   T(F): 96.7 (06-10-22 @ 07:43), Max: 98.6 (06-09-22 @ 23:00)  HR: 71 (06-10-22 @ 07:43)  BP: 147/85 (06-10-22 @ 07:43)  RR: 24 (06-10-22 @ 07:43)  SpO2: 96% (06-10-22 @ 07:43)      Diet, Clear Liquid:   Bariatric Clear Liquid (BARICLLIQ)      Fluids: lactated ringers.: Solution, 1000 milliLiter(s) infuse at 125 mL/Hr      I & O's:    06-09-22 @ 07:01  -  06-10-22 @ 07:00  --------------------------------------------------------  IN:    Lactated Ringers: 375 mL    Lactated Ringers: 1750 mL    Oral Fluid: 540 mL  Total IN: 2665 mL    OUT:    Voided (mL): 2080 mL  Total OUT: 2080 mL    Total NET: 585 mL            PHYSICAL EXAM:  General Appearance: NAD  HEENT: Normocephalic  Heart:rrr  Lungs: No increased work of breathing or accessory muscle use.   Abdomen:  Soft, approp ttp  MSK/Extremities: Warm & well-perfused.   Skin: Warm, dry. No jaundice.   Incision/wound: healing well, clean, dry and intact    MEDICATIONS  (STANDING):  atorvastatin Oral Tab/Cap - Peds 40 milliGRAM(s) Oral at bedtime  dextrose 50% Injectable 25 Gram(s) IV Push once  enoxaparin Injectable 40 milliGRAM(s) SubCutaneous every 24 hours  insulin lispro (ADMELOG) corrective regimen sliding scale   SubCutaneous three times a day before meals  lactated ringers. 1000 milliLiter(s) (125 mL/Hr) IV Continuous <Continuous>  pantoprazole  Injectable 40 milliGRAM(s) IV Push daily    MEDICATIONS  (PRN):  gabapentin Solution 100 milliGRAM(s) Oral three times a day PRN pain  ketorolac   Injectable 15 milliGRAM(s) IV Push every 8 hours PRN Moderate Pain (4 - 6)      DVT PROPHYLAXIS: enoxaparin Injectable 40 milliGRAM(s) SubCutaneous every 24 hours    GI PROPHYLAXIS: pantoprazole  Injectable 40 milliGRAM(s) IV Push daily    ANTICOAGULATION:   ANTIBIOTICS:            LAB/STUDIES:  Labs:  CAPILLARY BLOOD GLUCOSE      POCT Blood Glucose.: 124 mg/dL (10 Dagoberto 2022 06:12)  POCT Blood Glucose.: 137 mg/dL (09 Jun 2022 17:46)  POCT Blood Glucose.: 139 mg/dL (09 Jun 2022 11:07)  POCT Blood Glucose.: 164 mg/dL (09 Jun 2022 10:00)                          14.0   10.95 )-----------( 314      ( 09 Jun 2022 21:38 )             42.1       Auto Neutrophil %: 78.9 % (06-09-22 @ 21:38)  Auto Immature Granulocyte %: 0.3 % (06-09-22 @ 21:38)    06-09    136  |  101  |  13  ----------------------------<  136<H>  4.6   |  20  |  0.6<L>      Calcium, Total Serum: 9.5 mg/dL (06-09-22 @ 21:38)

## 2022-06-10 NOTE — DISCHARGE NOTE PROVIDER - HOSPITAL COURSE
Patient is a 46 F who presented to the hospital for elective laparoscopic sleeve gastrectomy on 6/9/2022. Post-operatively, patient was admitted to the SICU for monitoring. Post-op course was relatively uneventful. At time of discharge, patient was ambulating, tolerating diet, voiding, passing gas, and pain was well controlled. Patient is a 46 F who presented to the hospital for elective laparoscopic sleeve gastrectomy on 6/9/2022. Post-operatively, patient was admitted to the SICU for monitoring. Post-op course was relatively uneventful. At time of discharge, patient was ambulating, tolerating diet, voiding, passing gas, and pain was well controlled. Patient seen and examined ready for discharge

## 2022-06-10 NOTE — DISCHARGE NOTE PROVIDER - CARE PROVIDER_API CALL
Jessica Hurd)  [facility]  [address] [place] [address], [address] [place] [address]  Phone: [phone]  Fax: [phone]  Follow Up Time: 1 week

## 2022-06-10 NOTE — DISCHARGE NOTE PROVIDER - NSDCMRMEDTOKEN_GEN_ALL_CORE_FT
ATORVASTATIN 40 MG TABLET: TAKE 1 TABLET BY MOUTH EVERYDAY AT BEDTIME  DAILY-KHUSHI TABLET: TAKE 1 TABLET BY MOUTH EVERY DAY  JARDIANCE 10MG TAB:   OMEPRAZOLE 20MG CAP:   Trulicity Pen 0.75 mg/0.5 mL subcutaneous solution: 4 unit(s) subcutaneous once a week ON MONDAYS   ATORVASTATIN 40 MG TABLET: 1 tab(s) orally once a day (at bedtime)  DAILY-KHUSHI TABLET: TAKE 1 TABLET BY MOUTH EVERY DAY  JARDIANCE 10MG TAB: 1 tab(s) orally once a day  OMEPRAZOLE 20MG CAP: 1 cap(s) orally once a day  Trulicity Pen 0.75 mg/0.5 mL subcutaneous solution: 4 unit(s) subcutaneous once a week ON MONDAYS

## 2022-06-11 ENCOUNTER — TRANSCRIPTION ENCOUNTER (OUTPATIENT)
Age: 47
End: 2022-06-11

## 2022-06-11 VITALS
RESPIRATION RATE: 26 BRPM | TEMPERATURE: 98 F | OXYGEN SATURATION: 98 % | DIASTOLIC BLOOD PRESSURE: 72 MMHG | SYSTOLIC BLOOD PRESSURE: 126 MMHG | HEART RATE: 77 BPM

## 2022-06-11 LAB
GLUCOSE BLDC GLUCOMTR-MCNC: 113 MG/DL — HIGH (ref 70–99)
GLUCOSE BLDC GLUCOMTR-MCNC: 113 MG/DL — HIGH (ref 70–99)
HCT VFR BLD CALC: 35.4 % — LOW (ref 37–47)
HGB BLD-MCNC: 11.4 G/DL — LOW (ref 12–16)
MCHC RBC-ENTMCNC: 27.7 PG — SIGNIFICANT CHANGE UP (ref 27–31)
MCHC RBC-ENTMCNC: 32.2 G/DL — SIGNIFICANT CHANGE UP (ref 32–37)
MCV RBC AUTO: 86.1 FL — SIGNIFICANT CHANGE UP (ref 81–99)
NRBC # BLD: 0 /100 WBCS — SIGNIFICANT CHANGE UP (ref 0–0)
PLATELET # BLD AUTO: 250 K/UL — SIGNIFICANT CHANGE UP (ref 130–400)
RBC # BLD: 4.11 M/UL — LOW (ref 4.2–5.4)
RBC # FLD: 14.6 % — HIGH (ref 11.5–14.5)
WBC # BLD: 9.68 K/UL — SIGNIFICANT CHANGE UP (ref 4.8–10.8)
WBC # FLD AUTO: 9.68 K/UL — SIGNIFICANT CHANGE UP (ref 4.8–10.8)

## 2022-06-11 PROCEDURE — 99024 POSTOP FOLLOW-UP VISIT: CPT

## 2022-06-11 PROCEDURE — 99238 HOSP IP/OBS DSCHRG MGMT 30/<: CPT

## 2022-06-11 RX ORDER — ATORVASTATIN CALCIUM 80 MG/1
0 TABLET, FILM COATED ORAL
Qty: 0 | Refills: 2 | DISCHARGE

## 2022-06-11 RX ORDER — POTASSIUM PHOSPHATE, MONOBASIC POTASSIUM PHOSPHATE, DIBASIC 236; 224 MG/ML; MG/ML
15 INJECTION, SOLUTION INTRAVENOUS ONCE
Refills: 0 | Status: COMPLETED | OUTPATIENT
Start: 2022-06-11 | End: 2022-06-11

## 2022-06-11 RX ORDER — OMEPRAZOLE 10 MG/1
0 CAPSULE, DELAYED RELEASE ORAL
Qty: 0 | Refills: 0 | DISCHARGE

## 2022-06-11 RX ORDER — EMPAGLIFLOZIN 10 MG/1
0 TABLET, FILM COATED ORAL
Qty: 0 | Refills: 0 | DISCHARGE

## 2022-06-11 RX ADMIN — POTASSIUM PHOSPHATE, MONOBASIC POTASSIUM PHOSPHATE, DIBASIC 62.5 MILLIMOLE(S): 236; 224 INJECTION, SOLUTION INTRAVENOUS at 02:54

## 2022-06-11 RX ADMIN — PANTOPRAZOLE SODIUM 40 MILLIGRAM(S): 20 TABLET, DELAYED RELEASE ORAL at 12:23

## 2022-06-11 RX ADMIN — Medication 15 MILLIGRAM(S): at 03:02

## 2022-06-11 RX ADMIN — Medication 15 MILLIGRAM(S): at 11:17

## 2022-06-11 RX ADMIN — ENOXAPARIN SODIUM 40 MILLIGRAM(S): 100 INJECTION SUBCUTANEOUS at 07:26

## 2022-06-11 NOTE — PROGRESS NOTE ADULT - ASSESSMENT
ASSESSMENT:  45 y/o F with PMHx of DM, ovarian cancer s/p WILBERT with right oopherectomy, h/o seizures, HLD, diverticulitis s/p colon resection, KAN now s/p laparoscopic sleeve gastrectomy and BL TAP blocks    PLAN:  - Continue donavan clears  - PRN compazine for nausea  - Ambulate, IS  - DVT ppx, protonix  - Pain Management  - Strict Ins and Out  - K>4, MG>2, Phos>3    Lines/Tubes: GRICELDA    BLUE TEAM SPECTRA: [id number]

## 2022-06-11 NOTE — DISCHARGE NOTE NURSING/CASE MANAGEMENT/SOCIAL WORK - NSDCPEFALRISK_GEN_ALL_CORE
For information on Fall & Injury Prevention, visit: [email or web address] OR  [email or web address] OR  https://www.cdc.gov/steadi/patient.html

## 2022-06-11 NOTE — PROGRESS NOTE ADULT - ATTENDING COMMENTS
47yo female with KAN, class III obesity POD#1 s/p laparoscopic sleeve gastrectomy. Reported nausea over night, despite Compezine. Denies fever/chills, chest pain or shortness of breath. Started bariatric clear liquid diet last night but not tolerating. Ambulating well. Using incentive spirometer. Continue DVT ppx with Lovenox, incentive spirometry, and GI ppx. Continue anti-emetics. Monitor nausea. Dispo pending tomorrow if symptoms improve.
Patient seen and examined with surgery PA in SICU and discussed management plans. s/p sleeve gastrectomy Postop nausea unable to tolerate feeds yesterday. Feeling well this AM no nausea.  Trocar sites clean Hgb stable. If tolerating po dc home today
Critical Care: [id number]   This patient has a high probability of sudden, clinically significant deterioration, which requires the highest level of physician preparedness to intervene urgently. I managed/supervised life or organ supporting interventions that required frequent physician assessment. I devoted my full attention in the ICU to the direct care of this patient for the period of time indicated below. Time I spent with the family or surrogate(s) is included only if the patient was incapable of providing the necessary information or participating in medical decision making. Time devoted to teaching and to any procedures I billed separately is not included.     YEISONELISABETALFIE SHERWOOD 46y Female admitted to [ ] SICU /[ ] SDU with morbid obesity S/P gastric restrictive procedure, KAN. Patient presented with hemodynamically labile and with high risk for airway obstruction, electrolytes abnormality   Patient is examined and evaluated at the bedside with SICU team. Treatment plan discussed with SICU team, nurses and primary team.   Chest X-ray and all relevant studies reviewed during rounds.  Will continue hemodynamic monitoring as per protocol in SICU.    Neuro:  GCS [15 ]   [x ]  Neurovascular checks as per SICU protocol                 [ ] 3% NaCl                  Paralysis [ ] Yes  [x ]  No      Sedated/Pain control with                 [ ] Dilaudid drip, [ ]  Ketamine, [ ] Fentanyl, [ ] Propofol, [ ] Precedex, [ ] Versed, [ ] Ativan,                           [ ] OxyContin standing,  [ ] OxyContin PRN, [x ] Dilaudid PRN pushes, [ ] Fentanyl PRN pushes, [ ] PCA,                [x ] Tylenol IV/PO, [x ] Gabapentin, [x ]  Ketorolac, [ ] Tramadol,  [ ] Lidoderm Patch       Other Medications               [ ] Seroquel, [ ] Zyprexa, [ ] Haldol, [ ] Zyprexa,  [ ] Clonazepam [ ] Xanax, [ ] Versed/Ativan PRN, [ ] Valium [x ] None               [ ] Robaxin   [ ] Baclofen  [ ] Flexeril               [ ] Keppra  [ ] Lamictal  [ ] Depakote  [ ] Dilantin               [ ] CIWA (Ativan/Valium/ Librium)  CV: continue to monitor  On pressors [ ]  Yes  [ x]  No          [ ]  Levophed, [ ] Nicho-Synephrine, [ ] Vasopressin, [ ]  Epinephrine          [ ] Dobutamine, [ ] Milrinone, [ ]  Midodrine,  [ ] Others    Other Cardiac Meds          [ ] Amiodarone IV/PO, [ ] Digoxin, [ ] Cardizem drip, [ ] Cleviprex drip, [ ] Esmolol drip  Respiratory: Acute respiratory insufficiency -> continue monitoring                        None Invasive Support  [x ] Incentive Spirometer                                  [ ] BiPAP   [ ] CPAP/NIV   [ ] HFNC   [ ] NR Face Mask   [x ] NC  [ ] Trach Caller                        Ventilatory support  [ ] Yes [x ] No   [ ] SBT                                  [ ] PC    [ ] VC   [ ] AC   [ ] BiVent/APRV   [ ]CPAP   GI  [x ]  bowel regiment on hold  [x ] BM none [ ] Flatus              [ ] Ostomy        Prophylaxis [x ] PPI  [ ] H2 Blockers  [ ] Others  Nutrition: continue   [x ] Diet as per bariatric protocol  [ ] TPN/PPN   [ ] calories count   [ ]  Tube Feeds     Renal: Continue I&Os monitoring, Hayden catheter  [ ] Yes,  [x ]   No ,  [ ] Consideration for discontinuation [ ] Taxes cath/PrimaFit  [ ] TOV       Lytes/Acid-base: replete hypokalemia, hypomagnesemia, hypocalcemia, hypophosphatemia        IV Fluids   [x ] LR@125ml/hr,  [ ] NS  [ ] D5W1/2NS [ ] Bicarbonate  [ ] Albumin   ID: leukocytosis -> continue to monitor:         IV Abx [x ] Yes david-OP, [ ] No;  ID consulted [ ] Yes, [x ] No        Cultures send  [ ] Respiratory   [ ] Blood   [ ] Urine  [ ] Fluids  [ ] Tissue  [x ]  None  Lines:   [ ] Right   [ ] Left  [ ] Bilateral                     [ ] Subclavian TLC        [ ]  Internal Jugular TLC     [ ]  Femoral TLC                     [ ] Subclavian Cordis    [ ] Internal Jugular Cordis   [ ] Femoral Cordis                     [ ] HD catheter    [ ] PICC     [ ]  Midline   [ x] Peripheral IVs                                                 [ ] Right   [ ] Left   [x ] None                     [ ] Radial A-Line    [ ] Femoral A-Line   [ ] Axillary A-Line               Heme: continue to evaluate for acute blood loss anemia- trend Hg/Hct                     AC Reversal Indicated [ ] Yes  [x ] No                    Transfused  indicated  [ ] Yes, [x ] No    [ ] PRBCs   [ ] Platelets   [ ] FFPs   [ ] Cryoprecipitate                    Should be started on or continued with  following  [ ] Yes,  [ x] No                               [ ] Lovenox  [ ] Coumadin  [ ] Heparin drip  [ ] NOVACs  [ ] ASA  [ ] Antiplatelets   Endocrine: Prevent and treat hyperglycemia with insulin as needed,                                 Insuline drip [ ] Yes, [ x] No   PV: follow pulse exam  Skin: decub precautions  DVT Prophylaxis:  [x ] SCDs  [ ] Heparin SQ  [x ] Lovenox  SQ  Stress Gastritis Prophylaxis: PPI/H2 Blockers if indicated  Mobility: patient is evaluated at the bedside with mobility team and the goals for today are discussed with PT [x ]    PATIENT/FAMILY/SURROGATE CONFERENCE:  [ x] Yes with patient at the bedside. [ ] No  PURPOSE: To obtain necessary information, To discuss treatment options under consideration today.    I saw and evaluated the patient personally. I have reviewed and agree with note above. Treatment plan discussed with SICU team, nurses and primary team at the time of the multidisciplinary rounds. The above note is NOT written at the time of rounds and will reflect all changes throughout management of the patient for the day note is written for.    Cat Estrada MD, FACS  Trauma/ACS/SCC Attending
Critical Care: [id number]   This patient has a high probability of sudden, clinically significant deterioration, which requires the highest level of physician preparedness to intervene urgently. I managed/supervised life or organ supporting interventions that required frequent physician assessment. I devoted my full attention in the ICU to the direct care of this patient for the period of time indicated below. Time I spent with the family or surrogate(s) is included only if the patient was incapable of providing the necessary information or participating in medical decision making. Time devoted to teaching and to any procedures I billed separately is not included.     YEISONELISABETALFIE SHERWOOD 46y Female admitted to [ ] SICU /[ ] SDU with morbid obesity S/P gastric restrictive procedure, KAN. Patient presented with hemodynamically labile and with high risk for airway obstruction, electrolytes abnormality   Patient is examined and evaluated at the bedside with SICU team. Treatment plan discussed with SICU team, nurses and primary team.   Chest X-ray and all relevant studies reviewed during rounds.  Will continue hemodynamic monitoring as per protocol in SICU.    Neuro:  GCS [15 ]   [x ]  Neurovascular checks as per SICU protocol                 [ ] 3% NaCl                  Paralysis [ ] Yes  [x ]  No      Sedated/Pain control with                 [ ] Dilaudid drip, [ ]  Ketamine, [ ] Fentanyl, [ ] Propofol, [ ] Precedex, [ ] Versed, [ ] Ativan,                           [ ] OxyContin standing,  [ ] OxyContin PRN, [x ] Dilaudid PRN pushes, [ ] Fentanyl PRN pushes, [ ] PCA,                [x ] Tylenol IV/PO, [x ] Gabapentin, [x ]  Ketorolac, [ ] Tramadol,  [ ] Lidoderm Patch       Other Medications               [ ] Seroquel, [ ] Zyprexa, [ ] Haldol, [ ] Zyprexa,  [ ] Clonazepam [ ] Xanax, [ ] Versed/Ativan PRN, [ ] Valium [x ] None               [ ] Robaxin   [ ] Baclofen  [ ] Flexeril               [ ] Keppra  [ ] Lamictal  [ ] Depakote  [ ] Dilantin               [ ] CIWA (Ativan/Valium/ Librium)  CV: continue to monitor  On pressors [ ]  Yes  [ x]  No          [ ]  Levophed, [ ] Nicho-Synephrine, [ ] Vasopressin, [ ]  Epinephrine          [ ] Dobutamine, [ ] Milrinone, [ ]  Midodrine,  [ ] Others    Other Cardiac Meds          [ ] Amiodarone IV/PO, [ ] Digoxin, [ ] Cardizem drip, [ ] Cleviprex drip, [ ] Esmolol drip  Respiratory: Acute respiratory insufficiency -> continue monitoring                        None Invasive Support  [x ] Incentive Spirometer                                  [ ] BiPAP   [ ] CPAP/NIV   [ ] HFNC   [ ] NR Face Mask   [x ] NC  [ ] Trach Caller                        Ventilatory support  [ ] Yes [x ] No   [ ] SBT                                  [ ] PC    [ ] VC   [ ] AC   [ ] BiVent/APRV   [ ]CPAP   GI  [x ]  bowel regiment on hold  [x ] BM none [ ] Flatus              [ ] Ostomy        Prophylaxis [x ] PPI  [ ] H2 Blockers  [ ] Others  Nutrition: continue   [x ] Diet as per bariatric protocol  [ ] TPN/PPN   [ ] calories count   [ ]  Tube Feeds     Renal: Continue I&Os monitoring, Hayden catheter  [ ] Yes,  [x ]   No ,  [ ] Consideration for discontinuation [ ] Taxes cath/PrimaFit  [ ] TOV       Lytes/Acid-base: replete hypokalemia, hypomagnesemia, hypocalcemia, hypophosphatemia        IV Fluids   [x ] LR@125ml/hr,  [ ] NS  [ ] D5W1/2NS [ ] Bicarbonate  [ ] Albumin   ID: leukocytosis -> continue to monitor:         IV Abx [x ] Yes david-OP, [ ] No;  ID consulted [ ] Yes, [x ] No        Cultures send  [ ] Respiratory   [ ] Blood   [ ] Urine  [ ] Fluids  [ ] Tissue  [x ]  None  Lines:   [ ] Right   [ ] Left  [ ] Bilateral                     [ ] Subclavian TLC        [ ]  Internal Jugular TLC     [ ]  Femoral TLC                     [ ] Subclavian Cordis    [ ] Internal Jugular Cordis   [ ] Femoral Cordis                     [ ] HD catheter    [ ] PICC     [ ]  Midline   [ x] Peripheral IVs                                                 [ ] Right   [ ] Left   [x ] None                     [ ] Radial A-Line    [ ] Femoral A-Line   [ ] Axillary A-Line               Heme: continue to evaluate for acute blood loss anemia- trend Hg/Hct                     AC Reversal Indicated [ ] Yes  [x ] No                    Transfused  indicated  [ ] Yes, [x ] No    [ ] PRBCs   [ ] Platelets   [ ] FFPs   [ ] Cryoprecipitate                    Should be started on or continued with  following  [ ] Yes,  [ x] No                               [ ] Lovenox  [ ] Coumadin  [ ] Heparin drip  [ ] NOVACs  [ ] ASA  [ ] Antiplatelets   Endocrine: Prevent and treat hyperglycemia with insulin as needed,                                 Insuline drip [ ] Yes, [ x] No   PV: follow pulse exam  Skin: decub precautions  DVT Prophylaxis:  [x ] SCDs  [ ] Heparin SQ  [x ] Lovenox  SQ  Stress Gastritis Prophylaxis: PPI/H2 Blockers if indicated  Mobility: patient is evaluated at the bedside with mobility team and the goals for today are discussed with PT [x ]    PATIENT/FAMILY/SURROGATE CONFERENCE:  [ x] Yes with patient at the bedside. [ ] No  PURPOSE: To obtain necessary information, To discuss treatment options under consideration today.    I saw and evaluated the patient personally. I have reviewed and agree with note above. Treatment plan discussed with SICU team, nurses and primary team at the time of the multidisciplinary rounds. The above note is NOT written at the time of rounds and will reflect all changes throughout management of the patient for the day note is written for.    Cat Estrada MD, FACS  Trauma/ACS/SCC Attending

## 2022-06-11 NOTE — PROGRESS NOTE ADULT - SUBJECTIVE AND OBJECTIVE BOX
ALFIE MELANY  [id number]  46y Female    Indication for ICU admission:    Admit Date:06-09-22  ICU Date:  OR Date:    contrast media (gadolinium-based) (Short breath; Headache)    PAST MEDICAL & SURGICAL HISTORY:  Diabetes    Ovarian cancer  UNSURE OF STAGE HOWEVER REPORTS IT &quot;MINOR&quot;  Sees Oncologist Dr. Vanessa Correa    Seizure disorder  last seizure &gt; 6 months ago. No longer on medications    Hypercholesteremia    Vertigo  &gt; 3 months ago, not currently complaining of same (3/4/2020)    Diverticulosis of intestine    Diverticulitis    Fatty liver    Obesity    Sleep apnea    History of cholecystectomy  10-15 years ago    H/O abdominal hysterectomy  With Right Oophrecetomy   In December 2017    H/O colonoscopy  &gt;3 years ago    History of colon resection    History of mobilization of splenic flexure      Home Medications:  ATORVASTATIN 40 MG TABLET: TAKE 1 TABLET BY MOUTH EVERYDAY AT BEDTIME (09 Jun 2022 06:58)  DAILY-KHUSHI TABLET: TAKE 1 TABLET BY MOUTH EVERY DAY (09 Jun 2022 06:58)  JARDIANCE 10MG TAB:  (09 Jun 2022 06:58)  OMEPRAZOLE 20MG CAP:  (09 Jun 2022 06:58)  Trulicity Pen 0.75 mg/0.5 mL subcutaneous solution: 4 unit(s) subcutaneous once a week ON MONDAYS (09 Jun 2022 06:58)        24HRS EVENT:    6/10  NIGHT  -Hg 14 to 11, repeat at 4am  -K Phos    Day:  -episode of vomiting in the morning and nausea in afternoon --> tx with Compazine both times  -downgrade to floor     DVT PTX: Lovx 40    GI PTX:pantoprazole  Injectable 40 milliGRAM(s) IV Push daily      ***Tubes/Lines/Drains  ***  Peripheral IV       REVIEW OF SYSTEMS  [x ] A ten-point review of systems was otherwise negative except as noted.  [ ] Due to altered mental status/intubation, subjective information were not able to be obtained from the patient. History was obtained, to the extent possible, from review of the chart and collateral sources of information.     ALFIE MELANY  [id number]  46y Female    Indication for ICU admission:    Admit Date:06-09-22  ICU Date:  OR Date:    contrast media (gadolinium-based) (Short breath; Headache)    PAST MEDICAL & SURGICAL HISTORY:  Diabetes    Ovarian cancer  UNSURE OF STAGE HOWEVER REPORTS IT &quot;MINOR&quot;  Sees Oncologist Dr. Vanessa Correa    Seizure disorder  last seizure &gt; 6 months ago. No longer on medications    Hypercholesteremia    Vertigo  &gt; 3 months ago, not currently complaining of same (3/4/2020)    Diverticulosis of intestine    Diverticulitis    Fatty liver    Obesity    Sleep apnea    History of cholecystectomy  10-15 years ago    H/O abdominal hysterectomy  With Right Oophrecetomy   In December 2017    H/O colonoscopy  &gt;3 years ago    History of colon resection    History of mobilization of splenic flexure      Home Medications:  ATORVASTATIN 40 MG TABLET: TAKE 1 TABLET BY MOUTH EVERYDAY AT BEDTIME (09 Jun 2022 06:58)  DAILY-KHUSHI TABLET: TAKE 1 TABLET BY MOUTH EVERY DAY (09 Jun 2022 06:58)  JARDIANCE 10MG TAB:  (09 Jun 2022 06:58)  OMEPRAZOLE 20MG CAP:  (09 Jun 2022 06:58)  Trulicity Pen 0.75 mg/0.5 mL subcutaneous solution: 4 unit(s) subcutaneous once a week ON MONDAYS (09 Jun 2022 06:58)        24HRS EVENT:    6/10  NIGHT  -Hg 14 to 11, repeat at 4am  -K Phos    Day:  -episode of vomiting in the morning and nausea in afternoon --> tx with Compazine both times  -downgrade to floor     DVT PTX: Lovx 40    GI PTX:pantoprazole  Injectable 40 milliGRAM(s) IV Push daily      ***Tubes/Lines/Drains  ***  Peripheral IV       REVIEW OF SYSTEMS  [x ] A ten-point review of systems was otherwise negative except as noted.  [ ] Due to altered mental status/intubation, subjective information were not able to be obtained from the patient. History was obtained, to the extent possible, from review of the chart and collateral sources of information.      CURRENT MEDS:  Neurologic Medications  gabapentin Solution 100 milliGRAM(s) Oral three times a day PRN pain  ketorolac   Injectable 15 milliGRAM(s) IV Push every 8 hours PRN Moderate Pain (4 - 6)    Respiratory Medications    Cardiovascular Medications    Gastrointestinal Medications  lactated ringers. 1000 milliLiter(s) IV Continuous <Continuous>  pantoprazole  Injectable 40 milliGRAM(s) IV Push daily    Genitourinary Medications    Hematologic/Oncologic Medications  enoxaparin Injectable 40 milliGRAM(s) SubCutaneous every 24 hours    Antimicrobial/Immunologic Medications    Endocrine/Metabolic Medications  atorvastatin Oral Tab/Cap - Peds 40 milliGRAM(s) Oral at bedtime  dextrose 50% Injectable 25 Gram(s) IV Push once  insulin lispro (ADMELOG) corrective regimen sliding scale   SubCutaneous three times a day before meals    Topical/Other Medications      ICU Vital Signs Last 24 Hrs  T(C): 36.6 (11 Jun 2022 07:00), Max: 36.7 (10 Dagoberto 2022 15:30)  T(F): 97.8 (11 Jun 2022 07:00), Max: 98 (10 Dagoberto 2022 15:30)  HR: 65 (11 Jun 2022 07:00) (58 - 69)  BP: 127/78 (11 Jun 2022 07:00) (125/67 - 137/81)  BP(mean): 97 (11 Jun 2022 07:00) (89 - 105)  ABP: --  ABP(mean): --  RR: 17 (11 Jun 2022 07:00) (17 - 30)  SpO2: 96% (11 Jun 2022 07:00) (95% - 96%)        I&O's Detail    10 Dagoberto 2022 07:01  -  11 Jun 2022 07:00  --------------------------------------------------------  IN:    Lactated Ringers: 2000 mL  Total IN: 2000 mL    OUT:    Voided (mL): 1200 mL  Total OUT: 1200 mL    Total NET: 800 mL        I&O's Summary    10 Dagoberto 2022 07:01  -  11 Jun 2022 07:00  --------------------------------------------------------  IN: 2000 mL / OUT: 1200 mL / NET: 800 mL        LABS:    POCT Blood Glucose.: 113 mg/dL (11 Jun 2022 07:08)  POCT Blood Glucose.: 121 mg/dL (10 Dagoberto 2022 11:07)                          11.4   9.68  )-----------( 250      ( 11 Jun 2022 05:30 )             35.4       Auto Neutrophil %: 57.4 % (06-10-22 @ 22:00)  Auto Immature Granulocyte %: 0.2 % (06-10-22 @ 22:00)    06-10    143  |  108  |  13  ----------------------------<  105<H>  3.9   |  22  |  0.5<L>      Calcium, Total Serum: 8.9 mg/dL (06-10-22 @ 22:00)               PHYSICAL EXAM:    General/Neuro  GCS: 15      Deficits:  alert & oriented x 3, no focal deficits  Lungs:  clear to auscultation, Normal expansion/effort.   Cardiovascular : S1, S2.  Regular rate and rhythm.    Cardiac Rhythm: Normal Sinus Rhythm  GI: Abdomen soft, Non-tender, Non-distended.    Wound: 5 lap band sites c/d/i  Extremities: Extremities warm, well-perfused.  No Peripheral edema b/l LE  Derm: Good skin turgor, no skin breakdown.    : No Hayden catheter.

## 2022-06-11 NOTE — PROGRESS NOTE ADULT - ASSESSMENT
Assessment & Plan  46y Female s/p laparoscopic sleeve gastrectomy.       NEURO:  #Acute pain-controlled with   - IV tylenol, gabapentin, toradol   #h/o seizures not on any medications    RESP:   #h/o KAN on CPAP @ night    Oxygenation-on RA this am, satting well    Activity- ambulate as tolerated     Encourage IS    CARDS:   #h/o HLD  - continue home atorvastatin     #post op EKG: prolonged qtc 481  - 2gm Mg    GI/NUTR:   #h/o diverticulitis s/p colon resection     Diet, Bariatric Clear Liquid.  episode of vomiting this am --> compazine 10mg IVx1    GI Prophylaxis- Protonix    Bowel regimen-none    /RENAL:     Monitor UO- no shrestha, voiding  -IVF: LR @ 125/hr  Labs:          BUN/Cr- 13/0.6  -->,  13/0.5  -->          Electrolytes-Na 143 // K 3.9 // Mg 2.1 //  Phos 2.8 (06-10 @ 22:00)    HEME/ONC:   #h/o ovarian cancer s/p WILBERT, right oophrectomy   DVT prophylaxis-LVX qD, SCDs    Labs: Hb/Hct:  14.0/42.1  -->,  11.1/33.5  -->                      Plts:  266  -->,  314  -->         ID:  No antibiotics  WBC- 10.95  --->>,  8.98  --->>  Temp trend- 24hrs T(F): 97.3 (06-10 @ 23:49), Max: 98 (06-10 @ 15:30)      ENDO:  #h/o DM   - ISS   --164    LINES/DRAINS:  PIV    Advanced Directives: Presumed Full Code    HCP/Emergency Contact-    DISPO:    SDU         Assessment & Plan  46y Female s/p laparoscopic sleeve gastrectomy.       NEURO:  #Acute pain-controlled with   - IV tylenol, gabapentin, toradol   #h/o seizures not on any medications    RESP:   #h/o KAN on CPAP @ night    Oxygenation-on RA this am, satting well    Activity- ambulate as tolerated     Encourage IS    CARDS:   #h/o HLD  - continue home atorvastatin     #post op EKG: prolonged qtc 481  - 2gm Mg    GI/NUTR:   #h/o diverticulitis s/p colon resection     Diet, Bariatric Clear Liquid.  no further episodes of vomiting overnight    GI Prophylaxis- Protonix    Bowel regimen-none    /RENAL:     Monitor UO- no shrestha, voiding  -IVF: LR @ 125/hr  Labs:          BUN/Cr- 13/0.6  -->,  13/0.5  -->          Electrolytes-Na 143 // K 3.9 // Mg 2.1 //  Phos 2.8 (06-10 @ 22:00)    HEME/ONC:   #h/o ovarian cancer s/p WILBERT, right oophrectomy   DVT prophylaxis-LVX qD, SCDs    Labs: Hb/Hct:  14.0/42.1  -->,  11.1/33.5  --> 11.4/35.4                      Plts:  266  -->,  314  -->         ID:  No antibiotics  WBC- 10.95  --->>,  8.98  --->>9.7  Temp trend- 24hrs T(F): 97.3 (06-10 @ 23:49), Max: 98 (06-10 @ 15:30)      ENDO:  #h/o DM   - ISS   --164    LINES/DRAINS:  PIV    Advanced Directives: Presumed Full Code    HCP/Emergency Contact-    DISPO:  downgrade to floor

## 2022-06-11 NOTE — PROGRESS NOTE ADULT - SUBJECTIVE AND OBJECTIVE BOX
GENERAL SURGERY PROGRESS NOTE    Patient: ALFIE MOSLEY , 46y ([birth year])Female   MRN: [id number]  Location: [address]  Visit: 06-09-22 Inpatient  Date: 06-11-22 @ 03:24    Hospital Day #: 3  Post-Op Day #: 2    Procedure/Dx/Injuries: 45 y/o F with PMHx of DM, ovarian cancer s/p WILBERT with right oopherectomy, h/o seizures, HLD, diverticulitis s/p colon resection, KAN now s/p laparoscopic sleeve gastrectomy and BL TAP blocks    Events of past 24 hours:  NIGHT  -Hg 14 to 11, repeat at 4am  -K Phos    DAY  -episode of vomiting in the morning and nausea in afternoon --> tx with Compazine both times  -downgrade to floor       PAST MEDICAL & SURGICAL HISTORY:  Diabetes      Ovarian cancer  UNSURE OF STAGE HOWEVER REPORTS IT &quot;MINOR&quot;  Sees Oncologist Dr. Vanessa Correa      Seizure disorder  last seizure &gt; 6 months ago. No longer on medications      Hypercholesteremia      Vertigo  &gt; 3 months ago, not currently complaining of same (3/4/2020)      Diverticulosis of intestine      Diverticulitis      Fatty liver      Obesity      Sleep apnea      History of cholecystectomy  10-15 years ago      H/O abdominal hysterectomy  With Right Oophrecetomy   In December 2017      H/O colonoscopy  &gt;3 years ago      History of colon resection      History of mobilization of splenic flexure          Vitals:   T(F): 97.3 (06-10-22 @ 23:49), Max: 98 (06-10-22 @ 15:30)  HR: 58 (06-10-22 @ 15:30)  BP: 132/78 (06-10-22 @ 15:30)  RR: 30 (06-10-22 @ 15:30)  SpO2: 96% (06-10-22 @ 07:43)      Diet, Clear Liquid:   Bariatric Clear Liquid (BARICLLIQ)      Fluids:     I & O's:    06-09-22 @ 07:01  -  06-10-22 @ 07:00  --------------------------------------------------------  IN:    Lactated Ringers: 375 mL    Lactated Ringers: 1750 mL    Oral Fluid: 540 mL  Total IN: 2665 mL    OUT:    Voided (mL): 2080 mL  Total OUT: 2080 mL    Total NET: 585 mL      Bowel Movement : [] YES [x] NO  Flatus : [x] YES [] NO    PHYSICAL EXAM:  General Appearance: NAD  HEENT: Normocephalic  Heart:rrr  Lungs: No increased work of breathing or accessory muscle use.   Abdomen:  Soft, approp ttp  MSK/Extremities: Warm & well-perfused.   Skin: Warm, dry. No jaundice.   Incision/wound: healing well, clean, dry and intact      MEDICATIONS  (STANDING):  atorvastatin Oral Tab/Cap - Peds 40 milliGRAM(s) Oral at bedtime  dextrose 50% Injectable 25 Gram(s) IV Push once  enoxaparin Injectable 40 milliGRAM(s) SubCutaneous every 24 hours  insulin lispro (ADMELOG) corrective regimen sliding scale   SubCutaneous three times a day before meals  lactated ringers. 1000 milliLiter(s) (125 mL/Hr) IV Continuous <Continuous>  pantoprazole  Injectable 40 milliGRAM(s) IV Push daily    MEDICATIONS  (PRN):  gabapentin Solution 100 milliGRAM(s) Oral three times a day PRN pain  ketorolac   Injectable 15 milliGRAM(s) IV Push every 8 hours PRN Moderate Pain (4 - 6)      DVT PROPHYLAXIS: enoxaparin Injectable 40 milliGRAM(s) SubCutaneous every 24 hours    GI PROPHYLAXIS: pantoprazole  Injectable 40 milliGRAM(s) IV Push daily    ANTICOAGULATION:   ANTIBIOTICS:            LAB/STUDIES:  Labs:  CAPILLARY BLOOD GLUCOSE      POCT Blood Glucose.: 121 mg/dL (10 Dagoberto 2022 11:07)  POCT Blood Glucose.: 124 mg/dL (10 Dagoberto 2022 06:12)                          11.1   8.98  )-----------( 266      ( 10 Dagoberto 2022 22:00 )             33.5       Auto Neutrophil %: 57.4 % (06-10-22 @ 22:00)  Auto Immature Granulocyte %: 0.2 % (06-10-22 @ 22:00)    06-10    143  |  108  |  13  ----------------------------<  105<H>  3.9   |  22  |  0.5<L>      Calcium, Total Serum: 8.9 mg/dL (06-10-22 @ 22:00)    ACCESS/ DEVICES:  [x] Peripheral IV

## 2022-06-13 LAB — SURGICAL PATHOLOGY STUDY: SIGNIFICANT CHANGE UP

## 2022-06-14 ENCOUNTER — EMERGENCY (EMERGENCY)
Facility: HOSPITAL | Age: 47
LOS: 0 days | Discharge: HOME | End: 2022-06-14
Attending: EMERGENCY MEDICINE | Admitting: EMERGENCY MEDICINE
Payer: MEDICAID

## 2022-06-14 ENCOUNTER — APPOINTMENT (OUTPATIENT)
Dept: DERMATOLOGY | Facility: CLINIC | Age: 47
End: 2022-06-14
Payer: MEDICAID

## 2022-06-14 ENCOUNTER — OUTPATIENT (OUTPATIENT)
Dept: OUTPATIENT SERVICES | Facility: HOSPITAL | Age: 47
LOS: 1 days | Discharge: HOME | End: 2022-06-14

## 2022-06-14 VITALS
HEART RATE: 83 BPM | TEMPERATURE: 96 F | HEIGHT: 56 IN | OXYGEN SATURATION: 100 % | RESPIRATION RATE: 20 BRPM | DIASTOLIC BLOOD PRESSURE: 66 MMHG | SYSTOLIC BLOOD PRESSURE: 110 MMHG

## 2022-06-14 VITALS
TEMPERATURE: 97.1 F | BODY MASS INDEX: 41.84 KG/M2 | SYSTOLIC BLOOD PRESSURE: 120 MMHG | WEIGHT: 186 LBS | DIASTOLIC BLOOD PRESSURE: 79 MMHG | OXYGEN SATURATION: 98 % | HEART RATE: 87 BPM | HEIGHT: 56 IN

## 2022-06-14 DIAGNOSIS — Z98.890 OTHER SPECIFIED POSTPROCEDURAL STATES: Chronic | ICD-10-CM

## 2022-06-14 DIAGNOSIS — M54.2 CERVICALGIA: ICD-10-CM

## 2022-06-14 DIAGNOSIS — Z90.49 ACQUIRED ABSENCE OF OTHER SPECIFIED PARTS OF DIGESTIVE TRACT: ICD-10-CM

## 2022-06-14 DIAGNOSIS — M54.50 LOW BACK PAIN, UNSPECIFIED: ICD-10-CM

## 2022-06-14 DIAGNOSIS — R56.9 UNSPECIFIED CONVULSIONS: ICD-10-CM

## 2022-06-14 DIAGNOSIS — E78.00 PURE HYPERCHOLESTEROLEMIA, UNSPECIFIED: ICD-10-CM

## 2022-06-14 DIAGNOSIS — M54.9 DORSALGIA, UNSPECIFIED: ICD-10-CM

## 2022-06-14 DIAGNOSIS — S30.1XXA CONTUSION OF ABDOMINAL WALL, INITIAL ENCOUNTER: ICD-10-CM

## 2022-06-14 DIAGNOSIS — Z90.49 ACQUIRED ABSENCE OF OTHER SPECIFIED PARTS OF DIGESTIVE TRACT: Chronic | ICD-10-CM
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
## 2022-06-14 DIAGNOSIS — Z85.43 PERSONAL HISTORY OF MALIGNANT NEOPLASM OF OVARY: ICD-10-CM
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
## 2022-06-14 DIAGNOSIS — G47.30 SLEEP APNEA, UNSPECIFIED: ICD-10-CM

## 2022-06-14 DIAGNOSIS — Z98.84 BARIATRIC SURGERY STATUS: ICD-10-CM

## 2022-06-14 DIAGNOSIS — E11.9 TYPE 2 DIABETES MELLITUS WITHOUT COMPLICATIONS: ICD-10-CM

## 2022-06-14 DIAGNOSIS — R10.9 UNSPECIFIED ABDOMINAL PAIN: ICD-10-CM

## 2022-06-14 DIAGNOSIS — R42 DIZZINESS AND GIDDINESS: ICD-10-CM

## 2022-06-14 DIAGNOSIS — Z91.041 RADIOGRAPHIC DYE ALLERGY STATUS: ICD-10-CM

## 2022-06-14 DIAGNOSIS — Z90.710 ACQUIRED ABSENCE OF BOTH CERVIX AND UTERUS: ICD-10-CM

## 2022-06-14 DIAGNOSIS — Z87.19 PERSONAL HISTORY OF OTHER DISEASES OF THE DIGESTIVE SYSTEM: ICD-10-CM

## 2022-06-14 LAB
ALBUMIN SERPL ELPH-MCNC: 4.6 G/DL — SIGNIFICANT CHANGE UP (ref 3.5–5.2)
ALP SERPL-CCNC: 155 U/L — HIGH (ref 30–115)
ALT FLD-CCNC: 98 U/L — HIGH (ref 0–41)
ANION GAP SERPL CALC-SCNC: 17 MMOL/L — HIGH (ref 7–14)
APTT BLD: 32.8 SEC — SIGNIFICANT CHANGE UP (ref 27–39.2)
AST SERPL-CCNC: 47 U/L — HIGH (ref 0–41)
BASOPHILS # BLD AUTO: 0.03 K/UL — SIGNIFICANT CHANGE UP (ref 0–0.2)
BASOPHILS NFR BLD AUTO: 0.3 % — SIGNIFICANT CHANGE UP (ref 0–1)
BILIRUB SERPL-MCNC: 0.6 MG/DL — SIGNIFICANT CHANGE UP (ref 0.2–1.2)
BUN SERPL-MCNC: 14 MG/DL — SIGNIFICANT CHANGE UP (ref 10–20)
CALCIUM SERPL-MCNC: 9.8 MG/DL — SIGNIFICANT CHANGE UP (ref 8.5–10.1)
CHLORIDE SERPL-SCNC: 101 MMOL/L — SIGNIFICANT CHANGE UP (ref 98–110)
CO2 SERPL-SCNC: 22 MMOL/L — SIGNIFICANT CHANGE UP (ref 17–32)
CREAT SERPL-MCNC: 0.6 MG/DL — LOW (ref 0.7–1.5)
EGFR: 112 ML/MIN/1.73M2 — SIGNIFICANT CHANGE UP
EOSINOPHIL # BLD AUTO: 0.32 K/UL — SIGNIFICANT CHANGE UP (ref 0–0.7)
EOSINOPHIL NFR BLD AUTO: 2.9 % — SIGNIFICANT CHANGE UP (ref 0–8)
ETHANOL SERPL-MCNC: <10 MG/DL — SIGNIFICANT CHANGE UP
GLUCOSE SERPL-MCNC: 87 MG/DL — SIGNIFICANT CHANGE UP (ref 70–99)
HCG SERPL QL: NEGATIVE — SIGNIFICANT CHANGE UP
HCT VFR BLD CALC: 37.5 % — SIGNIFICANT CHANGE UP (ref 37–47)
HGB BLD-MCNC: 12.3 G/DL — SIGNIFICANT CHANGE UP (ref 12–16)
IMM GRANULOCYTES NFR BLD AUTO: 0.4 % — HIGH (ref 0.1–0.3)
INR BLD: 1.26 RATIO — SIGNIFICANT CHANGE UP (ref 0.65–1.3)
LACTATE SERPL-SCNC: 1 MMOL/L — SIGNIFICANT CHANGE UP (ref 0.7–2)
LIDOCAIN IGE QN: 28 U/L — SIGNIFICANT CHANGE UP (ref 7–60)
LYMPHOCYTES # BLD AUTO: 2.85 K/UL — SIGNIFICANT CHANGE UP (ref 1.2–3.4)
LYMPHOCYTES # BLD AUTO: 25.5 % — SIGNIFICANT CHANGE UP (ref 20.5–51.1)
MCHC RBC-ENTMCNC: 27.5 PG — SIGNIFICANT CHANGE UP (ref 27–31)
MCHC RBC-ENTMCNC: 32.8 G/DL — SIGNIFICANT CHANGE UP (ref 32–37)
MCV RBC AUTO: 83.7 FL — SIGNIFICANT CHANGE UP (ref 81–99)
MONOCYTES # BLD AUTO: 0.6 K/UL — SIGNIFICANT CHANGE UP (ref 0.1–0.6)
MONOCYTES NFR BLD AUTO: 5.4 % — SIGNIFICANT CHANGE UP (ref 1.7–9.3)
NEUTROPHILS # BLD AUTO: 7.34 K/UL — HIGH (ref 1.4–6.5)
NEUTROPHILS NFR BLD AUTO: 65.5 % — SIGNIFICANT CHANGE UP (ref 42.2–75.2)
NRBC # BLD: 0 /100 WBCS — SIGNIFICANT CHANGE UP (ref 0–0)
PLATELET # BLD AUTO: 342 K/UL — SIGNIFICANT CHANGE UP (ref 130–400)
POTASSIUM SERPL-MCNC: 4.4 MMOL/L — SIGNIFICANT CHANGE UP (ref 3.5–5)
POTASSIUM SERPL-SCNC: 4.4 MMOL/L — SIGNIFICANT CHANGE UP (ref 3.5–5)
PROT SERPL-MCNC: 7.7 G/DL — SIGNIFICANT CHANGE UP (ref 6–8)
PROTHROM AB SERPL-ACNC: 14.5 SEC — HIGH (ref 9.95–12.87)
RBC # BLD: 4.48 M/UL — SIGNIFICANT CHANGE UP (ref 4.2–5.4)
RBC # FLD: 14.5 % — SIGNIFICANT CHANGE UP (ref 11.5–14.5)
SODIUM SERPL-SCNC: 140 MMOL/L — SIGNIFICANT CHANGE UP (ref 135–146)
WBC # BLD: 11.18 K/UL — HIGH (ref 4.8–10.8)
WBC # FLD AUTO: 11.18 K/UL — HIGH (ref 4.8–10.8)

## 2022-06-14 PROCEDURE — 99214 OFFICE O/P EST MOD 30 MIN: CPT | Mod: GC

## 2022-06-14 PROCEDURE — 99283 EMERGENCY DEPT VISIT LOW MDM: CPT

## 2022-06-14 PROCEDURE — 74177 CT ABD & PELVIS W/CONTRAST: CPT | Mod: 26,MA

## 2022-06-14 PROCEDURE — 71260 CT THORAX DX C+: CPT | Mod: 26,MA

## 2022-06-14 PROCEDURE — 72125 CT NECK SPINE W/O DYE: CPT | Mod: 26,MA

## 2022-06-14 PROCEDURE — 70450 CT HEAD/BRAIN W/O DYE: CPT | Mod: 26,MA

## 2022-06-14 PROCEDURE — 99285 EMERGENCY DEPT VISIT HI MDM: CPT

## 2022-06-14 RX ORDER — DIPHENHYDRAMINE HCL 50 MG
50 CAPSULE ORAL ONCE
Refills: 0 | Status: COMPLETED | OUTPATIENT
Start: 2022-06-14 | End: 2022-06-14

## 2022-06-14 RX ORDER — BLOOD SUGAR DIAGNOSTIC
STRIP MISCELLANEOUS 3 TIMES DAILY
Qty: 100 | Refills: 5 | Status: ACTIVE | COMMUNITY
Start: 2020-05-28 | End: 1900-01-01

## 2022-06-14 RX ORDER — ACETAMINOPHEN 500 MG
650 TABLET ORAL ONCE
Refills: 0 | Status: COMPLETED | OUTPATIENT
Start: 2022-06-14 | End: 2022-06-14

## 2022-06-14 RX ADMIN — Medication 125 MILLIGRAM(S): at 15:42

## 2022-06-14 RX ADMIN — Medication 650 MILLIGRAM(S): at 17:15

## 2022-06-14 RX ADMIN — Medication 50 MILLIGRAM(S): at 15:42

## 2022-06-14 NOTE — ED ADULT NURSE NOTE - CHIEF COMPLAINT QUOTE
s/p trip and fall outside [place] parking lot, +hit her stomach Hx bariatric surgery 4 days ago, +abdominal pain and back pain, denies LOC, +nausea and dizziness., patient did not hit her head

## 2022-06-14 NOTE — CONSULT NOTE ADULT - ATTENDING COMMENTS
45yo female s/p robotic sleeve gastrectomy 6/9 presenting after mechanical fall and landing on her abdomen. Complained of abdominal and back pain. Had nausea postop that is improving overall. Denies chest pain, dizziness or headache. Imaging negative for traumatic injury or bariatric surgery issue. Dispo as per ER and trauma team.

## 2022-06-14 NOTE — ED ADULT NURSE NOTE - OBJECTIVE STATEMENT
s/p trip and fall outside [place] parking lot, +hit her stomach Hx bariatric surgery 4 days ago, +abdominal pain and back pain, denies LOC, +nausea and dizziness., patient did not hit her head

## 2022-06-14 NOTE — ED ADULT TRIAGE NOTE - CHIEF COMPLAINT QUOTE
s/p trip and fall outside [place] parking lot, +hit her stomach Hx bariatric surgery 4 days ago, +abdominal pain and back pain, denies LOC, +nausea and dizziness., patient did not hit her head

## 2022-06-14 NOTE — ED PROVIDER NOTE - PROVIDER TOKENS
PROVIDER:[TOKEN:[[id number]],FOLLOWUP:[1-3 Days],ESTABLISHEDPATIENT:[T]],PROVIDER:[TOKEN:[[id number]],FOLLOWUP:[1-3 Days]]

## 2022-06-14 NOTE — CONSULT NOTE ADULT - SUBJECTIVE AND OBJECTIVE BOX
Surgery Progress Note       Patient: ALFIE MOSLEY , 46y ([birth year])Female   MRN: [id number]  Location: [facility] ED  Visit: 06-14-22 Emergency  Date: 06-14-22 @ 17:03      HPI:  45 yo female with class 3 obesity POD5 lap sleeve gastrectomy. Discharged Saturday due to nausea post operatively. Drinking liquids okay. Was walking today and tripped and fell onto her abdomen. Denies LOC.     States she has some worsened nausea since fall and some epigastric pain.     PAST MEDICAL & SURGICAL HISTORY:  Diabetes      Ovarian cancer  UNSURE OF STAGE HOWEVER REPORTS IT &quot;MINOR&quot;  Sees Oncologist Dr. Vanessa Correa      Seizure disorder  last seizure &gt; 6 months ago. No longer on medications      Hypercholesteremia      Vertigo  &gt; 3 months ago, not currently complaining of same (3/4/2020)    Diverticulosis of intestine  Diverticulitis  Fatty live    Obesity    Sleep apnea    Surgical:   Lap sleeve gastrectomy   History of cholecystectomy  10-15 years ago  H/O abdominal hysterectomy  With Right Oophrecetomy   In December 2017  H/O colonoscopy  &gt;3 years ag    History of colon resection  History of mobilization of splenic flexure            Vitals:   T(F): 96.1 (06-14-22 @ 14:17), Max: 96.1 (06-14-22 @ 14:17)  HR: 83 (06-14-22 @ 14:17)  BP: 110/66 (06-14-22 @ 14:17)  RR: 20 (06-14-22 @ 14:17)  SpO2: 100% (06-14-22 @ 14:17)        PHYSICAL EXAM:  General Appearance: NAD  HEENT: Normocephalic, atraumatic  Heart: rrr  Lungs: No increased work of breathing or accessory muscle use.   Abdomen:  Soft, nontender, nondistended. incisions with mild bruising healing well. no obvious external traumatic bruising   MSK/Extremities: Warm & well-perfused.   Skin: Warm, dry. No jaundice.                 LAB/STUDIES:  Labs:  CAPILLARY BLOOD GLUCOSE                              12.3   11.18 )-----------( 342      ( 14 Jun 2022 15:12 )             37.5       Auto Neutrophil %: 65.5 % (06-14-22 @ 15:12)  Auto Immature Granulocyte %: 0.4 % (06-14-22 @ 15:12)    06-14    140  |  101  |  14  ----------------------------<  87  4.4   |  22  |  0.6<L>      Calcium, Total Serum: 9.8 mg/dL (06-14-22 @ 15:12)      LFTs:             7.7  | 0.6  | 47       ------------------[155     ( 14 Jun 2022 15:12 )  4.6  | x    | 98          Lipase:28     Amylase:x         Lactate, Blood: 1.0 mmol/L (06-14-22 @ 15:12)      Coags:     14.50  ----< 1.26    ( 14 Jun 2022 15:12 )     32.8              Alcohol, Blood: <10 mg/dL (06-14-22 @ 15:12)    Alcohol, Blood: <10 mg/dL (06-14-22 @ 15:12)      IMAGING:  < from: CT Abdomen and Pelvis w/ IV Cont (06.14.22 @ 16:09) >      < end of copied text >  < from: CT Cervical Spine No Cont (06.14.22 @ 15:46) >      < end of copied text >

## 2022-06-14 NOTE — ED PROVIDER NOTE - CARE PROVIDERS DIRECT ADDRESSES
[email or web address]

## 2022-06-14 NOTE — ED PROVIDER NOTE - ATTENDING CONTRIBUTION TO CARE
45 y/o female in ER for eval after fall just PTA.  Pt with h/o dm, ovarian ca s/p WILBERT, R oophorectomy, diverticulitis s/p colon resection, seizures, hld, KAN, s/p sleeve gastrectomy 5 days ago with Dr. Hurd - pt states she was walking outside, tripped and fell onto her stomach. did not hit head, no LOC.  + neck pain and LBP.  + abdominal pain.  + N. no V.  + dizziness.  no CP/SOB.  no extremity injury.  PE - nad, nc/at, eomi, perrl, op - clear, mmm, no c-spine tenderness, cta b/l, no w/r/r, rrr, abd - soft, + diffuse tenderness, surgical sites c/d/i, mild surrounding ecchymosis, from x 4, no extremity swelling/tenderness, A&O x 3, cn 2-12 intact, motor 5/5 b/l UE and LE, no sensory deficits.  -trama CT's, surg eval.

## 2022-06-14 NOTE — CONSULT NOTE ADULT - ASSESSMENT
A/P:   46y F POD5 lap sleeve gastrectomy, s/p fall from standing today  -No acute traumatic injuries. Small subq hematoma in right abdominal wall- can treat with ice pack/tylenol/ avoid nsaids.   -Follow up as scheduled this week with Dr Hurd, continue donavan clear diet with protein shakes  D/w Dr Vannessa Thapa MD  [facility] Fellow    [place] TEAM SPECTRA [id number]

## 2022-06-14 NOTE — ED PROVIDER NOTE - CARE PROVIDER_API CALL
Jesus Lima (DO)  Medicine  [address] [facility], [address] [place] [address]  Phone: [phone]  Fax: [phone]  Established Patient  Follow Up Time: 1-3 Days    Jessica Hurd)  Surgical Physicians  [address] [facility], [facility], [facility] [place] [address]  Phone: [phone]  Fax: [phone]  Follow Up Time: 1-3 Days

## 2022-06-14 NOTE — ED ADULT NURSE NOTE - NSIMPLEMENTINTERV_GEN_ALL_ED
Implemented All Universal Safety Interventions:  [place] to call system. Call bell, personal items and telephone within reach. Instruct patient to call for assistance. Room bathroom lighting operational. Non-slip footwear when patient is off stretcher. Physically safe environment: no spills, clutter or unnecessary equipment. Stretcher in lowest position, wheels locked, appropriate side rails in place.

## 2022-06-14 NOTE — ED PROVIDER NOTE - NSFOLLOWUPINSTRUCTIONS_ED_ALL_ED_FT
Abdominal Pain    Many things can cause abdominal pain. Usually, abdominal pain is not caused by a disease and will improve without treatment. Your health care provider will do a physical exam and possibly order blood tests and imaging to help determine the seriousness of your pain. However, in many cases, no cause may be found and you may need further testing as an outpatient. Monitor your abdominal pain for any changes.     SEEK IMMEDIATE MEDICAL CARE IF YOU HAVE THE FOLLOWING SYMPTOMS: worsening abdominal pain, vomiting, diarrhea, inability to have bowel movements or pass gas, black or bloody stool, fever accompanying chest pain or back pain, or dizziness/lightheadedness.    Fall Prevention in the Home, Adult  Falls can cause injuries and can affect people from all age groups. There are many simple things that you can do to make your home safe and to help prevent falls. Ask for help when making these changes, if needed.    What actions can I take to prevent falls?  General instructions     Use good lighting in all rooms. Replace any light bulbs that burn out.  Turn on lights if it is dark. Use night-lights.  Place frequently used items in easy-to-reach places. Lower the shelves around your home if necessary.  Set up furniture so that there are clear paths around it. Avoid moving your furniture around.  Remove throw rugs and other tripping hazards from the floor.  Avoid walking on wet floors.  Fix any uneven floor surfaces.  Add color or contrast paint or tape to grab bars and handrails in your home. Place contrasting color strips on the first and last steps of stairways.  When you use a stepladder, make sure that it is completely opened and that the sides are firmly locked. Have someone hold the ladder while you are using it. Do not climb a closed stepladder.  Be aware of any and all pets.  What can I do in the bathroom?     Keep the floor dry. Immediately clean up any water that spills onto the floor.  Remove soap buildup in the tub or shower on a regular basis.  Use non-skid mats or decals on the floor of the tub or shower.  Attach bath mats securely with double-sided, non-slip rug tape.  If you need to sit down while you are in the shower, use a plastic, non-slip stool.  Image ImageInstall grab bars by the toilet and in the tub and shower. Do not use towel bars as grab bars.  What can I do in the bedroom?     Make sure that a bedside light is easy to reach.  Do not use oversized bedding that drapes onto the floor.  Have a firm chair that has side arms to use for getting dressed.  What can I do in the kitchen?     Clean up any spills right away.  If you need to reach for something above you, use a sturdy step stool that has a grab bar.  Keep electrical cables out of the way.  Do not use floor polish or wax that makes floors slippery. If you must use wax, make sure that it is non-skid floor wax.  What can I do in the stairways?     Do not leave any items on the stairs.  Make sure that you have a light switch at the top of the stairs and the bottom of the stairs. Have them installed if you do not have them.  Make sure that there are handrails on both sides of the stairs. Fix handrails that are broken or loose. Make sure that handrails are as long as the stairways.  Install non-slip stair treads on all stairs in your home.  Avoid having throw rugs at the top or bottom of stairways, or secure the rugs with carpet tape to prevent them from moving.  Choose a carpet design that does not hide the edge of steps on the stairway.  Check any carpeting to make sure that it is firmly attached to the stairs. Fix any carpet that is loose or worn.  What can I do on the outside of my home?     Use bright outdoor lighting.  Regularly repair the edges of walkways and driveways and fix any cracks.  Remove high doorway thresholds.  Trim any shrubbery on the main path into your home.  Regularly check that handrails are securely fastened and in good repair. Both sides of any steps should have handrails.  Install guardrails along the edges of any raised decks or porches.  Clear walkways of debris and clutter, including tools and rocks.  Have leaves, snow, and ice cleared regularly.  Use sand or salt on walkways during winter months.  In the garage, clean up any spills right away, including grease or oil spills.  What other actions can I take?     Wear closed-toe shoes that fit well and support your feet. Wear shoes that have rubber soles or low heels.  Use mobility aids as needed, such as canes, walkers, scooters, and crutches.  Review your medicines with your health care provider. Some medicines can cause dizziness or changes in blood pressure, which increase your risk of falling.  Talk with your health care provider about other ways that you can decrease your risk of falls. This may include working with a physical therapist or  to improve your strength, balance, and endurance.    Where to find more information  Centers for Disease Control and Prevention, [place]: https://www.cdc.gov  National [place] on Aging: https://zd7qimj.navdeep.nih.gov  Contact a health care provider if:  You are afraid of falling at home.  You feel weak, drowsy, or dizzy at home.  You fall at home.  Summary  There are many simple things that you can do to make your home safe and to help prevent falls.  Ways to make your home safe include removing tripping hazards and installing grab bars in the bathroom.  Ask for help when making these changes in your home.  This information is not intended to replace advice given to you by your health care provider. Make sure you discuss any questions you have with your health care provider.

## 2022-06-14 NOTE — ED PROVIDER NOTE - PATIENT PORTAL LINK FT
You can access the FollowMyHealth Patient Portal offered by [facility] by registering at the following website: [email or web address]. By joining [email or web address]’s FollowMyHealth portal, you will also be able to view your health information using other applications (apps) compatible with our system.

## 2022-06-14 NOTE — ED PROVIDER NOTE - CLINICAL SUMMARY MEDICAL DECISION MAKING FREE TEXT BOX
Labs reviewed, CT head/C-spine negative.  CT chest/abdomen/pelvis: No evidence of acute traumatic intrathoracic pathology, no solid abdominal organ injury, R upper abdominal wall SQ fat small hematoma.  Patient seen and evaluated by surgery, to DC home and patient to follow-up with Dr. Hurd this week.  Patient told to return to ER for worsening pain, vomiting, dizziness, chest pain, or any other new/concerning symptoms.  Patient understands and agrees with plan.

## 2022-06-14 NOTE — PHYSICAL EXAM
[FreeTextEntry3] : Face, neck, chest, UE, back no suspicious lesions.\par \par Left axilla linear erythematous firm scar, slightly tender.

## 2022-06-14 NOTE — ED PROVIDER NOTE - NS ED ROS FT
Constitutional: No fatigue, confusion, or amnesia.  Head: No headache  ENT: No visual changes.  Cardiac:  No chest pain or SOB.  Respiratory:  No respiratory distress or hemoptysis.  GI:  Reports N/V and abdominal pain.   :  No incontinence.  MS:  No joint pain or back pain.  Neuro:  Reports dizziness. No LOC, paralysis, or N/T.  Skin:  No lacerations or abrasions.

## 2022-06-14 NOTE — ED ADULT NURSE NOTE - NS TRANSFER PATIENT BELONGINGS
Nutrition Assessment     Type and Reason for Visit: Reassess    Nutrition Recommendations/Plan:   1. Continue Regular diet    - provided cafe and grill options to patient to expand food choices while in the hospital on bed rest.     2. Encourage adequate hydration         Nutrition Assessment:     5/14: pt off the unit when RD visited, provided cafe menu for next week to RN caring for patient. Review of medical record completed. Plan for delivery within this next week @37w. Wt is 1lb up from last week. B/p meds increased. No needs identified at this time. 5/6: Pt admitted on bedrest for Hypertension in pregnancy [O16.9] for several days prior to delivery. RD visited with patient, her  at bedside. Pt has been getting some outside foods, some foods from cafe and meals provided at bedside. Pt has menu at bedside to order meals. No GI concerns or complaints at this time. Pt is eating well, wt gain is appropriate for gestation. Pt does not complain of constipation. Will follow for meal acceptance & adequate PO over the next few weeks. Malnutrition Assessment:  Malnutrition Status: No malnutrition     Estimated Daily Nutrient Needs:  Energy (kcal):  2040 - 2455 kcal/d (25-30 kcal/kg)  Protein (g):  80-100g/d (1-1.2g/kg of UBW pre-pregnancy)       Fluid (ml/day):  2040mL+    Nutrition Related Findings:       Last BM on flow sheet noted 5/14      Current Nutrition Therapies:  DIET REGULAR  DIET ONE TIME MESSAGE    Documented Meal intake:  No data found. - (5/6/21) Pt & RN reports pt is eating meals well    Documentation of supplement intake:  No data found.     Anthropometric Measures:  · Height:  5' 5\" (165.1 cm)  · Current Body Wt:  95.3 kg (210 lb 1.6 oz)  · BMI: 35     Last 3 Recorded Weights in this Encounter    05/02/21 0822 05/09/21 0749 05/11/21 1837   Weight: 94.4 kg (208 lb 3.2 oz) 94 kg (207 lb 3.2 oz) 95.3 kg (210 lb)       Nutrition Diagnosis:   · No nutrition diagnosis at this time Nutrition Intervention:  Food and/or Nutrient Delivery: Continue current diet, Snacks (specify) - pt instructed to request desired snacks PRN or added to meals when ordered.   Nutrition Education and Counseling: Education completed  Coordination of Nutrition Care: Continue to monitor while inpatient    Goals:  n/a       Nutrition Monitoring and Evaluation:   Behavioral-Environmental Outcomes:    Food/Nutrient Intake Outcomes: Food and nutrient intake  Physical Signs/Symptoms Outcomes: GI status, Weight    Discharge Planning:    No discharge needs at this time       Electronically signed by Ritu Linder RD, MS on 5/14/2021 at 2:03 PM  Contact via [address] [place] [address] or office [phone] Clothing

## 2022-06-14 NOTE — ED PROVIDER NOTE - PHYSICAL EXAMINATION
CONSTITUTIONAL: NAD   SKIN: No lacerations or abrasions.  HEAD: NCAT  EYES: PERRLA, EOMI  NECK: Diffusely tender over entire neck   CARD: +S1, S2 no murmurs, gallops, or rubs. Regular rate and rhythm. Radial, DP, PT 2+/4 bilaterally  RESP: LCTAB. No wheezes, rales or rhonchi.  ABD: Diffuse TTP. Multiple surgical incision sites noted over abdomen, clean and intact. Abdomen soft and nondistended   NEURO: Alert, oriented, grossly unremarkable. Strength 5/5 in bilateral UE and LEs. Follows commands.  MSK: No TTP in UE and LEs. No chest wall tenderness or crepitus  BACK: Diffusely tender over entire back.

## 2022-06-15 ENCOUNTER — OUTPATIENT (OUTPATIENT)
Dept: OUTPATIENT SERVICES | Facility: HOSPITAL | Age: 47
LOS: 1 days | Discharge: HOME | End: 2022-06-15

## 2022-06-15 ENCOUNTER — APPOINTMENT (OUTPATIENT)
Dept: ENDOCRINOLOGY | Facility: CLINIC | Age: 47
End: 2022-06-15

## 2022-06-15 ENCOUNTER — APPOINTMENT (OUTPATIENT)
Dept: SURGERY | Facility: CLINIC | Age: 47
End: 2022-06-15
Payer: MEDICAID

## 2022-06-15 VITALS
TEMPERATURE: 98.6 F | WEIGHT: 180 LBS | HEART RATE: 90 BPM | HEIGHT: 56 IN | OXYGEN SATURATION: 98 % | BODY MASS INDEX: 40.49 KG/M2 | SYSTOLIC BLOOD PRESSURE: 120 MMHG | DIASTOLIC BLOOD PRESSURE: 80 MMHG

## 2022-06-15 VITALS
SYSTOLIC BLOOD PRESSURE: 116 MMHG | WEIGHT: 183 LBS | TEMPERATURE: 97 F | HEIGHT: 56 IN | OXYGEN SATURATION: 98 % | HEART RATE: 101 BPM | BODY MASS INDEX: 41.17 KG/M2 | DIASTOLIC BLOOD PRESSURE: 80 MMHG

## 2022-06-15 DIAGNOSIS — Z98.890 OTHER SPECIFIED POSTPROCEDURAL STATES: Chronic | ICD-10-CM

## 2022-06-15 DIAGNOSIS — Z90.49 ACQUIRED ABSENCE OF OTHER SPECIFIED PARTS OF DIGESTIVE TRACT: Chronic | ICD-10-CM

## 2022-06-15 DIAGNOSIS — E55.9 VITAMIN D DEFICIENCY, UNSPECIFIED: ICD-10-CM

## 2022-06-15 PROCEDURE — 99024 POSTOP FOLLOW-UP VISIT: CPT

## 2022-06-15 RX ORDER — BLOOD-GLUCOSE METER
EACH MISCELLANEOUS
Qty: 1 | Refills: 0 | Status: ACTIVE | COMMUNITY
Start: 2022-06-15 | End: 1900-01-01

## 2022-06-15 NOTE — END OF VISIT
[] : Resident [FreeTextEntry3] : 46 year old female s/p sleeve gastrectomy on 6/9/22, no complications, stopped DM meds as her numbers were ok and she was not eating . \par - keep holding DM meds as improvement in insulin resistance occur immediately \par - monitor FS and is high then will restart meds \par - she will do labs with PCP and if no improvement in DM return to us \par - advised to take vit D 5000 IU daily  [Time Spent: ___ minutes] : I have spent [unfilled] minutes of time on the encounter.

## 2022-06-15 NOTE — ASSESSMENT
[FreeTextEntry1] : 47 yo female with hx of DM2, Ovarian cancer s/p left oophorectomy in 2006 and RT oophorectomy and hysterotomy in 2017, recurrent diverticulitis s/p partial colectomy, H pylori gastritis s/p eradication, s/p cholecystectomy, CKDN2A mutation, NAFLD now sp sleeve gastrectomy here for fu of DM \par \par \par #DMII- controlled /DL \par # s/p sleeve gastrectomy \par -A1c 7.1% 5/22 \par -previously on trulicity  3 mg) and Jardiance 25 mg but stopped by PCP due to vaginal itching, started on pioglitazone 15mg qd instead  \par - stopped taking medications 1 day prior to surgery on 6/9 and has not taken them since due to low sugars at home \par - counseled to check fsg at home , can hold diabetes medications for now, if FSG > 200 then will consider restarting medications  \par - reevaluate and repeat labs in 3 months, \par - vitamin d supplement 5000 IU once daily \par \par \par  [Carbohydrate Consistent Diet] : carbohydrate consistent diet [Importance of Diet and Exercise] : importance of diet and exercise to improve glycemic control, achieve weight loss and improve cardiovascular health [Weight Loss] : weight loss

## 2022-06-15 NOTE — PHYSICAL EXAM
[Alert] : alert [Well Nourished] : well nourished [No Acute Distress] : no acute distress [No Neck Mass] : no neck mass was observed [No Thyroid Nodules] : no palpable thyroid nodules [No Accessory Muscle Use] : no accessory muscle use [Clear to Auscultation] : lungs were clear to auscultation bilaterally [Normal S1, S2] : normal S1 and S2 [Normal Rate] : heart rate was normal [Not Tender] : non-tender [Soft] : abdomen soft [Thyroid Not Enlarged] : the thyroid was not enlarged

## 2022-06-15 NOTE — HISTORY OF PRESENT ILLNESS
[FreeTextEntry1] : 46 year old female with PMHx of DM2, Ovarian cancer s/p left oophorectomy in 2006 and RT oophorectomy and hysterotomy in 2017, recurrent diverticulitis s/p partial colectomy, H pylori gastritis s/p eradication, s/p cholecystectomy, CKDN2A mutation, NAFLD presenting for DM evaluation, she is now post op laparascopic sleeve gastrectomy 6/9, \par here for Dm fu \par \par Since surgery she did not take any DM meds .

## 2022-06-16 DIAGNOSIS — E78.5 HYPERLIPIDEMIA, UNSPECIFIED: ICD-10-CM

## 2022-06-16 DIAGNOSIS — R94.31 ABNORMAL ELECTROCARDIOGRAM [ECG] [EKG]: ICD-10-CM

## 2022-06-16 DIAGNOSIS — E78.00 PURE HYPERCHOLESTEROLEMIA, UNSPECIFIED: ICD-10-CM

## 2022-06-16 DIAGNOSIS — Z90.721 ACQUIRED ABSENCE OF OVARIES, UNILATERAL: ICD-10-CM

## 2022-06-16 DIAGNOSIS — E11.65 TYPE 2 DIABETES MELLITUS WITH HYPERGLYCEMIA: ICD-10-CM

## 2022-06-16 DIAGNOSIS — Z90.49 ACQUIRED ABSENCE OF OTHER SPECIFIED PARTS OF DIGESTIVE TRACT: ICD-10-CM

## 2022-06-16 DIAGNOSIS — E66.01 MORBID (SEVERE) OBESITY DUE TO EXCESS CALORIES: ICD-10-CM

## 2022-06-16 DIAGNOSIS — E66.8 OTHER OBESITY: ICD-10-CM

## 2022-06-16 DIAGNOSIS — Z90.710 ACQUIRED ABSENCE OF BOTH CERVIX AND UTERUS: ICD-10-CM

## 2022-06-16 DIAGNOSIS — G47.33 OBSTRUCTIVE SLEEP APNEA (ADULT) (PEDIATRIC): ICD-10-CM

## 2022-06-16 DIAGNOSIS — E55.9 VITAMIN D DEFICIENCY, UNSPECIFIED: ICD-10-CM

## 2022-06-16 DIAGNOSIS — Z91.041 RADIOGRAPHIC DYE ALLERGY STATUS: ICD-10-CM

## 2022-06-16 DIAGNOSIS — Z86.69 PERSONAL HISTORY OF OTHER DISEASES OF THE NERVOUS SYSTEM AND SENSE ORGANS: ICD-10-CM

## 2022-06-16 DIAGNOSIS — K76.0 FATTY (CHANGE OF) LIVER, NOT ELSEWHERE CLASSIFIED: ICD-10-CM

## 2022-06-16 DIAGNOSIS — E11.9 TYPE 2 DIABETES MELLITUS WITHOUT COMPLICATIONS: ICD-10-CM

## 2022-06-16 DIAGNOSIS — Z79.84 LONG TERM (CURRENT) USE OF ORAL HYPOGLYCEMIC DRUGS: ICD-10-CM

## 2022-06-16 DIAGNOSIS — Z87.891 PERSONAL HISTORY OF NICOTINE DEPENDENCE: ICD-10-CM

## 2022-06-16 DIAGNOSIS — Z85.43 PERSONAL HISTORY OF MALIGNANT NEOPLASM OF OVARY: ICD-10-CM

## 2022-06-16 NOTE — PHYSICAL EXAM
[de-identified] : soft, non-tender, no rebound/guarding, no masses/hernias, wounds without erythema, drainage, or induration

## 2022-06-16 NOTE — HISTORY OF PRESENT ILLNESS
[Procedure: ___] : Procedure performed: [unfilled]  [Date of Surgery: ___] : Date of Surgery:   [unfilled] [Surgeon Name:   ___] : Surgeon Name: Dr. VALLECILLO [de-identified] : 45yo female with PMhx of class III obesity s/p robotic sleeve gastrectomy. She came to hospital yesterday after mechanical fall and requiring assessment. Work up was negative. At this time, patient states she is still having some back pain but otherwise denies fever/chills, vomiting, chest pain or shortness of breath. She had been very nauseous after surgery, which has not resolved completely but has improved. Tolerating soups and other liquids, planning to start puree tomorrow (chicken and tuna salad). She is drinking protein shakes, water and crystal light. Walking around a lot. No reflux symptoms, Taking PPI, MV, calcium and B12. [Phase 2] : Phase 2

## 2022-06-16 NOTE — ASSESSMENT
[de-identified] : 45yo female s/p robotic sleeve gastrectomy 6/9/2022. Doing well.\par -mechanical fall yesterday but recovering\par -continue adequate protein intake - goal of 70g per day\par -continue adequate hydration - goal of 60oz water or equivalent per day\par -continue PPI, MV, calcium, and B12\par -continue light activity \par -return to office for 1 month follow up\par -call with concerns

## 2022-06-17 ENCOUNTER — APPOINTMENT (OUTPATIENT)
Dept: PULMONOLOGY | Facility: CLINIC | Age: 47
End: 2022-06-17

## 2022-06-21 ENCOUNTER — APPOINTMENT (OUTPATIENT)
Dept: HEMATOLOGY ONCOLOGY | Facility: CLINIC | Age: 47
End: 2022-06-21
Payer: MEDICAID

## 2022-06-21 ENCOUNTER — LABORATORY RESULT (OUTPATIENT)
Age: 47
End: 2022-06-21

## 2022-06-21 ENCOUNTER — OUTPATIENT (OUTPATIENT)
Dept: OUTPATIENT SERVICES | Facility: HOSPITAL | Age: 47
LOS: 1 days | Discharge: HOME | End: 2022-06-21

## 2022-06-21 VITALS
DIASTOLIC BLOOD PRESSURE: 88 MMHG | BODY MASS INDEX: 40.94 KG/M2 | TEMPERATURE: 98.3 F | SYSTOLIC BLOOD PRESSURE: 111 MMHG | HEIGHT: 56 IN | WEIGHT: 182 LBS | HEART RATE: 78 BPM

## 2022-06-21 DIAGNOSIS — Z90.49 ACQUIRED ABSENCE OF OTHER SPECIFIED PARTS OF DIGESTIVE TRACT: Chronic | ICD-10-CM

## 2022-06-21 DIAGNOSIS — Z98.890 OTHER SPECIFIED POSTPROCEDURAL STATES: Chronic | ICD-10-CM

## 2022-06-21 PROCEDURE — 99213 OFFICE O/P EST LOW 20 MIN: CPT

## 2022-06-22 LAB
ALBUMIN SERPL ELPH-MCNC: 4.7 G/DL
ALP BLD-CCNC: 148 U/L
ALT SERPL-CCNC: 61 U/L
ANION GAP SERPL CALC-SCNC: 11 MMOL/L
AST SERPL-CCNC: 35 U/L
BILIRUB SERPL-MCNC: 0.3 MG/DL
BUN SERPL-MCNC: 19 MG/DL
CALCIUM SERPL-MCNC: 9.7 MG/DL
CANCER AG125 SERPL-ACNC: 35 U/ML
CHLORIDE SERPL-SCNC: 105 MMOL/L
CO2 SERPL-SCNC: 25 MMOL/L
CREAT SERPL-MCNC: 0.6 MG/DL
EGFR: 112 ML/MIN/1.73M2
GLUCOSE SERPL-MCNC: 92 MG/DL
HCT VFR BLD CALC: 38 %
HGB BLD-MCNC: 12.2 G/DL
MCHC RBC-ENTMCNC: 27.1 PG
MCHC RBC-ENTMCNC: 32.1 G/DL
MCV RBC AUTO: 84.3 FL
PLATELET # BLD AUTO: 388 K/UL
PMV BLD: 10.7 FL
POTASSIUM SERPL-SCNC: 4.1 MMOL/L
PROT SERPL-MCNC: 7.3 G/DL
RBC # BLD: 4.51 M/UL
RBC # FLD: 14.7 %
SODIUM SERPL-SCNC: 141 MMOL/L
WBC # FLD AUTO: 10.42 K/UL

## 2022-06-24 NOTE — ASSESSMENT
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 The pathology does not show invasive implants. 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 Patient was told to inform family especially children to undergo genetic testing as well.  \par \par Mammogram on 4/5/2022 was benign.\par \par She was s/p fall on 6/14/22 and further radiologic evaluation was unremarkable.\par CT chest/abdomen/pelvis (6/14/22)showed no evidence of acute traumatic intrathoracic pathology or solid \par abdominal organ injury and right upper abdominal wall subcutaneous fat small hematoma, measuring \par up to 3 cm.\par CT head (6/14/22) showed no acute intracranial findings.\par CT cervical spine showed no acute cervical fracture or dislocation.\par \par RECOMMENDATIONS:\par Previous notes reviewed and all relevant laboratory and radiology results discussed with Dr Correa and communicated to the patient.\par \par -- Will do  , CBC, CMP today.\par -- Follow up PCP/derm/GI and Bariatric Surgery as recommended.\par -- Strongly advised to inform family to undergo genetic counseling for h/o CKDN2A mutation.  Referred to genetic counselor Mahnaz Triana, phone number provided.\par -- Continue lifestyle modification with diet and exercise, weight loss emphasized.\par \par RTC in 6 months.\par \par Case was seen and discussed with Dr. Correa who agreed with the assessment and plan.\par

## 2022-06-24 NOTE — HISTORY OF PRESENT ILLNESS
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  (SEE MICROSCOPIC DESCRIPTION FOR SYNOPTIC REPORT)\par \par       -    TUMOR IS PRESENT IN THE PARATUBAL TISSUE AND ON THE OVARIAN\par            SURFACE.\par \par       -    SCATTERED FOCI SUSPICIOUS OF STROMAL MICROINVASION, LARGEST\par            FOCUS MEASURING 4 MM. (SLIDE A4)\par \par       -    PATHOLOGIC STAGE (pTNM):  pT(m)2b pNX\par    ONE FOCUS SUGGESTIVE OF LOW GRADE SEROUS CARCINOMA. (0.6 MM;\par            SLIDE A7)\par \par There were no post operative complications.\par She C/o hot flashes.\par C/O pain in the abdomen, which is diffuse.\par No weight loss.\par No vaginal bleeding\par No fever.\par Operative report was reviewed. No residual disease noted.\par \par The pathology slides had been sent for 2nd opinion and result is noted below. No invasion was identified.\par \par Patient also had a liver biopsy which demonstrated normal liver parenchyma which is noted below.  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 Patient needs evaluation with EUS +/- MRCP for evaluation of pancreatic cancer and regular follow up with dermatology  [de-identified] :  Report CR- received from [facility], [address] [place] [address] by Moy Briscoe on 2/8/2018 with the following    diagnosis:     FINAL DIAGNOSIS:    OVARY, RIGHT, SALPINGO-OOPHORECTOMY:          - SEROUS BORDERLINE TUMOR WITH NONINVASIVE IMPLANTS.    Comment:     I agree with your interpretation on this case of serous borderline    tumor. There are areas of increased proliferation but nothing I    would call serous carcinoma. There are noninvasive implants of the    fallopian tube and on the surface of the ovary. The sections from    the pelvic side wall and omentum also show noninvasive implants of    serous borderline tumor.\par \par Cytopathology Report       Final Diagnosis\par  LIVER LESION, LIVER LOBE, CT-GUIDED NEEDLE BIOPSY AND IMPRINT:\par  - NO MALIGNANT CELLS IDENTIFIED\par . - LIVER PARENCHYMA TISSUE WITH MACROVESICULAR AND MICROVESICULAR STEATOSIS ( 50% OF THE TISSUE). \par - IMMUNOHISTOCHEMICAL AND SPECIAL STAINS PENDING.\par  [de-identified] : C/O Pain in left shoulder and left breast as before.\par She had a PET scan and she is scheduled for liver biopsy later this week.\par \par 3/26/18\par Patient here for follow up complains of symptoms including hot flashes and abdominal pain in the RUQ at the side of liver biopsy. Patient denies any changes in bowel habits, vaginal bleeding. She had a patient had a liver biopsy which was normal showing normal liver parenchyma. 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 Patient was told to inform family especially children to undergo genetic testing as well.  Patient was told to follow up with Dr Weinberg of GI for EUS of the pancreas and consideration of a MRCP in the future. The patient was also recommended to see a dermatologist for full skin evaluation. \par \par 7/5/18\par She has been drinking heavy ( 10 shots of tequila  each weekend).\par She has an appointment with GI next week.\par Has not seen dermatology.\par Denies abdominal pain, no vaginal bleeding\par \par 10/4/18:\par Had headaches and had MRI brain few weeks ago.\par C/o dizziness and blurry vision. \par On keppra started by neurology for possible convulsions, started a week ago. She says that she doesn't feel good on Keppra. \par Denies fever, nausea, vomiting, chest pain, SOB, abdominal pain, bowel and bladder problems.\par Due for EGD and EUS on 10/10/18\par Due for mammo in Oct 2018. \par Due for VEEG. \par \par 2/13/19:\par Patient here for follow up, feeling well, no new complaints.  Patient denies abdominal pain or bloating, denies vaginal bleeding or discharge.  She had a CT scan which was normal and she is scheduled with GYN [facility]C on Friday.  She saw dermatologist as well as GI.  EUS was scheduled and apparently cancelled after the patient was found to have food in her stomach.  She states its now scheduled for end of March.  Patient also being followed for pseudoseizures by neurology.\par \par 6/3/19:\par Doing well. No major complaints.\par Denies fever, nausea, vomiting, chest pain, SOB, abdominal pain, bowel and bladder problems.\par Seen by Dermatology and gynonc. \par Pt had an EGD and EUS done in March 2019  that revealed atrophic gastritis and EUS revealed normal pancreas and normal sized PD\par Due for mammo this week. \par \par \par 12/16/19\par Pt is here for follow up.\par States she is going for surgery for cecal diverticulitis.\par No vaginal bleeding.\par Has been following with GI for EUS for pancreatic ca screening.\par \par 05/28/2020\par ALFIE MOSLEY a 44 year F is here today for follow up of ovarian cancer and melanoma pancreatic cancer syndrome. \par Was referred to Dermatology for full skin evaluation; last seen in 11/2019, diagnosed with seborrheic keratosis of nose/buttock with benign non-dysplastic nevi\par Last EUD, EUS 3/29 normal pancreas. Next f/up with GI next week.\par As per pt all of her f/up appt were postponed due to COVID.\par Colorectal surgery for cecal diverticulitis was  postponed. \par Denies vaginal bleeding, reports chronic intermittent abdominal pain. 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 \par She had her colonoscopy 7/2020 and endoscopy 6/2020 with Dr. Cabrera- no suspicious findings.\par Patient's family did not go for genetic testing yet and patient did not have genetic counseling either. She did not follow up with Dr Weinberg of GI for EUS of the pancreas and consideration of a MRCP in the future. The patient was also recommended to see a dermatologist for full skin evaluation which she sees on a regular basis..\par She had robotic resection done by Dr. 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.\par She feels well today, offers no new complaints.\par She denies any abdominal pain, nausea, vaginal bleeding or discharge.\par She is UTD with EUS and colonoscopy with a finding of 1 cm peripancreatic lymph node which was not biopsied and hyperplastic polyp.\par She stated that her children has not seen genetic counselor.\par She saw derm Dr Rico in 01/2021.\par Last mammogram was in 8/2020, benign.\par She has been trying to lose weight with diet and exercise, following up with Nutritionist.\par \par 6/21/2022\par Patient is here to follow up for h/o 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

## 2022-06-24 NOTE — PHYSICAL EXAM
[Fully active, able to carry on all pre-disease performance without restriction] : Status 0 - Fully active, able to carry on all pre-disease performance without restriction [Obese] : obese [Normal] : affect appropriate [de-identified] : S/P lap sleeve gastrectomy incision healing well; + bruising on right upper abdominal wall resolving [de-identified] : Bilateral upper arm bruising 2' fall, resolving

## 2022-06-24 NOTE — RESULTS/DATA
[FreeTextEntry1] :  ACC: [id number] EXAM:  CT CERVICAL SPINE                      \par ACC: [id number] EXAM:  CT BRAIN                      \par PROCEDURE DATE:  06/14/2022  \par \par INTERPRETATION:  CLINICAL INDICATION: Status post trauma\par \par TECHNIQUE: CT of the head and cervical spine was performed without the \par administration of intravenous contrast.\par \par COMPARISON: CT head dated 1/10/2020\par \par FINDINGS:\par CT HEAD:\par No evidence of acute infarction, intracranial hemorrhage or mass lesion.\par \par The ventricles are normal without evidence of hydrocephalus. There are no \par extra-axial fluid collections.\par \par There is stable partial empty sella.\par \par The visualized intraorbital contents are normal. The imaged portions of \par the paranasal sinuses are clear. There is trace fluid in the right \par mastoid. The visualized soft tissues and osseous structures appear normal.\par \par CT CERVICAL SPINE:\par There is no evidence of acute fracture, compression deformity or facet \par subluxation of the cervical spine.\par \par The atlantoaxial relationships are maintained. The posterior elements are \par intact. There is no significant prevertebral soft tissue swelling. The \par visualized paraspinal soft tissues are unremarkable.\par \par Mild multilevel endplate degenerative changes as evidenced by marginal \par osteophyte formation, uncovertebral spurring, loss of normal disc space \par height as well as facet joint space compartment narrowing.\par \par There is no high-grade spinal canal stenosis. Please note spinal canal \par contents are suboptimally evaluated inherent to CT technique.\par \par The visualized lung apices are within normal limits.\par \par IMPRESSION:\par CT HEAD:\par No acute intracranial findings.\par \par CT CERVICAL SPINE:\par No acute cervical fracture or dislocation.\par \par \par \par ACC: [id number] EXAM:  CT ABDOMEN AND PELVIS IC                      \par ACC: [id number] EXAM:  CT CHEST IC                      \par PROCEDURE DATE:  06/14/2022  \par \par INTERPRETATION:  CLINICAL HISTORY / REASON FOR EXAM: Trauma code. Fall. \par Abdominal pain, back pain.\par \par TECHNIQUE: Multislice helical sections were obtained from the thoracic \par inlet to the lung bases with IV contrast administration. Coronal and \par sagittal images have been submitted for evaluation. 3D (MIP) images \par obtained.\par \par COMPARISON: CT abdomen and pelvis on 9/4/2020, CT chest 12/16/2017.\par \par FINDINGS:\par \par CHEST:\par LUNGS, PLEURA, AND AIRWAYS: No pneumothorax. No consolidation or pleural \par effusion. Central airways patent. Bibasilar dependent subsegmental \par atelectasis.\par \par MEDIASTINUM/THORACIC NODES: No mediastinal or axillary lymphadenopathy.\par \par HEART/GREAT VESSELS: No pericardial effusion. Heart size unremarkable. \par Coronary artery calcifications. No dilation of the thoracic aorta.\par \par ABDOMEN/PELVIS:\par \par HEPATOBILIARY: Hepatic steatosis. Status post cholecystectomy.\par \par SPLEEN: Unremarkable.\par \par PANCREAS: Unremarkable.\par \par ADRENAL GLANDS: Unremarkable.\par \par KIDNEYS: Symmetric renal enhancement. No hydronephrosis..\par \par ABDOMINOPELVIC NODES: Unremarkable.\par \par PELVIC ORGANS: Post hysterectomy. Urinary bladder nondistended.\par \par PERITONEUM/MESENTERY/BOWEL: Status post sleeve gastrectomy. Colonic \par diverticulosis. Post partial sigmoid resection. No bowel obstruction, \par ascites or intraperitoneal free air.\par \par BONES/SOFT TISSUES: No acute fractures. Chronic deformity of the right \par scapula. Right upper abdominal wall subcutaneous fat small hematoma, \par measuring up to 3 cm.\par \par IMPRESSION:\par 1. No evidence of acute traumatic intrathoracic pathology or solid \par abdominal organ injury.\par 2. Right upper abdominal wall subcutaneous fat small hematoma, measuring \par up to 3 cm.\par \par \par \par \par ACC: [id number]     EXAM:  MG MAMMO SCREEN W TAMMY BI#\par PROCEDURE DATE:  04/05/2022\par \par INTERPRETATION:  HISTORY:\par Bilateral MG MAMMO SCREEN W TAMMY BI# was performed. Patient is 46 years old and is seen for screening. The patient has a history of ovarian cancer in December, 2017.  The patient has no family history of breast cancer.\par \par RISK ASSESSMENT:\par NCI Lifetime Risk: 6.9\par Tyrer-Cuzick Lifetime Risk: 7.7\par \par CLINICAL BREAST EXAM:\par The patient reports her last clinical breast exam was performed over one year ago.\par \par COMPARISON STUDIES:\par The present examination has been compared to prior imaging studies performed at [facility] on 10/05/2017, 12/10/2018, 10/08/2019 and 08/15/2020.\par \par MAMMOGRAM FINDINGS:\par Mammography was performed including the following views: bilateral craniocaudal with tomosynthesis, bilateral mediolateral oblique with tomosynthesis.  The examination includes digital synthetic 2D and digital tomosynthesis 3D images. Additional imaging analysis was performed using CAD (computer-aided detection) software.\par \par The breasts are heterogeneously dense, which may obscure small masses.\par \par There are asymmetries seen in both breasts.\par \par No suspicious mass, grouping of calcifications, or other abnormality is identified.\par \par IMPRESSION:\par There is no mammographic evidence of malignancy.\par \par RECOMMENDATION:\par Unless otherwise indicated by clinical findings, annual screening mammography recommended.\par \par ASSESSMENT:\par BI-RADS Category 2:  Benign\par \par \par [facility] Accession Number : [id number]\par Patient:   ALFIE MOSLEY\par Accession:                             [id number]\par Collected Date/Time:                   6/9/2022 09:08 EDT\par Received Date/Time:                    6/9/2022 11:18 EDT\par \par Surgical Pathology Report - Auth (Verified)\par \par Specimen(s) Submitted\par Portion of stomach\par \par Final Diagnosis\par Portion of stomach,  sleeve gastrectomy:\par -  Stomach tissue without significant histopathologic changes.\par \par ACC: [id number]     EXAM:  US ABDOMEN COMPLETE\par PROCEDURE DATE:  04/05/2022\par \par INTERPRETATION:  CLINICAL INFORMATION: Abdominal pain.\par \par COMPARISON: April 23, 2021. CT scan dated September 4, 2020\par \par TECHNIQUE: Sonography of the abdomen.\par \par FINDINGS:\par Liver: Increased echogenicity.\par Bile ducts: Normal caliber. Common bile duct measures 6 mm.\par Gallbladder: Cholecystectomy.\par Pancreas: Visualized portions are within normal limits.\par Spleen: 9.0 cm. Heterogeneous appearance of the spleen.\par Right kidney: 10.7 cm. No hydronephrosis.\par Left kidney: 11.9 cm. No hydronephrosis. 1 cm lower pole benign cyst.\par Ascites: None.\par Aorta and IVC: Visualized portions are within normal limits.\par \par IMPRESSION:\par Status post cholecystectomy without ductal dilatation.\par Hepatic steatosis.\par Heterogeneous appearance of the spleen.\par \par

## 2022-06-27 DIAGNOSIS — D39.10 NEOPLASM OF UNCERTAIN BEHAVIOR OF UNSPECIFIED OVARY: ICD-10-CM

## 2022-06-27 DIAGNOSIS — C25.9 MALIGNANT NEOPLASM OF PANCREAS, UNSPECIFIED: ICD-10-CM

## 2022-06-27 DIAGNOSIS — Z15.09 GENETIC SUSCEPTIBILITY TO OTHER MALIGNANT NEOPLASM: ICD-10-CM

## 2022-06-28 ENCOUNTER — APPOINTMENT (OUTPATIENT)
Dept: INTERNAL MEDICINE | Facility: CLINIC | Age: 47
End: 2022-06-28
Payer: MEDICAID

## 2022-06-28 ENCOUNTER — OUTPATIENT (OUTPATIENT)
Dept: OUTPATIENT SERVICES | Facility: HOSPITAL | Age: 47
LOS: 1 days | Discharge: HOME | End: 2022-06-28

## 2022-06-28 VITALS
SYSTOLIC BLOOD PRESSURE: 104 MMHG | BODY MASS INDEX: 40.72 KG/M2 | OXYGEN SATURATION: 97 % | HEIGHT: 56 IN | DIASTOLIC BLOOD PRESSURE: 71 MMHG | TEMPERATURE: 97 F | HEART RATE: 64 BPM | WEIGHT: 181 LBS

## 2022-06-28 DIAGNOSIS — M54.50 LOW BACK PAIN, UNSPECIFIED: ICD-10-CM

## 2022-06-28 DIAGNOSIS — Z98.890 OTHER SPECIFIED POSTPROCEDURAL STATES: Chronic | ICD-10-CM

## 2022-06-28 DIAGNOSIS — Z90.49 ACQUIRED ABSENCE OF OTHER SPECIFIED PARTS OF DIGESTIVE TRACT: Chronic | ICD-10-CM

## 2022-06-28 PROCEDURE — 99214 OFFICE O/P EST MOD 30 MIN: CPT | Mod: GC

## 2022-06-28 RX ORDER — ACETAMINOPHEN 650 MG/1
650 TABLET, FILM COATED, EXTENDED RELEASE ORAL 3 TIMES DAILY
Qty: 90 | Refills: 0 | Status: ACTIVE | COMMUNITY
Start: 2022-06-28 | End: 1900-01-01

## 2022-06-28 RX ORDER — PIOGLITAZONE HYDROCHLORIDE 15 MG/1
15 TABLET ORAL DAILY
Qty: 30 | Refills: 2 | Status: DISCONTINUED | COMMUNITY
Start: 2022-05-26 | End: 2022-06-28

## 2022-06-28 RX ORDER — DULAGLUTIDE 3 MG/.5ML
3 INJECTION, SOLUTION SUBCUTANEOUS WEEKLY
Qty: 1 | Refills: 3 | Status: DISCONTINUED | COMMUNITY
Start: 2021-11-10 | End: 2022-06-28

## 2022-06-28 RX ORDER — ATORVASTATIN CALCIUM 40 MG/1
40 TABLET, FILM COATED ORAL
Qty: 1 | Refills: 3 | Status: DISCONTINUED | OUTPATIENT
Start: 2018-11-26 | End: 2022-06-28

## 2022-06-28 NOTE — HISTORY OF PRESENT ILLNESS
[FreeTextEntry1] : fall [de-identified] : c/o back pain since pt fell june 14\par c/o persistent back pain

## 2022-06-28 NOTE — PHYSICAL EXAM
[No Acute Distress] : no acute distress [No JVD] : no jugular venous distention [No Respiratory Distress] : no respiratory distress  [Normal Rate] : normal rate  [No Edema] : there was no peripheral edema [Soft] : abdomen soft [No HSM] : no HSM [No CVA Tenderness] : no CVA  tenderness [No Joint Swelling] : no joint swelling [No Rash] : no rash [Coordination Grossly Intact] : coordination grossly intact

## 2022-06-28 NOTE — ASSESSMENT
[FreeTextEntry1] : 45 year old female with PMHx of DM2, Ovarian cancer s/p left oophorectomy in 2006 and RT oophorectomy and hysterotomy in 2017, recurrent diverticulitis s/p partial colectomy, H pylori gastritis s/p eradication, s/p cholecystectomy, CKDN2A mutation, NAFLD presenting for follow up.\par \par #s/p  sleeve gastrectomy\par off of dm and cholesterol meds now\par lost about 10 lbs\par \par #vaginal itching\par - resolved\par \par #DMII- controlled \par - recheck in 3 months, FS around  at home\par - following with endo\par - UTD with podiatry. referral for opthomalogist\par \par #HLD\par - not taking statin\par \par #NAFLD\par - US abdomen: hepatic steatosis\par - following with hepatology\par \par #seizure vs psuedoseizures\par -stopped meds on her own 8months ago ,took med for 2-3 years according to her\par -no more episodes\par \par #Hx of recurrent diverticulitis\par -pt is following Colorectal surgeon, s/p surgery in july 2020\par -repeat colonoscopy in 5 years.\par \par #Ovarian CA s/p bilateral oophorectomy and hysterectomy\par -following with GYN\par \par #Health Maintenance\par - Mammogram 4/2022- BIRADS 2 \par - Pap smear: last in 2017- follows with GYN\par - Colonoscopy 2020- diverticulosis and hyperplastic polyps; repeat in 2026\par - PPSV 23 UTD- 2018\par - follow up in 3 months. routine blood work 1 week prior. \par

## 2022-06-29 DIAGNOSIS — M54.6 PAIN IN THORACIC SPINE: ICD-10-CM

## 2022-06-29 DIAGNOSIS — M54.50 LOW BACK PAIN, UNSPECIFIED: ICD-10-CM

## 2022-07-01 ENCOUNTER — OUTPATIENT (OUTPATIENT)
Dept: OUTPATIENT SERVICES | Facility: HOSPITAL | Age: 47
LOS: 1 days | Discharge: HOME | End: 2022-07-01

## 2022-07-01 ENCOUNTER — RESULT REVIEW (OUTPATIENT)
Age: 47
End: 2022-07-01

## 2022-07-01 DIAGNOSIS — M54.9 DORSALGIA, UNSPECIFIED: ICD-10-CM

## 2022-07-01 DIAGNOSIS — Z98.890 OTHER SPECIFIED POSTPROCEDURAL STATES: Chronic | ICD-10-CM

## 2022-07-01 DIAGNOSIS — Z90.49 ACQUIRED ABSENCE OF OTHER SPECIFIED PARTS OF DIGESTIVE TRACT: Chronic | ICD-10-CM

## 2022-07-01 PROCEDURE — 72114 X-RAY EXAM L-S SPINE BENDING: CPT | Mod: 26

## 2022-07-01 PROCEDURE — 72052 X-RAY EXAM NECK SPINE 6/>VWS: CPT | Mod: 26

## 2022-07-01 PROCEDURE — 72072 X-RAY EXAM THORAC SPINE 3VWS: CPT | Mod: 26

## 2022-07-06 ENCOUNTER — APPOINTMENT (OUTPATIENT)
Dept: SURGERY | Facility: CLINIC | Age: 47
End: 2022-07-06

## 2022-07-06 VITALS
HEART RATE: 79 BPM | HEIGHT: 56 IN | SYSTOLIC BLOOD PRESSURE: 112 MMHG | DIASTOLIC BLOOD PRESSURE: 80 MMHG | OXYGEN SATURATION: 98 % | TEMPERATURE: 97 F | WEIGHT: 177 LBS | BODY MASS INDEX: 39.82 KG/M2

## 2022-07-06 PROCEDURE — 99024 POSTOP FOLLOW-UP VISIT: CPT

## 2022-07-06 NOTE — HISTORY OF PRESENT ILLNESS
[Procedure: ___] : Procedure performed: [unfilled]  [Date of Surgery: ___] : Date of Surgery:   [unfilled] [Surgeon Name:   ___] : Surgeon Name: Dr. VALLECILLO [de-identified] : 45yo female with PMhx of class III obesity s/p robotic sleeve gastrectomy. She reports abdominal pain, particularly at night. She complains of constipation. Diet - moist chicken, hummus, soup, pasta. Eating small portions 5 times per day. Drinking 3-16oz bottles of water. Drinking decaf coffee with splenda and creamer. Drinking 2 protein shakes per day. Adds protein powder to her food.Avoiding dry foods. Exercise - 30 minutes per day. Denies fever/chills, chest pain, nausea/vomiting, shortness of breath. Last BM this morning, yesterday was very hard stool, this morning used  otherwise denies fever/chills, vomiting, chest pain or shortness of breath. No reflux symptoms, Taking PPI, MV, calcium and B12. Glucose levels 69-92. Stopped taking DM medications.

## 2022-07-06 NOTE — PHYSICAL EXAM
[de-identified] : soft, non-tender, no rebound/guarding, no masses/hernias, wounds without erythema, drainage, or induration; tenderness RUQ and LUQ

## 2022-07-06 NOTE — ASSESSMENT
[de-identified] : 47yo female s/p robotic sleeve gastrectomy 6/9/2022.\par -order CT A/P for evaluation of abdominal pain \par -constipation - recommend Benefiber and Miralax PRN\par -continue adequate protein intake - goal of 70g per day\par -continue adequate hydration - goal of 60oz water or equivalent per day\par -encouraged daily PPI for 3 months total\par -continue MV, calcium, and B12\par -continue light activity\par -return after undergoing CT A/P \par -call with concerns

## 2022-07-08 ENCOUNTER — NON-APPOINTMENT (OUTPATIENT)
Age: 47
End: 2022-07-08

## 2022-07-11 ENCOUNTER — NON-APPOINTMENT (OUTPATIENT)
Age: 47
End: 2022-07-11

## 2022-07-11 ENCOUNTER — APPOINTMENT (OUTPATIENT)
Dept: HEPATOLOGY | Facility: CLINIC | Age: 47
End: 2022-07-11

## 2022-07-11 ENCOUNTER — EMERGENCY (EMERGENCY)
Facility: HOSPITAL | Age: 47
LOS: 0 days | Discharge: HOME | End: 2022-07-11
Attending: EMERGENCY MEDICINE | Admitting: EMERGENCY MEDICINE

## 2022-07-11 VITALS
TEMPERATURE: 96 F | OXYGEN SATURATION: 99 % | HEART RATE: 64 BPM | DIASTOLIC BLOOD PRESSURE: 71 MMHG | WEIGHT: 175.93 LBS | SYSTOLIC BLOOD PRESSURE: 118 MMHG | HEIGHT: 56 IN | RESPIRATION RATE: 17 BRPM

## 2022-07-11 DIAGNOSIS — Z90.49 ACQUIRED ABSENCE OF OTHER SPECIFIED PARTS OF DIGESTIVE TRACT: ICD-10-CM

## 2022-07-11 DIAGNOSIS — Z87.19 PERSONAL HISTORY OF OTHER DISEASES OF THE DIGESTIVE SYSTEM: ICD-10-CM

## 2022-07-11 DIAGNOSIS — G47.33 OBSTRUCTIVE SLEEP APNEA (ADULT) (PEDIATRIC): ICD-10-CM

## 2022-07-11 DIAGNOSIS — Z20.822 CONTACT WITH AND (SUSPECTED) EXPOSURE TO COVID-19: ICD-10-CM

## 2022-07-11 DIAGNOSIS — R10.11 RIGHT UPPER QUADRANT PAIN: ICD-10-CM

## 2022-07-11 DIAGNOSIS — R11.0 NAUSEA: ICD-10-CM

## 2022-07-11 DIAGNOSIS — E11.9 TYPE 2 DIABETES MELLITUS WITHOUT COMPLICATIONS: ICD-10-CM

## 2022-07-11 DIAGNOSIS — Z90.49 ACQUIRED ABSENCE OF OTHER SPECIFIED PARTS OF DIGESTIVE TRACT: Chronic | ICD-10-CM

## 2022-07-11 DIAGNOSIS — Z91.041 RADIOGRAPHIC DYE ALLERGY STATUS: ICD-10-CM

## 2022-07-11 DIAGNOSIS — K76.0 FATTY (CHANGE OF) LIVER, NOT ELSEWHERE CLASSIFIED: ICD-10-CM

## 2022-07-11 DIAGNOSIS — Z90.721 ACQUIRED ABSENCE OF OVARIES, UNILATERAL: ICD-10-CM

## 2022-07-11 DIAGNOSIS — E78.00 PURE HYPERCHOLESTEROLEMIA, UNSPECIFIED: ICD-10-CM

## 2022-07-11 DIAGNOSIS — E78.5 HYPERLIPIDEMIA, UNSPECIFIED: ICD-10-CM

## 2022-07-11 DIAGNOSIS — Z98.84 BARIATRIC SURGERY STATUS: ICD-10-CM

## 2022-07-11 DIAGNOSIS — Z98.890 OTHER SPECIFIED POSTPROCEDURAL STATES: Chronic | ICD-10-CM

## 2022-07-11 DIAGNOSIS — Z90.710 ACQUIRED ABSENCE OF BOTH CERVIX AND UTERUS: ICD-10-CM

## 2022-07-11 DIAGNOSIS — G40.909 EPILEPSY, UNSPECIFIED, NOT INTRACTABLE, WITHOUT STATUS EPILEPTICUS: ICD-10-CM

## 2022-07-11 DIAGNOSIS — Z85.43 PERSONAL HISTORY OF MALIGNANT NEOPLASM OF OVARY: ICD-10-CM

## 2022-07-11 LAB
ALBUMIN SERPL ELPH-MCNC: 4.7 G/DL — SIGNIFICANT CHANGE UP (ref 3.5–5.2)
ALP SERPL-CCNC: 129 U/L — HIGH (ref 30–115)
ALT FLD-CCNC: 40 U/L — SIGNIFICANT CHANGE UP (ref 0–41)
ANION GAP SERPL CALC-SCNC: 11 MMOL/L — SIGNIFICANT CHANGE UP (ref 7–14)
APPEARANCE UR: CLEAR — SIGNIFICANT CHANGE UP
APTT BLD: 38.1 SEC — SIGNIFICANT CHANGE UP (ref 27–39.2)
AST SERPL-CCNC: 29 U/L — SIGNIFICANT CHANGE UP (ref 0–41)
BACTERIA # UR AUTO: NEGATIVE — SIGNIFICANT CHANGE UP
BASOPHILS # BLD AUTO: 0.02 K/UL — SIGNIFICANT CHANGE UP (ref 0–0.2)
BASOPHILS NFR BLD AUTO: 0.3 % — SIGNIFICANT CHANGE UP (ref 0–1)
BILIRUB SERPL-MCNC: 0.3 MG/DL — SIGNIFICANT CHANGE UP (ref 0.2–1.2)
BILIRUB UR-MCNC: NEGATIVE — SIGNIFICANT CHANGE UP
BLD GP AB SCN SERPL QL: SIGNIFICANT CHANGE UP
BUN SERPL-MCNC: 19 MG/DL — SIGNIFICANT CHANGE UP (ref 10–20)
CALCIUM SERPL-MCNC: 9.8 MG/DL — SIGNIFICANT CHANGE UP (ref 8.5–10.1)
CHLORIDE SERPL-SCNC: 106 MMOL/L — SIGNIFICANT CHANGE UP (ref 98–110)
CO2 SERPL-SCNC: 25 MMOL/L — SIGNIFICANT CHANGE UP (ref 17–32)
COLOR SPEC: ABNORMAL
CREAT SERPL-MCNC: 0.5 MG/DL — LOW (ref 0.7–1.5)
DIFF PNL FLD: NEGATIVE — SIGNIFICANT CHANGE UP
EGFR: 117 ML/MIN/1.73M2 — SIGNIFICANT CHANGE UP
EOSINOPHIL # BLD AUTO: 0.27 K/UL — SIGNIFICANT CHANGE UP (ref 0–0.7)
EOSINOPHIL NFR BLD AUTO: 4.3 % — SIGNIFICANT CHANGE UP (ref 0–8)
EPI CELLS # UR: 5 /HPF — SIGNIFICANT CHANGE UP (ref 0–5)
GLUCOSE SERPL-MCNC: 86 MG/DL — SIGNIFICANT CHANGE UP (ref 70–99)
GLUCOSE UR QL: NEGATIVE — SIGNIFICANT CHANGE UP
HCG SERPL QL: NEGATIVE — SIGNIFICANT CHANGE UP
HCT VFR BLD CALC: 39.4 % — SIGNIFICANT CHANGE UP (ref 37–47)
HGB BLD-MCNC: 12.8 G/DL — SIGNIFICANT CHANGE UP (ref 12–16)
HYALINE CASTS # UR AUTO: 5 /LPF — SIGNIFICANT CHANGE UP (ref 0–7)
IMM GRANULOCYTES NFR BLD AUTO: 0.2 % — SIGNIFICANT CHANGE UP (ref 0.1–0.3)
INR BLD: 1.16 RATIO — SIGNIFICANT CHANGE UP (ref 0.65–1.3)
KETONES UR-MCNC: NEGATIVE — SIGNIFICANT CHANGE UP
LEUKOCYTE ESTERASE UR-ACNC: ABNORMAL
LIDOCAIN IGE QN: 37 U/L — SIGNIFICANT CHANGE UP (ref 7–60)
LYMPHOCYTES # BLD AUTO: 2.44 K/UL — SIGNIFICANT CHANGE UP (ref 1.2–3.4)
LYMPHOCYTES # BLD AUTO: 39.2 % — SIGNIFICANT CHANGE UP (ref 20.5–51.1)
MCHC RBC-ENTMCNC: 27.2 PG — SIGNIFICANT CHANGE UP (ref 27–31)
MCHC RBC-ENTMCNC: 32.5 G/DL — SIGNIFICANT CHANGE UP (ref 32–37)
MCV RBC AUTO: 83.8 FL — SIGNIFICANT CHANGE UP (ref 81–99)
MONOCYTES # BLD AUTO: 0.37 K/UL — SIGNIFICANT CHANGE UP (ref 0.1–0.6)
MONOCYTES NFR BLD AUTO: 5.9 % — SIGNIFICANT CHANGE UP (ref 1.7–9.3)
NEUTROPHILS # BLD AUTO: 3.11 K/UL — SIGNIFICANT CHANGE UP (ref 1.4–6.5)
NEUTROPHILS NFR BLD AUTO: 50.1 % — SIGNIFICANT CHANGE UP (ref 42.2–75.2)
NITRITE UR-MCNC: NEGATIVE — SIGNIFICANT CHANGE UP
NRBC # BLD: 0 /100 WBCS — SIGNIFICANT CHANGE UP (ref 0–0)
PH UR: 6 — SIGNIFICANT CHANGE UP (ref 5–8)
PLATELET # BLD AUTO: 269 K/UL — SIGNIFICANT CHANGE UP (ref 130–400)
POTASSIUM SERPL-MCNC: 4.3 MMOL/L — SIGNIFICANT CHANGE UP (ref 3.5–5)
POTASSIUM SERPL-SCNC: 4.3 MMOL/L — SIGNIFICANT CHANGE UP (ref 3.5–5)
PROT SERPL-MCNC: 7.2 G/DL — SIGNIFICANT CHANGE UP (ref 6–8)
PROT UR-MCNC: SIGNIFICANT CHANGE UP
PROTHROM AB SERPL-ACNC: 13.3 SEC — HIGH (ref 9.95–12.87)
RBC # BLD: 4.7 M/UL — SIGNIFICANT CHANGE UP (ref 4.2–5.4)
RBC # FLD: 16.1 % — HIGH (ref 11.5–14.5)
RBC CASTS # UR COMP ASSIST: 2 /HPF — SIGNIFICANT CHANGE UP (ref 0–4)
SARS-COV-2 RNA SPEC QL NAA+PROBE: SIGNIFICANT CHANGE UP
SODIUM SERPL-SCNC: 142 MMOL/L — SIGNIFICANT CHANGE UP (ref 135–146)
SP GR SPEC: 1.02 — SIGNIFICANT CHANGE UP (ref 1.01–1.03)
UROBILINOGEN FLD QL: SIGNIFICANT CHANGE UP
WBC # BLD: 6.22 K/UL — SIGNIFICANT CHANGE UP (ref 4.8–10.8)
WBC # FLD AUTO: 6.22 K/UL — SIGNIFICANT CHANGE UP (ref 4.8–10.8)
WBC UR QL: 27 /HPF — HIGH (ref 0–5)

## 2022-07-11 PROCEDURE — 99285 EMERGENCY DEPT VISIT HI MDM: CPT

## 2022-07-11 PROCEDURE — 74177 CT ABD & PELVIS W/CONTRAST: CPT | Mod: 26,MA

## 2022-07-11 PROCEDURE — 99024 POSTOP FOLLOW-UP VISIT: CPT

## 2022-07-11 RX ORDER — MORPHINE SULFATE 50 MG/1
4 CAPSULE, EXTENDED RELEASE ORAL ONCE
Refills: 0 | Status: DISCONTINUED | OUTPATIENT
Start: 2022-07-11 | End: 2022-07-11

## 2022-07-11 RX ORDER — DIATRIZOATE MEGLUMINE 180 MG/ML
30 INJECTION, SOLUTION INTRAVESICAL ONCE
Refills: 0 | Status: COMPLETED | OUTPATIENT
Start: 2022-07-11 | End: 2022-07-11

## 2022-07-11 RX ORDER — DIPHENHYDRAMINE HCL 50 MG
50 CAPSULE ORAL ONCE
Refills: 0 | Status: COMPLETED | OUTPATIENT
Start: 2022-07-11 | End: 2022-07-11

## 2022-07-11 RX ORDER — NITROFURANTOIN MACROCRYSTAL 50 MG
1 CAPSULE ORAL
Qty: 14 | Refills: 0
Start: 2022-07-11 | End: 2022-07-17

## 2022-07-11 RX ORDER — OXYCODONE HYDROCHLORIDE 5 MG/1
1 TABLET ORAL
Qty: 8 | Refills: 0
Start: 2022-07-11

## 2022-07-11 RX ADMIN — MORPHINE SULFATE 4 MILLIGRAM(S): 50 CAPSULE, EXTENDED RELEASE ORAL at 12:04

## 2022-07-11 RX ADMIN — Medication 50 MILLIGRAM(S): at 13:32

## 2022-07-11 RX ADMIN — DIATRIZOATE MEGLUMINE 30 MILLILITER(S): 180 INJECTION, SOLUTION INTRAVESICAL at 11:35

## 2022-07-11 RX ADMIN — MORPHINE SULFATE 4 MILLIGRAM(S): 50 CAPSULE, EXTENDED RELEASE ORAL at 11:35

## 2022-07-11 RX ADMIN — Medication 125 MILLIGRAM(S): at 13:33

## 2022-07-11 NOTE — ED PROVIDER NOTE - CARE PROVIDER_API CALL
Jessica Hurd)  [facility]  [address] [place] [address], [address] [place] [address]  Phone: [phone]  Fax: [phone]  Follow Up Time: 1-3 Days

## 2022-07-11 NOTE — ED PROVIDER NOTE - NS ED ATTENDING STATEMENT MOD
This was a shared visit with the HOLLY. I reviewed and verified the documentation and independently performed the documented: Attending with

## 2022-07-11 NOTE — ED PROVIDER NOTE - PATIENT PORTAL LINK FT
You can access the FollowMyHealth Patient Portal offered by [facility] by registering at the following website: [email or web address]. By joining [facility]’s FollowMyHealth portal, you will also be able to view your health information using other applications (apps) compatible with our system.

## 2022-07-11 NOTE — ED PROVIDER NOTE - ATTENDING APP SHARED VISIT CONTRIBUTION OF CARE
46-year-old female who recently had gastric surgery presents with abdominal pain, nausea.  No chest pain or shortness of breath.  No fevers no chills.  No back pain.  Patient was instructed by surgeon's office to come to the ED.  No other complaints.  Patient is awake alert.  Abdomen soft with diffuse tenderness.  Guarding present.  Patient breathing comfortably.  Patient is allergic to MRI contrast only.  Patient is able to get CT with IV contrast.  Plan: Labs, CT, surgery consult reassess.

## 2022-07-11 NOTE — ED PROVIDER NOTE - OBJECTIVE STATEMENT
PS: Pt is a 47 y/o female with PMH of DM< KAN, obesity s/p gastric sleeve on 6/9/22 with Dr. Hurd presenting for RUQ pain x 1 week. Pain is nonradiating, associated with mild nausea. No fever, chills, dysuria, hematuria or diarrhea. Was recommended to come to ED by surgeon.

## 2022-07-11 NOTE — CONSULT NOTE ADULT - SUBJECTIVE AND OBJECTIVE BOX
GENERAL SURGERY CONSULT NOTE    Patient: ALFIE MOSLEY , 46y (75)Female   MRN: [id number]  Location: [facility] ED  Visit: 22 Emergency  Date: 22 @ 12:10    HPI:  Patient is a 46 year old Female with PMHx of class 3 obesity s/p laparoscopic sleeve gastrectomy (22), DM, KAN, ovarian CA s/p WILBERT w/ right oophorectomy, HLD, diverticulitis s/p sigmoid & descending colon resection () presents with 1 week or RUQ abdominal pain. Pain radiates to the right flank/back and constant. Pain aggravated by movement, and tramadol not alleviating pain. Associated with nausea and HA but denies emesis. Tolerating PO diet. Lost approximately 20lbs since bariatric surgery. Denies CP, SOB, fevers/chills, or urinary symptoms.       PAST MEDICAL & SURGICAL HISTORY:  Diabetes  Ovarian cancer  UNSURE OF STAGE HOWEVER REPORTS IT &quot;MINOR&quot;  Sees Oncologist Dr. Vanessa Correa  Seizure disorder  last seizure &gt; 6 months ago. No longer on medications  Hypercholesteremia  Vertigo  &gt; 3 months ago, not currently complaining of same (3/4/2020)  Diverticulosis of intestine  Diverticulitis  Fatty liver  Obesity  Sleep apnea  History of cholecystectomy  10-15 years ago  H/O abdominal hysterectomy  With Right Oophrecetomy   In 2017  H/O colonoscopy  &gt;3 years ago  History of colon resection  History of mobilization of splenic flexure    Home Medications:  ATORVASTATIN 40 MG TABLET: 1 tab(s) orally once a day (at bedtime) (2022 11:)  DAILY-KHUSHI TABLET: TAKE 1 TABLET BY MOUTH EVERY DAY (2022 06:58)  JARDIANCE 10MG TAB: 1 tab(s) orally once a day (2022 11:22)  OMEPRAZOLE 20MG CAP: 1 cap(s) orally once a day (2022 11:)  Trulicity Pen 0.75 mg/0.5 mL subcutaneous solution: 4 unit(s) subcutaneous once a week ON  (2022 06:58)    VITALS:  T(F): 96.2 (22 @ 10:19), Max: 96.2 (22 @ 10:19)  HR: 64 (22 @ 10:19) (64 - 64)  BP: 118/71 (22 @ 10:19) (118/71 - 118/71)  RR: 17 (22 @ 10:19) (17 - 17)  SpO2: 99% (22 @ 10:19) (99% - 99%)    PHYSICAL EXAM:  General: NAD, AAOx3, calm and cooperative  HEENT: NCAT, EOMI, Trachea ML, Neck supple  Cardiac: S1, S2  Respiratory: CTAB, normal respiratory effort, breath sounds equal BL, no wheeze, rhonchi or crackles  Abdomen: Soft, non-distended, moderate RUQ and right flank tenderness, no rebound, no guarding. Laparoscopic port sites incisions healing well, no erythema   Vascular: extremities well perfused  Skin: Warm/dry, normal color, no jaundice    MEDICATIONS  (STANDING):  diphenhydrAMINE Injectable 50 milliGRAM(s) IV Push once  methylPREDNISolone sodium succinate Injectable 125 milliGRAM(s) IV Push Once    MEDICATIONS  (PRN):      LAB/STUDIES:                        12.8   6.22  )-----------( 269      ( 2022 11:29 )             39.4         142  |  106  |  19  ----------------------------<  86  4.3   |  25  |  0.5<L>    Ca    9.8      2022 11:29    TPro  7.2  /  Alb  4.7  /  TBili  0.3  /  DBili  x   /  AST  29  /  ALT  40  /  AlkPhos  129<H>      PT/INR - ( 2022 11:29 )   PT: 13.30 sec;   INR: 1.16 ratio    PTT - ( 2022 11:29 )  PTT:38.1 sec  LIVER FUNCTIONS - ( 2022 11:29 )  Alb: 4.7 g/dL / Pro: 7.2 g/dL / ALK PHOS: 129 U/L / ALT: 40 U/L / AST: 29 U/L / GGT: x           Urinalysis Basic - ( 2022 11:45 )    Color: Davina / Appearance: Clear / S.024 / pH: x  Gluc: x / Ketone: Negative  / Bili: Negative / Urobili: <2 mg/dL   Blood: x / Protein: Trace / Nitrite: Negative   Leuk Esterase: Large / RBC: x / WBC x   Sq Epi: x / Non Sq Epi: x / Bacteria: x    IMAGING:   BARIATRIC SURGERY CONSULT NOTE    Patient: ALFIE MOSLEY , 46y (75)Female   MRN: [id number]  Location: [facility]  Visit: 22 Emergency  Date: 22 @ 12:10    HPI:  Patient is a 46 year old Female with PMHx of class 3 obesity s/p laparoscopic sleeve gastrectomy (22), DM, KAN, ovarian CA s/p WILBERT w/ right oophorectomy, HLD, diverticulitis s/p sigmoid & descending colon resection () presents with 1 week or RUQ abdominal pain. Pain radiates to the right flank/back and constant. Pain aggravated by movement, and tramadol not alleviating pain. Associated with nausea and HA but denies emesis. Tolerating PO diet. Lost approximately 20lbs since bariatric surgery. Denies CP, SOB, fevers/chills, or urinary symptoms.       PAST MEDICAL & SURGICAL HISTORY:  Diabetes  Ovarian cancer  UNSURE OF STAGE HOWEVER REPORTS IT &quot;MINOR&quot;  Sees Oncologist Dr. Vanessa Correa  Seizure disorder  last seizure &gt; 6 months ago. No longer on medications  Hypercholesteremia  Vertigo  &gt; 3 months ago, not currently complaining of same (3/4/2020)  Diverticulosis of intestine  Diverticulitis  Fatty liver  Obesity  Sleep apnea  History of cholecystectomy  10-15 years ago  H/O abdominal hysterectomy  With Right Oophrecetomy   In 2017  H/O colonoscopy  &gt;3 years ago  History of colon resection  History of mobilization of splenic flexure    Home Medications:  ATORVASTATIN 40 MG TABLET: 1 tab(s) orally once a day (at bedtime) (2022 11:)  DAILY-KHUSHI TABLET: TAKE 1 TABLET BY MOUTH EVERY DAY (2022 06:58)  JARDIANCE 10MG TAB: 1 tab(s) orally once a day (2022 11:22)  OMEPRAZOLE 20MG CAP: 1 cap(s) orally once a day (2022 11:)  Trulicity Pen 0.75 mg/0.5 mL subcutaneous solution: 4 unit(s) subcutaneous once a week ON  (2022 06:58)    VITALS:  T(F): 96.2 (22 @ 10:19), Max: 96.2 (22 @ 10:19)  HR: 64 (22 @ 10:19) (64 - 64)  BP: 118/71 (22 @ 10:19) (118/71 - 118/71)  RR: 17 (22 @ 10:19) (17 - 17)  SpO2: 99% (22 @ 10:19) (99% - 99%)    PHYSICAL EXAM:  General: NAD, AAOx3, calm and cooperative  HEENT: NCAT, EOMI, Trachea ML, Neck supple  Cardiac: S1, S2  Respiratory: CTAB, normal respiratory effort, breath sounds equal BL, no wheeze, rhonchi or crackles  Abdomen: Soft, non-distended, moderate RUQ and right flank tenderness, no rebound, no guarding. Laparoscopic port sites incisions healing well, no erythema   Vascular: extremities well perfused  Skin: Warm/dry, normal color, no jaundice    MEDICATIONS  (STANDING):  diphenhydrAMINE Injectable 50 milliGRAM(s) IV Push once  methylPREDNISolone sodium succinate Injectable 125 milliGRAM(s) IV Push Once    MEDICATIONS  (PRN):      LAB/STUDIES:                        12.8   6.22  )-----------( 269      ( 2022 11:29 )             39.4         142  |  106  |  19  ----------------------------<  86  4.3   |  25  |  0.5<L>    Ca    9.8      2022 11:29    TPro  7.2  /  Alb  4.7  /  TBili  0.3  /  DBili  x   /  AST  29  /  ALT  40  /  AlkPhos  129<H>      PT/INR - ( 2022 11:29 )   PT: 13.30 sec;   INR: 1.16 ratio    PTT - ( 2022 11:29 )  PTT:38.1 sec  LIVER FUNCTIONS - ( 2022 11:29 )  Alb: 4.7 g/dL / Pro: 7.2 g/dL / ALK PHOS: 129 U/L / ALT: 40 U/L / AST: 29 U/L / GGT: x           Urinalysis Basic - ( 2022 11:45 )    Color: Davina / Appearance: Clear / S.024 / pH: x  Gluc: x / Ketone: Negative  / Bili: Negative / Urobili: <2 mg/dL   Blood: x / Protein: Trace / Nitrite: Negative   Leuk Esterase: Large / RBC: x / WBC x   Sq Epi: x / Non Sq Epi: x / Bacteria: x    IMAGING:

## 2022-07-11 NOTE — CONSULT NOTE ADULT - ATTENDING COMMENTS
47yo female s/p laparoscopic sleeve gastrectomy 6/9/2022 presenting to the ER with persistent right sided abdominal pain since surgery. She says pain is sharp, around one of her incisions and radiates up and around her abdomen to her back, associated with nausea. No vomiting, no fever/chills, chest pain or shortness of breath. She is able to tolerate diet. Normal bowel function. No urinary symptoms. No improvement with Gabapentin, Tramadol, warm packs, or ice packs. On exam, incisional are healing well, +right costal margin tenderness, non-peritoneal. Labs reviewed - WBC 6.22, . CT A/P briefly reviewed with radiologist and grossly negative for acute intra-abdominal pathology. Pending official read. No clear etiology for refractory abdominal pain. IR consult placed for TAP block right side. no acute surgical intervention.

## 2022-07-11 NOTE — ED PROVIDER NOTE - ATTENDING CONTRIBUTION TO CARE
Irregular
46-year-old female who recently had gastric surgery presents with abdominal pain, nausea.  No chest pain or shortness of breath.  No fevers no chills.  No back pain.  Patient was instructed by surgeon's office to come to the ED.  No other complaints.  Patient is awake alert.  Abdomen soft with diffuse tenderness.  Guarding present.  Patient breathing comfortably.  Patient is allergic to MRI contrast only.  Patient is able to get CT with IV contrast.  Plan: Labs, CT, surgery consult reassess.

## 2022-07-11 NOTE — ED ADULT TRIAGE NOTE - CHIEF COMPLAINT QUOTE
patient brought in for abdominal pain with nausea x 1 week. patient had laparoscopic sleeve gastrectomy on 6/9 surgeon advised patient to be seen

## 2022-07-11 NOTE — ED PROVIDER NOTE - PHYSICAL EXAMINATION
CONST: well appearing for age  HEAD:  normocephalic, atraumatic  EYES:  conjunctivae without injection, drainage or discharge  ENMT:  nasal mucosa moist; mouth moist without ulcerations or lesions; throat moist without erythema, exudate, ulcerations or lesions  NECK:  supple  CARDIAC:  regular rate and rhythm, normal S1 and S2, no murmurs, rubs or gallops  RESP:  respiratory rate and effort appear normal for age; lungs are clear to auscultation bilaterally; no rales or wheezes  ABDOMEN:  soft, + diffuse abdominal tenderness, mainly on right side, + laparoscopic incisions healing well  MUSCULOSKELETAL/NEURO: AAOx3, CN II-XII grossly intact, sensation intact throughout, 5/5 strength in all extremities, normal gait, normal movement, normal tone  SKIN:  normal skin color for age and race, well-perfused; warm and dry

## 2022-07-11 NOTE — CONSULT NOTE ADULT - ASSESSMENT
ASSESSMENT:  Patient is a 46 year old Female with PMHx of class 3 obesity s/p laparoscopic sleeve gastrectomy (6/9/22), DM, KAN, ovarian CA s/p WILBERT w/ right oophorectomy, HLD, diverticulitis s/p sigmoid & descending colon resection (2020) presents with 1 week or RUQ abdominal pain. Pain radiates to the right flank/back and constant, aggravated by movement, and tramadol not alleviating pain. Associated with nausea and HA but denies emesis. Tolerating PO diet. Lost approximately 20lbs since bariatric surgery. Physical exam findings, imaging, and labs as documented above.     PLAN:  -Pending CT abdominal/pelvis    -    Above plan discussed with Attending Surgeon Dr. Hurd, patient, patient family, and Primary team  07-11-22 @ 12:10    [place] Team Spectra: [id number] ASSESSMENT:  Patient is a 46 year old Female with PMHx of class 3 obesity s/p laparoscopic sleeve gastrectomy (6/9/22), DM, KAN, ovarian CA s/p WILBERT w/ right oophorectomy, HLD, diverticulitis s/p sigmoid & descending colon resection (2020) presents with 1 week or RUQ abdominal pain. Pain radiates to the right flank/back and constant, aggravated by movement, and tramadol not alleviating pain. Associated with nausea and HA but denies emesis. Tolerating PO diet. Lost approximately 20lbs since bariatric surgery. Physical exam findings, imaging, and labs as documented above.     PLAN:  -Reviewed CT scan with attending and radiologist. No etiology on imaging that could be correlating to patients RUQ abdominal pain   -Will reach out to IR for an outpatient nerve block   -No acute surgical intervention at this time  -Dispo per ED. Please send patient with pain medications     Above plan discussed with Attending Surgeon Dr. Hurd, patient, patient family, and Primary team  07-11-22 @ 12:10    [place] Team Spectra: [phone]

## 2022-07-11 NOTE — ED PROVIDER NOTE - PROGRESS NOTE DETAILS
PS: Surgery consulted, will evaluate PS: CT negative. Cleared for dc by surgery. IR contacted by surgery for potential nerve block for pain relief. Pt aware she will be contacted I spoke to patient's family member who translated for patient.  Patient does not have any urinary complaints.  No dysuria no hematuria.  I informed him that urine culture was sent.  He is aware. I called patient's son regarding follow-up.  At this time he stated that his mom now states she does have some pain with urination.  Urine culture had already been sent.  Decision made to send in prescription for Macrobid.  I informed him that we would send antibiotics to pharmacy.  He understood.

## 2022-07-11 NOTE — ED PROVIDER NOTE - CLINICAL SUMMARY MEDICAL DECISION MAKING FREE TEXT BOX
Imaging and labs reviewed.  Patient nontoxic well-appearing.  Surgery was consulted.  Patient cleared by surgery for discharge with outpatient follow-up.  Surgery will refer patient to IR as an outpatient for block.  I spoke to patient's son at length last night on the phone.  He is aware of the importance of follow-up with surgery.  Patient initially stated no urinary complaints.  However patient then reported later on that she in fact did have some dysuria.  Given the UA results and the symptoms decision was made to send antibiotics to pharmacy.  Patient's son aware that antibiotics are sent to pharmacy for UTI.

## 2022-07-13 LAB
CULTURE RESULTS: SIGNIFICANT CHANGE UP
SPECIMEN SOURCE: SIGNIFICANT CHANGE UP

## 2022-07-20 ENCOUNTER — APPOINTMENT (OUTPATIENT)
Dept: SURGERY | Facility: CLINIC | Age: 47
End: 2022-07-20

## 2022-07-20 VITALS
TEMPERATURE: 97.3 F | DIASTOLIC BLOOD PRESSURE: 80 MMHG | WEIGHT: 173 LBS | SYSTOLIC BLOOD PRESSURE: 114 MMHG | BODY MASS INDEX: 38.92 KG/M2 | HEIGHT: 56 IN

## 2022-07-20 PROCEDURE — 99024 POSTOP FOLLOW-UP VISIT: CPT

## 2022-07-22 NOTE — PRE-ANESTHESIA EVALUATION ADULT - WEIGHT IN KG
While at rest patients oxygen dropped to 88% on room air, once awaken and began talking came back up to 96%, oxygen applied        Charissa Ross RN  07/21/22 1150
88.9

## 2022-07-25 RX ORDER — TRAMADOL HYDROCHLORIDE 50 MG/1
50 TABLET, COATED ORAL
Qty: 10 | Refills: 0 | Status: COMPLETED | COMMUNITY
Start: 2022-07-08 | End: 2022-07-25

## 2022-07-25 NOTE — HISTORY OF PRESENT ILLNESS
[de-identified] : 47yo female with PMHx of class III obesity s/p sleeve gastrectomy 6/9/2022. Post-operatively, she had been complaining of severe right sided abdominal pain near the right paramedian incision. Imaging was negative for acute findings. At this time, patient states that her abdominal pain has improved overall. Diet - eating almond butter on bread if having cravings. Eating chicken, eggs. Denies reflux symptoms. Taking PPI, B12, MV, calcium. No longer taking fingersticks. No longer taking any medications aside from vitamins. Exercise - walking daily on the treadmill.

## 2022-07-25 NOTE — ASSESSMENT
[FreeTextEntry1] : 47yo female with PMHx of class III obesity s/p laparoscopic sleeve gastrectomy 6/9/2022.\par -diet - continue high protein, low fat, low carb diet\par -continue protein intake - 70g per day\par -continue water intake - goal of 60oz per day\par -Instructed to separate food from drinking as she has been drinking and eating at the same time\par -will consider medication if cravings persist\par -continue PPI\par -continue MV, calcium and B12\par -medications reviewed and amended\par -cleared for full activity\par -labs ordered for prior to next visit\par -return in 6 weeks for 3 month follow up\par -call with concerns\par -will defer pain block\par

## 2022-08-16 ENCOUNTER — OUTPATIENT (OUTPATIENT)
Dept: OUTPATIENT SERVICES | Facility: HOSPITAL | Age: 47
LOS: 1 days | Discharge: HOME | End: 2022-08-16

## 2022-08-16 DIAGNOSIS — M25.571 PAIN IN RIGHT ANKLE AND JOINTS OF RIGHT FOOT: ICD-10-CM

## 2022-08-16 DIAGNOSIS — Z98.890 OTHER SPECIFIED POSTPROCEDURAL STATES: Chronic | ICD-10-CM

## 2022-08-16 DIAGNOSIS — Z90.49 ACQUIRED ABSENCE OF OTHER SPECIFIED PARTS OF DIGESTIVE TRACT: Chronic | ICD-10-CM

## 2022-08-16 PROCEDURE — 73610 X-RAY EXAM OF ANKLE: CPT | Mod: 26,RT

## 2022-08-16 PROCEDURE — 73630 X-RAY EXAM OF FOOT: CPT | Mod: 26,RT

## 2022-08-24 LAB
ALBUMIN SERPL ELPH-MCNC: 4.7 G/DL
ALP BLD-CCNC: 133 U/L
ALT SERPL-CCNC: 26 U/L
ANION GAP SERPL CALC-SCNC: 12 MMOL/L
AST SERPL-CCNC: 23 U/L
BASOPHILS # BLD AUTO: 0.03 K/UL
BASOPHILS NFR BLD AUTO: 0.6 %
BILIRUB SERPL-MCNC: 0.5 MG/DL
BUN SERPL-MCNC: 22 MG/DL
CALCIUM SERPL-MCNC: 9.9 MG/DL
CHLORIDE SERPL-SCNC: 106 MMOL/L
CHOLEST SERPL-MCNC: 228 MG/DL
CO2 SERPL-SCNC: 24 MMOL/L
CREAT SERPL-MCNC: 0.5 MG/DL
CREAT SPEC-SCNC: 206 MG/DL
EGFR: 117 ML/MIN/1.73M2
EOSINOPHIL # BLD AUTO: 0.09 K/UL
EOSINOPHIL NFR BLD AUTO: 1.7 %
ESTIMATED AVERAGE GLUCOSE: 126 MG/DL
GLUCOSE SERPL-MCNC: 92 MG/DL
HBA1C MFR BLD HPLC: 6 %
HCT VFR BLD CALC: 40.3 %
HDLC SERPL-MCNC: 34 MG/DL
HGB BLD-MCNC: 13.1 G/DL
IMM GRANULOCYTES NFR BLD AUTO: 0.2 %
LDLC SERPL CALC-MCNC: 168 MG/DL
LYMPHOCYTES # BLD AUTO: 2.61 K/UL
LYMPHOCYTES NFR BLD AUTO: 49.3 %
MAN DIFF?: NORMAL
MCHC RBC-ENTMCNC: 27.7 PG
MCHC RBC-ENTMCNC: 32.5 G/DL
MCV RBC AUTO: 85.2 FL
MICROALBUMIN 24H UR DL<=1MG/L-MCNC: 2 MG/DL
MICROALBUMIN/CREAT 24H UR-RTO: 10 MG/G
MONOCYTES # BLD AUTO: 0.36 K/UL
MONOCYTES NFR BLD AUTO: 6.8 %
NEUTROPHILS # BLD AUTO: 2.19 K/UL
NEUTROPHILS NFR BLD AUTO: 41.4 %
NONHDLC SERPL-MCNC: 194 MG/DL
PLATELET # BLD AUTO: 271 K/UL
POTASSIUM SERPL-SCNC: 4.1 MMOL/L
PROT SERPL-MCNC: 7.4 G/DL
RBC # BLD: 4.73 M/UL
RBC # FLD: 16.5 %
SODIUM SERPL-SCNC: 142 MMOL/L
TRIGL SERPL-MCNC: 131 MG/DL
TSH SERPL-ACNC: 1.25 UIU/ML
WBC # FLD AUTO: 5.29 K/UL

## 2022-08-29 ENCOUNTER — APPOINTMENT (OUTPATIENT)
Dept: INTERNAL MEDICINE | Facility: CLINIC | Age: 47
End: 2022-08-29

## 2022-08-29 ENCOUNTER — OUTPATIENT (OUTPATIENT)
Dept: OUTPATIENT SERVICES | Facility: HOSPITAL | Age: 47
LOS: 1 days | Discharge: HOME | End: 2022-08-29

## 2022-08-29 ENCOUNTER — LABORATORY RESULT (OUTPATIENT)
Age: 47
End: 2022-08-29

## 2022-08-29 VITALS
HEIGHT: 56 IN | BODY MASS INDEX: 36.67 KG/M2 | OXYGEN SATURATION: 96 % | DIASTOLIC BLOOD PRESSURE: 75 MMHG | SYSTOLIC BLOOD PRESSURE: 110 MMHG | HEART RATE: 63 BPM | WEIGHT: 163 LBS | TEMPERATURE: 96.9 F

## 2022-08-29 DIAGNOSIS — Z98.890 OTHER SPECIFIED POSTPROCEDURAL STATES: Chronic | ICD-10-CM

## 2022-08-29 DIAGNOSIS — Z90.49 ACQUIRED ABSENCE OF OTHER SPECIFIED PARTS OF DIGESTIVE TRACT: Chronic | ICD-10-CM

## 2022-08-29 DIAGNOSIS — R30.0 DYSURIA: ICD-10-CM

## 2022-08-29 PROCEDURE — 99214 OFFICE O/P EST MOD 30 MIN: CPT | Mod: GC

## 2022-08-30 ENCOUNTER — APPOINTMENT (OUTPATIENT)
Dept: OPHTHALMOLOGY | Facility: CLINIC | Age: 47
End: 2022-08-30

## 2022-08-30 ENCOUNTER — LABORATORY RESULT (OUTPATIENT)
Age: 47
End: 2022-08-30

## 2022-08-31 DIAGNOSIS — L73.2 HIDRADENITIS SUPPURATIVA: ICD-10-CM

## 2022-08-31 DIAGNOSIS — R30.0 DYSURIA: ICD-10-CM

## 2022-08-31 DIAGNOSIS — M79.671 PAIN IN RIGHT FOOT: ICD-10-CM

## 2022-09-01 ENCOUNTER — APPOINTMENT (OUTPATIENT)
Dept: PODIATRY | Facility: CLINIC | Age: 47
End: 2022-09-01

## 2022-09-01 ENCOUNTER — OUTPATIENT (OUTPATIENT)
Dept: OUTPATIENT SERVICES | Facility: HOSPITAL | Age: 47
LOS: 1 days | Discharge: HOME | End: 2022-09-01

## 2022-09-01 DIAGNOSIS — Z98.890 OTHER SPECIFIED POSTPROCEDURAL STATES: Chronic | ICD-10-CM

## 2022-09-01 DIAGNOSIS — Z90.49 ACQUIRED ABSENCE OF OTHER SPECIFIED PARTS OF DIGESTIVE TRACT: Chronic | ICD-10-CM

## 2022-09-01 DIAGNOSIS — B35.1 TINEA UNGUIUM: ICD-10-CM

## 2022-09-01 PROCEDURE — 99213 OFFICE O/P EST LOW 20 MIN: CPT

## 2022-09-02 DIAGNOSIS — M72.2 PLANTAR FASCIAL FIBROMATOSIS: ICD-10-CM

## 2022-09-02 DIAGNOSIS — S93.401A SPRAIN OF UNSPECIFIED LIGAMENT OF RIGHT ANKLE, INITIAL ENCOUNTER: ICD-10-CM

## 2022-09-02 DIAGNOSIS — E66.01 MORBID (SEVERE) OBESITY DUE TO EXCESS CALORIES: ICD-10-CM

## 2022-09-02 DIAGNOSIS — M79.671 PAIN IN RIGHT FOOT: ICD-10-CM

## 2022-09-02 DIAGNOSIS — B35.1 TINEA UNGUIUM: ICD-10-CM

## 2022-09-02 NOTE — ASSESSMENT
Physical Therapy  Hold per RN as patient's respirations are in the 60's. Will hold and await medical stability. [FreeTextEntry1] : Assessment:\par Diabetic Footcare and Risk Assessment:\par \par Plan:\par Patient was seen and evaluated;\par Advised to limit tight wearing shoes; to limit pressure on incurved nails on B/L Hallux;\par Advised to be cautious of open wounds or lesions due to diabetic status;\par Advised to return back to clinic in 1 year; \par \par \par \par  No

## 2022-09-06 ENCOUNTER — APPOINTMENT (OUTPATIENT)
Dept: SURGERY | Facility: CLINIC | Age: 47
End: 2022-09-06

## 2022-09-06 VITALS
OXYGEN SATURATION: 97 % | HEIGHT: 56 IN | TEMPERATURE: 97 F | HEART RATE: 63 BPM | BODY MASS INDEX: 35.99 KG/M2 | DIASTOLIC BLOOD PRESSURE: 78 MMHG | WEIGHT: 160 LBS | SYSTOLIC BLOOD PRESSURE: 115 MMHG

## 2022-09-06 DIAGNOSIS — Z87.898 PERSONAL HISTORY OF OTHER SPECIFIED CONDITIONS: ICD-10-CM

## 2022-09-06 LAB
25(OH)D3 SERPL-MCNC: 57 NG/ML
ALBUMIN SERPL ELPH-MCNC: 4.8 G/DL
ALP BLD-CCNC: 140 U/L
ALT SERPL-CCNC: 25 U/L
ANION GAP SERPL CALC-SCNC: 15 MMOL/L
AST SERPL-CCNC: 23 U/L
BASOPHILS # BLD AUTO: 0.03 K/UL
BASOPHILS NFR BLD AUTO: 0.5 %
BILIRUB SERPL-MCNC: 0.5 MG/DL
BUN SERPL-MCNC: 21 MG/DL
CALCIUM SERPL-MCNC: 10.1 MG/DL
CHLORIDE SERPL-SCNC: 105 MMOL/L
CHOLEST SERPL-MCNC: 226 MG/DL
CO2 SERPL-SCNC: 23 MMOL/L
CREAT SERPL-MCNC: 0.6 MG/DL
EGFR: 112 ML/MIN/1.73M2
EOSINOPHIL # BLD AUTO: 0.08 K/UL
EOSINOPHIL NFR BLD AUTO: 1.4 %
ESTIMATED AVERAGE GLUCOSE: 120 MG/DL
FERRITIN SERPL-MCNC: 87 NG/ML
FOLATE RBC-MCNC: 1346 NG/ML
GLUCOSE SERPL-MCNC: 90 MG/DL
HBA1C MFR BLD HPLC: 5.8 %
HCT VFR BLD CALC: 41.5 %
HCT VFR BLD CALC: 43.4 %
HDLC SERPL-MCNC: 38 MG/DL
HGB BLD-MCNC: 13.2 G/DL
IMM GRANULOCYTES NFR BLD AUTO: 0.3 %
IRON SATN MFR SERPL: 28 %
IRON SERPL-MCNC: 90 UG/DL
LDLC SERPL CALC-MCNC: 160 MG/DL
LYMPHOCYTES # BLD AUTO: 2.57 K/UL
LYMPHOCYTES NFR BLD AUTO: 44.3 %
MAN DIFF?: NORMAL
MCHC RBC-ENTMCNC: 27.7 PG
MCHC RBC-ENTMCNC: 31.8 G/DL
MCV RBC AUTO: 87.2 FL
MONOCYTES # BLD AUTO: 0.4 K/UL
MONOCYTES NFR BLD AUTO: 6.9 %
NEUTROPHILS # BLD AUTO: 2.7 K/UL
NEUTROPHILS NFR BLD AUTO: 46.6 %
NONHDLC SERPL-MCNC: 188 MG/DL
PLATELET # BLD AUTO: 266 K/UL
POTASSIUM SERPL-SCNC: 4.9 MMOL/L
PROT SERPL-MCNC: 7.2 G/DL
RBC # BLD: 4.76 M/UL
RBC # FLD: 15.7 %
SODIUM SERPL-SCNC: 143 MMOL/L
TIBC SERPL-MCNC: 320 UG/DL
TRIGL SERPL-MCNC: 142 MG/DL
UIBC SERPL-MCNC: 230 UG/DL
VIT B1 SERPL-MCNC: 154.1 NMOL/L
VIT B12 SERPL-MCNC: 1855 PG/ML
WBC # FLD AUTO: 5.8 K/UL

## 2022-09-06 PROCEDURE — 99024 POSTOP FOLLOW-UP VISIT: CPT

## 2022-09-06 NOTE — REASON FOR VISIT
[Post Operative Visit] : a post operative visit for [Other___] : [unfilled] [[employer] ] : provided by [employer]   [FreeTextEntry2] : Sleeve Gastrectomy on 6/9/2022 [Interpreters_IDNumber] : [id number] [Interpreters_FullName] : Chikis [TWNoteComboBox1] : [place]

## 2022-09-06 NOTE — PHYSICAL EXAM
[Normal] : normoactive bowel sounds, soft and nontender, no hepatosplenomegaly or masses appreciated. Incisions healing appropriately without erythema or drainage [de-identified] : Soft, nondistended

## 2022-09-06 NOTE — PLAN
[FreeTextEntry1] : Plan: Add fruits and vegetables.\par         Add weight bearing exercises.\par         RTO in 3 months with blood work.\par         Call with concerns.

## 2022-09-06 NOTE — HISTORY OF PRESENT ILLNESS
[de-identified] : Patient had surgery approximately 3 months ago. She had a UTI recently but symptoms resolved.\par Denies reflux/heartburn/nausea/vomiting. Bowel movements  are sometimes hard and she gets some relief with MiraLax.\par Taking 2 multivitamins with iron, 2 Viactiv chews, 1 vitamin b 12 and Omeprazole daily.\par Diabetes improved and A1c is 5.8 %. She is off statin and hyperlipidemia also improved.

## 2022-09-06 NOTE — ASSESSMENT
[FreeTextEntry1] : ALFIE MOSLEY is a 46 year female seen today for postoperative bariatric follow up visit. \par Patient is compliant with dietary guidelines.\par She is focusing on proteins and keeps her portions small -  eggs, tuna fish/chicken salad, fish, cheeses and Greek yogurt. Recommending adding softly cooks vegetables/mixed green salads and fruits.\par Fluid intake is adequate with mostly water.\par She reports that her ankle feels better and has not been swollen for a few days. She walks for exercise and I recommend adding weight bearing exercise.\par \par

## 2022-09-27 ENCOUNTER — NON-APPOINTMENT (OUTPATIENT)
Age: 47
End: 2022-09-27

## 2022-10-03 ENCOUNTER — APPOINTMENT (OUTPATIENT)
Dept: PODIATRY | Facility: CLINIC | Age: 47
End: 2022-10-03

## 2022-10-05 LAB
APPEARANCE: ABNORMAL
BILIRUBIN URINE: NEGATIVE
BLOOD URINE: NEGATIVE
COLOR: YELLOW
GLUCOSE QUALITATIVE U: NEGATIVE
KETONES URINE: NORMAL
LEUKOCYTE ESTERASE URINE: ABNORMAL
NITRITE URINE: POSITIVE
PH URINE: 6
PROTEIN URINE: NORMAL
SPECIFIC GRAVITY URINE: 1.03
UROBILINOGEN URINE: NORMAL

## 2022-10-07 ENCOUNTER — OUTPATIENT (OUTPATIENT)
Dept: OUTPATIENT SERVICES | Facility: HOSPITAL | Age: 47
LOS: 1 days | Discharge: HOME | End: 2022-10-07

## 2022-10-07 DIAGNOSIS — Z98.890 OTHER SPECIFIED POSTPROCEDURAL STATES: Chronic | ICD-10-CM

## 2022-10-07 DIAGNOSIS — Z90.49 ACQUIRED ABSENCE OF OTHER SPECIFIED PARTS OF DIGESTIVE TRACT: Chronic | ICD-10-CM

## 2022-10-07 DIAGNOSIS — M76.70 PERONEAL TENDINITIS, UNSPECIFIED LEG: ICD-10-CM

## 2022-10-07 PROCEDURE — 73718 MRI LOWER EXTREMITY W/O DYE: CPT | Mod: 26,RT

## 2022-10-11 ENCOUNTER — NON-APPOINTMENT (OUTPATIENT)
Age: 47
End: 2022-10-11

## 2022-10-12 ENCOUNTER — OUTPATIENT (OUTPATIENT)
Dept: OUTPATIENT SERVICES | Facility: HOSPITAL | Age: 47
LOS: 1 days | End: 2022-10-12

## 2022-10-12 ENCOUNTER — NON-APPOINTMENT (OUTPATIENT)
Age: 47
End: 2022-10-12

## 2022-10-12 ENCOUNTER — OUTPATIENT (OUTPATIENT)
Dept: OUTPATIENT SERVICES | Facility: HOSPITAL | Age: 47
LOS: 1 days | Discharge: HOME | End: 2022-10-12

## 2022-10-12 ENCOUNTER — APPOINTMENT (OUTPATIENT)
Dept: GASTROENTEROLOGY | Facility: CLINIC | Age: 47
End: 2022-10-12

## 2022-10-12 VITALS
WEIGHT: 159 LBS | HEART RATE: 67 BPM | SYSTOLIC BLOOD PRESSURE: 116 MMHG | HEIGHT: 56 IN | OXYGEN SATURATION: 98 % | BODY MASS INDEX: 35.77 KG/M2 | DIASTOLIC BLOOD PRESSURE: 79 MMHG | TEMPERATURE: 97 F

## 2022-10-12 DIAGNOSIS — R74.01 ELEVATION OF LEVELS OF LIVER TRANSAMINASE LEVELS: ICD-10-CM

## 2022-10-12 DIAGNOSIS — M54.2 CERVICALGIA: ICD-10-CM

## 2022-10-12 DIAGNOSIS — Z12.11 ENCOUNTER FOR SCREENING FOR MALIGNANT NEOPLASM OF COLON: ICD-10-CM

## 2022-10-12 DIAGNOSIS — Z98.890 OTHER SPECIFIED POSTPROCEDURAL STATES: Chronic | ICD-10-CM

## 2022-10-12 DIAGNOSIS — M25.571 PAIN IN RIGHT ANKLE AND JOINTS OF RIGHT FOOT: ICD-10-CM

## 2022-10-12 DIAGNOSIS — K57.90 DIVERTICULOSIS OF INTESTINE, PART UNSPECIFIED, WITHOUT PERFORATION OR ABSCESS WITHOUT BLEEDING: ICD-10-CM

## 2022-10-12 DIAGNOSIS — Z90.49 ACQUIRED ABSENCE OF OTHER SPECIFIED PARTS OF DIGESTIVE TRACT: Chronic | ICD-10-CM

## 2022-10-12 DIAGNOSIS — M54.50 LOW BACK PAIN, UNSPECIFIED: ICD-10-CM

## 2022-10-12 DIAGNOSIS — K76.0 FATTY (CHANGE OF) LIVER, NOT ELSEWHERE CLASSIFIED: ICD-10-CM

## 2022-10-12 DIAGNOSIS — K21.9 GASTRO-ESOPHAGEAL REFLUX DISEASE WITHOUT ESOPHAGITIS: ICD-10-CM

## 2022-10-12 DIAGNOSIS — Z15.89 GENETIC SUSCEPTIBILITY TO OTHER DISEASE: ICD-10-CM

## 2022-10-12 PROCEDURE — 99214 OFFICE O/P EST MOD 30 MIN: CPT | Mod: GC

## 2022-10-12 RX ORDER — PANTOPRAZOLE 40 MG/1
40 TABLET, DELAYED RELEASE ORAL DAILY
Qty: 30 | Refills: 2 | Status: ACTIVE | COMMUNITY
Start: 2022-10-12 | End: 1900-01-01

## 2022-10-12 NOTE — HISTORY OF PRESENT ILLNESS
[FreeTextEntry1] : 46 year old female with PMHx of DM2, Ovarian cancer s/p left oophorectomy in 2006 and RT oophorectomy and hysterotomy in 2017, recurrent diverticulitis s/p partial colectomy, H pylori gastritis s/p eradication, s/p cholecystectomy, CKDN2A mutation, NAFLD presenting for burning sensation that is intermittent associated with abdominal pain (gassiness). No nausea, vomiting, diarrhea, or constipation. The burning sensation started after the sleeve Sx in June 2022.\par \par \par

## 2022-10-12 NOTE — PHYSICAL EXAM
[Alert] : alert [Normal Voice/Communication] : normal voice/communication [Healthy Appearing] : healthy appearing [No Acute Distress] : no acute distress [Hearing Threshold Finger Rub Not [place]] : hearing was normal [Normal Lips/Gums] : the lips and gums were normal [Normal Appearance] : the appearance of the neck was normal [No Neck Mass] : no neck mass was observed [No Respiratory Distress] : no respiratory distress [No Acc Muscle Use] : no accessory muscle use [Respiration, Rhythm And Depth] : normal respiratory rhythm and effort [Auscultation Breath Sounds / Voice Sounds] : lungs were clear to auscultation bilaterally [Heart Rate And Rhythm] : heart rate was normal and rhythm regular [Normal S1, S2] : normal S1 and S2 [Murmurs] : no murmurs [Bowel Sounds] : normal bowel sounds [No CVA Tenderness] : no CVA  tenderness [Abnormal Walk] : normal gait [Normal Color / Pigmentation] : normal skin color and pigmentation [] : no rash [Oriented To Time, Place, And Person] : oriented to person, place, and time [de-identified] : Diffuse mild tenderness

## 2022-10-12 NOTE — REVIEW OF SYSTEMS
[As Noted in HPI] : as noted in HPI [Abdominal Pain] : abdominal pain [Heartburn] : heartburn [Bloating (gassiness)] : bloating [Negative] : Endocrine

## 2022-10-12 NOTE — ASSESSMENT
[FreeTextEntry1] : 46 year old female with PMHx of DM2, Ovarian cancer s/p left oophorectomy in 2006 and RT oophorectomy and hysterotomy in 2017, recurrent diverticulitis s/p partial colectomy, H pylori gastritis s/p eradication, s/p cholecystectomy, CKDN2A mutation, NAFLD presenting for burning sensation that is intermittent associated with abdominal pain (gassiness). No nausea, vomiting, diarrhea, or constipation. The burning sensation started after the sleeve Sx in June 2022.\par \par GERD\par - Post Sleeve which was done in June 2022\par - Advised on lifestyle modifications\par - Prescribed Protonix 40mg this visit\par - F/u in 3 months\par \par Gene Mutation\par - CKDN2A heterozygote\par - Yearly screening EUS\par - Last EUS in May 2022, showed two LN in the norris hepatica (12x8 mm & 8x4 mm). Pathology negative\par - Repeat EUS in May 2023\par \par Obesity\par - s/p sleeve in June 2022\par - Following up with Surgery\par \par F/u in 3 months\par \par Screening Colonoscopy\par - Last one done in July 2021, 3mm hyperplastic lesion\par - Repeat in 2024\par \par NAFLD\par - LFTs normal\par - ALK still mildly elevated\par \par

## 2022-10-13 ENCOUNTER — OUTPATIENT (OUTPATIENT)
Dept: OUTPATIENT SERVICES | Facility: HOSPITAL | Age: 47
LOS: 1 days | Discharge: HOME | End: 2022-10-13

## 2022-10-13 ENCOUNTER — APPOINTMENT (OUTPATIENT)
Dept: DERMATOLOGY | Facility: CLINIC | Age: 47
End: 2022-10-13

## 2022-10-13 VITALS
WEIGHT: 156 LBS | HEIGHT: 56 IN | SYSTOLIC BLOOD PRESSURE: 99 MMHG | BODY MASS INDEX: 35.09 KG/M2 | HEART RATE: 74 BPM | TEMPERATURE: 97 F | OXYGEN SATURATION: 96 % | DIASTOLIC BLOOD PRESSURE: 68 MMHG

## 2022-10-13 DIAGNOSIS — Z98.890 OTHER SPECIFIED POSTPROCEDURAL STATES: Chronic | ICD-10-CM

## 2022-10-13 DIAGNOSIS — Z90.49 ACQUIRED ABSENCE OF OTHER SPECIFIED PARTS OF DIGESTIVE TRACT: Chronic | ICD-10-CM

## 2022-10-13 PROCEDURE — 99214 OFFICE O/P EST MOD 30 MIN: CPT | Mod: GC

## 2022-10-13 NOTE — PHYSICAL EXAM
[Alert] : alert [Oriented x 3] : ~L oriented x 3 [Well Nourished] : well nourished [L Arm] : L Arm [R Leg] : R Leg [FreeTextEntry3] : 2x left axillary hyperpigmented suppurative soft nodule with slight discharge/possible sinus tract left \par - proximal arm scaly hyperpigmented patch w few excoriations and R thigh\par \par Pt prefers TSBE at the next visit.

## 2022-10-13 NOTE — HISTORY OF PRESENT ILLNESS
[FreeTextEntry1] : left armpit SQ bump  [de-identified] : Ms Mahesh Friedman is a 46 ALBIN with PMHx of DM2, Ovarian cancer (s/p left oophorectomy in 2006 and RT oophorectomy and hysterotomy in 2017), recurrent diverticulitis s/p partial colectomy, CKDN2A mutation, NAFLD presenting for left axillary slightly tender hyperpigmented SQ nodule w sinus tract with occasional draining. Patient also reports pruritic lesions on left proximal arm rash and right distal anterior thigh x weeks worse, not using medication and using unknown soap.  \par She denies any changing skin lesion or mole. \par \par

## 2022-10-13 NOTE — ASSESSMENT
[FreeTextEntry1] : Ms Leonardo is a 46 ALBIN with PMHx of DM2, Ovarian cancer (s/p left oophorectomy in 2006 and RT oophorectomy and hysterotomy in 2017), recurrent diverticulitis s/p partial colectomy, CKDN2A mutation, NAFLD presenting for left axillary draining abscess w possible sinus tract. Patient also reports pruritic lesions on left proximal arm lesion and right distal anterior thigh. Patient reports she had a recent gastric sleeve placed in June and has lost ~40lbs since. Patient denies fever, rigors, or blood from lesion. Endorses that she expresses thick white puss from her axillary nodule 2x per day\par \par #Left axillary indurated supperating subcutanious 1cm nodule w sinus tract \par likely chronic hydradenitis suppurativa\par patient has tried topical treatments w no/minimal resolution\par -cephalexin 500mg TID  for 10 days\par -if no improvement patient will need to see surgeon for excision \par -c/w weight loss\par -change to aveeno fragrence free body wash \par -avoid deodorant or perfume to the area for now (can use Dove unscented deoderant there after) \par \par \par #Left proximal arm scaly plaque lesion \par #right anterior thigh scaly plaque lesion \par likely contact dermatitis \par -trial of Triamcinolone 0.1% cream BID \par \par #CKDN2A mutation\par PATIENT IS DUE FOR FULL BODY MELANOMA EXAM/ prefers to do it at the next appt she has in 1 month further evaluate and assess for any atypical skin cancers- patient denies any other changing or symptomatic lesions\par \par \par \par \par 1 month f/u \par

## 2022-10-18 ENCOUNTER — APPOINTMENT (OUTPATIENT)
Dept: PODIATRY | Facility: CLINIC | Age: 47
End: 2022-10-18

## 2022-10-18 ENCOUNTER — OUTPATIENT (OUTPATIENT)
Dept: OUTPATIENT SERVICES | Facility: HOSPITAL | Age: 47
LOS: 1 days | Discharge: HOME | End: 2022-10-18

## 2022-10-18 DIAGNOSIS — Z98.890 OTHER SPECIFIED POSTPROCEDURAL STATES: Chronic | ICD-10-CM

## 2022-10-18 DIAGNOSIS — M79.10 MYALGIA, UNSPECIFIED SITE: ICD-10-CM

## 2022-10-18 DIAGNOSIS — Z90.49 ACQUIRED ABSENCE OF OTHER SPECIFIED PARTS OF DIGESTIVE TRACT: Chronic | ICD-10-CM

## 2022-10-18 DIAGNOSIS — L73.2 HIDRADENITIS SUPPURATIVA: ICD-10-CM

## 2022-10-18 DIAGNOSIS — Z15.09 GENETIC SUSCEPTIBILITY TO OTHER MALIGNANT NEOPLASM: ICD-10-CM

## 2022-10-18 DIAGNOSIS — L25.9 UNSPECIFIED CONTACT DERMATITIS, UNSPECIFIED CAUSE: ICD-10-CM

## 2022-10-18 PROCEDURE — 99213 OFFICE O/P EST LOW 20 MIN: CPT

## 2022-10-28 DIAGNOSIS — M72.2 PLANTAR FASCIAL FIBROMATOSIS: ICD-10-CM

## 2022-10-28 DIAGNOSIS — M79.10 MYALGIA, UNSPECIFIED SITE: ICD-10-CM

## 2022-10-28 DIAGNOSIS — M79.671 PAIN IN RIGHT FOOT: ICD-10-CM

## 2022-10-28 DIAGNOSIS — E11.65 TYPE 2 DIABETES MELLITUS WITH HYPERGLYCEMIA: ICD-10-CM

## 2022-10-28 NOTE — PHYSICAL EXAM
[General Appearance - Alert] : alert [General Appearance - In No Acute Distress] : in no acute distress [Full Pulse] : the pedal pulses are present [Edema] : there was no peripheral edema [Abnormal Walk] : normal gait [Nail Clubbing] : no clubbing  or cyanosis of the fingernails [Musculoskeletal - Swelling] : no joint swelling seen [Motor Tone] : muscle strength and tone were normal [Skin Color & Pigmentation] : normal skin color and pigmentation [Skin Turgor] : normal skin turgor [] : no rash [Normal in Appearance] : normal in appearance [Normal] : normal [Full ROM] : with full range of motion [FreeTextEntry1] : Mild Incurved Nail of B/L Hallux; Medial Aspect;  [Deep Tendon Reflexes (DTR)] : deep tendon reflexes were 2+ and symmetric [Sensation] : the sensory exam was normal to light touch and pinprick [No Focal Deficits] : no focal deficits [Oriented To Time, Place, And Person] : oriented to person, place, and time [Impaired Insight] : insight and judgment were intact [Affect] : the affect was normal

## 2022-10-28 NOTE — HISTORY OF PRESENT ILLNESS
[facility] Emergency Department  [address] [address]  Phone:  [phone]  Fax:  [phone]                                    Antoine Russo   MRN: [id number]    Department:  [facility] Emergency Department   Date of Visit:  1/24/2020           After Visit Summary Signature Page    I have received my discharge instructions, and my questions have been answered. I have discussed any challenges I see with this plan with the nurse or doctor.    ..........................................................................................................................................  Patient/Patient Representative Signature      ..........................................................................................................................................  Patient Representative Print Name and Relationship to Patient    ..................................................               ................................................  Date                                   Time    ..........................................................................................................................................  Reviewed by Signature/Title    ...................................................              ..............................................  Date                                               Time          22EPIC Rev 08/18        [FreeTextEntry1] : Patient states pain is present on first step in the morning and gets better with increased movement.\par po Meloxicam helped relief some pain but comes back once medications worn out. she also complaints right lateral foot pain.\par

## 2022-11-14 ENCOUNTER — APPOINTMENT (OUTPATIENT)
Dept: OPHTHALMOLOGY | Facility: CLINIC | Age: 47
End: 2022-11-14

## 2022-11-14 ENCOUNTER — OUTPATIENT (OUTPATIENT)
Dept: OUTPATIENT SERVICES | Facility: HOSPITAL | Age: 47
LOS: 1 days | Discharge: HOME | End: 2022-11-14

## 2022-11-14 DIAGNOSIS — Z98.890 OTHER SPECIFIED POSTPROCEDURAL STATES: Chronic | ICD-10-CM

## 2022-11-14 DIAGNOSIS — Z90.49 ACQUIRED ABSENCE OF OTHER SPECIFIED PARTS OF DIGESTIVE TRACT: Chronic | ICD-10-CM

## 2022-11-14 PROCEDURE — 92014 COMPRE OPH EXAM EST PT 1/>: CPT

## 2022-11-14 PROCEDURE — 92133 CPTRZD OPH DX IMG PST SGM ON: CPT | Mod: 26

## 2022-11-16 DIAGNOSIS — Z86.69 PERSONAL HISTORY OF OTHER DISEASES OF THE NERVOUS SYSTEM AND SENSE ORGANS: ICD-10-CM

## 2022-11-16 DIAGNOSIS — R60.0 LOCALIZED EDEMA: ICD-10-CM

## 2022-11-16 DIAGNOSIS — H11.052 PERIPHERAL PTERYGIUM, PROGRESSIVE, LEFT EYE: ICD-10-CM

## 2022-11-16 DIAGNOSIS — H40.013 OPEN ANGLE WITH BORDERLINE FINDINGS, LOW RISK, BILATERAL: ICD-10-CM

## 2022-11-16 DIAGNOSIS — E11.9 TYPE 2 DIABETES MELLITUS WITHOUT COMPLICATIONS: ICD-10-CM

## 2022-11-16 DIAGNOSIS — H57.89 OTHER SPECIFIED DISORDERS OF EYE AND ADNEXA: ICD-10-CM

## 2022-11-28 ENCOUNTER — OUTPATIENT (OUTPATIENT)
Dept: OUTPATIENT SERVICES | Facility: HOSPITAL | Age: 47
LOS: 1 days | Discharge: HOME | End: 2022-11-28

## 2022-11-28 ENCOUNTER — APPOINTMENT (OUTPATIENT)
Dept: OPHTHALMOLOGY | Facility: CLINIC | Age: 47
End: 2022-11-28

## 2022-11-28 ENCOUNTER — APPOINTMENT (OUTPATIENT)
Dept: PODIATRY | Facility: CLINIC | Age: 47
End: 2022-11-28

## 2022-11-28 DIAGNOSIS — Z90.49 ACQUIRED ABSENCE OF OTHER SPECIFIED PARTS OF DIGESTIVE TRACT: Chronic | ICD-10-CM

## 2022-11-28 DIAGNOSIS — Z98.890 OTHER SPECIFIED POSTPROCEDURAL STATES: Chronic | ICD-10-CM

## 2022-11-28 PROCEDURE — 99213 OFFICE O/P EST LOW 20 MIN: CPT

## 2022-11-28 PROCEDURE — 92012 INTRM OPH EXAM EST PATIENT: CPT

## 2022-11-30 DIAGNOSIS — R60.0 LOCALIZED EDEMA: ICD-10-CM

## 2022-11-30 DIAGNOSIS — H57.89 OTHER SPECIFIED DISORDERS OF EYE AND ADNEXA: ICD-10-CM

## 2022-11-30 DIAGNOSIS — H11.052 PERIPHERAL PTERYGIUM, PROGRESSIVE, LEFT EYE: ICD-10-CM

## 2022-12-06 NOTE — DISCHARGE NOTE NURSING/CASE MANAGEMENT/SOCIAL WORK - PATIENT PORTAL LINK FT
Pap every 5 years if normal, sexually transmitted infection testing as indicated, exercise most days of week, obtain appropriate diet and hydration, Calcium 1000mg + 600 vit D daily, birth control as directed (ACHES reviewed)  Benefits, risks and alternatives discussed/reviewed  HPV 9 vaccine recommended through age 39  Check with your insurance for coverage  If covered, call office to schedule start of vaccine series  Annual mammogram starting at age 36, monthly breast self exam  Yen Lopez 20 times twice daily  You can access the FollowMyHealth Patient Portal offered by [facility] by registering at the following website: [email or web address]. By joining [employer]’s FollowMyHealth portal, you will also be able to view your health information using other applications (apps) compatible with our system.

## 2022-12-12 NOTE — ED ADULT NURSE NOTE - NS ED NURSE LEVEL OF CONSCIOUSNESS AFFECT
It was very nice to meet you, Lucie. Thank you for allowing us to take care of you today at [facility].    Your evaluation today did not show any emergent findings or have any emergent indications for admission to the hospital.     Please understand that an ER evaluation is just the start of your evaluation. We will do what we can, but we are often unable to fully figure out what is causing your symptoms from one evaluation. Thus, our primary goal is to determine whether you need to be evaluated in the hospital or if it is safe for you to go home and see other doctors such as a primary care physician or a specialist on an outpatient basis.     Like we discussed, it is VERY IMPORTANT that you follow up with your primary care doctor (call them to set up an appointment) within the next few days or as soon as possible so that you can be re-evaluated for improvement in your symptoms or for any other questions.     A copy of your results should be included in your paperwork. If you were prescribed any medications, please take them as directed or call us back with any questions.    Please return to the emergency room within 12-48 hours if you experience fever, chills, chest pain or shortness of breath, pain with inspiration/expiration, pain that travels to your arms, neck or back, nausea, vomiting, severe headache, tearing pain in your chest, dizziness, feel as though you are about to pass out, have any worsening symptoms, or any other concerns.    *IMPORTANT INFORMATION REGARDING XRAYS AND CT SCANS*  Please keep in mind that at nighttime we do not have an in-house radiologist and use a tele-radiology service. This means that while they provide us with information on emergent findings or important acute findings, they may not report to us ALL of the other smaller, yet still important to know, findings that may be there on your imaging studies. While we will try our best to inform you about any incidental  findings or abnormal findings that require follow up, please follow up with your primary care provider to have your imaging studies reviewed formally and have any abnormal findings addressed.      Calm/Appropriate

## 2022-12-17 NOTE — PHYSICAL EXAM
[General Appearance - Alert] : alert [General Appearance - In No Acute Distress] : in no acute distress [Full Pulse] : the pedal pulses are present [Edema] : there was no peripheral edema [Abnormal Walk] : normal gait [Musculoskeletal - Swelling] : no joint swelling seen [Nail Clubbing] : no clubbing  or cyanosis of the fingernails [Motor Tone] : muscle strength and tone were normal [Skin Color & Pigmentation] : normal skin color and pigmentation [Skin Turgor] : normal skin turgor [] : no rash [Normal in Appearance] : normal in appearance [Normal] : normal [Full ROM] : with full range of motion [FreeTextEntry1] : Mild Incurved Nail of B/L Hallux; Medial Aspect;  [Deep Tendon Reflexes (DTR)] : deep tendon reflexes were 2+ and symmetric [No Focal Deficits] : no focal deficits [Sensation] : the sensory exam was normal to light touch and pinprick [Oriented To Time, Place, And Person] : oriented to person, place, and time [Impaired Insight] : insight and judgment were intact [Affect] : the affect was normal

## 2022-12-19 ENCOUNTER — OUTPATIENT (OUTPATIENT)
Dept: OUTPATIENT SERVICES | Facility: HOSPITAL | Age: 47
LOS: 1 days | Discharge: HOME | End: 2022-12-19

## 2022-12-19 DIAGNOSIS — Z90.49 ACQUIRED ABSENCE OF OTHER SPECIFIED PARTS OF DIGESTIVE TRACT: Chronic | ICD-10-CM

## 2022-12-19 DIAGNOSIS — Z98.890 OTHER SPECIFIED POSTPROCEDURAL STATES: Chronic | ICD-10-CM

## 2022-12-20 ENCOUNTER — APPOINTMENT (OUTPATIENT)
Dept: DERMATOLOGY | Facility: CLINIC | Age: 47
End: 2022-12-20

## 2022-12-20 ENCOUNTER — APPOINTMENT (OUTPATIENT)
Dept: HEMATOLOGY ONCOLOGY | Facility: CLINIC | Age: 47
End: 2022-12-20

## 2022-12-20 ENCOUNTER — OUTPATIENT (OUTPATIENT)
Dept: OUTPATIENT SERVICES | Facility: HOSPITAL | Age: 47
LOS: 1 days | Discharge: HOME | End: 2022-12-20

## 2022-12-20 VITALS
SYSTOLIC BLOOD PRESSURE: 115 MMHG | TEMPERATURE: 97 F | HEIGHT: 56 IN | OXYGEN SATURATION: 97 % | HEART RATE: 60 BPM | BODY MASS INDEX: 35.09 KG/M2 | WEIGHT: 156 LBS | DIASTOLIC BLOOD PRESSURE: 77 MMHG

## 2022-12-20 DIAGNOSIS — Z98.890 OTHER SPECIFIED POSTPROCEDURAL STATES: Chronic | ICD-10-CM

## 2022-12-20 DIAGNOSIS — L82.1 OTHER SEBORRHEIC KERATOSIS: ICD-10-CM

## 2022-12-20 DIAGNOSIS — D22.9 MELANOCYTIC NEVI, UNSPECIFIED: ICD-10-CM

## 2022-12-20 DIAGNOSIS — Z15.09 GENETIC SUSCEPTIBILITY TO OTHER MALIGNANT NEOPLASM: ICD-10-CM

## 2022-12-20 DIAGNOSIS — L73.2 HIDRADENITIS SUPPURATIVA: ICD-10-CM

## 2022-12-20 DIAGNOSIS — Z90.49 ACQUIRED ABSENCE OF OTHER SPECIFIED PARTS OF DIGESTIVE TRACT: Chronic | ICD-10-CM

## 2022-12-20 DIAGNOSIS — C25.9 MALIGNANT NEOPLASM OF PANCREAS, UNSPECIFIED: ICD-10-CM

## 2022-12-20 PROCEDURE — 99213 OFFICE O/P EST LOW 20 MIN: CPT | Mod: GC

## 2022-12-20 NOTE — HISTORY OF PRESENT ILLNESS
[de-identified] : f/u melanoma screening.\par \par f/u hidradenitis.  Left axillary lesion improved with oral antibiotic in Oct, but still drains at times and is tender.

## 2022-12-20 NOTE — PHYSICAL EXAM
[FreeTextEntry3] : Full skin exam except double covered areas, scattered small macular nevi, round and evenly covered.\par Few seborrheic keratoses and lentigines.\par Dermatofibroma on right calf.\par \par Left axilla indurated linear scar with about 1cm subcutaneous nodule, slight tenderness and drainage.

## 2022-12-27 ENCOUNTER — APPOINTMENT (OUTPATIENT)
Dept: INTERNAL MEDICINE | Facility: CLINIC | Age: 47
End: 2022-12-27

## 2022-12-27 ENCOUNTER — OUTPATIENT (OUTPATIENT)
Dept: OUTPATIENT SERVICES | Facility: HOSPITAL | Age: 47
LOS: 1 days | Discharge: HOME | End: 2022-12-27

## 2022-12-27 VITALS
TEMPERATURE: 98.1 F | OXYGEN SATURATION: 95 % | BODY MASS INDEX: 34.87 KG/M2 | WEIGHT: 155 LBS | SYSTOLIC BLOOD PRESSURE: 108 MMHG | DIASTOLIC BLOOD PRESSURE: 73 MMHG | HEIGHT: 56 IN | HEART RATE: 84 BPM

## 2022-12-27 DIAGNOSIS — Z98.890 OTHER SPECIFIED POSTPROCEDURAL STATES: Chronic | ICD-10-CM

## 2022-12-27 DIAGNOSIS — F32.A DEPRESSION, UNSPECIFIED: ICD-10-CM

## 2022-12-27 DIAGNOSIS — Z23 ENCOUNTER FOR IMMUNIZATION: ICD-10-CM

## 2022-12-27 DIAGNOSIS — Z90.49 ACQUIRED ABSENCE OF OTHER SPECIFIED PARTS OF DIGESTIVE TRACT: Chronic | ICD-10-CM

## 2022-12-27 PROCEDURE — 99214 OFFICE O/P EST MOD 30 MIN: CPT | Mod: GC

## 2022-12-27 NOTE — INTERPRETER SERVICES
[[employer] ] : provided by [employer]   [Time Spent: ____ minutes] : Total time spent using  services: [unfilled] minutes. The patient's primary language is not English thus required  services. [Interpreters_IDNumber] : [id number] [Interpreters_FullName] : Sarahi [TWNoteComboBox1] : Nicaraguan

## 2022-12-27 NOTE — PHYSICAL EXAM
[No Acute Distress] : no acute distress [Normal Sclera/Conjunctiva] : normal sclera/conjunctiva [No Accessory Muscle Use] : no accessory muscle use [Clear to Auscultation] : lungs were clear to auscultation bilaterally [Regular Rhythm] : with a regular rhythm [Normal S1, S2] : normal S1 and S2 [No Rash] : no rash [No Focal Deficits] : no focal deficits [Normal Insight/Judgement] : insight and judgment were intact [de-identified] : vertical sx scar cdi, soft nt nd

## 2022-12-27 NOTE — ASSESSMENT
[FreeTextEntry1] : Diabetes;\par Low sugar, low carbohydrate diet \par Exercise Counseled \par Patient given opportunity to discuss frequency and target blood sugar levels\par Patient educated on symptoms of hypo/hyperglycemic events \par Counseled on: Yearly Ophthalmology and Podiatry Exam\par s/p sleeve gastrectomy a1c 5.1 wnl\par \par Obesity;\par Diet/Exercise Counseled\par Patient was counseled on changing their diet to low fat, low carbohydrates and low cholesterol.\par Patient was also counseled on increasing their activity to 45 minutes of exercise daily.\par s/p sleeve\par \par nafld\par lfts stable\par \par #seizure vs psuedoseizures\par -stopped meds on her own ,took med for 2-3 years according to her\par -no more episodes\par \par #Hx of recurrent diverticulitis\par -pt is following Colorectal surgeon, s/p surgery in july 2020\par -repeat colonoscopy in 5 years\par \par #Ovarian CA s/p bilateral oophorectomy and hysterectomy\par -following with GYN\par Vaccine;\par All vaccine side effects discussed with pt prior to injection.\par Tolerated well by patient.\par Patient instructed to return to office if any side effects; including, but not limited to, rash, fever or vomiting occur.\par flu vaccine given during this visit\par \par hld uncontrolled\par resume qhs statin\par Hyperlipidemia;\par Low fat, low cholesterol diet.\par Discussed importance of eating a heart healthy diet\par Counseled on aerobic exercise and weight loss\par Fasting lipid panel every 3 months\par Treatment options and possible side effects discussed \par Smoking cessation discussed, if applicable\par Patient counseled on effects of ETOH on lipid levels\par \par \par hcm\par #Health Maintenance\par - Mammogram 4/2022- BIRADS 2 - repeat in april 2023\par Mammogram Cancer Screening;\par Patient counseled on the importance of a yearly mammogram screening and directed to have test performed or follow-up with OB/GYN. Failure to perform test can result in breast cancer and death\par \par - Pap smear: last in 2017- follows with GYN- seeing every 6 mo per patient\par - Colonoscopy 2020- diverticulosis and hyperplastic polyps; repeat in 2026\par - PPSV 23 UTD- 2018\par - follow up in 4 months

## 2022-12-27 NOTE — HISTORY OF PRESENT ILLNESS
[FreeTextEntry1] : routine f/u, obesity, dm2, hld, nafld [de-identified] : no active complaints. needs health forms/physical filled out. titers and drug screen drawn. requests flu vaccine\par \par fluarix quadrivaleny\par LOT [id number]\par exp 6/30/23\par [id number]\par \par glaxoSmithKline\par \par administered L delt\par

## 2022-12-29 ENCOUNTER — APPOINTMENT (OUTPATIENT)
Dept: PODIATRY | Facility: CLINIC | Age: 47
End: 2022-12-29

## 2022-12-30 DIAGNOSIS — Z00.00 ENCOUNTER FOR GENERAL ADULT MEDICAL EXAMINATION WITHOUT ABNORMAL FINDINGS: ICD-10-CM

## 2022-12-30 DIAGNOSIS — Z23 ENCOUNTER FOR IMMUNIZATION: ICD-10-CM

## 2022-12-30 DIAGNOSIS — E66.9 OBESITY, UNSPECIFIED: ICD-10-CM

## 2022-12-30 DIAGNOSIS — K76.0 FATTY (CHANGE OF) LIVER, NOT ELSEWHERE CLASSIFIED: ICD-10-CM

## 2022-12-30 DIAGNOSIS — E11.65 TYPE 2 DIABETES MELLITUS WITH HYPERGLYCEMIA: ICD-10-CM

## 2022-12-30 DIAGNOSIS — F32.A DEPRESSION, UNSPECIFIED: ICD-10-CM

## 2022-12-30 DIAGNOSIS — K21.9 GASTRO-ESOPHAGEAL REFLUX DISEASE WITHOUT ESOPHAGITIS: ICD-10-CM

## 2023-01-10 ENCOUNTER — APPOINTMENT (OUTPATIENT)
Dept: SURGERY | Facility: CLINIC | Age: 48
End: 2023-01-10
Payer: MEDICAID

## 2023-01-10 VITALS
WEIGHT: 154 LBS | HEIGHT: 56 IN | HEART RATE: 88 BPM | OXYGEN SATURATION: 97 % | DIASTOLIC BLOOD PRESSURE: 86 MMHG | SYSTOLIC BLOOD PRESSURE: 124 MMHG | BODY MASS INDEX: 34.64 KG/M2 | TEMPERATURE: 97 F

## 2023-01-10 DIAGNOSIS — M54.9 DORSALGIA, UNSPECIFIED: ICD-10-CM

## 2023-01-10 LAB
25(OH)D3 SERPL-MCNC: 53 NG/ML
ALBUMIN SERPL ELPH-MCNC: 4.6 G/DL
ALP BLD-CCNC: 126 U/L
ALT SERPL-CCNC: 20 U/L
ANION GAP SERPL CALC-SCNC: 15 MMOL/L
AST SERPL-CCNC: 22 U/L
BASOPHILS # BLD AUTO: 0.02 K/UL
BASOPHILS NFR BLD AUTO: 0.3 %
BILIRUB SERPL-MCNC: 0.6 MG/DL
BUN SERPL-MCNC: 18 MG/DL
CALCIUM SERPL-MCNC: 9.9 MG/DL
CHLORIDE SERPL-SCNC: 105 MMOL/L
CHOLEST SERPL-MCNC: 248 MG/DL
CO2 SERPL-SCNC: 22 MMOL/L
CREAT SERPL-MCNC: 0.5 MG/DL
EGFR: 116 ML/MIN/1.73M2
EOSINOPHIL # BLD AUTO: 0.06 K/UL
EOSINOPHIL NFR BLD AUTO: 0.9 %
ESTIMATED AVERAGE GLUCOSE: 114 MG/DL
FERRITIN SERPL-MCNC: 88 NG/ML
FOLATE RBC-MCNC: 1377 NG/ML
GLUCOSE SERPL-MCNC: 79 MG/DL
HBA1C MFR BLD HPLC: 5.6 %
HCT VFR BLD CALC: 40.3 %
HCT VFR BLD CALC: 40.3 %
HDLC SERPL-MCNC: 37 MG/DL
HGB BLD-MCNC: 13.1 G/DL
IMM GRANULOCYTES NFR BLD AUTO: 0.3 %
IRON SATN MFR SERPL: 24 %
IRON SERPL-MCNC: 76 UG/DL
LDLC SERPL CALC-MCNC: 167 MG/DL
LYMPHOCYTES # BLD AUTO: 1.48 K/UL
LYMPHOCYTES NFR BLD AUTO: 22.9 %
MAN DIFF?: NORMAL
MCHC RBC-ENTMCNC: 29 PG
MCHC RBC-ENTMCNC: 32.5 G/DL
MCV RBC AUTO: 89.2 FL
MONOCYTES # BLD AUTO: 0.22 K/UL
MONOCYTES NFR BLD AUTO: 3.4 %
NEUTROPHILS # BLD AUTO: 4.65 K/UL
NEUTROPHILS NFR BLD AUTO: 72.2 %
NONHDLC SERPL-MCNC: 211 MG/DL
PLATELET # BLD AUTO: 274 K/UL
POTASSIUM SERPL-SCNC: 4.6 MMOL/L
PROT SERPL-MCNC: 7.1 G/DL
RBC # BLD: 4.52 M/UL
RBC # FLD: 14.5 %
SODIUM SERPL-SCNC: 142 MMOL/L
TIBC SERPL-MCNC: 317 UG/DL
TRIGL SERPL-MCNC: 221 MG/DL
UIBC SERPL-MCNC: 241 UG/DL
VIT B1 SERPL-MCNC: 148.4 NMOL/L
VIT B12 SERPL-MCNC: >2000 PG/ML
WBC # FLD AUTO: 6.45 K/UL

## 2023-01-10 PROCEDURE — 99213 OFFICE O/P EST LOW 20 MIN: CPT

## 2023-01-10 NOTE — PHYSICAL EXAM
[Normal] : normoactive bowel sounds, soft and nontender, no hepatosplenomegaly or masses appreciated. Incisions healing appropriately without erythema or drainage [de-identified] : Soft, nondistended

## 2023-01-10 NOTE — ASSESSMENT
[FreeTextEntry1] : ALFIE MOSLEY is a 47 year female seen today for bariatric follow up visit. \par She is eating every 2 hours as she feels hungry. Breakfast - egg salad with avocado/mayonnaise and a few drops of honey - without the yolks. She will eat the rest for lunch with a few crackers.\par Dinner is usually a piece of fish/chicken with vegetables/beans and a small serving of rice/potato or a slice of bread.\par She snacks on sweet bread or chocolate. Recommend that she decrease sugary foods and chocolate. She agreed to substitute her chocolate craving with Sugar-free chocolate pudding. Encouraged her to add berries, apple and spinach as she tolerates them.\par Fluid intake is adequate.\par She is not exercising and I discussed the importance of regular scheduled exercise most days of the week to achieve optimal weight loss.

## 2023-01-10 NOTE — DATA REVIEWED
[FreeTextEntry1] : Blood work reviewed with patient. Alk Phos 126 which is much improved since surgery.

## 2023-01-10 NOTE — REASON FOR VISIT
[Follow-Up Visit] : a follow-up visit for [FreeTextEntry2] : Sleeve Gastrectomy on 6/9/2022 [Interpreters_IDNumber] : [id number] [Interpreters_FullName] : Keiry [TWNoteComboBox1] : Micronesian

## 2023-01-10 NOTE — PLAN
[FreeTextEntry1] : Plan: Decrease sugary foods and CHO.\par          Add fruits and vegetables.\par          Exercise 3-4 days/week for 30-60 minutes.\par          RTO in 3 months.\par

## 2023-01-10 NOTE — HISTORY OF PRESENT ILLNESS
[FreeTextEntry1] : Swelling of the knee and purulent papules in the armpit [de-identified] : c/o back pain since pt fell june 14\par c/o persistent back pain

## 2023-01-10 NOTE — HISTORY OF PRESENT ILLNESS
[FreeTextEntry1] : Swelling of the knee and purulent papules in the armpit [de-identified] : c/o back pain since pt fell june 14\par c/o persistent back pain

## 2023-01-10 NOTE — HISTORY OF PRESENT ILLNESS
[de-identified] : Patient had surgery approximately 7 months ago. \par Denies reflux/heartburn/nausea/vomiting. Patient reports that she gets diarrhea sometimes if she eats pineapple, kaylene and vegetables. She was away and had Tequila and had the same problem. \par Taking 2 multivitamins with iron, 2 Viactiv chews, 1 vitamin b 12 and Omeprazole daily.\par Diabetes improved and A1c is 5.6 %. Lipid panel was elevated and her PCP is putting her back on the statin. \par Patient expressed an interest in plastic surgery and I made her aware that she should wait for ~ 18 months when her weight is stable. \par

## 2023-01-18 ENCOUNTER — RESULT REVIEW (OUTPATIENT)
Age: 48
End: 2023-01-18

## 2023-01-18 ENCOUNTER — OUTPATIENT (OUTPATIENT)
Dept: OUTPATIENT SERVICES | Facility: HOSPITAL | Age: 48
LOS: 1 days | Discharge: HOME | End: 2023-01-18

## 2023-01-18 ENCOUNTER — APPOINTMENT (OUTPATIENT)
Dept: GASTROENTEROLOGY | Facility: CLINIC | Age: 48
End: 2023-01-18
Payer: MEDICAID

## 2023-01-18 VITALS
SYSTOLIC BLOOD PRESSURE: 107 MMHG | BODY MASS INDEX: 34.87 KG/M2 | HEART RATE: 60 BPM | TEMPERATURE: 97.5 F | DIASTOLIC BLOOD PRESSURE: 75 MMHG | HEIGHT: 56 IN | OXYGEN SATURATION: 98 % | WEIGHT: 155 LBS

## 2023-01-18 DIAGNOSIS — Z98.890 OTHER SPECIFIED POSTPROCEDURAL STATES: Chronic | ICD-10-CM

## 2023-01-18 DIAGNOSIS — K21.9 GASTRO-ESOPHAGEAL REFLUX DISEASE WITHOUT ESOPHAGITIS: ICD-10-CM

## 2023-01-18 DIAGNOSIS — K57.90 DIVERTICULOSIS OF INTESTINE, PART UNSPECIFIED, WITHOUT PERFORATION OR ABSCESS WITHOUT BLEEDING: ICD-10-CM

## 2023-01-18 DIAGNOSIS — K59.00 CONSTIPATION, UNSPECIFIED: ICD-10-CM

## 2023-01-18 DIAGNOSIS — Z12.11 ENCOUNTER FOR SCREENING FOR MALIGNANT NEOPLASM OF COLON: ICD-10-CM

## 2023-01-18 DIAGNOSIS — Z90.49 ACQUIRED ABSENCE OF OTHER SPECIFIED PARTS OF DIGESTIVE TRACT: Chronic | ICD-10-CM

## 2023-01-18 DIAGNOSIS — K57.90 DIVERTICULOSIS OF INTESTINE, PART UNSPECIFIED, W/OUT PERFORATION OR ABSCESS W/OUT BLEEDING: ICD-10-CM

## 2023-01-18 DIAGNOSIS — K21.9 GASTRO-ESOPHAGEAL REFLUX DISEASE W/OUT ESOPHAGITIS: ICD-10-CM

## 2023-01-18 PROCEDURE — 99213 OFFICE O/P EST LOW 20 MIN: CPT | Mod: GC

## 2023-01-18 NOTE — END OF VISIT
[] : Fellow [FreeTextEntry3] : Pt presents for follow up of heartburn with CKDN2A mutation. Heartburn controlled on PPI. Recommend schedule EUS due in May 2023 and repeat colonoscopy in 2024 based on prior exams.

## 2023-01-18 NOTE — ASSESSMENT
[FreeTextEntry1] : 47 year old female with PMHx of DM2, Ovarian cancer s/p left oophorectomy in 2006 and RT oophorectomy and hysterotomy in 2017, recurrent diverticulitis s/p partial colectomy, H pylori gastritis s/p eradication, s/p cholecystectomy, CKDN2A mutation here for follow up for GERD. 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 Pathology negative\par - Repeat EUS in May 2023\par -Ct scan abdomen in 7/2022 post surgical changes related to sleeve, 2 cm small hematoma at suture line, no pancreatic lesions\par \par #)Obesity\par - s/p sleeve in June 2022\par - Following up with Surgery\par \par #)Screening Colonoscopy\par - Last one done in July 2021, 3mm hyperplastic lesion\par - Repeat in 2024\par \par \par

## 2023-01-18 NOTE — PHYSICAL EXAM
[Alert] : alert [Sclera] : the sclera and conjunctiva were normal [Hearing Threshold Finger Rub Not Waller] : hearing was normal [Normal Appearance] : the appearance of the neck was normal [No Respiratory Distress] : no respiratory distress [No Acc Muscle Use] : no accessory muscle use [Auscultation Breath Sounds / Voice Sounds] : lungs were clear to auscultation bilaterally [Heart Rate And Rhythm] : heart rate was normal and rhythm regular [Normal S1, S2] : normal S1 and S2 [Bowel Sounds] : normal bowel sounds [Abdomen Tenderness] : non-tender [Abdomen Soft] : soft [Abnormal Walk] : normal gait [No Focal Deficits] : no focal deficits [Oriented To Time, Place, And Person] : oriented to person, place, and time

## 2023-01-18 NOTE — HISTORY OF PRESENT ILLNESS
[FreeTextEntry1] : 47 year old female with PMHx of DM2, Ovarian cancer s/p left oophorectomy in 2006 and RT oophorectomy and hysterotomy in 2017, recurrent diverticulitis s/p partial colectomy, H pylori gastritis s/p eradication, s/p cholecystectomy, CKDN2A mutation here for follow up for GERD. \par Currently feeling better with omeprazole once a day. \par Denies dysphagia, odynophagia, weight loss, hematemesis, melena, hematochezia, nausea, vomiting, family h/o GI cancers\par \par Last EGD/EUS in 5/2022; \par EGD: irregular z-line (biopsy), otherwise normal.  \par  EUS: 2 well circumscribed, irregularly shaped and hypoechoic, norris hepatis \par lymph nodes, measuring 12 x 8 mm and 8 x 4 mm respectively, that appeared \par reactive. Due to the patient's known mutation, we elected to sample the largest \par lymph node using a 25 g FNB and samples were sent for cytopathology evaluation. \par  \par  EUS: otherwise normal exam of the pancreas.\par Last colonoscopy in July 2021;  Polyp (3 mm) in the transverse colon (HP) \par  Mild diverticulosis of the the left side of the colon.

## 2023-01-27 NOTE — ED ADULT NURSE NOTE - NS ED NURSE LEVEL OF CONSCIOUSNESS ORIENTATION
Health Maintenance Due   Topic Date Due   • Annual Physical (ages 3-18)  07/31/2021   • COVID-19 Vaccine (3 - Booster for Pfizer series) 08/19/2021       Patient is due for topics listed above, she wishes to proceed with Immunization(s) Influenza, but is not proceeding with Immunization(s) COVID-19 at this time.   Oriented - self; Oriented - place; Oriented - time

## 2023-01-30 ENCOUNTER — OUTPATIENT (OUTPATIENT)
Dept: OUTPATIENT SERVICES | Facility: HOSPITAL | Age: 48
LOS: 1 days | End: 2023-01-30

## 2023-01-30 DIAGNOSIS — Z98.890 OTHER SPECIFIED POSTPROCEDURAL STATES: Chronic | ICD-10-CM

## 2023-01-30 DIAGNOSIS — S93.401A SPRAIN OF UNSPECIFIED LIGAMENT OF RIGHT ANKLE, INITIAL ENCOUNTER: ICD-10-CM

## 2023-01-30 DIAGNOSIS — Z90.49 ACQUIRED ABSENCE OF OTHER SPECIFIED PARTS OF DIGESTIVE TRACT: Chronic | ICD-10-CM

## 2023-02-06 ENCOUNTER — OUTPATIENT (OUTPATIENT)
Dept: OUTPATIENT SERVICES | Facility: HOSPITAL | Age: 48
LOS: 1 days | End: 2023-02-06
Payer: MEDICAID

## 2023-02-06 DIAGNOSIS — Z98.890 OTHER SPECIFIED POSTPROCEDURAL STATES: Chronic | ICD-10-CM

## 2023-02-06 DIAGNOSIS — Z00.00 ENCOUNTER FOR GENERAL ADULT MEDICAL EXAMINATION WITHOUT ABNORMAL FINDINGS: ICD-10-CM

## 2023-02-06 DIAGNOSIS — Z90.49 ACQUIRED ABSENCE OF OTHER SPECIFIED PARTS OF DIGESTIVE TRACT: Chronic | ICD-10-CM

## 2023-02-06 PROCEDURE — 97110 THERAPEUTIC EXERCISES: CPT | Mod: GP

## 2023-02-06 PROCEDURE — 97010 HOT OR COLD PACKS THERAPY: CPT | Mod: GP

## 2023-02-07 DIAGNOSIS — Z00.00 ENCOUNTER FOR GENERAL ADULT MEDICAL EXAMINATION WITHOUT ABNORMAL FINDINGS: ICD-10-CM

## 2023-02-13 ENCOUNTER — OUTPATIENT (OUTPATIENT)
Dept: OUTPATIENT SERVICES | Facility: HOSPITAL | Age: 48
LOS: 1 days | End: 2023-02-13

## 2023-02-13 DIAGNOSIS — Z98.890 OTHER SPECIFIED POSTPROCEDURAL STATES: Chronic | ICD-10-CM

## 2023-02-13 DIAGNOSIS — Z90.49 ACQUIRED ABSENCE OF OTHER SPECIFIED PARTS OF DIGESTIVE TRACT: Chronic | ICD-10-CM

## 2023-02-13 DIAGNOSIS — Z00.00 ENCOUNTER FOR GENERAL ADULT MEDICAL EXAMINATION WITHOUT ABNORMAL FINDINGS: ICD-10-CM

## 2023-02-27 ENCOUNTER — OUTPATIENT (OUTPATIENT)
Dept: OUTPATIENT SERVICES | Facility: HOSPITAL | Age: 48
LOS: 1 days | End: 2023-02-27
Payer: MEDICAID

## 2023-02-27 ENCOUNTER — APPOINTMENT (OUTPATIENT)
Dept: PODIATRY | Facility: CLINIC | Age: 48
End: 2023-02-27
Payer: MEDICAID

## 2023-02-27 DIAGNOSIS — Z98.890 OTHER SPECIFIED POSTPROCEDURAL STATES: Chronic | ICD-10-CM
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
## 2023-02-27 DIAGNOSIS — M67.479 GANGLION, UNSPECIFIED ANKLE AND FOOT: ICD-10-CM

## 2023-02-27 DIAGNOSIS — G51.0 BELL'S PALSY: ICD-10-CM

## 2023-02-27 DIAGNOSIS — Z00.00 ENCOUNTER FOR GENERAL ADULT MEDICAL EXAMINATION WITHOUT ABNORMAL FINDINGS: ICD-10-CM

## 2023-02-27 DIAGNOSIS — M72.2 PLANTAR FASCIAL FIBROMATOSIS: ICD-10-CM

## 2023-02-27 DIAGNOSIS — Z90.49 ACQUIRED ABSENCE OF OTHER SPECIFIED PARTS OF DIGESTIVE TRACT: Chronic | ICD-10-CM

## 2023-02-27 PROCEDURE — 99214 OFFICE O/P EST MOD 30 MIN: CPT | Mod: 95

## 2023-02-27 PROCEDURE — 99214 OFFICE O/P EST MOD 30 MIN: CPT

## 2023-02-28 PROBLEM — M72.2 PLANTAR FASCIITIS: Status: ACTIVE | Noted: 2019-12-02

## 2023-02-28 PROBLEM — G51.0 BELL'S PALSY: Status: ACTIVE | Noted: 2020-01-11

## 2023-02-28 PROBLEM — M67.479 GANGLION CYST OF FOOT: Status: ACTIVE | Noted: 2023-02-27

## 2023-02-28 NOTE — ASSESSMENT
[FreeTextEntry1] : Assessment:\par Diabetic Footcare and Risk Assessment:\par \par Plan:\par Patient was seen and evaluated;\par Advised to limit tight wearing shoes; to limit pressure on incurved nails on B/L Hallux;\par Advised to be cautious of open wounds or lesions due to diabetic status;\par MRI ordered to Right foot; to eval ganglion cyst and ATFL tear\par Advised to return back to clinic in 3 months to review MRI\par \par \par \par

## 2023-02-28 NOTE — HISTORY OF PRESENT ILLNESS
[FreeTextEntry1] : Patient states pain is present on first step in the morning and gets better with increased movement. Meloxicam helped relief some pain but she stopped taking it. she also complaints right lateral foot pain.\par

## 2023-02-28 NOTE — PHYSICAL EXAM
[Ankle Swelling Bilaterally] : bilaterally  [2+] : left foot dorsalis pedis 2+ [General Appearance - Alert] : alert [General Appearance - In No Acute Distress] : in no acute distress [Full Pulse] : the pedal pulses are present [Edema] : there was no peripheral edema [Abnormal Walk] : normal gait [Nail Clubbing] : no clubbing  or cyanosis of the fingernails [Musculoskeletal - Swelling] : no joint swelling seen [Motor Tone] : muscle strength and tone were normal [Skin Color & Pigmentation] : normal skin color and pigmentation [Skin Turgor] : normal skin turgor [Normal in Appearance] : normal in appearance [Normal] : normal [Full ROM] : with full range of motion [Deep Tendon Reflexes (DTR)] : deep tendon reflexes were 2+ and symmetric [Sensation] : the sensory exam was normal to light touch and pinprick [No Focal Deficits] : no focal deficits [Oriented To Time, Place, And Person] : oriented to person, place, and time [Impaired Insight] : insight and judgment were intact [Affect] : the affect was normal [Ankle Swelling (On Exam)] : not present [Varicose Veins Of Lower Extremities] : not present [] : not present [Delayed in the Right Toes] : capillary refills normal in right toes [Delayed in the Left Toes] : capillary refills normal in the left toes [de-identified] : TTp on the lateral aspect of the right foot distal to the lateral mal  [FreeTextEntry1] : Mild Incurved Nail of B/L Hallux; Medial Aspect;

## 2023-03-06 ENCOUNTER — LABORATORY RESULT (OUTPATIENT)
Age: 48
End: 2023-03-06

## 2023-03-06 ENCOUNTER — OUTPATIENT (OUTPATIENT)
Dept: OUTPATIENT SERVICES | Facility: HOSPITAL | Age: 48
LOS: 1 days | End: 2023-03-06
Payer: MEDICAID

## 2023-03-06 DIAGNOSIS — Z98.890 OTHER SPECIFIED POSTPROCEDURAL STATES: Chronic | ICD-10-CM

## 2023-03-06 DIAGNOSIS — Z90.49 ACQUIRED ABSENCE OF OTHER SPECIFIED PARTS OF DIGESTIVE TRACT: Chronic | ICD-10-CM

## 2023-03-06 DIAGNOSIS — K59.00 CONSTIPATION, UNSPECIFIED: ICD-10-CM

## 2023-03-06 PROCEDURE — 74018 RADEX ABDOMEN 1 VIEW: CPT | Mod: 26

## 2023-03-06 PROCEDURE — 74018 RADEX ABDOMEN 1 VIEW: CPT

## 2023-03-07 DIAGNOSIS — K59.00 CONSTIPATION, UNSPECIFIED: ICD-10-CM

## 2023-03-07 NOTE — ASU PATIENT PROFILE, ADULT - AS SC BRADEN ACTIVITY
[facility] GERIATRIC SERVICES    Code Status:  DNR/DNI   Visit Type:   Chief Complaint   Patient presents with     TCU Follow Up     Facility:  [facility] ([facility]) [[id number]]           HPI: Reymundo Petersen is a 84 year old male who I am seeing today for follow-up on the TCU.  Patient recently hospitalized on 1/23/2023 secondary to fatigue, confusion and dehydration.  Patient with cardiogenic shock secondary to cardiomyopathy with acute on chronic congestive heart failure with reduced ejection fracture.  Patient initially treated with dobutamine infusion and transition to oral Bumex.  Transthoracic echocardiogram on December 19 showed a left ventricular ejection fracture of 35 to 40%.  Beta-blocker held.  Patient is status post RHC on 1/25 which showed elevated right and left-sided filling pressures consistent with volume overload and pulmonary hypertension.  Due to diuresis patient had an acute kidney injury.  Bumex held and then resumed.  Persistent atrial fib with complete heart block status post ablation and DCCV status post CRT/P.  Hyperkalemia initially however ended up with hypokalemia.  Hyponatremia replaced.  Dysphagia.  Patient was seen by speech and there was some concern for esophageal dysmotility.  General anxiety disorder on sertraline.  DJD on Tylenol for pain.  Pressure injury of the left heel being treated topically.  Overall weakness and fatigue with deconditioning.  Per hospital records cardiology initiated palliative care consult.    Transitional Care Course: Today patient sitting up in chair.  Patient other daughter present on exam today.  Patient is somewhat sleepy.  His daughter reports that he did not sleep well and was up at 5 AM this morning.  Underlying congestive heart failure.  He denies any shortness of breath or chest pain.  Continues on Bumex.  He is on oxygen at 2 L.  Edema to lower extremities remains the same.  Continues in compression socks.  He reports he is eating well.   He is having regular bowel movement.  No dysuria.  Patient awaiting long-term care placement.    Assessment/Plan: Reviewed with changes    Acute on chronic congestive heart failure, unspecified heart failure type (H)  -Ejection fraction of 35 to 40%.  -Follow up CXR showed bilateral pleural effusions.  -Recent increase in Bumex 2 mg to twice daily  -Continues on 2 L of O2.  -Continue daily weights.  -Follow-up BMP on Thursday.    Complete heart block (H)  Status post catheter ablation of atrial fibrillation  -Recent follow-up with cardiology. Amiodarone discontinued.  -No further tachycardia.  -No beta-blocker.  -Continue Xarelto.    Mild cognitive impairment  -UA UC negative.  -Increase symptomology.  No signs or symptoms of infection.  Overall decline.  -CBC without leukocytosis.  Hemoglobin 12.  -Occasional anxiety.  Continues on sertraline.    Stage 2 chronic kidney disease  -Creatinine stable  -Follow-up BMP on Thursday.        Active Ambulatory Problems     Diagnosis Date Noted     Primary osteoarthritis of knees, bilateral 08/15/2019     Primary hypertension 07/11/2018     BPH (benign prostatic hyperplasia) 07/11/2018     Combined hyperlipidemia 07/11/2018     Dizziness 07/11/2018     DJD (degenerative joint disease) 07/11/2018     Left ventricular hypertrophy 07/31/2018     Troponin level elevated 03/25/2020     Acute combined systolic and diastolic congestive heart failure (H) 03/26/2020     Pulmonary hypertension (H) 04/01/2020     Nonrheumatic aortic valve insufficiency 04/01/2020     Coronary artery disease due to lipid rich plaque 10/15/2020     MARIAN (generalized anxiety disorder) 02/11/2021     GERD (gastroesophageal reflux disease)      Infection due to 2019 novel coronavirus 02/11/2021     Dilated cardiomyopathy (H) 02/11/2021     Persistent atrial fibrillation (H) 08/11/2020     Presence of cardiac resynchronization therapy pacemaker (CRT-P) 12/01/2020     Stage 2 chronic kidney disease 10/08/2021      Hearing loss 10/08/2021     Arthritis, lumbar spine 10/08/2021     Peripheral edema 03/14/2022     Elevated TSH 03/16/2022     Elevated troponin 12/19/2022     Pleural effusion on right 12/19/2022     Acute on chronic congestive heart failure, unspecified heart failure type (H) 12/19/2022     Status post catheter ablation of atrial fibrillation 01/04/2023     Heart failure with reduced ejection fraction (H) 01/12/2023     Hyperkalemia 01/23/2023     Hyponatremia 01/23/2023     Cardiogenic shock (H)      Generalized abdominal pain 02/08/2017     Gastro-esophageal reflux disease with esophagitis 03/23/2017     Diverticular disease of large intestine 03/23/2017     Diarrhea 02/08/2017     Diaphragmatic hernia 03/23/2017     Nonischemic cardiomyopathy (H) 02/10/2023     Resolved Ambulatory Problems     Diagnosis Date Noted     Complete heart block (H) 03/24/2020     Bradycardia 07/11/2018     LBBB (left bundle branch block) 07/11/2018     S/P drug eluting coronary stent placement 05/30/2019     Cardiac pacemaker in situ 09/29/2020     Chronic combined systolic and diastolic heart failure (H) 02/11/2021     Weight loss 02/11/2021     COVID-19 02/12/2021     CAD (coronary artery disease) 07/11/2018     CHF (congestive heart failure) (H) 09/08/2021     Past Medical History:   Diagnosis Date     Atrial fibrillation (H)      Atrial fibrillation, transient (H) 08/11/2020     Congestive heart failure (H)      Coronary artery disease      COVID 02/2021     High cholesterol      Hyperlipidemia      Hypertension      Allergies   Allergen Reactions     Diltiazem Other (See Comments)     Contraindicated in HFrEF     Lactose GI Disturbance     Midodrine Other (See Comments)     Contraindicated in HFrEF       All Meds and Allergies reviewed in the record at the facility and is the most up-to-date.      Current Outpatient Medications   Medication Sig     acetaminophen (TYLENOL) 325 MG tablet Take 2 tablets (650 mg) by mouth every  6 hours as needed for mild pain or other (and adjunct with moderate or severe pain or per patient request) Alternate with ibuprofen if ordered. Maximum acetaminophen dose from all sources = 75 mg/kg/day not to exceed 4 grams/day.     aspirin (ASA) 81 MG chewable tablet Take 1 tablet (81 mg) by mouth every other day     atorvastatin (LIPITOR) 10 MG tablet Take 10 mg by mouth four times a week Sunday, Tuesday, Thursday, & Saturday at bedtime     bumetanide (BUMEX) 2 MG tablet Take 2 mg by mouth 2 times daily Given the a.m.     Coenzyme Q10 300 MG CAPS Take 300 mg by mouth daily     doxazosin (CARDURA) 4 MG tablet Take 1 tablet (4 mg) by mouth At Bedtime     finasteride (PROSCAR) 5 mg tablet [FINASTERIDE (PROSCAR) 5 MG TABLET] Take 5 mg by mouth at bedtime.            gabapentin (NEURONTIN) 100 MG capsule Take 200 mg by mouth 2 times daily (before meals) 2 capsules (200 mg) PO BID every morning before breakfast and every afternoon before lunch     gabapentin (NEURONTIN) 300 MG capsule Take 300 mg by mouth At Bedtime     glucosamine/chondr cole A sod (OSTEO BI-FLEX ORAL) Take 1 tablet by mouth 2 times daily (with meals)     Melatonin 10 MG CAPS Take 10 mg by mouth daily     multivitamin, therapeutic (THERA-VIT) TABS tablet Take 1 tablet by mouth daily     nitroGLYcerin (NITROSTAT) 0.4 MG sublingual tablet For chest pain place 1 tablet under the tongue every 5 minutes for 3 doses. If symptoms persist 5 minutes after 1st dose call 911.     pantoprazole (PROTONIX) 40 MG EC tablet Take 1 tablet (40 mg) by mouth 2 times daily     polyethylene glycol-propylene glycol (SYSTANE ULTRA) 0.4-0.3 % SOLN ophthalmic solution Place 2 drops into both eyes 2 times daily as needed for dry eyes     potassium chloride ER (KLOR-CON M) 20 MEQ CR tablet Take 1 tablet (20 mEq) by mouth daily     rivaroxaban ANTICOAGULANT (XARELTO) 20 MG TABS tablet Take 1 tablet (20 mg) by mouth daily (with dinner)     sennosides (SENOKOT) 8.6 MG tablet Take by  "mouth 2 times daily     sertraline (ZOLOFT) 50 MG tablet Take 100 mg by mouth At Bedtime     No current facility-administered medications for this visit.       REVIEW OF SYSTEMS:   10 point review of systems reviewed and pertinent positives in the HPI.     PHYSICAL EXAMINATION:  Physical Exam     Vital signs: BP 94/63   Pulse 69   Temp 98  F (36.7  C)   Resp 16   Ht 1.778 m (5' 10\")   Wt 77.2 kg (170 lb 3.2 oz)   SpO2 93%   BMI 24.42 kg/m    General: Awake, Alert, tired and fatigued, oriented x2, sitting up in bedside chair.  HEENT: Pink conjunctiva, dry oral mucosa  NECK: Supple  CVS: Irregular irregular.  LUNG: Shallow respiratory effort.  No wheezes rales or rhonchi.  Oxygen on at 2 L.  No cough on exam.  BACK: No kyphosis of the thoracic spine  EXTREMITIES:  thin frail extremities, diffuse weakness, 2+ pedal edema, no calf tenderness  NEUROLOGIC: Intact  PSYCHIATRIC: Flat affect.  Cognitive impairment noted.      Labs:  All labs reviewed in the nursing home record and Epic   @  Lab Results   Component Value Date    WBC 8.9 01/28/2023     Lab Results   Component Value Date    RBC 4.15 01/28/2023     Lab Results   Component Value Date    HGB 12.3 01/28/2023     Lab Results   Component Value Date    HCT 40.5 01/28/2023     Lab Results   Component Value Date    MCV 98 01/28/2023     Lab Results   Component Value Date    MCH 29.6 01/28/2023     Lab Results   Component Value Date    MCHC 30.4 01/28/2023     Lab Results   Component Value Date    RDW 15.8 01/28/2023     Lab Results   Component Value Date     01/28/2023        @Last Comprehensive Metabolic Panel:  Sodium   Date Value Ref Range Status   03/02/2023 137 136 - 145 mmol/L Final     Potassium   Date Value Ref Range Status   03/02/2023 3.5 3.4 - 5.3 mmol/L Final   10/17/2022 4.2 3.5 - 5.0 mmol/L Final     Chloride   Date Value Ref Range Status   03/02/2023 89 (L) 98 - 107 mmol/L Final   10/17/2022 99 98 - 107 mmol/L Final     Carbon Dioxide (CO2) "   Date Value Ref Range Status   03/02/2023 40 (H) 22 - 29 mmol/L Final   10/17/2022 30 22 - 31 mmol/L Final     Anion Gap   Date Value Ref Range Status   03/02/2023 8 7 - 15 mmol/L Final   10/17/2022 10 5 - 18 mmol/L Final     Glucose   Date Value Ref Range Status   03/02/2023 100 (H) 70 - 99 mg/dL Final   10/17/2022 99 70 - 125 mg/dL Final     GLUCOSE BY METER POCT   Date Value Ref Range Status   01/27/2023 248 (H) 70 - 99 mg/dL Final     Urea Nitrogen   Date Value Ref Range Status   03/02/2023 10.7 8.0 - 23.0 mg/dL Final   10/17/2022 27 8 - 28 mg/dL Final     Creatinine   Date Value Ref Range Status   03/02/2023 0.88 0.67 - 1.17 mg/dL Final     GFR Estimate   Date Value Ref Range Status   03/02/2023 85 >60 mL/min/1.73m2 Final     Comment:     eGFR calculated using 2021 CKD-EPI equation.   07/02/2021 >60 >60 mL/min/1.73m2 Final     Calcium   Date Value Ref Range Status   03/02/2023 9.0 8.8 - 10.2 mg/dL Final       this note has been dictated using voice recognition software. Any grammatical or context distortions are unintentional and inherent to the software    Electronically signed by: Nitza Ludwig, CNP    (4) walks frequently

## 2023-03-08 LAB
AMPHET UR-MCNC: NEGATIVE NG/ML
BARBITURATES UR-MCNC: NEGATIVE NG/ML
BENZODIAZ UR-MCNC: NEGATIVE NG/ML
COCAINE METAB.OTHER UR-MCNC: NEGATIVE NG/ML
CREATININE, URINE: 150.3 MG/DL
FENTANYL, URINE: NEGATIVE NG/ML
METHADONE UR-MCNC: NEGATIVE NG/ML
MEV IGG FLD QL IA: 142 AU/ML
MEV IGG+IGM SER-IMP: POSITIVE
MUV AB SER-ACNC: POSITIVE
MUV IGG SER QL IA: 140 AU/ML
OPIATES UR-MCNC: NEGATIVE NG/ML
OXYCODONE/OXYMORPHONE, URINE: NEGATIVE NG/ML
PCP UR-MCNC: NEGATIVE NG/ML
PH, URINE: 6.2
PLEASE NOTE: DRUGSCRUR: NORMAL
RUBV IGG FLD-ACNC: 1.5 INDEX
RUBV IGG SER-IMP: POSITIVE
SIUH URINE DRUG SCREEN 1: NORMAL
THC UR QL: NEGATIVE NG/ML

## 2023-03-09 ENCOUNTER — NON-APPOINTMENT (OUTPATIENT)
Age: 48
End: 2023-03-09

## 2023-03-09 DIAGNOSIS — G51.0 BELL'S PALSY: ICD-10-CM

## 2023-03-09 DIAGNOSIS — M79.671 PAIN IN RIGHT FOOT: ICD-10-CM

## 2023-03-09 DIAGNOSIS — M72.2 PLANTAR FASCIAL FIBROMATOSIS: ICD-10-CM

## 2023-03-09 DIAGNOSIS — M67.479 GANGLION, UNSPECIFIED ANKLE AND FOOT: ICD-10-CM

## 2023-03-09 DIAGNOSIS — S93.401A SPRAIN OF UNSPECIFIED LIGAMENT OF RIGHT ANKLE, INITIAL ENCOUNTER: ICD-10-CM

## 2023-03-09 DIAGNOSIS — S93.491D SPRAIN OF OTHER LIGAMENT OF RIGHT ANKLE, SUBSEQUENT ENCOUNTER: ICD-10-CM

## 2023-03-09 LAB
M TB IFN-G BLD-IMP: NEGATIVE
QUANTIFERON TB PLUS MITOGEN MINUS NIL: >10 IU/ML
QUANTIFERON TB PLUS NIL: 0.03 IU/ML
QUANTIFERON TB PLUS TB1 MINUS NIL: 0 IU/ML
QUANTIFERON TB PLUS TB2 MINUS NIL: -0.01 IU/ML

## 2023-03-10 ENCOUNTER — NON-APPOINTMENT (OUTPATIENT)
Age: 48
End: 2023-03-10

## 2023-03-14 ENCOUNTER — APPOINTMENT (OUTPATIENT)
Dept: SURGERY | Facility: CLINIC | Age: 48
End: 2023-03-14
Payer: MEDICAID

## 2023-03-14 VITALS
TEMPERATURE: 97.1 F | DIASTOLIC BLOOD PRESSURE: 76 MMHG | SYSTOLIC BLOOD PRESSURE: 109 MMHG | OXYGEN SATURATION: 97 % | HEART RATE: 69 BPM | WEIGHT: 146.4 LBS | BODY MASS INDEX: 32.82 KG/M2

## 2023-03-14 DIAGNOSIS — E11.65 TYPE 2 DIABETES MELLITUS WITH HYPERGLYCEMIA: ICD-10-CM

## 2023-03-14 PROCEDURE — 99213 OFFICE O/P EST LOW 20 MIN: CPT

## 2023-03-14 NOTE — PLAN
[FreeTextEntry1] : Plan: Continue to have protein at each meal.\par          Increase exercise as tolerated.\par          RTO in 3 months with blood work.\par          Call with concerns.

## 2023-03-14 NOTE — HISTORY OF PRESENT ILLNESS
[de-identified] : Patient had surgery approximately 9 months ago. \par Denies reflux/heartburn/nausea/vomiting. Diarrhea improved since her last visit.. \par Taking 2 multivitamins with iron, 2 Viactiv chews, 1 vitamin b 12 and Omeprazole daily.\par Diabetes improved and A1c is 5.6 %. Lipid panel was elevated and her PCP is putting her back on the statin. \par \par

## 2023-03-14 NOTE — ASSESSMENT
[FreeTextEntry1] : ALFIE MOSLEY is a 47 year female seen today for bariatric follow up visit. \par Breakfast - toasted whole wheat bread with Jam,Greek yogurt or chicken salad\par Lunch chicken with a spicy condiment and beets\par Dinner - sausage with tomato and peppers, shrimp a few days/week or chicken and egg salad mixed together. She will sometimes eat a small serving of rice and sweet potato. Patient reports that she gets severe stomach cramps with most green leafy vegetables.\par Fruits - strawberries, kaylene, melon and banana. \par Fluid intake is adequate.\par She is doing Kourtney 2 days/week and she added weight bearing exercises. \par \par

## 2023-03-14 NOTE — REASON FOR VISIT
[Follow-Up Visit] : a follow-up visit for [[employer] ] : provided by [employer]   [FreeTextEntry2] : Sleeve Gastrectomy on 6/9/2022 [Interpreters_IDNumber] : [id number] [Interpreters_FullName] : Stella [TWNoteComboBox1] : Thai

## 2023-03-14 NOTE — REASON FOR VISIT
[Follow-Up Visit] : a follow-up visit for [[employer] ] : provided by [employer]   [FreeTextEntry2] : Sleeve Gastrectomy on 6/9/2022 [Interpreters_IDNumber] : [id number] [Interpreters_FullName] : Stella [TWNoteComboBox1] : Samoan

## 2023-03-14 NOTE — HISTORY OF PRESENT ILLNESS
[de-identified] : Patient had surgery approximately 9 months ago. \par Denies reflux/heartburn/nausea/vomiting. Diarrhea improved since her last visit.. \par Taking 2 multivitamins with iron, 2 Viactiv chews, 1 vitamin b 12 and Omeprazole daily.\par Diabetes improved and A1c is 5.6 %. Lipid panel was elevated and her PCP is putting her back on the statin. \par \par

## 2023-03-22 ENCOUNTER — NON-APPOINTMENT (OUTPATIENT)
Age: 48
End: 2023-03-22

## 2023-03-27 ENCOUNTER — OUTPATIENT (OUTPATIENT)
Dept: OUTPATIENT SERVICES | Facility: HOSPITAL | Age: 48
LOS: 1 days | End: 2023-03-27
Payer: MEDICAID

## 2023-03-27 ENCOUNTER — APPOINTMENT (OUTPATIENT)
Dept: PODIATRY | Facility: CLINIC | Age: 48
End: 2023-03-27
Payer: MEDICAID

## 2023-03-27 DIAGNOSIS — Z98.890 OTHER SPECIFIED POSTPROCEDURAL STATES: Chronic | ICD-10-CM

## 2023-03-27 DIAGNOSIS — Z90.49 ACQUIRED ABSENCE OF OTHER SPECIFIED PARTS OF DIGESTIVE TRACT: Chronic | ICD-10-CM

## 2023-03-27 DIAGNOSIS — Z00.00 ENCOUNTER FOR GENERAL ADULT MEDICAL EXAMINATION WITHOUT ABNORMAL FINDINGS: ICD-10-CM

## 2023-03-27 PROCEDURE — 99215 OFFICE O/P EST HI 40 MIN: CPT

## 2023-03-27 PROCEDURE — 99215 OFFICE O/P EST HI 40 MIN: CPT | Mod: 95

## 2023-03-28 DIAGNOSIS — M25.579 PAIN IN UNSPECIFIED ANKLE AND JOINTS OF UNSPECIFIED FOOT: ICD-10-CM

## 2023-03-28 DIAGNOSIS — M79.672 PAIN IN LEFT FOOT: ICD-10-CM

## 2023-03-28 DIAGNOSIS — S86.219S: ICD-10-CM

## 2023-03-28 NOTE — ASSESSMENT
[FreeTextEntry1] : Assessment:\par Diabetic Footcare and Risk Assessment:\par \par Plan:\par Discussed Broström procedure in detail\par Patient is aware of all surgical risks and is in agreement\par Discussed MRI findings in detail\par Will attempt to remove ganglion cyst as well during Broström procedure\par Discussed removal of ganglion cyst and did\par Surgery scheduled for June\par We will follow-up with patient once [place]\par \par \par

## 2023-03-28 NOTE — PHYSICAL EXAM
[Ankle Swelling Bilaterally] : bilaterally  [2+] : left foot dorsalis pedis 2+ [General Appearance - Alert] : alert [General Appearance - In No Acute Distress] : in no acute distress [Full Pulse] : the pedal pulses are present [Edema] : there was no peripheral edema [Abnormal Walk] : normal gait [Nail Clubbing] : no clubbing  or cyanosis of the fingernails [Musculoskeletal - Swelling] : no joint swelling seen [Motor Tone] : muscle strength and tone were normal [Skin Color & Pigmentation] : normal skin color and pigmentation [Skin Turgor] : normal skin turgor [Normal in Appearance] : normal in appearance [Normal] : normal [Full ROM] : with full range of motion [Deep Tendon Reflexes (DTR)] : deep tendon reflexes were 2+ and symmetric [Sensation] : the sensory exam was normal to light touch and pinprick [No Focal Deficits] : no focal deficits [Oriented To Time, Place, And Person] : oriented to person, place, and time [Impaired Insight] : insight and judgment were intact [Affect] : the affect was normal [Ankle Swelling (On Exam)] : not present [Varicose Veins Of Lower Extremities] : not present [] : not present [Delayed in the Right Toes] : capillary refills normal in right toes [Delayed in the Left Toes] : capillary refills normal in the left toes [de-identified] : TTp on the lateral aspect of the right foot distal to the lateral mal  [FreeTextEntry1] : Mild Incurved Nail of B/L Hallux; Medial Aspect;

## 2023-03-28 NOTE — PHYSICAL EXAM
[Ankle Swelling Bilaterally] : bilaterally  [2+] : left foot dorsalis pedis 2+ [General Appearance - Alert] : alert [General Appearance - In No Acute Distress] : in no acute distress [Full Pulse] : the pedal pulses are present [Edema] : there was no peripheral edema [Abnormal Walk] : normal gait [Nail Clubbing] : no clubbing  or cyanosis of the fingernails [Musculoskeletal - Swelling] : no joint swelling seen [Motor Tone] : muscle strength and tone were normal [Skin Color & Pigmentation] : normal skin color and pigmentation [Skin Turgor] : normal skin turgor [Normal in Appearance] : normal in appearance [Normal] : normal [Full ROM] : with full range of motion [Deep Tendon Reflexes (DTR)] : deep tendon reflexes were 2+ and symmetric [Sensation] : the sensory exam was normal to light touch and pinprick [No Focal Deficits] : no focal deficits [Oriented To Time, Place, And Person] : oriented to person, place, and time [Impaired Insight] : insight and judgment were intact [Affect] : the affect was normal [Ankle Swelling (On Exam)] : not present [Varicose Veins Of Lower Extremities] : not present [] : not present [Delayed in the Right Toes] : capillary refills normal in right toes [Delayed in the Left Toes] : capillary refills normal in the left toes [de-identified] : TTp on the lateral aspect of the right foot distal to the lateral mal  [FreeTextEntry1] : Mild Incurved Nail of B/L Hallux; Medial Aspect;

## 2023-04-10 ENCOUNTER — NON-APPOINTMENT (OUTPATIENT)
Age: 48
End: 2023-04-10

## 2023-04-11 ENCOUNTER — NON-APPOINTMENT (OUTPATIENT)
Age: 48
End: 2023-04-11

## 2023-04-11 ENCOUNTER — APPOINTMENT (OUTPATIENT)
Dept: INTERNAL MEDICINE | Facility: CLINIC | Age: 48
End: 2023-04-11

## 2023-04-11 ENCOUNTER — APPOINTMENT (OUTPATIENT)
Dept: INTERNAL MEDICINE | Facility: CLINIC | Age: 48
End: 2023-04-11
Payer: MEDICAID

## 2023-04-11 ENCOUNTER — OUTPATIENT (OUTPATIENT)
Dept: OUTPATIENT SERVICES | Facility: HOSPITAL | Age: 48
LOS: 1 days | End: 2023-04-11
Payer: MEDICAID

## 2023-04-11 VITALS
TEMPERATURE: 97 F | WEIGHT: 148.13 LBS | DIASTOLIC BLOOD PRESSURE: 76 MMHG | SYSTOLIC BLOOD PRESSURE: 113 MMHG | HEIGHT: 56 IN | BODY MASS INDEX: 33.32 KG/M2 | OXYGEN SATURATION: 98 % | HEART RATE: 73 BPM

## 2023-04-11 DIAGNOSIS — Z98.890 OTHER SPECIFIED POSTPROCEDURAL STATES: Chronic | ICD-10-CM

## 2023-04-11 DIAGNOSIS — Z00.00 ENCOUNTER FOR GENERAL ADULT MEDICAL EXAMINATION WITHOUT ABNORMAL FINDINGS: ICD-10-CM

## 2023-04-11 DIAGNOSIS — Z90.49 ACQUIRED ABSENCE OF OTHER SPECIFIED PARTS OF DIGESTIVE TRACT: Chronic | ICD-10-CM

## 2023-04-11 DIAGNOSIS — A08.4 VIRAL INTESTINAL INFECTION, UNSPECIFIED: ICD-10-CM

## 2023-04-11 PROCEDURE — 99212 OFFICE O/P EST SF 10 MIN: CPT

## 2023-04-11 PROCEDURE — 99212 OFFICE O/P EST SF 10 MIN: CPT | Mod: GC

## 2023-04-11 NOTE — ASSESSMENT
[FreeTextEntry1] : viral gastroenteritis\par resolving\par counselled on brat diet deescalation\par saw her bariatric surgeon\par bm's improving diarrhea s/p immodium\par urgent care paperwork reviewed\par \par dyspepsia\par GERD;\par Anti-reflux diet d/w patient\par Avoid laying down after meals\par Smoking cessation encouraged, if applicable\par Counseled on use of extra pillows at night time to elevate position\par Discussed treatment options and all side effects\par c/w ppi\par \par hcm\par Mammogram Cancer Screening;\par Patient counseled on the importance of a yearly mammogram screening and directed to have test performed or follow-up with OB/GYN. Failure to perform test can result in breast cancer and death\par \par RTC in 3 mo, or sooner if not feeling well\par

## 2023-04-11 NOTE — PHYSICAL EXAM
[No Acute Distress] : no acute distress [Well Nourished] : well nourished [PERRL] : pupils equal round and reactive to light [No JVD] : no jugular venous distention [Supple] : supple [No Respiratory Distress] : no respiratory distress  [No Accessory Muscle Use] : no accessory muscle use [Clear to Auscultation] : lungs were clear to auscultation bilaterally [Normal Rate] : normal rate  [Regular Rhythm] : with a regular rhythm [Normal S1, S2] : normal S1 and S2 [Soft] : abdomen soft [Normal Bowel Sounds] : normal bowel sounds [de-identified] : epigastric tender to palpation

## 2023-04-11 NOTE — INTERPRETER SERVICES
[Time Spent: ____ minutes] : Total time spent using  services: [unfilled] minutes. The patient's primary language is not English thus required  services. [Interpreters_IDNumber] : [id number] [Interpreters_FullName] : Carissa [TWNoteComboBox1] : Vietnamese

## 2023-04-11 NOTE — HISTORY OF PRESENT ILLNESS
[FreeTextEntry1] : follow up, abdominal pain [de-identified] : 48 yo female PMHx obesity s/p sleeve, DMT2 (resolved), HLD, NAFLD. On 4/6 she went to urgent care for diarrhea, nausea, vomiting, was diagnosed with gastroenteritis. Her symptoms have resolved since Monday (4/10). She took Imodium and Pepto-Bismol which helped her symptoms.The abdominal pain is epigastric and has persisted since the diarrhea has resolved when she eats and at rest, she descirbes it as a stabing pain, 7/10. She currently takes omeprazole 20mg for 2 days without any resolution of sx.

## 2023-04-11 NOTE — REVIEW OF SYSTEMS
[Chills] : chills [Fatigue] : fatigue [Abdominal Pain] : abdominal pain [Nausea] : nausea [Fever] : no fever [Vision Problems] : no vision problems [Hearing Loss] : no hearing loss [Sore Throat] : no sore throat [Chest Pain] : no chest pain [Palpitations] : no palpitations [Lower Ext Edema] : no lower extremity edema [Shortness Of Breath] : no shortness of breath [Cough] : no cough [Constipation] : no constipation [Diarrhea] : diarrhea [Vomiting] : no vomiting [Dysuria] : no dysuria

## 2023-04-12 ENCOUNTER — APPOINTMENT (OUTPATIENT)
Dept: GASTROENTEROLOGY | Facility: CLINIC | Age: 48
End: 2023-04-12

## 2023-04-17 ENCOUNTER — APPOINTMENT (OUTPATIENT)
Dept: OPHTHALMOLOGY | Facility: CLINIC | Age: 48
End: 2023-04-17

## 2023-04-19 ENCOUNTER — APPOINTMENT (OUTPATIENT)
Dept: GASTROENTEROLOGY | Facility: CLINIC | Age: 48
End: 2023-04-19

## 2023-04-21 ENCOUNTER — APPOINTMENT (OUTPATIENT)
Dept: GASTROENTEROLOGY | Facility: CLINIC | Age: 48
End: 2023-04-21
Payer: MEDICAID

## 2023-04-21 ENCOUNTER — OUTPATIENT (OUTPATIENT)
Dept: OUTPATIENT SERVICES | Facility: HOSPITAL | Age: 48
LOS: 1 days | End: 2023-04-21
Payer: MEDICAID

## 2023-04-21 VITALS
OXYGEN SATURATION: 98 % | HEART RATE: 51 BPM | HEIGHT: 56 IN | SYSTOLIC BLOOD PRESSURE: 109 MMHG | DIASTOLIC BLOOD PRESSURE: 74 MMHG | TEMPERATURE: 97 F | WEIGHT: 148 LBS | BODY MASS INDEX: 33.29 KG/M2

## 2023-04-21 DIAGNOSIS — K21.9 GASTRO-ESOPHAGEAL REFLUX DISEASE W/OUT ESOPHAGITIS: ICD-10-CM

## 2023-04-21 DIAGNOSIS — Z90.49 ACQUIRED ABSENCE OF OTHER SPECIFIED PARTS OF DIGESTIVE TRACT: Chronic | ICD-10-CM

## 2023-04-21 DIAGNOSIS — Z98.890 OTHER SPECIFIED POSTPROCEDURAL STATES: Chronic | ICD-10-CM

## 2023-04-21 DIAGNOSIS — K57.90 DIVERTICULOSIS OF INTESTINE, PART UNSPECIFIED, W/OUT PERFORATION OR ABSCESS W/OUT BLEEDING: ICD-10-CM

## 2023-04-21 DIAGNOSIS — Z00.00 ENCOUNTER FOR GENERAL ADULT MEDICAL EXAMINATION WITHOUT ABNORMAL FINDINGS: ICD-10-CM

## 2023-04-21 PROCEDURE — 99213 OFFICE O/P EST LOW 20 MIN: CPT | Mod: GC

## 2023-04-21 PROCEDURE — 99213 OFFICE O/P EST LOW 20 MIN: CPT

## 2023-04-21 NOTE — HISTORY OF PRESENT ILLNESS
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 Her symptoms have resolved since Monday (4/10). She took Imodium and Pepto-Bismol which helped her symptoms. She currently takes omeprazole 20mg BID and is complaining of bitter taste in the mouth \par   \par patient is due for EUS for Pancreatic cancer screening next week, EUS already scheduled \par \par labs reviewed \par hb 13.1 jan 2023\par CMP normal except for alk phos 126\par \par CT 2022 fatty liver  post surgical changes related to sleeve, 2 cm small hematoma at suture line, no pancreatic lesions

## 2023-04-21 NOTE — REASON FOR VISIT
[Follow-up] : a follow-up of an existing diagnosis [Interpreters_IDNumber] : [id number] [Interpreters_FullName] : Adrien [TWNoteComboBox1] : Sri Lankan

## 2023-04-21 NOTE — PHYSICAL EXAM
[Alert] : alert [Sclera] : the sclera and conjunctiva were normal [Hearing Threshold Finger Rub Not [place]] : hearing was normal [Normal Appearance] : the appearance of the neck was normal [Auscultation Breath Sounds / Voice Sounds] : lungs were clear to auscultation bilaterally [Normal S1, S2] : normal S1 and S2 [Abdomen Tenderness] : non-tender [Abdomen Soft] : soft [No CVA Tenderness] : no CVA  tenderness [Abnormal Walk] : normal gait [Oriented To Time, Place, And Person] : oriented to person, place, and time

## 2023-04-21 NOTE — REVIEW OF SYSTEMS
[As Noted in HPI] : as noted in HPI [Fever] : no fever [Chills] : no chills [Eye Pain] : no eye pain [Loss Of Hearing] : no hearing loss [Chest Pain] : no chest pain [Palpitations] : no palpitations [SOB on Exertion] : no shortness of breath during exertion [Confused] : no confusion

## 2023-04-21 NOTE — ASSESSMENT
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 Pathology negative\par - Repeat EUS in next week \par  \par *Elevated alk phos, trendig down\par -immune for hep B, hep C negative\par -patient continue to loose weight post sleeve gastrectomy june 2022\par -elevated alk phos can be related to vit D deficiency \par \par rec\par can consider repeat LFTs next visit, fibroscan, hep A Igg for immunization status \par \par *CRC screening\par -last colonoscopy July 2021, 3 mm hyperplastic polyp\par -due 2024 \par

## 2023-04-24 DIAGNOSIS — A08.4 VIRAL INTESTINAL INFECTION, UNSPECIFIED: ICD-10-CM

## 2023-04-24 DIAGNOSIS — K21.9 GASTRO-ESOPHAGEAL REFLUX DISEASE WITHOUT ESOPHAGITIS: ICD-10-CM

## 2023-04-24 NOTE — H&P ADULT - PMH
Diabetes  Family Doctor: Dr Reeves  Liver disorder  LIVER LESIONS APPEARED ON CT-SCAN  (Within the past few months)  Ovarian cancer  UNSURE OF STAGE HOWEVER REPORTS IT "MINOR"  Sees Oncologist Dr. Vanessa Correa Bilateral Helical Rim Advancement Flap Text: The defect edges were debeveled with a #15 blade scalpel.  Given the location of the defect and the proximity to free margins (helical rim) a bilateral helical rim advancement flap was deemed most appropriate.  Using a sterile surgical marker, the appropriate advancement flaps were drawn incorporating the defect and placing the expected incisions between the helical rim and antihelix where possible.  The area thus outlined was incised through and through with a #15 scalpel blade.  With a skin hook and iris scissors, the flaps were gently and sharply undermined and freed up.

## 2023-04-26 ENCOUNTER — TRANSCRIPTION ENCOUNTER (OUTPATIENT)
Age: 48
End: 2023-04-26

## 2023-04-26 ENCOUNTER — RESULT REVIEW (OUTPATIENT)
Age: 48
End: 2023-04-26

## 2023-04-26 ENCOUNTER — OUTPATIENT (OUTPATIENT)
Dept: INPATIENT UNIT | Facility: HOSPITAL | Age: 48
LOS: 1 days | Discharge: HOME | End: 2023-04-26
Payer: MEDICAID

## 2023-04-26 VITALS
HEIGHT: 56 IN | SYSTOLIC BLOOD PRESSURE: 106 MMHG | HEART RATE: 61 BPM | DIASTOLIC BLOOD PRESSURE: 73 MMHG | WEIGHT: 145.06 LBS | RESPIRATION RATE: 18 BRPM | TEMPERATURE: 97 F

## 2023-04-26 VITALS
OXYGEN SATURATION: 98 % | DIASTOLIC BLOOD PRESSURE: 59 MMHG | HEART RATE: 52 BPM | SYSTOLIC BLOOD PRESSURE: 107 MMHG | TEMPERATURE: 98 F | RESPIRATION RATE: 21 BRPM

## 2023-04-26 DIAGNOSIS — Z98.890 OTHER SPECIFIED POSTPROCEDURAL STATES: Chronic | ICD-10-CM

## 2023-04-26 DIAGNOSIS — Z15.09 GENETIC SUSCEPTIBILITY TO OTHER MALIGNANT NEOPLASM: ICD-10-CM

## 2023-04-26 DIAGNOSIS — Z15.89 GENETIC SUSCEPTIBILITY TO OTHER DISEASE: ICD-10-CM

## 2023-04-26 DIAGNOSIS — Z90.49 ACQUIRED ABSENCE OF OTHER SPECIFIED PARTS OF DIGESTIVE TRACT: Chronic | ICD-10-CM

## 2023-04-26 PROCEDURE — 88312 SPECIAL STAINS GROUP 1: CPT | Mod: 26

## 2023-04-26 PROCEDURE — 43237 ENDOSCOPIC US EXAM ESOPH: CPT

## 2023-04-26 PROCEDURE — 43239 EGD BIOPSY SINGLE/MULTIPLE: CPT | Mod: XU

## 2023-04-26 PROCEDURE — 88305 TISSUE EXAM BY PATHOLOGIST: CPT

## 2023-04-26 PROCEDURE — 88312 SPECIAL STAINS GROUP 1: CPT

## 2023-04-26 PROCEDURE — 88305 TISSUE EXAM BY PATHOLOGIST: CPT | Mod: 26

## 2023-04-26 RX ORDER — EMPAGLIFLOZIN 10 MG/1
1 TABLET, FILM COATED ORAL
Qty: 0 | Refills: 0 | DISCHARGE

## 2023-04-26 RX ORDER — DULAGLUTIDE 4.5 MG/.5ML
4 INJECTION, SOLUTION SUBCUTANEOUS
Qty: 0 | Refills: 0 | DISCHARGE

## 2023-04-26 NOTE — ASU DISCHARGE PLAN (ADULT/PEDIATRIC) - CALL YOUR DOCTOR IF YOU HAVE ANY OF THE FOLLOWING:
Bleeding that does not stop/Pain not relieved by Medications/Fever greater than (need to indicate Fahrenheit or Celsius)/Wound/Surgical Site with redness, or foul smelling discharge or pus/Numbness, tingling, color or temperature change to extremity/Nausea and vomiting that does not stop/Unable to urinate/Excessive diarrhea

## 2023-04-26 NOTE — ASU PATIENT PROFILE, ADULT - SURGICAL SITE INCISION
"Epidural catheter Procedure Note    Pre-Procedure   Staff -        Anesthesiologist:  Carlos Potter MD       Performed By: anesthesiologist       Location: OB       Procedure Start/Stop Times: 3/2/2023 8:48 PM and 3/2/2023 9:07 PM       Pre-Anesthestic Checklist: patient identified, risks and benefits discussed, informed consent, monitors and equipment checked and pre-op evaluation  Timeout:       Correct Patient: Yes        Correct Procedure: Yes        Correct Site: Yes        Correct Position: Yes   Procedure Documentation  Procedure: epidural catheter       Diagnosis: labor pain       Patient Position: sitting       Patient Prep/Sterile Barriers: sterile gloves, mask, patient draped       Skin prep: Chloraprep       Local skin infiltrated with 2 mL of 1% lidocaine.        Insertion Site: L3-4. (midline approach).       Technique: LORT saline        Needle Type: [employer]       Needle Gauge: 18.        Needle Length (Inches): 3.5        Catheter: 20 G.          Catheter threaded easily.         5 cm epidural space.           # of attempts: 1 and  # of redirects:  0    Assessment/Narrative         Paresthesias: No.       Test dose of 3 mL lidocaine 1.5% w/ 1:200,000 epinephrine at 20:58 CST.        .       Insertion/Infusion Method: LORT saline       Aspiration negative for Heme or CSF via Epidural Catheter.    Medication(s) Administered   0.25% Bupivacaine PF (Epidural) - EPIDURAL   5 mL - 3/2/2023 9:04:00 PM  Medication Administration Time: 3/2/2023 8:48 PM      FOR [facility] ([facility] ONLY:   Pain Team Contact information: please page the Pain Team Via [employer]. Search \"Pain\". During daytime hours, please page the attending first. At night please page the resident first.    "
no

## 2023-04-26 NOTE — ASU DISCHARGE PLAN (ADULT/PEDIATRIC) - NS MD DC FALL RISK RISK
For information on Fall & Injury Prevention, visit: [email or web address] OR  [email or web address] OR  https://www.cdc.gov/steadi/patient.html

## 2023-04-26 NOTE — H&P PST ADULT - NSICDXPASTSURGICALHX_GEN_ALL_CORE_FT
Am labs show ca 6.9.  message relayed to     PAST SURGICAL HISTORY:  H/O abdominal hysterectomy With Right Oophrecetomy   In December 2017    H/O colonoscopy >3 years ago    History of cholecystectomy 10-15 years ago    History of colon resection     History of mobilization of splenic flexure

## 2023-04-26 NOTE — ASU DISCHARGE PLAN (ADULT/PEDIATRIC) - CARE PROVIDER_API CALL
Coty Montero)  Gastroenterology; Internal Medicine  [address] [place] [id number]  Phone: [phone]  Fax: [phone]  Follow Up Time:

## 2023-04-27 LAB — SURGICAL PATHOLOGY STUDY: SIGNIFICANT CHANGE UP

## 2023-05-01 DIAGNOSIS — K76.0 FATTY (CHANGE OF) LIVER, NOT ELSEWHERE CLASSIFIED: ICD-10-CM

## 2023-05-01 DIAGNOSIS — Z90.49 ACQUIRED ABSENCE OF OTHER SPECIFIED PARTS OF DIGESTIVE TRACT: ICD-10-CM

## 2023-05-01 DIAGNOSIS — Z90.710 ACQUIRED ABSENCE OF BOTH CERVIX AND UTERUS: ICD-10-CM

## 2023-05-01 DIAGNOSIS — E78.00 PURE HYPERCHOLESTEROLEMIA, UNSPECIFIED: ICD-10-CM

## 2023-05-01 DIAGNOSIS — Z98.84 BARIATRIC SURGERY STATUS: ICD-10-CM

## 2023-05-01 DIAGNOSIS — Z91.041 RADIOGRAPHIC DYE ALLERGY STATUS: ICD-10-CM

## 2023-05-01 DIAGNOSIS — K29.50 UNSPECIFIED CHRONIC GASTRITIS WITHOUT BLEEDING: ICD-10-CM

## 2023-05-01 DIAGNOSIS — G47.30 SLEEP APNEA, UNSPECIFIED: ICD-10-CM

## 2023-05-01 DIAGNOSIS — K86.9 DISEASE OF PANCREAS, UNSPECIFIED: ICD-10-CM

## 2023-05-01 DIAGNOSIS — E11.9 TYPE 2 DIABETES MELLITUS WITHOUT COMPLICATIONS: ICD-10-CM

## 2023-05-01 DIAGNOSIS — G40.909 EPILEPSY, UNSPECIFIED, NOT INTRACTABLE, WITHOUT STATUS EPILEPTICUS: ICD-10-CM

## 2023-05-01 DIAGNOSIS — E66.9 OBESITY, UNSPECIFIED: ICD-10-CM

## 2023-05-01 DIAGNOSIS — Z85.43 PERSONAL HISTORY OF MALIGNANT NEOPLASM OF OVARY: ICD-10-CM

## 2023-05-02 ENCOUNTER — NON-APPOINTMENT (OUTPATIENT)
Age: 48
End: 2023-05-02

## 2023-05-02 DIAGNOSIS — K21.9 GASTRO-ESOPHAGEAL REFLUX DISEASE WITHOUT ESOPHAGITIS: ICD-10-CM

## 2023-05-02 DIAGNOSIS — K57.90 DIVERTICULOSIS OF INTESTINE, PART UNSPECIFIED, WITHOUT PERFORATION OR ABSCESS WITHOUT BLEEDING: ICD-10-CM

## 2023-05-02 DIAGNOSIS — Z15.09 GENETIC SUSCEPTIBILITY TO OTHER MALIGNANT NEOPLASM: ICD-10-CM

## 2023-05-02 DIAGNOSIS — K76.0 FATTY (CHANGE OF) LIVER, NOT ELSEWHERE CLASSIFIED: ICD-10-CM

## 2023-05-20 ENCOUNTER — EMERGENCY (EMERGENCY)
Facility: HOSPITAL | Age: 48
LOS: 0 days | Discharge: HOME | End: 2023-05-20
Attending: EMERGENCY MEDICINE
Payer: MEDICAID

## 2023-05-20 VITALS
OXYGEN SATURATION: 96 % | SYSTOLIC BLOOD PRESSURE: 130 MMHG | RESPIRATION RATE: 16 BRPM | TEMPERATURE: 98 F | WEIGHT: 145.06 LBS | DIASTOLIC BLOOD PRESSURE: 73 MMHG | HEIGHT: 56 IN | HEART RATE: 78 BPM

## 2023-05-20 DIAGNOSIS — Z90.49 ACQUIRED ABSENCE OF OTHER SPECIFIED PARTS OF DIGESTIVE TRACT: Chronic | ICD-10-CM

## 2023-05-20 DIAGNOSIS — Z46.89 ENCOUNTER FOR FITTING AND ADJUSTMENT OF OTHER SPECIFIED DEVICES: ICD-10-CM

## 2023-05-20 DIAGNOSIS — Z90.721 ACQUIRED ABSENCE OF OVARIES, UNILATERAL: ICD-10-CM

## 2023-05-20 DIAGNOSIS — Z87.42 PERSONAL HISTORY OF OTHER DISEASES OF THE FEMALE GENITAL TRACT: ICD-10-CM

## 2023-05-20 DIAGNOSIS — Z98.890 OTHER SPECIFIED POSTPROCEDURAL STATES: Chronic | ICD-10-CM

## 2023-05-20 DIAGNOSIS — Z87.19 PERSONAL HISTORY OF OTHER DISEASES OF THE DIGESTIVE SYSTEM: ICD-10-CM

## 2023-05-20 DIAGNOSIS — E11.9 TYPE 2 DIABETES MELLITUS WITHOUT COMPLICATIONS: ICD-10-CM

## 2023-05-20 DIAGNOSIS — Z91.041 RADIOGRAPHIC DYE ALLERGY STATUS: ICD-10-CM

## 2023-05-20 DIAGNOSIS — E78.00 PURE HYPERCHOLESTEROLEMIA, UNSPECIFIED: ICD-10-CM

## 2023-05-20 DIAGNOSIS — Z90.49 ACQUIRED ABSENCE OF OTHER SPECIFIED PARTS OF DIGESTIVE TRACT: ICD-10-CM

## 2023-05-20 DIAGNOSIS — Z90.710 ACQUIRED ABSENCE OF BOTH CERVIX AND UTERUS: ICD-10-CM

## 2023-05-20 DIAGNOSIS — K76.0 FATTY (CHANGE OF) LIVER, NOT ELSEWHERE CLASSIFIED: ICD-10-CM

## 2023-05-20 PROCEDURE — 99282 EMERGENCY DEPT VISIT SF MDM: CPT

## 2023-05-20 PROCEDURE — 99283 EMERGENCY DEPT VISIT LOW MDM: CPT

## 2023-05-20 NOTE — ED PROVIDER NOTE - PATIENT PORTAL LINK FT
You can access the FollowMyHealth Patient Portal offered by [facility] by registering at the following website: [email or web address]. By joining [facility]’s FollowMyHealth portal, you will also be able to view your health information using other applications (apps) compatible with our system.

## 2023-05-20 NOTE — ED PROVIDER NOTE - CLINICAL SUMMARY MEDICAL DECISION MAKING FREE TEXT BOX
Patient presented requesting crutches.  Patient is scheduled for foot surgery next month but states that she needs crutches in the meantime.  No further complaints.  Afebrile, hemodynamically stable, neurovascular intact.  Will give crutches and discharge.  Patient agreeable with plan. Agrees to return to ED for any new or worsening symptoms.

## 2023-05-20 NOTE — ED PROVIDER NOTE - OBJECTIVE STATEMENT
47-year-old female no past medical history presents to the ED requesting crutches because she will be having a foot surgery next month.  Patient offers no complaints currently.

## 2023-05-20 NOTE — ED ADULT NURSE NOTE - NSFALLUNIVINTERV_ED_ALL_ED
Bed/Stretcher in lowest position, wheels locked, appropriate side rails in place/Call bell, personal items and telephone in reach/Instruct patient to call for assistance before getting out of bed/chair/stretcher/Non-slip footwear applied when patient is off stretcher/[place] to call system/Physically safe environment - no spills, clutter or unnecessary equipment/Purposeful proactive rounding/Room/bathroom lighting operational, light cord in reach

## 2023-05-23 ENCOUNTER — OUTPATIENT (OUTPATIENT)
Dept: OUTPATIENT SERVICES | Facility: HOSPITAL | Age: 48
LOS: 1 days | End: 2023-05-23
Payer: MEDICAID

## 2023-05-23 VITALS
WEIGHT: 145.95 LBS | HEIGHT: 56 IN | DIASTOLIC BLOOD PRESSURE: 72 MMHG | OXYGEN SATURATION: 97 % | RESPIRATION RATE: 16 BRPM | SYSTOLIC BLOOD PRESSURE: 114 MMHG | TEMPERATURE: 98 F | HEART RATE: 68 BPM

## 2023-05-23 DIAGNOSIS — S93.492A SPRAIN OF OTHER LIGAMENT OF LEFT ANKLE, INITIAL ENCOUNTER: ICD-10-CM

## 2023-05-23 DIAGNOSIS — M67.479 GANGLION, UNSPECIFIED ANKLE AND FOOT: ICD-10-CM

## 2023-05-23 DIAGNOSIS — Z90.49 ACQUIRED ABSENCE OF OTHER SPECIFIED PARTS OF DIGESTIVE TRACT: Chronic | ICD-10-CM

## 2023-05-23 DIAGNOSIS — Z01.818 ENCOUNTER FOR OTHER PREPROCEDURAL EXAMINATION: ICD-10-CM

## 2023-05-23 DIAGNOSIS — Z98.890 OTHER SPECIFIED POSTPROCEDURAL STATES: Chronic | ICD-10-CM

## 2023-05-23 DIAGNOSIS — Z90.3 ACQUIRED ABSENCE OF STOMACH [PART OF]: Chronic | ICD-10-CM

## 2023-05-23 LAB
A1C WITH ESTIMATED AVERAGE GLUCOSE RESULT: 5.6 % — SIGNIFICANT CHANGE UP (ref 4–5.6)
ALBUMIN SERPL ELPH-MCNC: 4.7 G/DL — SIGNIFICANT CHANGE UP (ref 3.5–5.2)
ALP SERPL-CCNC: 137 U/L — HIGH (ref 30–115)
ALT FLD-CCNC: 19 U/L — SIGNIFICANT CHANGE UP (ref 0–41)
ANION GAP SERPL CALC-SCNC: 9 MMOL/L — SIGNIFICANT CHANGE UP (ref 7–14)
APTT BLD: 37.6 SEC — SIGNIFICANT CHANGE UP (ref 27–39.2)
AST SERPL-CCNC: 21 U/L — SIGNIFICANT CHANGE UP (ref 0–41)
BASOPHILS # BLD AUTO: 0.03 K/UL — SIGNIFICANT CHANGE UP (ref 0–0.2)
BASOPHILS NFR BLD AUTO: 0.4 % — SIGNIFICANT CHANGE UP (ref 0–1)
BILIRUB SERPL-MCNC: <0.2 MG/DL — SIGNIFICANT CHANGE UP (ref 0.2–1.2)
BUN SERPL-MCNC: 19 MG/DL — SIGNIFICANT CHANGE UP (ref 10–20)
CALCIUM SERPL-MCNC: 9.8 MG/DL — SIGNIFICANT CHANGE UP (ref 8.4–10.5)
CHLORIDE SERPL-SCNC: 105 MMOL/L — SIGNIFICANT CHANGE UP (ref 98–110)
CO2 SERPL-SCNC: 27 MMOL/L — SIGNIFICANT CHANGE UP (ref 17–32)
CREAT SERPL-MCNC: 0.5 MG/DL — LOW (ref 0.7–1.5)
EGFR: 116 ML/MIN/1.73M2 — SIGNIFICANT CHANGE UP
EOSINOPHIL # BLD AUTO: 0.1 K/UL — SIGNIFICANT CHANGE UP (ref 0–0.7)
EOSINOPHIL NFR BLD AUTO: 1.5 % — SIGNIFICANT CHANGE UP (ref 0–8)
ESTIMATED AVERAGE GLUCOSE: 114 MG/DL — SIGNIFICANT CHANGE UP (ref 68–114)
GLUCOSE SERPL-MCNC: 82 MG/DL — SIGNIFICANT CHANGE UP (ref 70–99)
HCT VFR BLD CALC: 39.5 % — SIGNIFICANT CHANGE UP (ref 37–47)
HGB BLD-MCNC: 13 G/DL — SIGNIFICANT CHANGE UP (ref 12–16)
IMM GRANULOCYTES NFR BLD AUTO: 0.1 % — SIGNIFICANT CHANGE UP (ref 0.1–0.3)
INR BLD: 1.04 RATIO — SIGNIFICANT CHANGE UP (ref 0.65–1.3)
LYMPHOCYTES # BLD AUTO: 2.88 K/UL — SIGNIFICANT CHANGE UP (ref 1.2–3.4)
LYMPHOCYTES # BLD AUTO: 43 % — SIGNIFICANT CHANGE UP (ref 20.5–51.1)
MCHC RBC-ENTMCNC: 29.1 PG — SIGNIFICANT CHANGE UP (ref 27–31)
MCHC RBC-ENTMCNC: 32.9 G/DL — SIGNIFICANT CHANGE UP (ref 32–37)
MCV RBC AUTO: 88.4 FL — SIGNIFICANT CHANGE UP (ref 81–99)
MONOCYTES # BLD AUTO: 0.35 K/UL — SIGNIFICANT CHANGE UP (ref 0.1–0.6)
MONOCYTES NFR BLD AUTO: 5.2 % — SIGNIFICANT CHANGE UP (ref 1.7–9.3)
NEUTROPHILS # BLD AUTO: 3.32 K/UL — SIGNIFICANT CHANGE UP (ref 1.4–6.5)
NEUTROPHILS NFR BLD AUTO: 49.8 % — SIGNIFICANT CHANGE UP (ref 42.2–75.2)
NRBC # BLD: 0 /100 WBCS — SIGNIFICANT CHANGE UP (ref 0–0)
PLATELET # BLD AUTO: 294 K/UL — SIGNIFICANT CHANGE UP (ref 130–400)
PMV BLD: 10.8 FL — HIGH (ref 7.4–10.4)
POTASSIUM SERPL-MCNC: 4.5 MMOL/L — SIGNIFICANT CHANGE UP (ref 3.5–5)
POTASSIUM SERPL-SCNC: 4.5 MMOL/L — SIGNIFICANT CHANGE UP (ref 3.5–5)
PROT SERPL-MCNC: 7.3 G/DL — SIGNIFICANT CHANGE UP (ref 6–8)
PROTHROM AB SERPL-ACNC: 11.9 SEC — SIGNIFICANT CHANGE UP (ref 9.95–12.87)
RBC # BLD: 4.47 M/UL — SIGNIFICANT CHANGE UP (ref 4.2–5.4)
RBC # FLD: 13.8 % — SIGNIFICANT CHANGE UP (ref 11.5–14.5)
SODIUM SERPL-SCNC: 141 MMOL/L — SIGNIFICANT CHANGE UP (ref 135–146)
WBC # BLD: 6.69 K/UL — SIGNIFICANT CHANGE UP (ref 4.8–10.8)
WBC # FLD AUTO: 6.69 K/UL — SIGNIFICANT CHANGE UP (ref 4.8–10.8)

## 2023-05-23 PROCEDURE — 97116 GAIT TRAINING THERAPY: CPT | Mod: GP

## 2023-05-23 PROCEDURE — 85025 COMPLETE CBC W/AUTO DIFF WBC: CPT

## 2023-05-23 PROCEDURE — 83036 HEMOGLOBIN GLYCOSYLATED A1C: CPT

## 2023-05-23 PROCEDURE — 80053 COMPREHEN METABOLIC PANEL: CPT

## 2023-05-23 PROCEDURE — 93010 ELECTROCARDIOGRAM REPORT: CPT

## 2023-05-23 PROCEDURE — 85730 THROMBOPLASTIN TIME PARTIAL: CPT

## 2023-05-23 PROCEDURE — 36415 COLL VENOUS BLD VENIPUNCTURE: CPT

## 2023-05-23 PROCEDURE — 85610 PROTHROMBIN TIME: CPT

## 2023-05-23 PROCEDURE — 93005 ELECTROCARDIOGRAM TRACING: CPT

## 2023-05-23 PROCEDURE — 99214 OFFICE O/P EST MOD 30 MIN: CPT | Mod: 25

## 2023-05-23 NOTE — H&P PST ADULT - REASON FOR ADMISSION
Case Type: OP  Suite: [address]  Proceduralist: Kaushal Lorenz  Confirmed Surgery Date Time: 06- P  PAST Date Time: 05- - 9:15  Procedure: Ganglion Cyst Removal and Brostrom Repair Right Foot  Laterality: Right  Length of Procedure: 120 Minutes  Anesthesia Type: General

## 2023-05-23 NOTE — H&P PST ADULT - HISTORY OF PRESENT ILLNESS
Rita Allison is a 48 yo Female who presents to pretesting for the preparations of the above procedure due to right ankle ganglion cyst with pain and discomfort.   Patient denies any cp, sob, palpitations, fever, cough, URI, abdominal pains, N/V, UTI, Rashes or open wounds.  As per patient exercise tolerance of 1-2 fos walks with out sob  Patient had COVD 1 yr ago.   Patient denies any s/s covid 19 and reports no contact with known positive people. Patient instructed to continue to self monitor and report any concerns to MD. Pt will continue to practice self isolation and  exposure control measures pre op.   Anesthesia Alert  NO--Difficult Airway  NO--History of neck surgery or radiation  NO--Limited ROM of neck  NO--History of Malignant hyperthermia  NO--Personal or family history of Pseudocholinesterase deficiency  NO--Prior Anesthesia Complication  NO--Latex Allergy  NO--Loose teeth  NO--History of Rheumatoid Arthritis  NO--KAN  NO--Bleeding Risk   Seizure precautions 1 yr ago

## 2023-05-23 NOTE — H&P PST ADULT - MUSCULOSKELETAL
Right ankle/ROM intact/decreased ROM due to pain/normal gait/strength 5/5 bilateral upper extremities/strength 5/5 bilateral lower extremities negative

## 2023-05-23 NOTE — H&P PST ADULT - NSICDXPASTSURGICALHX_GEN_ALL_CORE_FT
PAST SURGICAL HISTORY:  H/O abdominal hysterectomy With Right Oophrecetomy   In December 2017    H/O colonoscopy >3 years ago    H/O gastric sleeve     History of cholecystectomy 10-15 years ago    History of colon resection     History of mobilization of splenic flexure

## 2023-05-24 DIAGNOSIS — M67.479 GANGLION, UNSPECIFIED ANKLE AND FOOT: ICD-10-CM

## 2023-05-24 DIAGNOSIS — Z01.818 ENCOUNTER FOR OTHER PREPROCEDURAL EXAMINATION: ICD-10-CM

## 2023-06-12 ENCOUNTER — APPOINTMENT (OUTPATIENT)
Dept: SURGERY | Facility: CLINIC | Age: 48
End: 2023-06-12
Payer: MEDICAID

## 2023-06-12 ENCOUNTER — APPOINTMENT (OUTPATIENT)
Dept: INTERNAL MEDICINE | Facility: CLINIC | Age: 48
End: 2023-06-12

## 2023-06-12 VITALS — BODY MASS INDEX: 34.19 KG/M2 | HEIGHT: 56 IN | WEIGHT: 152 LBS

## 2023-06-12 DIAGNOSIS — K21.9 GASTRO-ESOPHAGEAL REFLUX DISEASE W/OUT ESOPHAGITIS: ICD-10-CM

## 2023-06-12 PROCEDURE — 99213 OFFICE O/P EST LOW 20 MIN: CPT

## 2023-06-12 NOTE — REASON FOR VISIT
[Follow-Up Visit] : a follow-up visit for [[employer] ] : provided by [employer]   [FreeTextEntry2] : Sleeve Gastrectomy on 6/9/2022 [Interpreters_IDNumber] : [id number] [Interpreters_FullName] : Jackie [TWNoteComboBox1] : Macedonian

## 2023-06-12 NOTE — REVIEW OF SYSTEMS
[Joint Pain] : joint pain [Joint Stiffness] : joint stiffness [Negative] : Allergic/Immunologic [FreeTextEntry9] : right ankle

## 2023-06-12 NOTE — HISTORY OF PRESENT ILLNESS
[de-identified] : Patient had surgery approximately 1 year ago. She recently returned from vacation and is disappointed that she gained 4 lbs. She is planning to undergo right ankle surgery in the next few weeks. She also had EUS done by Dr. Ley.\par Denies reflux/heartburn/nausea/vomiting. Diarrhea improved since her last visit.. \par Taking 2 multivitamins with iron, 2 Viactiv chews, 1 vitamin b 12 and Omeprazole daily.\par Diabetes improved and A1c is 5.6 %. Lipid panel was elevated and her PCP is putting her back on the statin. \par

## 2023-06-12 NOTE — PLAN
[FreeTextEntry1] : Plan: Continue to focus on healthy food choices.\par          Blood work.\par          RTO in 3 months.\par          Call with concerns.

## 2023-06-12 NOTE — ASSESSMENT
[FreeTextEntry1] : ALFIE MOSLEY is a 47 year female seen today for bariatric follow up visit.\par Breakfast - toasted whole wheat bread with Jam,Greek yogurt or chicken salad\par Lunch -  chicken with beans and a spicy condiment.\par Dinner - green salad with tuna fish/avocado/beans,sausage with tomato and peppers, shrimp a few days/week or chicken and egg salad mixed together. She will sometimes eat a small serving of rice and sweet potato. Patient reports that she gets severe stomach cramps with most green leafy vegetables.\par Fruits - strawberries, kaylene, melon and banana. \par Fluid intake is adequate.\par She is c/o right ankle pain and is unable to exercise. Encouraged patient to work on upper body strengthening exercises. \par \par  \par \par

## 2023-06-15 ENCOUNTER — TRANSCRIPTION ENCOUNTER (OUTPATIENT)
Age: 48
End: 2023-06-15

## 2023-06-15 ENCOUNTER — RESULT REVIEW (OUTPATIENT)
Age: 48
End: 2023-06-15

## 2023-06-15 ENCOUNTER — OUTPATIENT (OUTPATIENT)
Dept: INPATIENT UNIT | Facility: HOSPITAL | Age: 48
LOS: 1 days | Discharge: HOME | End: 2023-06-15
Payer: MEDICAID

## 2023-06-15 VITALS
HEIGHT: 56 IN | DIASTOLIC BLOOD PRESSURE: 70 MMHG | RESPIRATION RATE: 16 BRPM | TEMPERATURE: 98 F | HEART RATE: 52 BPM | OXYGEN SATURATION: 96 % | WEIGHT: 145.95 LBS | SYSTOLIC BLOOD PRESSURE: 114 MMHG

## 2023-06-15 VITALS
RESPIRATION RATE: 18 BRPM | DIASTOLIC BLOOD PRESSURE: 82 MMHG | HEART RATE: 72 BPM | SYSTOLIC BLOOD PRESSURE: 123 MMHG | OXYGEN SATURATION: 97 %

## 2023-06-15 DIAGNOSIS — S93.492A SPRAIN OF OTHER LIGAMENT OF LEFT ANKLE, INITIAL ENCOUNTER: ICD-10-CM

## 2023-06-15 DIAGNOSIS — M67.479 GANGLION, UNSPECIFIED ANKLE AND FOOT: ICD-10-CM

## 2023-06-15 DIAGNOSIS — Z98.890 OTHER SPECIFIED POSTPROCEDURAL STATES: Chronic | ICD-10-CM

## 2023-06-15 DIAGNOSIS — Z90.3 ACQUIRED ABSENCE OF STOMACH [PART OF]: Chronic | ICD-10-CM

## 2023-06-15 DIAGNOSIS — Z90.49 ACQUIRED ABSENCE OF OTHER SPECIFIED PARTS OF DIGESTIVE TRACT: Chronic | ICD-10-CM

## 2023-06-15 PROCEDURE — C1713: CPT

## 2023-06-15 PROCEDURE — 88304 TISSUE EXAM BY PATHOLOGIST: CPT | Mod: 26

## 2023-06-15 PROCEDURE — 88311 DECALCIFY TISSUE: CPT | Mod: 26

## 2023-06-15 PROCEDURE — 88304 TISSUE EXAM BY PATHOLOGIST: CPT

## 2023-06-15 PROCEDURE — 28220 RELEASE OF FOOT TENDON: CPT | Mod: 59

## 2023-06-15 PROCEDURE — 28120 PART REMOVAL OF ANKLE/HEEL: CPT | Mod: 59

## 2023-06-15 PROCEDURE — 88311 DECALCIFY TISSUE: CPT

## 2023-06-15 PROCEDURE — 27698 REPAIR OF ANKLE LIGAMENT: CPT

## 2023-06-15 PROCEDURE — 27630 REMOVAL OF TENDON LESION: CPT | Mod: 59

## 2023-06-15 RX ORDER — TRAMADOL HYDROCHLORIDE AND ACETAMINOPHEN 37.5; 325 MG/1; MG/1
37.5-325 TABLET, FILM COATED ORAL
Qty: 42 | Refills: 0 | Status: ACTIVE | COMMUNITY
Start: 2023-06-15 | End: 1900-01-01

## 2023-06-15 RX ORDER — IBUPROFEN 600 MG/1
600 TABLET, FILM COATED ORAL
Qty: 20 | Refills: 0 | Status: COMPLETED | COMMUNITY
Start: 2022-12-19

## 2023-06-15 RX ORDER — ONDANSETRON 8 MG/1
4 TABLET, FILM COATED ORAL ONCE
Refills: 0 | Status: DISCONTINUED | OUTPATIENT
Start: 2023-06-15 | End: 2023-06-15

## 2023-06-15 RX ORDER — HYDROMORPHONE HYDROCHLORIDE 2 MG/ML
0.5 INJECTION INTRAMUSCULAR; INTRAVENOUS; SUBCUTANEOUS
Refills: 0 | Status: DISCONTINUED | OUTPATIENT
Start: 2023-06-15 | End: 2023-06-15

## 2023-06-15 RX ORDER — ATORVASTATIN CALCIUM 80 MG/1
1 TABLET, FILM COATED ORAL
Qty: 0 | Refills: 2 | DISCHARGE

## 2023-06-15 RX ORDER — SODIUM CHLORIDE 9 MG/ML
1000 INJECTION, SOLUTION INTRAVENOUS
Refills: 0 | Status: DISCONTINUED | OUTPATIENT
Start: 2023-06-15 | End: 2023-06-15

## 2023-06-15 RX ORDER — OMEPRAZOLE 10 MG/1
1 CAPSULE, DELAYED RELEASE ORAL
Qty: 0 | Refills: 0 | DISCHARGE

## 2023-06-15 RX ADMIN — HYDROMORPHONE HYDROCHLORIDE 0.5 MILLIGRAM(S): 2 INJECTION INTRAMUSCULAR; INTRAVENOUS; SUBCUTANEOUS at 14:00

## 2023-06-15 RX ADMIN — HYDROMORPHONE HYDROCHLORIDE 0.5 MILLIGRAM(S): 2 INJECTION INTRAMUSCULAR; INTRAVENOUS; SUBCUTANEOUS at 14:22

## 2023-06-15 RX ADMIN — HYDROMORPHONE HYDROCHLORIDE 0.5 MILLIGRAM(S): 2 INJECTION INTRAMUSCULAR; INTRAVENOUS; SUBCUTANEOUS at 13:50

## 2023-06-15 RX ADMIN — HYDROMORPHONE HYDROCHLORIDE 0.5 MILLIGRAM(S): 2 INJECTION INTRAMUSCULAR; INTRAVENOUS; SUBCUTANEOUS at 14:12

## 2023-06-15 NOTE — CHART NOTE - NSCHARTNOTEFT_GEN_A_CORE
PACU ANESTHESIA ADMISSION NOTE      Procedure: Surgical removal of ganglion cyst of toe of right foot    Brostrom procedure of right ankle      Post op diagnosis:  S/P ankle ligament repair        ____  Intubated  TV:______       Rate: ______      FiO2: ______    __x__  Patent Airway    __x__  Full return of protective reflexes    _x___  Full recovery from anesthesia / back to baseline     Vitals:   T: 98.3          R:  13                BP:  127/62                Sat:  98%                 P: 86      Mental Status:  _x___ Awake   ___x__ Alert   _____ Drowsy   _____ Sedated    Nausea/Vomiting:  __x__ NO  ______Yes,   See Post - Op Orders          Pain Scale (0-10):  __0___    Treatment: ____ None    ____ See Post - Op/PCA Orders    Post - Operative Fluids:   ____ Oral   ____ See Post - Op Orders    Plan: Discharge:   ____Home       _____Floor     _____Critical Care    _____  Other:_________________    Comments: Pt awake and alert. Vital signs stable. Discharge patient when appropriate.

## 2023-06-15 NOTE — ASU DISCHARGE PLAN (ADULT/PEDIATRIC) - NS MD DC FALL RISK RISK
For information on Fall & Injury Prevention, visit: [email or web address] OR  [email or web address] OR  https://www.cdc.gov/steadi/patient.html

## 2023-06-15 NOTE — BRIEF OPERATIVE NOTE - COMMENTS
15cc mixed 0.5% Marcaine plain, 2% Lidocaine plain, 1:1  Jump start dressing to incision sites, Posterior splint

## 2023-06-15 NOTE — BRIEF OPERATIVE NOTE - NSICDXBRIEFPROCEDURE_GEN_ALL_CORE_FT
PROCEDURES:  Surgical removal of ganglion cyst of toe of right foot 15-Dagoberto-2023 13:45:31  Tiffanie Floresstrom procedure of right ankle 15-Dagoberto-2023 13:45:58  Tiffanie Flores

## 2023-06-15 NOTE — ASU DISCHARGE PLAN (ADULT/PEDIATRIC) - CARE PROVIDER_API CALL
Kaushal Lorenz  Podiatric Medicine and Surgery  [address], [address], [address]  [place] [address]  Phone: [phone]  Fax: [phone]  Follow Up Time:

## 2023-06-15 NOTE — BRIEF OPERATIVE NOTE - NSICDXBRIEFPREOP_GEN_ALL_CORE_FT
PRE-OP DIAGNOSIS:  Ganglion cyst of right foot 15-Dagoberto-2023 13:43:52  Tiffanie Flores  Right ankle instability 15-Dagoberto-2023 13:44:08  Tiffanie Flores

## 2023-06-19 LAB — SURGICAL PATHOLOGY STUDY: SIGNIFICANT CHANGE UP

## 2023-06-20 ENCOUNTER — APPOINTMENT (OUTPATIENT)
Dept: DERMATOLOGY | Facility: CLINIC | Age: 48
End: 2023-06-20

## 2023-06-20 ENCOUNTER — OUTPATIENT (OUTPATIENT)
Dept: OUTPATIENT SERVICES | Facility: HOSPITAL | Age: 48
LOS: 1 days | End: 2023-06-20
Payer: MEDICAID

## 2023-06-20 DIAGNOSIS — Z00.00 ENCOUNTER FOR GENERAL ADULT MEDICAL EXAMINATION WITHOUT ABNORMAL FINDINGS: ICD-10-CM

## 2023-06-20 DIAGNOSIS — Z98.890 OTHER SPECIFIED POSTPROCEDURAL STATES: Chronic | ICD-10-CM

## 2023-06-20 DIAGNOSIS — Z90.49 ACQUIRED ABSENCE OF OTHER SPECIFIED PARTS OF DIGESTIVE TRACT: Chronic | ICD-10-CM

## 2023-06-20 DIAGNOSIS — M67.471 GANGLION, RIGHT ANKLE AND FOOT: ICD-10-CM

## 2023-06-20 DIAGNOSIS — G47.33 OBSTRUCTIVE SLEEP APNEA (ADULT) (PEDIATRIC): ICD-10-CM

## 2023-06-20 DIAGNOSIS — Z90.3 ACQUIRED ABSENCE OF STOMACH [PART OF]: Chronic | ICD-10-CM

## 2023-06-20 DIAGNOSIS — E66.9 OBESITY, UNSPECIFIED: ICD-10-CM

## 2023-06-20 DIAGNOSIS — E78.00 PURE HYPERCHOLESTEROLEMIA, UNSPECIFIED: ICD-10-CM

## 2023-06-20 DIAGNOSIS — M25.371 OTHER INSTABILITY, RIGHT ANKLE: ICD-10-CM

## 2023-06-20 DIAGNOSIS — E11.9 TYPE 2 DIABETES MELLITUS WITHOUT COMPLICATIONS: ICD-10-CM

## 2023-06-20 DIAGNOSIS — Z85.43 PERSONAL HISTORY OF MALIGNANT NEOPLASM OF OVARY: ICD-10-CM

## 2023-06-20 PROCEDURE — 99213 OFFICE O/P EST LOW 20 MIN: CPT

## 2023-06-20 NOTE — ASSESSMENT
[FreeTextEntry1] : #R interdigital space itching and pain\par - Likely contact dermatitis, patient was previously given Triamcinolone cream 0.1% for upper extremity contact dermatitis w/similar symptoms. Will refill Triamcinolone cream. Patient to RTC in 5-6 monthsor sooner if symptoms worsen\par \par #Hidrantinitis Suppurativa\par - No longer taking antibiotics, reports improvement of her symptoms

## 2023-06-20 NOTE — HISTORY OF PRESENT ILLNESS
[de-identified] : The patient is a 48 yo female w/PMH Hidrantenitis Suppurativa (previously on Clinda + Flagyl) here for follow-up visit and medication refill. She recently had cyst removal and tendon repair surgery of the right lower extremity by Dr. Lorenz and has been applying Triamcinolone 0.1% Cream to the R interdigital spaces near where the cast is in place for itching and pain. At this appointment the patient is requesting medication refill of her Triamcinolone cream. She also reports improvement of Hidrantenitis symptoms and is no longer taking antibiotics. Patient denies any other rashes or new skin lesions.

## 2023-06-20 NOTE — END OF VISIT
[] : Resident [FreeTextEntry3] : Dermatitis of right toe area responds to TAC, but can not fully examine foot because of cast.\par Next visit evaluate to r/o tinea infection

## 2023-06-20 NOTE — PHYSICAL EXAM
[Alert] : alert [Oriented x 3] : ~L oriented x 3 [Well Nourished] : well nourished [Conjunctiva Non-injected] : conjunctiva non-injected [No Visual Lymphadenopathy] : no visual  lymphadenopathy [No Clubbing] : no clubbing [No Edema] : no edema [FreeTextEntry3] : Erythema of the  R large toe interdigital space. R lower extremity with cast in place

## 2023-06-21 ENCOUNTER — APPOINTMENT (OUTPATIENT)
Dept: GASTROENTEROLOGY | Facility: CLINIC | Age: 48
End: 2023-06-21

## 2023-06-21 DIAGNOSIS — L25.9 UNSPECIFIED CONTACT DERMATITIS, UNSPECIFIED CAUSE: ICD-10-CM

## 2023-06-22 ENCOUNTER — OUTPATIENT (OUTPATIENT)
Dept: OUTPATIENT SERVICES | Facility: HOSPITAL | Age: 48
LOS: 1 days | End: 2023-06-22
Payer: MEDICAID

## 2023-06-22 ENCOUNTER — RESULT REVIEW (OUTPATIENT)
Age: 48
End: 2023-06-22

## 2023-06-22 ENCOUNTER — APPOINTMENT (OUTPATIENT)
Dept: PODIATRY | Facility: CLINIC | Age: 48
End: 2023-06-22
Payer: MEDICAID

## 2023-06-22 DIAGNOSIS — Z90.49 ACQUIRED ABSENCE OF OTHER SPECIFIED PARTS OF DIGESTIVE TRACT: Chronic | ICD-10-CM
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
## 2023-06-22 DIAGNOSIS — Z12.31 ENCOUNTER FOR SCREENING MAMMOGRAM FOR MALIGNANT NEOPLASM OF BREAST: ICD-10-CM

## 2023-06-22 DIAGNOSIS — Z98.890 OTHER SPECIFIED POSTPROCEDURAL STATES: Chronic | ICD-10-CM

## 2023-06-22 DIAGNOSIS — Z90.3 ACQUIRED ABSENCE OF STOMACH [PART OF]: Chronic | ICD-10-CM
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
## 2023-06-22 DIAGNOSIS — Z00.00 ENCOUNTER FOR GENERAL ADULT MEDICAL EXAMINATION WITHOUT ABNORMAL FINDINGS: ICD-10-CM

## 2023-06-22 PROCEDURE — 77063 BREAST TOMOSYNTHESIS BI: CPT

## 2023-06-22 PROCEDURE — 29515 APPLICATION SHORT LEG SPLINT: CPT | Mod: RT,58

## 2023-06-22 PROCEDURE — 77067 SCR MAMMO BI INCL CAD: CPT

## 2023-06-22 PROCEDURE — 29515 APPLICATION SHORT LEG SPLINT: CPT | Mod: RT

## 2023-06-22 PROCEDURE — 77063 BREAST TOMOSYNTHESIS BI: CPT | Mod: 26

## 2023-06-22 PROCEDURE — 77067 SCR MAMMO BI INCL CAD: CPT | Mod: 26

## 2023-06-23 DIAGNOSIS — Z12.31 ENCOUNTER FOR SCREENING MAMMOGRAM FOR MALIGNANT NEOPLASM OF BREAST: ICD-10-CM

## 2023-06-26 NOTE — PROCEDURE
[] : A short leg posterior mold with sugartong splint was applied to the right leg. [FreeTextEntry1] : Patient evaluated history reviewed\par Patient placed in a right foot cast\par As above\par Patient to follow-up in 1 week for suture

## 2023-06-26 NOTE — PHYSICAL EXAM
[General Appearance - Alert] : alert [General Appearance - In No Acute Distress] : in no acute distress [Ankle Swelling (On Exam)] : not present [Varicose Veins Of Lower Extremities] : not present [Delayed in the Right Toes] : capillary refills normal in right toes [Delayed in the Left Toes] : capillary refills normal in the left toes [2+] : left foot dorsalis pedis 2+ [No Joint Swelling] : no joint swelling [] : normal strength/tone [Normal Foot/Ankle] : Both lower extremities were exposed and visualized. Standing exam demonstrates normal foot posture and alignment. Hindfoot exam shows no hindfoot valgus or varus [Sensation] : the sensory exam was normal to light touch and pinprick [No Focal Deficits] : no focal deficits [Deep Tendon Reflexes (DTR)] : deep tendon reflexes were 2+ and symmetric [Motor Exam] : the motor exam was normal

## 2023-06-27 ENCOUNTER — OUTPATIENT (OUTPATIENT)
Dept: OUTPATIENT SERVICES | Facility: HOSPITAL | Age: 48
LOS: 1 days | End: 2023-06-27
Payer: COMMERCIAL

## 2023-06-27 DIAGNOSIS — Z98.890 OTHER SPECIFIED POSTPROCEDURAL STATES: Chronic | ICD-10-CM

## 2023-06-27 DIAGNOSIS — Z90.3 ACQUIRED ABSENCE OF STOMACH [PART OF]: Chronic | ICD-10-CM

## 2023-06-27 DIAGNOSIS — Z90.49 ACQUIRED ABSENCE OF OTHER SPECIFIED PARTS OF DIGESTIVE TRACT: Chronic | ICD-10-CM

## 2023-06-27 DIAGNOSIS — K02.7 DENTAL ROOT CARIES: ICD-10-CM

## 2023-06-27 PROCEDURE — D0220: CPT

## 2023-06-27 PROCEDURE — D0230: CPT

## 2023-06-29 ENCOUNTER — OUTPATIENT (OUTPATIENT)
Dept: OUTPATIENT SERVICES | Facility: HOSPITAL | Age: 48
LOS: 1 days | End: 2023-06-29
Payer: MEDICAID

## 2023-06-29 ENCOUNTER — APPOINTMENT (OUTPATIENT)
Dept: PODIATRY | Facility: CLINIC | Age: 48
End: 2023-06-29
Payer: MEDICAID

## 2023-06-29 DIAGNOSIS — Z98.890 OTHER SPECIFIED POSTPROCEDURAL STATES: Chronic | ICD-10-CM

## 2023-06-29 DIAGNOSIS — M79.671 PAIN IN RIGHT FOOT: ICD-10-CM

## 2023-06-29 DIAGNOSIS — S93.401A SPRAIN OF UNSPECIFIED LIGAMENT OF RIGHT ANKLE, INITIAL ENCOUNTER: ICD-10-CM

## 2023-06-29 DIAGNOSIS — Z00.00 ENCOUNTER FOR GENERAL ADULT MEDICAL EXAMINATION WITHOUT ABNORMAL FINDINGS: ICD-10-CM

## 2023-06-29 DIAGNOSIS — Z90.3 ACQUIRED ABSENCE OF STOMACH [PART OF]: Chronic | ICD-10-CM

## 2023-06-29 DIAGNOSIS — K02.9 DENTAL CARIES, UNSPECIFIED: ICD-10-CM

## 2023-06-29 DIAGNOSIS — Z90.49 ACQUIRED ABSENCE OF OTHER SPECIFIED PARTS OF DIGESTIVE TRACT: Chronic | ICD-10-CM

## 2023-06-29 PROCEDURE — 29515 APPLICATION SHORT LEG SPLINT: CPT | Mod: 58

## 2023-06-29 PROCEDURE — 29515 APPLICATION SHORT LEG SPLINT: CPT

## 2023-06-29 NOTE — PROCEDURE
[] : A short leg posterior mold with sugartong splint was applied to the right leg. [FreeTextEntry1] : Patient evaluated history reviewed\par Patient placed in a right foot cast\par As above\par Patient to follow-up in 1 week for suture\par Rx Pain meds as below

## 2023-06-30 DIAGNOSIS — R60.9 EDEMA, UNSPECIFIED: ICD-10-CM

## 2023-06-30 DIAGNOSIS — M25.571 PAIN IN RIGHT ANKLE AND JOINTS OF RIGHT FOOT: ICD-10-CM

## 2023-06-30 DIAGNOSIS — M79.671 PAIN IN RIGHT FOOT: ICD-10-CM

## 2023-07-07 ENCOUNTER — APPOINTMENT (OUTPATIENT)
Dept: GYNECOLOGIC ONCOLOGY | Facility: CLINIC | Age: 48
End: 2023-07-07
Payer: MEDICAID

## 2023-07-07 VITALS
SYSTOLIC BLOOD PRESSURE: 112 MMHG | WEIGHT: 150 LBS | HEIGHT: 56 IN | BODY MASS INDEX: 33.74 KG/M2 | DIASTOLIC BLOOD PRESSURE: 79 MMHG

## 2023-07-07 PROCEDURE — 99214 OFFICE O/P EST MOD 30 MIN: CPT

## 2023-07-07 NOTE — DISCUSSION/SUMMARY
[Reviewed Clinical Lab Test(s)] : Results of clinical tests were reviewed. [Reviewed Radiology Report(s)] : Radiology reports were reviewed. [Visit Time ___ Minutes] : [unfilled] minutes [Face to Face Time___ Minutes] : with [unfilled] minutes in face to face consultation. [FreeTextEntry1] : Discussed the good overall prognosis of BOTs, especially as she is more than 5 years out from surgery.\par Discussed signs and Sx of recurrence and encouraged her to call sooner with any complaints.\par \par CT done 2019 was negative, would repeat every 5 years as she is at higher risk for pancreatic cancer.\par \par UTD with colonoscopy/Mammogram\par \par All questions answered, \par \par Return in 1 yr

## 2023-07-07 NOTE — HISTORY OF PRESENT ILLNESS
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

## 2023-07-07 NOTE — PHYSICAL EXAM
[Absent] : Adnexa(ae): Absent [Normal] : Recto-Vaginal Exam: Normal [FreeTextEntry1] : NAD [de-identified] : JACLYN [de-identified] : no edema [de-identified] : soft NT/ND [de-identified] : cuff intact [Fully active, able to carry on all pre-disease performance without restriction] : Status 0 - Fully active, able to carry on all pre-disease performance without restriction

## 2023-07-07 NOTE — REVIEW OF SYSTEMS
[Negative] : Musculoskeletal [Rash] : no rash noted [Ulcer] : no ulcer was seen [Syncope] : no syncope noted [Neuropathy] : no neuropathy [Depression] : no depression [Diabetes] : no diabetes mellitus [Hematuria] : no hematuria [Abn Vag Bleeding] : no abnormal vaginal bleeding [Dyspareunia] : no dyspareunia [Fatigue] : no fatigue [Recent Wt Gain ___ Lbs] : no recent weight gain [Recent Wt Loss___ Lbs] : no recent weight loss [Chest Pain] : no chest pain [STAPLES] : no dyspnea on exertion [Wheezing] : no wheezing [SOB on Exertion] : no shortness of breath during exertion [Bloody Stools] : no bloody stools [Nausea] : no nausea/vomitting [Muscle Weakness] : no muscle weakness

## 2023-07-10 ENCOUNTER — APPOINTMENT (OUTPATIENT)
Dept: PODIATRY | Facility: CLINIC | Age: 48
End: 2023-07-10
Payer: MEDICAID

## 2023-07-10 ENCOUNTER — OUTPATIENT (OUTPATIENT)
Dept: OUTPATIENT SERVICES | Facility: HOSPITAL | Age: 48
LOS: 1 days | End: 2023-07-10
Payer: MEDICAID
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
## 2023-07-10 DIAGNOSIS — Z98.890 OTHER SPECIFIED POSTPROCEDURAL STATES: Chronic | ICD-10-CM

## 2023-07-10 DIAGNOSIS — Z90.3 ACQUIRED ABSENCE OF STOMACH [PART OF]: Chronic | ICD-10-CM

## 2023-07-10 DIAGNOSIS — Z00.00 ENCOUNTER FOR GENERAL ADULT MEDICAL EXAMINATION WITHOUT ABNORMAL FINDINGS: ICD-10-CM
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
## 2023-07-10 DIAGNOSIS — Z90.49 ACQUIRED ABSENCE OF OTHER SPECIFIED PARTS OF DIGESTIVE TRACT: Chronic | ICD-10-CM

## 2023-07-10 PROCEDURE — 29515 APPLICATION SHORT LEG SPLINT: CPT | Mod: 58,RT

## 2023-07-10 PROCEDURE — 99215 OFFICE O/P EST HI 40 MIN: CPT | Mod: 95

## 2023-07-14 ENCOUNTER — OUTPATIENT (OUTPATIENT)
Dept: OUTPATIENT SERVICES | Facility: HOSPITAL | Age: 48
LOS: 1 days | End: 2023-07-14
Payer: MEDICAID

## 2023-07-14 ENCOUNTER — APPOINTMENT (OUTPATIENT)
Dept: PODIATRY | Facility: CLINIC | Age: 48
End: 2023-07-14
Payer: MEDICAID

## 2023-07-14 DIAGNOSIS — Z98.890 OTHER SPECIFIED POSTPROCEDURAL STATES: Chronic | ICD-10-CM

## 2023-07-14 DIAGNOSIS — M79.671 PAIN IN RIGHT FOOT: ICD-10-CM

## 2023-07-14 DIAGNOSIS — Z90.49 ACQUIRED ABSENCE OF OTHER SPECIFIED PARTS OF DIGESTIVE TRACT: Chronic | ICD-10-CM

## 2023-07-14 DIAGNOSIS — M25.571 PAIN IN RIGHT ANKLE AND JOINTS OF RIGHT FOOT: ICD-10-CM

## 2023-07-14 DIAGNOSIS — Z00.00 ENCOUNTER FOR GENERAL ADULT MEDICAL EXAMINATION WITHOUT ABNORMAL FINDINGS: ICD-10-CM

## 2023-07-14 DIAGNOSIS — Z90.3 ACQUIRED ABSENCE OF STOMACH [PART OF]: Chronic | ICD-10-CM

## 2023-07-14 PROCEDURE — 29515 APPLICATION SHORT LEG SPLINT: CPT | Mod: 58,RT

## 2023-07-14 PROCEDURE — 29515 APPLICATION SHORT LEG SPLINT: CPT

## 2023-07-19 DIAGNOSIS — S93.401A SPRAIN OF UNSPECIFIED LIGAMENT OF RIGHT ANKLE, INITIAL ENCOUNTER: ICD-10-CM

## 2023-07-19 DIAGNOSIS — M79.671 PAIN IN RIGHT FOOT: ICD-10-CM

## 2023-07-19 NOTE — PROCEDURE
[] : A short leg posterior mold with sugartong splint was applied to the right leg. [FreeTextEntry1] : Patient evaluated history reviewed\par Right foot sutures removed \par Patient placed in a right foot cast\par As above\par Patient to follow-up in 1 week for suture\par Rx Pain meds as below

## 2023-07-31 PROBLEM — M79.671 RIGHT FOOT PAIN: Status: ACTIVE | Noted: 2021-11-10

## 2023-07-31 NOTE — PROCEDURE
[] : A short leg posterior mold with sugartong splint was applied to the right leg. [FreeTextEntry1] : Patient evaluated history reviewed Right foot sutures removed  Patient placed in a right foot cast As above Rx Pain meds as below

## 2023-08-04 ENCOUNTER — APPOINTMENT (OUTPATIENT)
Dept: GASTROENTEROLOGY | Facility: CLINIC | Age: 48
End: 2023-08-04

## 2023-08-04 ENCOUNTER — APPOINTMENT (OUTPATIENT)
Dept: PODIATRY | Facility: CLINIC | Age: 48
End: 2023-08-04
Payer: MEDICAID

## 2023-08-04 ENCOUNTER — OUTPATIENT (OUTPATIENT)
Dept: OUTPATIENT SERVICES | Facility: HOSPITAL | Age: 48
LOS: 1 days | End: 2023-08-04
Payer: MEDICAID

## 2023-08-04 DIAGNOSIS — Z00.00 ENCOUNTER FOR GENERAL ADULT MEDICAL EXAMINATION WITHOUT ABNORMAL FINDINGS: ICD-10-CM

## 2023-08-04 DIAGNOSIS — Z90.49 ACQUIRED ABSENCE OF OTHER SPECIFIED PARTS OF DIGESTIVE TRACT: Chronic | ICD-10-CM

## 2023-08-04 DIAGNOSIS — Z98.890 OTHER SPECIFIED POSTPROCEDURAL STATES: Chronic | ICD-10-CM

## 2023-08-04 DIAGNOSIS — S93.491D SPRAIN OF OTHER LIGAMENT OF RIGHT ANKLE, SUBSEQUENT ENCOUNTER: ICD-10-CM

## 2023-08-04 DIAGNOSIS — Z90.3 ACQUIRED ABSENCE OF STOMACH [PART OF]: Chronic | ICD-10-CM

## 2023-08-04 DIAGNOSIS — M76.70 PERONEAL TENDINITIS, UNSPECIFIED LEG: ICD-10-CM

## 2023-08-04 PROCEDURE — 99024 POSTOP FOLLOW-UP VISIT: CPT

## 2023-08-04 PROCEDURE — 99204 OFFICE O/P NEW MOD 45 MIN: CPT

## 2023-08-07 PROBLEM — M76.70 PERONEAL TENDINITIS, UNSPECIFIED LATERALITY: Status: ACTIVE | Noted: 2020-01-17

## 2023-08-07 PROBLEM — S93.491D SPRAIN OF ANTERIOR TALOFIBULAR LIGAMENT OF RIGHT ANKLE, SUBSEQUENT ENCOUNTER: Status: ACTIVE | Noted: 2023-02-27

## 2023-08-07 NOTE — PROCEDURE
[] : A short leg posterior mold with sugartong splint was applied to the right leg. [FreeTextEntry1] : Patient evaluated history reviewed Patient evaluated patient can start physical therapy Patient has continued pain we will prescribe pain medication for breakthrough pain and when needed for uncontrolled pain in the evening Patient can start ambulating in cam boot Will follow-up in 4 weeks

## 2023-08-08 DIAGNOSIS — S93.491D SPRAIN OF OTHER LIGAMENT OF RIGHT ANKLE, SUBSEQUENT ENCOUNTER: ICD-10-CM

## 2023-08-08 DIAGNOSIS — M76.70 PERONEAL TENDINITIS, UNSPECIFIED LEG: ICD-10-CM

## 2023-08-22 ENCOUNTER — APPOINTMENT (OUTPATIENT)
Dept: PODIATRY | Facility: CLINIC | Age: 48
End: 2023-08-22
Payer: MEDICAID

## 2023-08-22 ENCOUNTER — OUTPATIENT (OUTPATIENT)
Dept: OUTPATIENT SERVICES | Facility: HOSPITAL | Age: 48
LOS: 1 days | End: 2023-08-22
Payer: MEDICAID

## 2023-08-22 ENCOUNTER — LABORATORY RESULT (OUTPATIENT)
Age: 48
End: 2023-08-22

## 2023-08-22 DIAGNOSIS — M84.374K: ICD-10-CM

## 2023-08-22 DIAGNOSIS — Z98.890 OTHER SPECIFIED POSTPROCEDURAL STATES: Chronic | ICD-10-CM

## 2023-08-22 DIAGNOSIS — M84.30XA STRESS FRACTURE, UNSPECIFIED SITE, INITIAL ENCOUNTER FOR FRACTURE: ICD-10-CM

## 2023-08-22 DIAGNOSIS — Z90.3 ACQUIRED ABSENCE OF STOMACH [PART OF]: Chronic | ICD-10-CM

## 2023-08-22 DIAGNOSIS — Z00.00 ENCOUNTER FOR GENERAL ADULT MEDICAL EXAMINATION WITHOUT ABNORMAL FINDINGS: ICD-10-CM

## 2023-08-22 DIAGNOSIS — N30.90 CYSTITIS, UNSPECIFIED W/OUT HEMATURIA: ICD-10-CM

## 2023-08-22 PROCEDURE — 99024 POSTOP FOLLOW-UP VISIT: CPT

## 2023-08-22 PROCEDURE — 99213 OFFICE O/P EST LOW 20 MIN: CPT

## 2023-08-22 PROCEDURE — 81001 URINALYSIS AUTO W/SCOPE: CPT
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
## 2023-08-30 NOTE — PHYSICAL EXAM
[General Appearance - Alert] : alert [General Appearance - In No Acute Distress] : in no acute distress [2+] : left foot dorsalis pedis 2+ [No Joint Swelling] : no joint swelling [Normal Foot/Ankle] : Both lower extremities were exposed and visualized. Standing exam demonstrates normal foot posture and alignment. Hindfoot exam shows no hindfoot valgus or varus [Skin Color & Pigmentation] : normal skin color and pigmentation [Skin Lesions] : no skin lesions [Sensation] : the sensory exam was normal to light touch and pinprick [No Focal Deficits] : no focal deficits [Deep Tendon Reflexes (DTR)] : deep tendon reflexes were 2+ and symmetric [Motor Exam] : the motor exam was normal [Ankle Swelling (On Exam)] : not present [Varicose Veins Of Lower Extremities] : not present [] : not present [Delayed in the Right Toes] : capillary refills normal in right toes [Delayed in the Left Toes] : capillary refills normal in the left toes [Foot Ulcer] : no foot ulcer [FreeTextEntry1] : Incision healed

## 2023-08-30 NOTE — ASSESSMENT
[FreeTextEntry1] : -s/p ATFL repair Right ankle   Plan:  -Pt seen and evaluted. All findings discussed with patient -MRI ordered to eval for possible new injury -Continue to elevate foot for swelling -Had xray done at [facility]- report requested -Pain meds renewed -RTC 2 weeks
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
## 2023-08-31 DIAGNOSIS — M84.30XA STRESS FRACTURE, UNSPECIFIED SITE, INITIAL ENCOUNTER FOR FRACTURE: ICD-10-CM
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
## 2023-08-31 DIAGNOSIS — M84.374K: ICD-10-CM

## 2023-09-01 NOTE — HISTORY OF PRESENT ILLNESS
[No Acute Distress] : no acute distress [de-identified] : pt c/o clindamycin gel did not hellp groin boil.\par She recently had a new one left axilla which drained, was also not helped by clindamycin gel.\par \par She had bariatric surgery last week.\par \par Screening for melanoma [Well Nourished] : well nourished [Well Developed] : well developed [Well-Appearing] : well-appearing [Normal Sclera/Conjunctiva] : normal sclera/conjunctiva [PERRL] : pupils equal round and reactive to light [EOMI] : extraocular movements intact [Normal Outer Ear/Nose] : the outer ears and nose were normal in appearance [Normal Oropharynx] : the oropharynx was normal [Normal TMs] : both tympanic membranes were normal [Normal Nasal Mucosa] : the nasal mucosa was normal [No JVD] : no jugular venous distention [No Lymphadenopathy] : no lymphadenopathy [Supple] : supple [Thyroid Normal, No Nodules] : the thyroid was normal and there were no nodules present [No Respiratory Distress] : no respiratory distress  [No Accessory Muscle Use] : no accessory muscle use [Clear to Auscultation] : lungs were clear to auscultation bilaterally [Normal Rate] : normal rate  [Regular Rhythm] : with a regular rhythm [Normal S1, S2] : normal S1 and S2 [No Murmur] : no murmur heard [No Carotid Bruits] : no carotid bruits [No Abdominal Bruit] : a ~M bruit was not heard ~T in the abdomen [No Varicosities] : no varicosities [Pedal Pulses Present] : the pedal pulses are present [No Edema] : there was no peripheral edema [No Palpable Aorta] : no palpable aorta [No Extremity Clubbing/Cyanosis] : no extremity clubbing/cyanosis [Soft] : abdomen soft [Non Tender] : non-tender [Non-distended] : non-distended [No Masses] : no abdominal mass palpated [No HSM] : no HSM [Normal Bowel Sounds] : normal bowel sounds [Normal Posterior Cervical Nodes] : no posterior cervical lymphadenopathy [Normal Anterior Cervical Nodes] : no anterior cervical lymphadenopathy [No CVA Tenderness] : no CVA  tenderness [No Spinal Tenderness] : no spinal tenderness [No Joint Swelling] : no joint swelling [Grossly Normal Strength/Tone] : grossly normal strength/tone [No Rash] : no rash [Coordination Grossly Intact] : coordination grossly intact [No Focal Deficits] : no focal deficits [Normal Gait] : normal gait [Deep Tendon Reflexes (DTR)] : deep tendon reflexes were 2+ and symmetric [Speech Grossly Normal] : speech grossly normal [Memory Grossly Normal] : memory grossly normal [Normal Affect] : the affect was normal [Alert and Oriented x3] : oriented to person, place, and time [Normal Mood] : the mood was normal [Normal Insight/Judgement] : insight and judgment were intact

## 2023-09-05 ENCOUNTER — OUTPATIENT (OUTPATIENT)
Dept: OUTPATIENT SERVICES | Facility: HOSPITAL | Age: 48
LOS: 1 days | End: 2023-09-05
Payer: MEDICAID

## 2023-09-05 ENCOUNTER — APPOINTMENT (OUTPATIENT)
Dept: PODIATRY | Facility: CLINIC | Age: 48
End: 2023-09-05
Payer: MEDICAID

## 2023-09-05 DIAGNOSIS — Z98.890 OTHER SPECIFIED POSTPROCEDURAL STATES: Chronic | ICD-10-CM

## 2023-09-05 DIAGNOSIS — Z90.49 ACQUIRED ABSENCE OF OTHER SPECIFIED PARTS OF DIGESTIVE TRACT: Chronic | ICD-10-CM

## 2023-09-05 DIAGNOSIS — Z00.00 ENCOUNTER FOR GENERAL ADULT MEDICAL EXAMINATION WITHOUT ABNORMAL FINDINGS: ICD-10-CM

## 2023-09-05 DIAGNOSIS — Z90.3 ACQUIRED ABSENCE OF STOMACH [PART OF]: Chronic | ICD-10-CM

## 2023-09-05 PROCEDURE — 99213 OFFICE O/P EST LOW 20 MIN: CPT | Mod: 24

## 2023-09-05 PROCEDURE — 99213 OFFICE O/P EST LOW 20 MIN: CPT

## 2023-09-11 ENCOUNTER — APPOINTMENT (OUTPATIENT)
Dept: PODIATRY | Facility: CLINIC | Age: 48
End: 2023-09-11
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
## 2023-09-15 DIAGNOSIS — M79.671 PAIN IN RIGHT FOOT: ICD-10-CM

## 2023-09-15 DIAGNOSIS — M25.571 PAIN IN RIGHT ANKLE AND JOINTS OF RIGHT FOOT: ICD-10-CM
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
## 2023-09-26 ENCOUNTER — RESULT REVIEW (OUTPATIENT)
Age: 48
End: 2023-09-26

## 2023-09-26 ENCOUNTER — OUTPATIENT (OUTPATIENT)
Dept: OUTPATIENT SERVICES | Facility: HOSPITAL | Age: 48
LOS: 1 days | End: 2023-09-26
Payer: MEDICAID

## 2023-09-26 DIAGNOSIS — Z98.890 OTHER SPECIFIED POSTPROCEDURAL STATES: Chronic | ICD-10-CM

## 2023-09-26 DIAGNOSIS — Z90.49 ACQUIRED ABSENCE OF OTHER SPECIFIED PARTS OF DIGESTIVE TRACT: Chronic | ICD-10-CM

## 2023-09-26 DIAGNOSIS — Z00.8 ENCOUNTER FOR OTHER GENERAL EXAMINATION: ICD-10-CM

## 2023-09-26 DIAGNOSIS — Z90.3 ACQUIRED ABSENCE OF STOMACH [PART OF]: Chronic | ICD-10-CM

## 2023-09-26 DIAGNOSIS — M25.571 PAIN IN RIGHT ANKLE AND JOINTS OF RIGHT FOOT: ICD-10-CM

## 2023-09-26 PROCEDURE — A9579: CPT

## 2023-09-26 PROCEDURE — 73721 MRI JNT OF LWR EXTRE W/O DYE: CPT | Mod: 26,RT

## 2023-09-26 PROCEDURE — 73721 MRI JNT OF LWR EXTRE W/O DYE: CPT | Mod: RT

## 2023-09-27 DIAGNOSIS — M25.571 PAIN IN RIGHT ANKLE AND JOINTS OF RIGHT FOOT: ICD-10-CM

## 2023-10-09 ENCOUNTER — APPOINTMENT (OUTPATIENT)
Dept: PODIATRY | Facility: CLINIC | Age: 48
End: 2023-10-09
Payer: MEDICAID

## 2023-10-09 PROCEDURE — 99214 OFFICE O/P EST MOD 30 MIN: CPT

## 2023-11-02 ENCOUNTER — APPOINTMENT (OUTPATIENT)
Dept: DERMATOLOGY | Facility: CLINIC | Age: 48
End: 2023-11-02

## 2023-11-06 ENCOUNTER — APPOINTMENT (OUTPATIENT)
Dept: PODIATRY | Facility: CLINIC | Age: 48
End: 2023-11-06

## 2023-11-06 ENCOUNTER — NON-APPOINTMENT (OUTPATIENT)
Age: 48
End: 2023-11-06

## 2023-11-06 ENCOUNTER — APPOINTMENT (OUTPATIENT)
Dept: PODIATRY | Facility: CLINIC | Age: 48
End: 2023-11-06
Payer: MEDICAID

## 2023-11-06 VITALS — WEIGHT: 156 LBS | BODY MASS INDEX: 35.09 KG/M2 | HEIGHT: 56 IN

## 2023-11-06 PROCEDURE — 99213 OFFICE O/P EST LOW 20 MIN: CPT

## 2023-11-07 ENCOUNTER — OUTPATIENT (OUTPATIENT)
Dept: OUTPATIENT SERVICES | Facility: HOSPITAL | Age: 48
LOS: 1 days | End: 2023-11-07
Payer: MEDICAID

## 2023-11-07 ENCOUNTER — APPOINTMENT (OUTPATIENT)
Dept: INTERNAL MEDICINE | Facility: CLINIC | Age: 48
End: 2023-11-07
Payer: MEDICAID

## 2023-11-07 VITALS
HEART RATE: 88 BPM | HEIGHT: 56 IN | OXYGEN SATURATION: 97 % | WEIGHT: 158 LBS | BODY MASS INDEX: 35.54 KG/M2 | SYSTOLIC BLOOD PRESSURE: 114 MMHG | DIASTOLIC BLOOD PRESSURE: 82 MMHG
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
## 2023-11-07 DIAGNOSIS — M25.551 PAIN IN RIGHT HIP: ICD-10-CM

## 2023-11-07 DIAGNOSIS — Z90.3 ACQUIRED ABSENCE OF STOMACH [PART OF]: Chronic | ICD-10-CM

## 2023-11-07 DIAGNOSIS — M54.6 PAIN IN THORACIC SPINE: ICD-10-CM

## 2023-11-07 DIAGNOSIS — M25.552 PAIN IN RIGHT HIP: ICD-10-CM

## 2023-11-07 DIAGNOSIS — Z90.49 ACQUIRED ABSENCE OF OTHER SPECIFIED PARTS OF DIGESTIVE TRACT: Chronic | ICD-10-CM

## 2023-11-07 DIAGNOSIS — M54.2 CERVICALGIA: ICD-10-CM

## 2023-11-07 DIAGNOSIS — R52 PAIN, UNSPECIFIED: ICD-10-CM

## 2023-11-07 DIAGNOSIS — Z98.890 OTHER SPECIFIED POSTPROCEDURAL STATES: Chronic | ICD-10-CM

## 2023-11-07 DIAGNOSIS — Z00.00 ENCOUNTER FOR GENERAL ADULT MEDICAL EXAMINATION WITHOUT ABNORMAL FINDINGS: ICD-10-CM
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
## 2023-11-07 PROCEDURE — 99213 OFFICE O/P EST LOW 20 MIN: CPT

## 2023-11-07 RX ORDER — CEFADROXIL 500 MG/1
500 CAPSULE ORAL TWICE DAILY
Qty: 28 | Refills: 0 | Status: DISCONTINUED | COMMUNITY
Start: 2023-06-15 | End: 2023-11-07

## 2023-11-07 RX ORDER — FLUCONAZOLE 150 MG/1
150 TABLET ORAL
Qty: 1 | Refills: 0 | Status: DISCONTINUED | COMMUNITY
Start: 2022-05-26 | End: 2023-11-07

## 2023-11-07 RX ORDER — NITROFURANTOIN (MONOHYDRATE/MACROCRYSTALS) 25; 75 MG/1; MG/1
100 CAPSULE ORAL
Qty: 10 | Refills: 0 | Status: DISCONTINUED | COMMUNITY
Start: 2023-08-22 | End: 2023-11-07

## 2023-11-07 RX ORDER — CEPHALEXIN 500 MG/1
500 CAPSULE ORAL 3 TIMES DAILY
Qty: 30 | Refills: 1 | Status: DISCONTINUED | COMMUNITY
Start: 2022-10-13 | End: 2023-11-07

## 2023-11-07 RX ORDER — BLOOD-GLUCOSE METER
KIT MISCELLANEOUS 3 TIMES DAILY
Qty: 60 | Refills: 3 | Status: DISCONTINUED | COMMUNITY
Start: 2022-06-15 | End: 2023-11-07

## 2023-11-07 RX ORDER — OXYCODONE AND ACETAMINOPHEN 5; 325 MG/1; MG/1
5-325 TABLET ORAL
Qty: 40 | Refills: 0 | Status: DISCONTINUED | COMMUNITY
Start: 2023-08-22 | End: 2023-11-07

## 2023-11-07 RX ORDER — NITROFURANTOIN (MONOHYDRATE/MACROCRYSTALS) 25; 75 MG/1; MG/1
100 CAPSULE ORAL
Qty: 10 | Refills: 0 | Status: DISCONTINUED | COMMUNITY
Start: 2022-08-29 | End: 2023-11-07

## 2023-11-07 RX ORDER — OXYCODONE AND ACETAMINOPHEN 10; 325 MG/1; MG/1
10-325 TABLET ORAL
Qty: 20 | Refills: 0 | Status: COMPLETED | COMMUNITY
Start: 2023-06-29 | End: 2023-07-31

## 2023-11-07 RX ORDER — DICLOFENAC SODIUM 1% 10 MG/G
1 GEL TOPICAL DAILY
Qty: 1 | Refills: 3 | Status: ACTIVE | COMMUNITY
Start: 2023-08-04 | End: 1900-01-01

## 2023-11-07 RX ORDER — MULTIVITAMIN
TABLET ORAL DAILY
Qty: 30 | Refills: 5 | Status: ACTIVE | COMMUNITY
Start: 2021-12-13 | End: 1900-01-01

## 2023-11-07 RX ORDER — IBUPROFEN 800 MG/1
800 TABLET, FILM COATED ORAL
Qty: 56 | Refills: 1 | Status: DISCONTINUED | COMMUNITY
Start: 2023-06-15 | End: 2023-11-07

## 2023-11-07 RX ORDER — CLINDAMYCIN PHOSPHATE 10 MG/ML
1 LOTION TOPICAL TWICE DAILY
Qty: 1 | Refills: 5 | Status: DISCONTINUED | COMMUNITY
Start: 2022-08-31 | End: 2023-11-07

## 2023-11-07 RX ORDER — METRONIDAZOLE 7.5 MG/G
0.75 GEL TOPICAL TWICE DAILY
Qty: 1 | Refills: 3 | Status: DISCONTINUED | COMMUNITY
Start: 2022-06-14 | End: 2023-11-07

## 2023-11-28 ENCOUNTER — OUTPATIENT (OUTPATIENT)
Dept: OUTPATIENT SERVICES | Facility: HOSPITAL | Age: 48
LOS: 1 days | End: 2023-11-28

## 2023-11-28 DIAGNOSIS — Z00.00 ENCOUNTER FOR GENERAL ADULT MEDICAL EXAMINATION WITHOUT ABNORMAL FINDINGS: ICD-10-CM

## 2023-11-28 DIAGNOSIS — Z90.3 ACQUIRED ABSENCE OF STOMACH [PART OF]: Chronic | ICD-10-CM

## 2023-11-28 DIAGNOSIS — Z98.890 OTHER SPECIFIED POSTPROCEDURAL STATES: Chronic | ICD-10-CM

## 2023-11-28 DIAGNOSIS — Z90.49 ACQUIRED ABSENCE OF OTHER SPECIFIED PARTS OF DIGESTIVE TRACT: Chronic | ICD-10-CM

## 2023-11-29 DIAGNOSIS — Z00.00 ENCOUNTER FOR GENERAL ADULT MEDICAL EXAMINATION WITHOUT ABNORMAL FINDINGS: ICD-10-CM

## 2023-12-01 LAB
ALBUMIN SERPL ELPH-MCNC: 4.6 G/DL
ALP BLD-CCNC: 133 U/L
ALT SERPL-CCNC: 15 U/L
ANION GAP SERPL CALC-SCNC: 10 MMOL/L
AST SERPL-CCNC: 18 U/L
BASOPHILS # BLD AUTO: 0.03 K/UL
BASOPHILS NFR BLD AUTO: 0.6 %
BILIRUB SERPL-MCNC: 0.3 MG/DL
BUN SERPL-MCNC: 20 MG/DL
CALCIUM SERPL-MCNC: 9.9 MG/DL
CHLORIDE SERPL-SCNC: 106 MMOL/L
CHOLEST SERPL-MCNC: 223 MG/DL
CO2 SERPL-SCNC: 26 MMOL/L
CREAT SERPL-MCNC: 0.5 MG/DL
EGFR: 116 ML/MIN/1.73M2
EOSINOPHIL # BLD AUTO: 0.07 K/UL
EOSINOPHIL NFR BLD AUTO: 1.4 %
ESTIMATED AVERAGE GLUCOSE: 117 MG/DL
GLUCOSE SERPL-MCNC: 83 MG/DL
HBA1C MFR BLD HPLC: 5.7 %
HCT VFR BLD CALC: 40.2 %
HDLC SERPL-MCNC: 38 MG/DL
HGB BLD-MCNC: 12.9 G/DL
IMM GRANULOCYTES NFR BLD AUTO: 0.2 %
LDLC SERPL CALC-MCNC: 152 MG/DL
LYMPHOCYTES # BLD AUTO: 2.62 K/UL
LYMPHOCYTES NFR BLD AUTO: 51.6 %
MAN DIFF?: NORMAL
MCHC RBC-ENTMCNC: 29.1 PG
MCHC RBC-ENTMCNC: 32.1 G/DL
MCV RBC AUTO: 90.7 FL
MONOCYTES # BLD AUTO: 0.34 K/UL
MONOCYTES NFR BLD AUTO: 6.7 %
NEUTROPHILS # BLD AUTO: 2.01 K/UL
NEUTROPHILS NFR BLD AUTO: 39.5 %
NONHDLC SERPL-MCNC: 185 MG/DL
PLATELET # BLD AUTO: 285 K/UL
POTASSIUM SERPL-SCNC: 4.2 MMOL/L
PROT SERPL-MCNC: 7.2 G/DL
RBC # BLD: 4.43 M/UL
RBC # FLD: 13.4 %
SODIUM SERPL-SCNC: 142 MMOL/L
TRIGL SERPL-MCNC: 165 MG/DL
TSH SERPL-ACNC: 1.58 UIU/ML
WBC # FLD AUTO: 5.08 K/UL

## 2023-12-04 ENCOUNTER — APPOINTMENT (OUTPATIENT)
Dept: PODIATRY | Facility: CLINIC | Age: 48
End: 2023-12-04
Payer: MEDICAID

## 2023-12-04 ENCOUNTER — NON-APPOINTMENT (OUTPATIENT)
Age: 48
End: 2023-12-04

## 2023-12-04 DIAGNOSIS — M77.50 OTHER ENTHESOPATHY OF UNSPCFD FOOT AND ANKLE: ICD-10-CM

## 2023-12-04 DIAGNOSIS — S93.401A SPRAIN OF UNSPECIFIED LIGAMENT OF RIGHT ANKLE, INITIAL ENCOUNTER: ICD-10-CM

## 2023-12-04 PROCEDURE — 99213 OFFICE O/P EST LOW 20 MIN: CPT

## 2023-12-18 ENCOUNTER — APPOINTMENT (OUTPATIENT)
Dept: PODIATRY | Facility: CLINIC | Age: 48
End: 2023-12-18

## 2023-12-18 ENCOUNTER — APPOINTMENT (OUTPATIENT)
Dept: SURGERY | Facility: CLINIC | Age: 48
End: 2023-12-18
Payer: MEDICAID

## 2023-12-18 VITALS
BODY MASS INDEX: 35.32 KG/M2 | HEIGHT: 56 IN | SYSTOLIC BLOOD PRESSURE: 120 MMHG | WEIGHT: 157 LBS | HEART RATE: 88 BPM | OXYGEN SATURATION: 99 % | DIASTOLIC BLOOD PRESSURE: 68 MMHG | TEMPERATURE: 97.1 F

## 2023-12-18 PROCEDURE — 99213 OFFICE O/P EST LOW 20 MIN: CPT

## 2023-12-18 NOTE — HISTORY OF PRESENT ILLNESS
[de-identified] : Patient had surgery approximately 1 1/2 years ago. She is interested in breast augmentation and panniculectomy. She reported rashes under abdominal apron. I will refer her to plastics. Patient reported that she continues to have pain in her right ankle, which has limited her mobility for some time. Denies reflux/heartburn/nausea/vomiting. Diarrhea continues to improve. Taking 2 multivitamins with iron, 2 Viactiv chews, 1 vitamin b 12, vitamin d and e. Her recent A1c increased slightly to 5.7% and I discussed dietary changes with patient. Lipid panel improved.

## 2023-12-18 NOTE — ASSESSMENT
[FreeTextEntry1] : ALFIE MOSLEY is a 48 year female seen today for Bariatric follow up visit.  She reported that she tries to plan a healthy meal but has a weakness for cake which she eats mostly on the weekends. Breakfast - eggs with avocado, cheese and tomato in a lettuce wrap. Lunch - 2 protein squares with peanut butter. Dinner - beef, beans, chicken with spinach and an occasional small serving of rice. Fluid intake is adequate. Exercise 3 days doing Kourtney and weight bearing exercises.

## 2023-12-18 NOTE — PLAN
[FreeTextEntry1] : Plan: Nutritional labs.          Contact information for plastics.          RTO in 6 months.          Call with concerns.

## 2023-12-28 ENCOUNTER — OUTPATIENT (OUTPATIENT)
Dept: OUTPATIENT SERVICES | Facility: HOSPITAL | Age: 48
LOS: 1 days | End: 2023-12-28
Payer: MEDICAID

## 2023-12-28 DIAGNOSIS — Z98.890 OTHER SPECIFIED POSTPROCEDURAL STATES: Chronic | ICD-10-CM

## 2023-12-28 DIAGNOSIS — Z90.3 ACQUIRED ABSENCE OF STOMACH [PART OF]: Chronic | ICD-10-CM

## 2023-12-28 DIAGNOSIS — M25.571 PAIN IN RIGHT ANKLE AND JOINTS OF RIGHT FOOT: ICD-10-CM

## 2023-12-28 PROCEDURE — 97161 PT EVAL LOW COMPLEX 20 MIN: CPT | Mod: GP

## 2023-12-28 PROCEDURE — T1013: CPT

## 2023-12-29 DIAGNOSIS — M25.571 PAIN IN RIGHT ANKLE AND JOINTS OF RIGHT FOOT: ICD-10-CM

## 2024-01-03 PROBLEM — S93.401A: Status: ACTIVE | Noted: 2022-09-01
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
## 2024-01-09 ENCOUNTER — APPOINTMENT (OUTPATIENT)
Dept: PODIATRY | Facility: CLINIC | Age: 49
End: 2024-01-09
Payer: MEDICAID

## 2024-01-09 VITALS
TEMPERATURE: 97 F | WEIGHT: 157 LBS | OXYGEN SATURATION: 99 % | HEIGHT: 56 IN | HEART RATE: 80 BPM | BODY MASS INDEX: 35.32 KG/M2

## 2024-01-09 DIAGNOSIS — M76.61 ACHILLES TENDINITIS, RIGHT LEG: ICD-10-CM

## 2024-01-09 PROCEDURE — 99214 OFFICE O/P EST MOD 30 MIN: CPT

## 2024-01-09 RX ORDER — OXYCODONE AND ACETAMINOPHEN 5; 325 MG/1; MG/1
5-325 TABLET ORAL
Qty: 20 | Refills: 0 | Status: ACTIVE | COMMUNITY
Start: 2024-01-09 | End: 1900-01-01

## 2024-01-09 RX ORDER — MELOXICAM 15 MG/1
15 TABLET ORAL DAILY
Qty: 1 | Refills: 4 | Status: ACTIVE | COMMUNITY
Start: 2024-01-09 | End: 1900-01-01
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
## 2024-01-18 NOTE — PROCEDURE
[FreeTextEntry1] : Patient evaluated history reviewed At this time patient will require a MRI of right foot and ankle to evaluate possible Achilles tendon tear rupture Patient can follow-up post Achilles tendon MRI

## 2024-01-18 NOTE — PHYSICAL EXAM
[General Appearance - Alert] : alert [General Appearance - In No Acute Distress] : in no acute distress [Varicose Veins Of Lower Extremities] : not present [Delayed in the Right Toes] : capillary refills normal in right toes [Delayed in the Left Toes] : capillary refills normal in the left toes [2+] : left foot dorsalis pedis 2+ [No Joint Swelling] : no joint swelling [de-identified] : Pain on range of motion of right foot dorsiflexion and plantarflexion [Skin Color & Pigmentation] : normal skin color and pigmentation [Skin Turgor] : normal skin turgor [] : no rash [Skin Lesions] : no skin lesions [Foot Ulcer] : no foot ulcer [Skin Induration] : no skin induration [Sensation] : the sensory exam was normal to light touch and pinprick [No Focal Deficits] : no focal deficits [Deep Tendon Reflexes (DTR)] : deep tendon reflexes were 2+ and symmetric [Motor Exam] : the motor exam was normal [Oriented To Time, Place, And Person] : oriented to person, place, and time [Impaired Insight] : insight and judgment were intact [Affect] : the affect was normal

## 2024-01-20 ENCOUNTER — OUTPATIENT (OUTPATIENT)
Dept: OUTPATIENT SERVICES | Facility: HOSPITAL | Age: 49
LOS: 1 days | End: 2024-01-20
Payer: MEDICAID

## 2024-01-20 ENCOUNTER — APPOINTMENT (OUTPATIENT)
Dept: INTERNAL MEDICINE | Facility: CLINIC | Age: 49
End: 2024-01-20
Payer: MEDICAID

## 2024-01-20 ENCOUNTER — RESULT REVIEW (OUTPATIENT)
Age: 49
End: 2024-01-20

## 2024-01-20 ENCOUNTER — OUTPATIENT (OUTPATIENT)
Dept: OUTPATIENT SERVICES | Facility: HOSPITAL | Age: 49
LOS: 1 days | End: 2024-01-20
Payer: COMMERCIAL

## 2024-01-20 VITALS
BODY MASS INDEX: 34.64 KG/M2 | HEIGHT: 56 IN | DIASTOLIC BLOOD PRESSURE: 84 MMHG | SYSTOLIC BLOOD PRESSURE: 116 MMHG | OXYGEN SATURATION: 98 % | HEART RATE: 66 BPM | WEIGHT: 154 LBS

## 2024-01-20 DIAGNOSIS — Z90.49 ACQUIRED ABSENCE OF OTHER SPECIFIED PARTS OF DIGESTIVE TRACT: Chronic | ICD-10-CM

## 2024-01-20 DIAGNOSIS — E66.9 OBESITY, UNSPECIFIED: ICD-10-CM

## 2024-01-20 DIAGNOSIS — M79.2 NEURALGIA AND NEURITIS, UNSPECIFIED: ICD-10-CM

## 2024-01-20 DIAGNOSIS — M54.2 CERVICALGIA: ICD-10-CM

## 2024-01-20 DIAGNOSIS — Z98.890 OTHER SPECIFIED POSTPROCEDURAL STATES: Chronic | ICD-10-CM

## 2024-01-20 DIAGNOSIS — R52 PAIN, UNSPECIFIED: ICD-10-CM

## 2024-01-20 DIAGNOSIS — E11.9 TYPE 2 DIABETES MELLITUS WITHOUT COMPLICATIONS: ICD-10-CM

## 2024-01-20 DIAGNOSIS — E78.5 HYPERLIPIDEMIA, UNSPECIFIED: ICD-10-CM

## 2024-01-20 DIAGNOSIS — E11.9 TYPE 2 DIABETES MELLITUS W/OUT COMPLICATIONS: ICD-10-CM

## 2024-01-20 DIAGNOSIS — L25.9 UNSPECIFIED CONTACT DERMATITIS, UNSPECIFIED CAUSE: ICD-10-CM

## 2024-01-20 DIAGNOSIS — Z00.00 ENCOUNTER FOR GENERAL ADULT MEDICAL EXAMINATION WITHOUT ABNORMAL FINDINGS: ICD-10-CM

## 2024-01-20 DIAGNOSIS — E78.00 PURE HYPERCHOLESTEROLEMIA, UNSPECIFIED: ICD-10-CM

## 2024-01-20 DIAGNOSIS — R73.03 PREDIABETES: ICD-10-CM

## 2024-01-20 DIAGNOSIS — Z90.3 ACQUIRED ABSENCE OF STOMACH [PART OF]: Chronic | ICD-10-CM

## 2024-01-20 PROCEDURE — G2211 COMPLEX E/M VISIT ADD ON: CPT | Mod: NC,1L

## 2024-01-20 PROCEDURE — 73030 X-RAY EXAM OF SHOULDER: CPT | Mod: 26,50

## 2024-01-20 PROCEDURE — 99214 OFFICE O/P EST MOD 30 MIN: CPT

## 2024-01-20 PROCEDURE — 72040 X-RAY EXAM NECK SPINE 2-3 VW: CPT

## 2024-01-20 PROCEDURE — 73030 X-RAY EXAM OF SHOULDER: CPT | Mod: 50

## 2024-01-20 PROCEDURE — 72040 X-RAY EXAM NECK SPINE 2-3 VW: CPT | Mod: 26
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
## 2024-01-20 NOTE — END OF VISIT
[] : Resident [FreeTextEntry3] : I saw the patient, examined the patient independently, reviewed medical record, and provided the medical services. Agree with resident note as personally edited above. refill steroid cream for hx dermatitis dm2 well controlled c diet musculoskeletal cervical and b/l shoulder pain, without neurologic red flags- conservative tx, pt, tylenol, nsaid prn, and new prn muscle relaxant reinfoced statin adherence for hld rtc in 6 mo or prn if new issue
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
## 2024-01-20 NOTE — PHYSICAL EXAM
[Well Nourished] : well nourished [No Acute Distress] : no acute distress [Well Developed] : well developed [Normal Sclera/Conjunctiva] : normal sclera/conjunctiva [Normal Outer Ear/Nose] : the outer ears and nose were normal in appearance [No JVD] : no jugular venous distention [No Lymphadenopathy] : no lymphadenopathy [No Respiratory Distress] : no respiratory distress  [No Accessory Muscle Use] : no accessory muscle use [Normal Rate] : normal rate  [Regular Rhythm] : with a regular rhythm [Normal S1, S2] : normal S1 and S2 [No Carotid Bruits] : no carotid bruits [No Edema] : there was no peripheral edema [Soft] : abdomen soft [Non Tender] : non-tender [Non-distended] : non-distended [No Joint Swelling] : no joint swelling [Grossly Normal Strength/Tone] : grossly normal strength/tone [No Rash] : no rash [de-identified] : Left aphtus inside of the left cheek [de-identified] : b/l arm and shoulder pain, b/l hypertonic trapezius, no cervical midline tenderness, spurling maneuver negative bilateral, no thenar wasting or paresthesia reported

## 2024-01-20 NOTE — INTERPRETER SERVICES
[[employer] ] : provided by [employer]   [Time Spent: ____ minutes] : Total time spent using  services: [unfilled] minutes. The patient's primary language is not English thus required  services. [Interpreters_IDNumber] : [id number] [Interpreters_FullName] : Иван Mckenzie

## 2024-01-20 NOTE — REVIEW OF SYSTEMS
[Night Sweats] : night sweats [Joint Pain] : joint pain [Muscle Pain] : muscle pain [Fever] : no fever [Discharge] : no discharge [Vision Problems] : no vision problems [Earache] : no earache [Nosebleed] : no nosebleeds [Chest Pain] : no chest pain [Lower Ext Edema] : no lower extremity edema [Shortness Of Breath] : no shortness of breath [Cough] : no cough [Abdominal Pain] : no abdominal pain [Nausea] : no nausea [Vomiting] : no vomiting [Headache] : no headache [Dizziness] : no dizziness [FreeTextEntry2] : night sweats are coming and going since her hysterectomy. [FreeTextEntry9] : b/l shoulder pain and in muscles around shoulder

## 2024-01-21 DIAGNOSIS — M54.2 CERVICALGIA: ICD-10-CM

## 2024-01-29 ENCOUNTER — OUTPATIENT (OUTPATIENT)
Dept: OUTPATIENT SERVICES | Facility: HOSPITAL | Age: 49
LOS: 1 days | End: 2024-01-29
Payer: MEDICAID

## 2024-01-29 ENCOUNTER — RESULT REVIEW (OUTPATIENT)
Age: 49
End: 2024-01-29

## 2024-01-29 DIAGNOSIS — Z98.890 OTHER SPECIFIED POSTPROCEDURAL STATES: Chronic | ICD-10-CM

## 2024-01-29 DIAGNOSIS — Z90.3 ACQUIRED ABSENCE OF STOMACH [PART OF]: Chronic | ICD-10-CM

## 2024-01-29 DIAGNOSIS — Z90.49 ACQUIRED ABSENCE OF OTHER SPECIFIED PARTS OF DIGESTIVE TRACT: Chronic | ICD-10-CM

## 2024-01-29 DIAGNOSIS — M25.571 PAIN IN RIGHT ANKLE AND JOINTS OF RIGHT FOOT: ICD-10-CM

## 2024-01-29 DIAGNOSIS — Z00.8 ENCOUNTER FOR OTHER GENERAL EXAMINATION: ICD-10-CM

## 2024-01-29 PROCEDURE — 73721 MRI JNT OF LWR EXTRE W/O DYE: CPT | Mod: RT

## 2024-01-29 PROCEDURE — 73721 MRI JNT OF LWR EXTRE W/O DYE: CPT | Mod: 26,RT

## 2024-01-29 NOTE — PATIENT PROFILE ADULT - NSTOBACCO TYPE_GEN_A_CORE_RD
[NS_DeliveryAttending1_OBGYN_ALL_OB_FT:[id number]=],[NS_DeliveryAssist1_OBGYN_ALL_OB_FT:[id number]=] Cigarettes

## 2024-01-30 DIAGNOSIS — M25.571 PAIN IN RIGHT ANKLE AND JOINTS OF RIGHT FOOT: ICD-10-CM

## 2024-02-02 ENCOUNTER — APPOINTMENT (OUTPATIENT)
Dept: PLASTIC SURGERY | Facility: CLINIC | Age: 49
End: 2024-02-02
Payer: COMMERCIAL

## 2024-02-02 ENCOUNTER — APPOINTMENT (OUTPATIENT)
Dept: PODIATRY | Facility: CLINIC | Age: 49
End: 2024-02-02

## 2024-02-02 VITALS — BODY MASS INDEX: 34.64 KG/M2 | WEIGHT: 154 LBS | HEIGHT: 56 IN

## 2024-02-02 PROCEDURE — 99203 OFFICE O/P NEW LOW 30 MIN: CPT

## 2024-02-02 NOTE — PHYSICAL EXAM
[NI] : Normal [de-identified] : Neck: no cervical spine point tenderness; bilateral shoulder grooving present  Left breast: no palpable masses, nipple retraction or discharge.  Right breast: no palpable masses, nipple retraction or discharge. Moderate bilateral axillary rolls Chest: no trunk deformities Bilateral macromastia with Grade II ptosis with no active inframammary fold rash Anticipated volume of resection of EACH breast based on CLINICAL ASSESSMENT:  Anticipated volume of resection of EACH breast based on BSA: *** g  Breast measurements (standing, cm): R SN-Nipple:  R Nipple-IMF: R Nipple - midsternum:  R NAC diameter:  IMF position ***symmetrical, left *** cm *lower/higher* than right breast L SN-Nipple:  L Nipple-IMF:  L Nipple - midsternum:  L NAC diameter: [de-identified] : Thick abdominal pannus, excess skin, grade 2; midline laparotomy; scar noted vertically; multiple port scars noted, no hernias, Intertrigo rash present

## 2024-02-02 NOTE — HISTORY OF PRESENT ILLNESS
[FreeTextEntry1] : 48F PMH pre-diabetic (last A1C Nov 2023 5.7%), HLD,  who presents for evaluation of abdominal lipodystrophy and ptosis. Also bothered by excess skin on breasts, legs, and arms. However, the abdomen is her most significant concern. She reports severe itching and rashing. Also reports this has caused her anxiety. Patient reports history of sleeve gastrectomy in 6/2022 , max wieght 200 lbs, now 154 lbs, stable around 145-150 for 6 months.   Compliant with diet and exercise. Using anti-fungal powder in the waistline for intertrigo by her dermatologist but has not helped. Overhanging abdominal pannus affects low self esteem and daily interactions.   Denies breast masses or nipple discharge BL.  Has lumpy breast followed by serial mammograms. Last mammogram 6/2023: BIRADS 2- benign.  Breasts also bother her, states that she has rashes, neck and back pain, and shoulder grooving. Desires options for relief Bra size: 38 DDD Desired breast volume: D  In order of body contouring priority, pt would like: abdomen > breast  PSH: R foot ATFL June 2023 Gallbladder 25 years  Diverticulitis with colon resection 2021 hysterectomy 2017  Former smoker - quit 4 years ago  Works as home attendant

## 2024-02-02 NOTE — ASSESSMENT
[FreeTextEntry1] : 48F PMH pre-diabetic (last A1C Nov 2023 5.7%), HLD,  who presents for evaluation of abdominal lipodystrophy and ptosis. Also bothered by excess skin on breasts, legs, and arms. However, the abdomen is her most significant concern. She reports severe itching and rashing. Also reports this has caused her anxiety. Patient reports history of sleeve gastrectomy in 6/2022 , max wieght 200 lbs, now 154 lbs, stable around 145-150 for 6 months.  Compliant with diet and exercise. Using anti-fungal powder in the waistline for intertrigo by her dermatologist but has not helped. Overhanging abdominal pannus affects low self esteem and daily interactions.  She is a good candidate for possible Jessenia de Lis panniculectomy vs extended panniculectomy, possible concurrent breast reduction after further pre-op weight loss  - recommend pre-op weight loss 10 lbs for improved post-op results -All the patient's questions were answered to her apparent satisfaction. - present during entire encounter - Nancy Reyes -will follow up in 6 weeks for weight check and further planning. pre-op measurement and photos

## 2024-02-06 ENCOUNTER — OUTPATIENT (OUTPATIENT)
Dept: OUTPATIENT SERVICES | Facility: HOSPITAL | Age: 49
LOS: 1 days | End: 2024-02-06
Payer: COMMERCIAL

## 2024-02-06 DIAGNOSIS — Z90.49 ACQUIRED ABSENCE OF OTHER SPECIFIED PARTS OF DIGESTIVE TRACT: Chronic | ICD-10-CM

## 2024-02-06 DIAGNOSIS — M25.571 PAIN IN RIGHT ANKLE AND JOINTS OF RIGHT FOOT: ICD-10-CM

## 2024-02-06 DIAGNOSIS — Z90.3 ACQUIRED ABSENCE OF STOMACH [PART OF]: Chronic | ICD-10-CM

## 2024-02-06 DIAGNOSIS — Z98.890 OTHER SPECIFIED POSTPROCEDURAL STATES: Chronic | ICD-10-CM

## 2024-02-06 PROCEDURE — 97110 THERAPEUTIC EXERCISES: CPT | Mod: GP

## 2024-02-06 PROCEDURE — 97010 HOT OR COLD PACKS THERAPY: CPT | Mod: GP

## 2024-02-07 DIAGNOSIS — M25.571 PAIN IN RIGHT ANKLE AND JOINTS OF RIGHT FOOT: ICD-10-CM

## 2024-02-07 NOTE — ASU PATIENT PROFILE, ADULT - BRADEN SCORE (IF 18 OR LESS ACTIVATE SKIN INJURY RISK INCREASED GUIDELINE), MLM
Update History & Physical    The patient's History and Physical of February 2, 2024, the plan and history was reviewed with the patient and I examined the patient. There was no change. The surgical site was confirmed by the patient and me. Right breast lumpectomy with sentinel lymph node biopsy.     Plan: The risks, benefits, expected outcome, and alternative to the recommended procedure have been discussed with the patient. Patient understands and wants to proceed with the procedure.     Electronically signed by Ar Barnett DO on 2/7/2024 at 12:24 PM       23

## 2024-02-08 ENCOUNTER — OUTPATIENT (OUTPATIENT)
Dept: OUTPATIENT SERVICES | Facility: HOSPITAL | Age: 49
LOS: 1 days | End: 2024-02-08

## 2024-02-08 DIAGNOSIS — Z98.890 OTHER SPECIFIED POSTPROCEDURAL STATES: Chronic | ICD-10-CM

## 2024-02-08 DIAGNOSIS — Z90.3 ACQUIRED ABSENCE OF STOMACH [PART OF]: Chronic | ICD-10-CM

## 2024-02-08 DIAGNOSIS — M25.571 PAIN IN RIGHT ANKLE AND JOINTS OF RIGHT FOOT: ICD-10-CM

## 2024-02-08 DIAGNOSIS — Z90.49 ACQUIRED ABSENCE OF OTHER SPECIFIED PARTS OF DIGESTIVE TRACT: Chronic | ICD-10-CM

## 2024-02-13 ENCOUNTER — OUTPATIENT (OUTPATIENT)
Dept: OUTPATIENT SERVICES | Facility: HOSPITAL | Age: 49
LOS: 1 days | End: 2024-02-13

## 2024-02-13 DIAGNOSIS — M25.571 PAIN IN RIGHT ANKLE AND JOINTS OF RIGHT FOOT: ICD-10-CM

## 2024-02-13 DIAGNOSIS — Z90.49 ACQUIRED ABSENCE OF OTHER SPECIFIED PARTS OF DIGESTIVE TRACT: Chronic | ICD-10-CM

## 2024-02-13 DIAGNOSIS — Z98.890 OTHER SPECIFIED POSTPROCEDURAL STATES: Chronic | ICD-10-CM

## 2024-02-13 DIAGNOSIS — Z90.3 ACQUIRED ABSENCE OF STOMACH [PART OF]: Chronic | ICD-10-CM

## 2024-02-15 ENCOUNTER — OUTPATIENT (OUTPATIENT)
Dept: OUTPATIENT SERVICES | Facility: HOSPITAL | Age: 49
LOS: 1 days | End: 2024-02-15

## 2024-02-15 DIAGNOSIS — Z98.890 OTHER SPECIFIED POSTPROCEDURAL STATES: Chronic | ICD-10-CM

## 2024-02-15 DIAGNOSIS — M25.571 PAIN IN RIGHT ANKLE AND JOINTS OF RIGHT FOOT: ICD-10-CM

## 2024-02-15 DIAGNOSIS — Z90.3 ACQUIRED ABSENCE OF STOMACH [PART OF]: Chronic | ICD-10-CM

## 2024-02-15 DIAGNOSIS — Z90.49 ACQUIRED ABSENCE OF OTHER SPECIFIED PARTS OF DIGESTIVE TRACT: Chronic | ICD-10-CM

## 2024-02-20 ENCOUNTER — APPOINTMENT (OUTPATIENT)
Dept: PODIATRY | Facility: CLINIC | Age: 49
End: 2024-02-20

## 2024-02-22 ENCOUNTER — OUTPATIENT (OUTPATIENT)
Dept: OUTPATIENT SERVICES | Facility: HOSPITAL | Age: 49
LOS: 1 days | End: 2024-02-22

## 2024-02-22 DIAGNOSIS — Z90.3 ACQUIRED ABSENCE OF STOMACH [PART OF]: Chronic | ICD-10-CM

## 2024-02-22 DIAGNOSIS — Z98.890 OTHER SPECIFIED POSTPROCEDURAL STATES: Chronic | ICD-10-CM

## 2024-02-22 DIAGNOSIS — M25.571 PAIN IN RIGHT ANKLE AND JOINTS OF RIGHT FOOT: ICD-10-CM

## 2024-02-22 DIAGNOSIS — Z90.49 ACQUIRED ABSENCE OF OTHER SPECIFIED PARTS OF DIGESTIVE TRACT: Chronic | ICD-10-CM

## 2024-03-04 ENCOUNTER — NON-APPOINTMENT (OUTPATIENT)
Age: 49
End: 2024-03-04

## 2024-03-05 ENCOUNTER — OUTPATIENT (OUTPATIENT)
Dept: OUTPATIENT SERVICES | Facility: HOSPITAL | Age: 49
LOS: 1 days | End: 2024-03-05
Payer: COMMERCIAL

## 2024-03-05 ENCOUNTER — APPOINTMENT (OUTPATIENT)
Dept: HEMATOLOGY ONCOLOGY | Facility: CLINIC | Age: 49
End: 2024-03-05
Payer: COMMERCIAL

## 2024-03-05 VITALS
RESPIRATION RATE: 16 BRPM | HEART RATE: 72 BPM | DIASTOLIC BLOOD PRESSURE: 77 MMHG | TEMPERATURE: 97.4 F | WEIGHT: 155 LBS | SYSTOLIC BLOOD PRESSURE: 121 MMHG | OXYGEN SATURATION: 98 % | HEIGHT: 56 IN | BODY MASS INDEX: 34.87 KG/M2

## 2024-03-05 DIAGNOSIS — Z98.890 OTHER SPECIFIED POSTPROCEDURAL STATES: Chronic | ICD-10-CM

## 2024-03-05 DIAGNOSIS — Z90.49 ACQUIRED ABSENCE OF OTHER SPECIFIED PARTS OF DIGESTIVE TRACT: Chronic | ICD-10-CM

## 2024-03-05 DIAGNOSIS — Z15.01 GENETIC SUSCEPTIBILITY TO OTHER MALIGNANT NEOPLASM: ICD-10-CM

## 2024-03-05 DIAGNOSIS — D39.10 NEOPLASM OF UNCERTAIN BEHAVIOR OF UNSPECIFIED OVARY: ICD-10-CM

## 2024-03-05 DIAGNOSIS — Z15.09 GENETIC SUSCEPTIBILITY TO OTHER MALIGNANT NEOPLASM: ICD-10-CM

## 2024-03-05 DIAGNOSIS — Z90.3 ACQUIRED ABSENCE OF STOMACH [PART OF]: Chronic | ICD-10-CM

## 2024-03-05 DIAGNOSIS — C56.9 MALIGNANT NEOPLASM OF UNSPECIFIED OVARY: ICD-10-CM

## 2024-03-05 PROCEDURE — 99214 OFFICE O/P EST MOD 30 MIN: CPT

## 2024-03-05 PROCEDURE — 85027 COMPLETE CBC AUTOMATED: CPT

## 2024-03-05 PROCEDURE — 86304 IMMUNOASSAY TUMOR CA 125: CPT

## 2024-03-05 PROCEDURE — 80053 COMPREHEN METABOLIC PANEL: CPT

## 2024-03-06 DIAGNOSIS — C56.9 MALIGNANT NEOPLASM OF UNSPECIFIED OVARY: ICD-10-CM

## 2024-03-06 LAB
ALBUMIN SERPL ELPH-MCNC: 4.7 G/DL
ALP BLD-CCNC: 140 U/L
ALT SERPL-CCNC: 20 U/L
ANION GAP SERPL CALC-SCNC: 13 MMOL/L
AST SERPL-CCNC: 21 U/L
BASOPHILS # BLD AUTO: 0.03 K/UL
BASOPHILS NFR BLD AUTO: 0.5 %
BILIRUB SERPL-MCNC: <0.2 MG/DL
BUN SERPL-MCNC: 20 MG/DL
CALCIUM SERPL-MCNC: 9.6 MG/DL
CANCER AG125 SERPL-ACNC: 6 U/ML
CHLORIDE SERPL-SCNC: 106 MMOL/L
CO2 SERPL-SCNC: 22 MMOL/L
CREAT SERPL-MCNC: 0.6 MG/DL
EGFR: 111 ML/MIN/1.73M2
EOSINOPHIL # BLD AUTO: 0.08 K/UL
EOSINOPHIL NFR BLD AUTO: 1.2 %
GLUCOSE SERPL-MCNC: 110 MG/DL
HCT VFR BLD CALC: 39.1 %
HGB BLD-MCNC: 12.9 G/DL
IMM GRANULOCYTES NFR BLD AUTO: 0.3 %
LYMPHOCYTES # BLD AUTO: 2.39 K/UL
LYMPHOCYTES NFR BLD AUTO: 36.4 %
MAN DIFF?: NORMAL
MCHC RBC-ENTMCNC: 28.8 PG
MCHC RBC-ENTMCNC: 33 G/DL
MCV RBC AUTO: 87.3 FL
MONOCYTES # BLD AUTO: 0.41 K/UL
MONOCYTES NFR BLD AUTO: 6.2 %
NEUTROPHILS # BLD AUTO: 3.64 K/UL
NEUTROPHILS NFR BLD AUTO: 55.4 %
PLATELET # BLD AUTO: 317 K/UL
PMV BLD AUTO: 0 /100 WBCS
POTASSIUM SERPL-SCNC: 4.6 MMOL/L
PROT SERPL-MCNC: 7.3 G/DL
RBC # BLD: 4.48 M/UL
RBC # FLD: 13.9 %
SODIUM SERPL-SCNC: 141 MMOL/L
WBC # FLD AUTO: 6.57 K/UL

## 2024-03-07 PROBLEM — D39.10 BORDERLINE EPITHELIAL NEOPLASM OF OVARY: Status: ACTIVE | Noted: 2019-12-16

## 2024-03-07 PROBLEM — Z15.09: Status: ACTIVE | Noted: 2021-07-30

## 2024-03-08 NOTE — END OF VISIT
[FreeTextEntry3] : I was physically present for the key portions of the evaluation and management service provided.  I agree with the history and physical, and plan which I have reviewed and edited where appropriate.\par

## 2024-03-08 NOTE — PHYSICAL EXAM
[Fully active, able to carry on all pre-disease performance without restriction] : Status 0 - Fully active, able to carry on all pre-disease performance without restriction [Obese] : obese [Normal] : affect appropriate [de-identified] : S/P lap sleeve gastrectomy incision healed well.

## 2024-03-08 NOTE — ASSESSMENT
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 The pathology does not show invasive implants. Biopsy of the liver demonstrated benign lymphangioma.  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 Patient was told to inform family especially children to undergo genetic testing as well.    She was s/p fall on 6/14/22 and further radiologic evaluation was unremarkable. CT chest/abdomen/pelvis (6/14/22) showed no evidence of acute traumatic intrathoracic pathology or solid  abdominal organ injury and right upper abdominal wall subcutaneous fat small hematoma, measuring  up to 3 cm. CT head (6/14/22) showed no acute intracranial findings. CT cervical spine showed no acute cervical fracture or dislocation.  Mammogram on 6/22/2023 was benign.  RECOMMENDATIONS: Previous notes reviewed and all relevant laboratory and radiology results discussed with Dr Correa and communicated to the patient.  -- Will do  , CBC, CMP today. -- Follow up PCP, gyne and Bariatric Surgery as recommended. -- Strongly advised to inform family to undergo genetic counseling for h/o CKDN2A mutation.  Referred to genetic counselor Mahnaz Triana, phone number provided. -- Continue lifestyle modification with diet and exercise, weight loss emphasized. -- She was advised to evaluate her skin monthly and report concerning findings. Emphasized follow up with derm every 6 months. -- Follow up with GI for biannual pancreatic cancer screening.  RTC in 6 months.  Case was seen and discussed with Dr. Correa who agreed with the assessment and plan.

## 2024-03-08 NOTE — RESULTS/DATA
[FreeTextEntry1] : ACC: [id number]     EXAM:  MG MAMMO SCREEN W TAMMY BI#   ORDERED BY: NICOLE CLINEAYLEEN  PROCEDURE DATE:  06/22/2023    INTERPRETATION:  HISTORY: Bilateral MG MAMMO SCREEN W TAMMY BI# was performed. Patient is 47 years old and is seen for screening. The patient has a history of ovarian cancer in December, 2017.  The patient has no family history of breast cancer.  RISK ASSESSMENT: NCI Lifetime Risk: 6.8 Tyrer-Cuzick Lifetime Risk: 5.5  CLINICAL BREAST EXAM: The patient reports their last clinical breast exam was performed within the past year.  COMPARISON STUDIES: The present examination has been compared to prior imaging studies performed at [facility] on 10/08/2019, 08/15/2020 and 04/05/2022.  MAMMOGRAM FINDINGS: Mammography was performed including the following views: bilateral craniocaudal with tomosynthesis, bilateral mediolateral oblique with tomosynthesis.  The examination includes digital synthetic 2D and digital tomosynthesis 3D images. Additional imaging analysis was performed using CAD (computer-aided detection) software.  There are scattered areas of fibroglandular density.  There are stable asymmetries in both breasts and benign masses in the right breast.  No suspicious mass, grouping of calcifications, or other abnormality is identified.  IMPRESSION: There is no mammographic evidence of malignancy.  RECOMMENDATION: Unless otherwise indicated by clinical findings, annual screening mammography recommended.  ASSESSMENT: BI-RADS Category 2:  Benign

## 2024-03-08 NOTE — HISTORY OF PRESENT ILLNESS
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  (SEE MICROSCOPIC DESCRIPTION FOR SYNOPTIC REPORT)\par  \par        -    TUMOR IS PRESENT IN THE PARATUBAL TISSUE AND ON THE OVARIAN\par             SURFACE.\par  \par        -    SCATTERED FOCI SUSPICIOUS OF STROMAL MICROINVASION, LARGEST\par             FOCUS MEASURING 4 MM. (SLIDE A4)\par  \par        -    PATHOLOGIC STAGE (pTNM):  pT(m)2b pNX\par     ONE FOCUS SUGGESTIVE OF LOW GRADE SEROUS CARCINOMA. (0.6 MM;\par             SLIDE A7)\par  \par  There were no post operative complications.\par  She C/o hot flashes.\par  C/O pain in the abdomen, which is diffuse.\par  No weight loss.\par  No vaginal bleeding\par  No fever.\par  Operative report was reviewed. No residual disease noted.\par  \par  The pathology slides had been sent for 2nd opinion and result is noted below. No invasion was identified.\par  \par  Patient also had a liver biopsy which demonstrated normal liver parenchyma which is noted below.  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 Patient needs evaluation with EUS +/- MRCP for evaluation of pancreatic cancer and regular follow up with dermatology  [de-identified] :  Report CR- received from [facility], [address] [place] [address] by Myo Briscoe on 2/8/2018 with the following    diagnosis:     FINAL DIAGNOSIS:    OVARY, RIGHT, SALPINGO-OOPHORECTOMY:          - SEROUS BORDERLINE TUMOR WITH NONINVASIVE IMPLANTS.    Comment:     I agree with your interpretation on this case of serous borderline    tumor. There are areas of increased proliferation but nothing I    would call serous carcinoma. There are noninvasive implants of the    fallopian tube and on the surface of the ovary. The sections from    the pelvic side wall and omentum also show noninvasive implants of    serous borderline tumor.\par  \par  Cytopathology Report       Final Diagnosis\par   LIVER LESION, LIVER LOBE, CT-GUIDED NEEDLE BIOPSY AND IMPRINT:\par   - NO MALIGNANT CELLS IDENTIFIED\par  . - LIVER PARENCHYMA TISSUE WITH MACROVESICULAR AND MICROVESICULAR STEATOSIS ( 50% OF THE TISSUE). \par  - IMMUNOHISTOCHEMICAL AND SPECIAL STAINS PENDING.\par   [de-identified] : C/O Pain in left shoulder and left breast as before. She had a PET scan and she is scheduled for liver biopsy later this week.  3/26/18 Patient here for follow up complains of symptoms including hot flashes and abdominal pain in the RUQ at the side of liver biopsy. Patient denies any changes in bowel habits, vaginal bleeding. She had a patient had a liver biopsy which was normal showing normal liver parenchyma. 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 Patient was told to inform family especially children to undergo genetic testing as well.  Patient was told to follow up with Dr Weinberg of GI for EUS of the pancreas and consideration of a MRCP in the future. The patient was also recommended to see a dermatologist for full skin evaluation.   7/5/18 She has been drinking heavy ( 10 shots of tequila  each weekend). She has an appointment with GI next week. Has not seen dermatology. Denies abdominal pain, no vaginal bleeding  10/4/18: Had headaches and had MRI brain few weeks ago. C/o dizziness and blurry vision.  On keppra started by neurology for possible convulsions, started a week ago. She says that she doesn't feel good on Keppra.  Denies fever, nausea, vomiting, chest pain, SOB, abdominal pain, bowel and bladder problems. Due for EGD and EUS on 10/10/18 Due for mammo in Oct 2018.  Due for VEEG.   2/13/19: Patient here for follow up, feeling well, no new complaints.  Patient denies abdominal pain or bloating, denies vaginal bleeding or discharge.  She had a CT scan which was normal and she is scheduled with GYN ONC on Friday.  She saw dermatologist as well as GI.  EUS was scheduled and apparently cancelled after the patient was found to have food in her stomach.  She states its now scheduled for end of March.  Patient also being followed for pseudoseizures by neurology.  6/3/19: Doing well. No major complaints. Denies fever, nausea, vomiting, chest pain, SOB, abdominal pain, bowel and bladder problems. Seen by Dermatology and gynonc.  Pt had an EGD and EUS done in March 2019  that revealed atrophic gastritis and EUS revealed normal pancreas and normal sized PD Due for mammo this week.    12/16/19 Pt is here for follow up. States she is going for surgery for cecal diverticulitis. No vaginal bleeding. Has been following with GI for EUS for pancreatic ca screening.  05/28/2020 ALFIE MOSLEY a 44 year F is here today for follow up of ovarian cancer and melanoma pancreatic cancer syndrome.  Was referred to Dermatology for full skin evaluation; last seen in 11/2019, diagnosed with seborrheic keratosis of nose/buttock with benign non-dysplastic nevi Last EUD, EUS 3/29 normal pancreas. Next f/up with GI next week. As per pt all of her f/up appt were postponed due to COVID. Colorectal surgery for cecal diverticulitis was  postponed.  Denies vaginal bleeding, reports chronic intermittent abdominal pain.   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 She had a liver biopsy which was normal showing normal liver parenchyma. 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  She had her colonoscopy 7/2020 and endoscopy 6/2020 with Dr. Carbera- no suspicious findings. Patient's family did not go for genetic testing yet and patient did not have genetic counseling either. She did not follow up with Dr Weinberg of GI for EUS of the pancreas and consideration of a MRCP in the future. The patient was also recommended to see a dermatologist for full skin evaluation which she sees on a regular basis.. She had robotic resection done by Dr. Borja on 7/20/2020 for diverticulitis which she tolerated well.  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 She feels well today, offers no new complaints. She denies any abdominal pain, nausea, vaginal bleeding or discharge. She is UTD with EUS and colonoscopy with a finding of 1 cm peripancreatic lymph node which was not biopsied and hyperplastic polyp. She stated that her children has not seen genetic counselor. She saw derm Dr Rico in 01/2021. Last mammogram was in 8/2020, benign. She has been trying to lose weight with diet and exercise, following up with Nutritionist.  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 She feels well today, offers no new complaints. She denies any abdominal pain, nausea, vomiting, vaginal bleeding or discharge. She is UTD with GI and derm was last week. She had sleeve gastrectomy on 6/9/2022, recovering well. She stated that she fell on 6/14/22, had imaging done at the ER which were benign.  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 She feels well today, offers no new complaints. She denies any abdominal pain, nausea, vomiting, vaginal bleeding or discharge. She is UTD with GI Dr Ley, last EGD and EUS was 4/2023. She is overdue to follow up with derm Dr Coronado. Her family members had not done genetic testing.

## 2024-03-12 ENCOUNTER — OUTPATIENT (OUTPATIENT)
Dept: OUTPATIENT SERVICES | Facility: HOSPITAL | Age: 49
LOS: 1 days | End: 2024-03-12
Payer: COMMERCIAL

## 2024-03-12 DIAGNOSIS — Z98.890 OTHER SPECIFIED POSTPROCEDURAL STATES: Chronic | ICD-10-CM

## 2024-03-12 DIAGNOSIS — M25.571 PAIN IN RIGHT ANKLE AND JOINTS OF RIGHT FOOT: ICD-10-CM

## 2024-03-12 DIAGNOSIS — Z90.49 ACQUIRED ABSENCE OF OTHER SPECIFIED PARTS OF DIGESTIVE TRACT: Chronic | ICD-10-CM

## 2024-03-12 DIAGNOSIS — Z90.3 ACQUIRED ABSENCE OF STOMACH [PART OF]: Chronic | ICD-10-CM

## 2024-03-12 PROCEDURE — 97140 MANUAL THERAPY 1/> REGIONS: CPT | Mod: GO

## 2024-03-12 PROCEDURE — 97165 OT EVAL LOW COMPLEX 30 MIN: CPT | Mod: GO

## 2024-03-13 DIAGNOSIS — M25.571 PAIN IN RIGHT ANKLE AND JOINTS OF RIGHT FOOT: ICD-10-CM

## 2024-03-14 ENCOUNTER — OUTPATIENT (OUTPATIENT)
Dept: OUTPATIENT SERVICES | Facility: HOSPITAL | Age: 49
LOS: 1 days | End: 2024-03-14

## 2024-03-14 DIAGNOSIS — Z98.890 OTHER SPECIFIED POSTPROCEDURAL STATES: Chronic | ICD-10-CM

## 2024-03-14 DIAGNOSIS — M25.571 PAIN IN RIGHT ANKLE AND JOINTS OF RIGHT FOOT: ICD-10-CM

## 2024-03-14 DIAGNOSIS — Z90.3 ACQUIRED ABSENCE OF STOMACH [PART OF]: Chronic | ICD-10-CM

## 2024-03-14 DIAGNOSIS — M54.2 CERVICALGIA: ICD-10-CM

## 2024-03-14 DIAGNOSIS — M25.511 PAIN IN RIGHT SHOULDER: ICD-10-CM

## 2024-03-14 DIAGNOSIS — Z90.49 ACQUIRED ABSENCE OF OTHER SPECIFIED PARTS OF DIGESTIVE TRACT: Chronic | ICD-10-CM

## 2024-03-15 ENCOUNTER — OUTPATIENT (OUTPATIENT)
Dept: OUTPATIENT SERVICES | Facility: HOSPITAL | Age: 49
LOS: 1 days | End: 2024-03-15
Payer: COMMERCIAL

## 2024-03-15 ENCOUNTER — APPOINTMENT (OUTPATIENT)
Dept: GASTROENTEROLOGY | Facility: CLINIC | Age: 49
End: 2024-03-15
Payer: COMMERCIAL

## 2024-03-15 ENCOUNTER — APPOINTMENT (OUTPATIENT)
Dept: PLASTIC SURGERY | Facility: CLINIC | Age: 49
End: 2024-03-15
Payer: COMMERCIAL

## 2024-03-15 VITALS
BODY MASS INDEX: 34.19 KG/M2 | HEART RATE: 51 BPM | SYSTOLIC BLOOD PRESSURE: 129 MMHG | HEIGHT: 56 IN | WEIGHT: 152 LBS | OXYGEN SATURATION: 97 % | DIASTOLIC BLOOD PRESSURE: 83 MMHG | TEMPERATURE: 98.1 F

## 2024-03-15 VITALS — HEIGHT: 56 IN | WEIGHT: 152 LBS | BODY MASS INDEX: 34.19 KG/M2

## 2024-03-15 DIAGNOSIS — K76.0 FATTY (CHANGE OF) LIVER, NOT ELSEWHERE CLASSIFIED: ICD-10-CM

## 2024-03-15 DIAGNOSIS — M54.2 CERVICALGIA: ICD-10-CM

## 2024-03-15 DIAGNOSIS — Z90.49 ACQUIRED ABSENCE OF OTHER SPECIFIED PARTS OF DIGESTIVE TRACT: Chronic | ICD-10-CM

## 2024-03-15 DIAGNOSIS — Z15.89 GENETIC SUSCEPTIBILITY TO OTHER DISEASE: ICD-10-CM

## 2024-03-15 DIAGNOSIS — Z98.890 OTHER SPECIFIED POSTPROCEDURAL STATES: Chronic | ICD-10-CM

## 2024-03-15 DIAGNOSIS — Z00.00 ENCOUNTER FOR GENERAL ADULT MEDICAL EXAMINATION WITHOUT ABNORMAL FINDINGS: ICD-10-CM

## 2024-03-15 DIAGNOSIS — M25.511 PAIN IN RIGHT SHOULDER: ICD-10-CM

## 2024-03-15 DIAGNOSIS — Z90.3 ACQUIRED ABSENCE OF STOMACH [PART OF]: Chronic | ICD-10-CM

## 2024-03-15 PROCEDURE — 99214 OFFICE O/P EST MOD 30 MIN: CPT

## 2024-03-15 PROCEDURE — 99213 OFFICE O/P EST LOW 20 MIN: CPT

## 2024-03-15 NOTE — PHYSICAL EXAM
[Alert] : alert [Sclera] : the sclera and conjunctiva were normal [Hearing Threshold Finger Rub Not Anderson] : hearing was normal [Normal Appearance] : the appearance of the neck was normal [Auscultation Breath Sounds / Voice Sounds] : lungs were clear to auscultation bilaterally [Normal S1, S2] : normal S1 and S2 [Abdomen Tenderness] : non-tender [Abdomen Soft] : soft [No CVA Tenderness] : no CVA  tenderness [Abnormal Walk] : normal gait [Oriented To Time, Place, And Person] : oriented to person, place, and time

## 2024-03-15 NOTE — REVIEW OF SYSTEMS
[As Noted in HPI] : as noted in HPI [Fever] : no fever [Chills] : no chills [Loss Of Hearing] : no hearing loss [Eye Pain] : no eye pain [Palpitations] : no palpitations [Chest Pain] : no chest pain [SOB on Exertion] : no shortness of breath during exertion [Confused] : no confusion

## 2024-03-15 NOTE — HISTORY OF PRESENT ILLNESS
[FreeTextEntry1] : 48F PMH pre-diabetic (last A1C Nov 2023 5.7%), HLD,  who presents for evaluation of abdominal lipodystrophy and ptosis. Also bothered by excess skin on breasts, legs, and arms. However, the abdomen is her most significant concern. She reports severe itching and rashing. Also reports this has caused her anxiety. Patient reports history of sleeve gastrectomy in 6/2022 , max wieght 200 lbs, now 154 lbs, stable around 145-150 for 6 months.   Compliant with diet and exercise. Using anti-fungal powder in the waistline for intertrigo by her dermatologist but has not helped. Overhanging abdominal pannus affects low self esteem and daily interactions.   Denies breast masses or nipple discharge BL.  Has lumpy breast followed by serial mammograms. Last mammogram 6/2023: BIRADS 2- benign.  Breasts also bother her, states that she has rashes, neck and back pain, and shoulder grooving. Desires options for relief Bra size: 38 DDD Desired breast volume: D  In order of body contouring priority, pt would like: abdomen > breast  PSH: R foot ATFL June 2023 Gallbladder 25 years  Diverticulitis with colon resection 2021 hysterectomy 2017  Former smoker - quit 4 years ago  Works as home attendant    Interval hx (3/15/24).  She has lost 3 lbs.  pt has noticed abdominal pannus skin is hanging more and weight loss around neck.   her closed feel looser

## 2024-03-15 NOTE — PHYSICAL EXAM
[NI] : Normal [de-identified] : Neck: no cervical spine point tenderness; bilateral shoulder grooving present  Left breast: no palpable masses, nipple retraction or discharge.  Right breast: no palpable masses, nipple retraction or discharge. Moderate bilateral axillary rolls Chest: no trunk deformities Bilateral macromastia with Grade II ptosis with no active inframammary fold rash Anticipated volume of resection of EACH breast based on CLINICAL ASSESSMENT:  Anticipated volume of resection of EACH breast based on BSA: *** g  Breast measurements (standing, cm): R SN-Nipple:  R Nipple-IMF: R Nipple - midsternum:  R NAC diameter:  IMF position ***symmetrical, left *** cm *lower/higher* than right breast L SN-Nipple:  L Nipple-IMF:  L Nipple - midsternum:  L NAC diameter: [de-identified] : Thick abdominal pannus, excess skin, grade 2/3; midline laparotomy scar noted vertically; multiple port scars noted, no hernias, Intertrigo rash present ptotic mons

## 2024-03-15 NOTE — HISTORY OF PRESENT ILLNESS
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  has no complains. denies nausea, vomiting, abdominal pain, wt loss  patient is due for EUS for Pancreatic cancer screening and colon for CRC screening

## 2024-03-15 NOTE — ASSESSMENT
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   # Gene Mutation - CKDN2A heterozygote can increase the risk of multiple melanoma and pancreatic cancer  - Last EUS  4/2023 negative for pathology, egd biopsy negative for h. pylori  - Repeat EUS    # Elevated alk phos, trendig down - biopsy proven bui - follow up with hepatology  -immune for hep B, hep C negative  # CRC screening -last colonoscopy July 2021, 3 mm hyperplastic polyp -due 2024 , schedule for colonoscopy

## 2024-03-15 NOTE — REASON FOR VISIT
[Follow-up] : a follow-up of an existing diagnosis [Interpreters_FullName] : Adrien [Interpreters_IDNumber] : [id number] [TWNoteComboBox1] : Guatemalan

## 2024-03-15 NOTE — ASSESSMENT
[FreeTextEntry1] : 48F PMH pre-diabetic (last A1C Nov 2023 5.7%), HLD,  who presents for evaluation of abdominal lipodystrophy and ptosis. Also bothered by excess skin on breasts, legs, and arms. However, the abdomen is her most significant concern. She reports severe itching and rashing. Also reports this has caused her anxiety. Patient reports history of sleeve gastrectomy in 6/2022 , max wieght 200 lbs, now 154 lbs, stable around 145-150 for 6 months.  Compliant with diet and exercise. Using anti-fungal powder in the waistline for intertrigo by her dermatologist but has not helped. Overhanging abdominal pannus affects low self esteem and daily interactions.  She is a good candidate for possible Jessenia de Lis panniculectomy and staged bilateral breast reduction  -Regarding the procedure, the discussed the benefits, risks, and outcomes. I explained the risk of bleeding, infection, hematoma, seroma, poor wound healing, wound separation, fat and/or tissue necrosis, hypertrophic scar/keloid formation, umbilicus ischemia, risk of VTE and possible pulmonary embolism, and need for re-admission, and dissatisfaction with the outcome. I also discussed the anticipated scar locations (hip-to-hip; periumbilical), use of surgical drains, and need for post-operative use of an abdominal binder and lifting restrictions after surgery. -The patient understand the risks and post-operative expectations. -All the patient's questions were answered to her apparent satisfaction. Informed consent was obtained. -Pre-op photos were taken -Will schedule for outpatient ANNA MARIE procedure. - present during entire encounter - Nancy Reyes

## 2024-03-19 ENCOUNTER — OUTPATIENT (OUTPATIENT)
Dept: OUTPATIENT SERVICES | Facility: HOSPITAL | Age: 49
LOS: 1 days | End: 2024-03-19

## 2024-03-19 DIAGNOSIS — M25.571 PAIN IN RIGHT ANKLE AND JOINTS OF RIGHT FOOT: ICD-10-CM

## 2024-03-19 DIAGNOSIS — Z98.890 OTHER SPECIFIED POSTPROCEDURAL STATES: Chronic | ICD-10-CM

## 2024-03-19 DIAGNOSIS — Z90.3 ACQUIRED ABSENCE OF STOMACH [PART OF]: Chronic | ICD-10-CM

## 2024-03-19 DIAGNOSIS — M54.2 CERVICALGIA: ICD-10-CM

## 2024-03-19 DIAGNOSIS — M25.511 PAIN IN RIGHT SHOULDER: ICD-10-CM

## 2024-03-19 DIAGNOSIS — Z90.49 ACQUIRED ABSENCE OF OTHER SPECIFIED PARTS OF DIGESTIVE TRACT: Chronic | ICD-10-CM

## 2024-03-20 DIAGNOSIS — M25.511 PAIN IN RIGHT SHOULDER: ICD-10-CM

## 2024-03-20 DIAGNOSIS — M54.2 CERVICALGIA: ICD-10-CM

## 2024-03-21 ENCOUNTER — OUTPATIENT (OUTPATIENT)
Dept: OUTPATIENT SERVICES | Facility: HOSPITAL | Age: 49
LOS: 1 days | End: 2024-03-21

## 2024-03-21 DIAGNOSIS — M25.571 PAIN IN RIGHT ANKLE AND JOINTS OF RIGHT FOOT: ICD-10-CM

## 2024-03-21 DIAGNOSIS — Z98.890 OTHER SPECIFIED POSTPROCEDURAL STATES: Chronic | ICD-10-CM

## 2024-03-21 DIAGNOSIS — Z90.3 ACQUIRED ABSENCE OF STOMACH [PART OF]: Chronic | ICD-10-CM

## 2024-03-21 DIAGNOSIS — Z90.49 ACQUIRED ABSENCE OF OTHER SPECIFIED PARTS OF DIGESTIVE TRACT: Chronic | ICD-10-CM

## 2024-03-21 DIAGNOSIS — Z15.89 GENETIC SUSCEPTIBILITY TO OTHER DISEASE: ICD-10-CM

## 2024-03-21 DIAGNOSIS — K76.0 FATTY (CHANGE OF) LIVER, NOT ELSEWHERE CLASSIFIED: ICD-10-CM

## 2024-03-25 NOTE — ED ADULT NURSE NOTE - NS ED PATIENT SAFETY CONCERN
Patient called and stated the Jardiance 25 mg tablets  and the test strips are too expensive.Patient is requesting and alternative medication and test strips.      No

## 2024-03-26 ENCOUNTER — OUTPATIENT (OUTPATIENT)
Dept: OUTPATIENT SERVICES | Facility: HOSPITAL | Age: 49
LOS: 1 days | End: 2024-03-26

## 2024-03-26 DIAGNOSIS — Z98.890 OTHER SPECIFIED POSTPROCEDURAL STATES: Chronic | ICD-10-CM

## 2024-03-26 DIAGNOSIS — Z90.3 ACQUIRED ABSENCE OF STOMACH [PART OF]: Chronic | ICD-10-CM

## 2024-03-26 DIAGNOSIS — M25.571 PAIN IN RIGHT ANKLE AND JOINTS OF RIGHT FOOT: ICD-10-CM

## 2024-03-26 DIAGNOSIS — Z90.49 ACQUIRED ABSENCE OF OTHER SPECIFIED PARTS OF DIGESTIVE TRACT: Chronic | ICD-10-CM

## 2024-04-02 ENCOUNTER — APPOINTMENT (OUTPATIENT)
Dept: PODIATRY | Facility: CLINIC | Age: 49
End: 2024-04-02

## 2024-04-02 ENCOUNTER — OUTPATIENT (OUTPATIENT)
Dept: OUTPATIENT SERVICES | Facility: HOSPITAL | Age: 49
LOS: 1 days | End: 2024-04-02
Payer: COMMERCIAL

## 2024-04-02 DIAGNOSIS — M54.2 CERVICALGIA: ICD-10-CM

## 2024-04-02 DIAGNOSIS — Z90.49 ACQUIRED ABSENCE OF OTHER SPECIFIED PARTS OF DIGESTIVE TRACT: Chronic | ICD-10-CM

## 2024-04-02 DIAGNOSIS — Z90.3 ACQUIRED ABSENCE OF STOMACH [PART OF]: Chronic | ICD-10-CM

## 2024-04-02 DIAGNOSIS — Z98.890 OTHER SPECIFIED POSTPROCEDURAL STATES: Chronic | ICD-10-CM

## 2024-04-02 DIAGNOSIS — M25.511 PAIN IN RIGHT SHOULDER: ICD-10-CM

## 2024-04-02 PROCEDURE — 97035 APP MDLTY 1+ULTRASOUND EA 15: CPT | Mod: GP

## 2024-04-02 PROCEDURE — 97110 THERAPEUTIC EXERCISES: CPT | Mod: GO

## 2024-04-02 PROCEDURE — 97010 HOT OR COLD PACKS THERAPY: CPT | Mod: GP

## 2024-04-02 PROCEDURE — 97140 MANUAL THERAPY 1/> REGIONS: CPT | Mod: GO

## 2024-04-02 PROCEDURE — 97014 ELECTRIC STIMULATION THERAPY: CPT

## 2024-04-03 DIAGNOSIS — M25.511 PAIN IN RIGHT SHOULDER: ICD-10-CM

## 2024-04-03 DIAGNOSIS — M54.2 CERVICALGIA: ICD-10-CM

## 2024-04-09 ENCOUNTER — OUTPATIENT (OUTPATIENT)
Dept: OUTPATIENT SERVICES | Facility: HOSPITAL | Age: 49
LOS: 1 days | End: 2024-04-09

## 2024-04-09 DIAGNOSIS — Z90.3 ACQUIRED ABSENCE OF STOMACH [PART OF]: Chronic | ICD-10-CM

## 2024-04-09 DIAGNOSIS — Z90.49 ACQUIRED ABSENCE OF OTHER SPECIFIED PARTS OF DIGESTIVE TRACT: Chronic | ICD-10-CM

## 2024-04-09 DIAGNOSIS — M54.2 CERVICALGIA: ICD-10-CM

## 2024-04-09 DIAGNOSIS — M25.511 PAIN IN RIGHT SHOULDER: ICD-10-CM

## 2024-04-09 DIAGNOSIS — Z98.890 OTHER SPECIFIED POSTPROCEDURAL STATES: Chronic | ICD-10-CM

## 2024-04-18 ENCOUNTER — OUTPATIENT (OUTPATIENT)
Dept: OUTPATIENT SERVICES | Facility: HOSPITAL | Age: 49
LOS: 1 days | End: 2024-04-18
Payer: COMMERCIAL

## 2024-04-18 ENCOUNTER — APPOINTMENT (OUTPATIENT)
Dept: INTERNAL MEDICINE | Facility: CLINIC | Age: 49
End: 2024-04-18
Payer: COMMERCIAL

## 2024-04-18 VITALS
BODY MASS INDEX: 34.87 KG/M2 | OXYGEN SATURATION: 98 % | TEMPERATURE: 97.5 F | DIASTOLIC BLOOD PRESSURE: 83 MMHG | HEART RATE: 74 BPM | WEIGHT: 155 LBS | HEIGHT: 56 IN | SYSTOLIC BLOOD PRESSURE: 117 MMHG

## 2024-04-18 DIAGNOSIS — Z00.00 ENCOUNTER FOR GENERAL ADULT MEDICAL EXAMINATION W/OUT ABNORMAL FINDINGS: ICD-10-CM

## 2024-04-18 DIAGNOSIS — Z90.3 ACQUIRED ABSENCE OF STOMACH [PART OF]: Chronic | ICD-10-CM

## 2024-04-18 DIAGNOSIS — Z00.00 ENCOUNTER FOR GENERAL ADULT MEDICAL EXAMINATION WITHOUT ABNORMAL FINDINGS: ICD-10-CM

## 2024-04-18 DIAGNOSIS — Z90.49 ACQUIRED ABSENCE OF OTHER SPECIFIED PARTS OF DIGESTIVE TRACT: Chronic | ICD-10-CM

## 2024-04-18 DIAGNOSIS — E78.5 HYPERLIPIDEMIA, UNSPECIFIED: ICD-10-CM

## 2024-04-18 DIAGNOSIS — R07.0 PAIN IN THROAT: ICD-10-CM

## 2024-04-18 DIAGNOSIS — Z98.890 OTHER SPECIFIED POSTPROCEDURAL STATES: Chronic | ICD-10-CM

## 2024-04-18 DIAGNOSIS — R73.03 PREDIABETES.: ICD-10-CM

## 2024-04-18 PROCEDURE — 99214 OFFICE O/P EST MOD 30 MIN: CPT

## 2024-04-18 RX ORDER — OMEPRAZOLE 20 MG/1
20 CAPSULE, DELAYED RELEASE ORAL
Qty: 30 | Refills: 5 | Status: ACTIVE | COMMUNITY
Start: 1900-01-01 | End: 1900-01-01

## 2024-04-18 RX ORDER — CYCLOBENZAPRINE HYDROCHLORIDE 5 MG/1
5 TABLET, FILM COATED ORAL
Qty: 30 | Refills: 3 | Status: ACTIVE | COMMUNITY
Start: 2024-01-20 | End: 1900-01-01

## 2024-04-18 RX ORDER — METHOCARBAMOL 500 MG/1
500 TABLET, FILM COATED ORAL
Qty: 30 | Refills: 3 | Status: ACTIVE | COMMUNITY
Start: 2022-10-18 | End: 1900-01-01

## 2024-04-18 RX ORDER — CETIRIZINE HYDROCHLORIDE 10 MG/1
10 TABLET, COATED ORAL DAILY
Qty: 14 | Refills: 0 | Status: ACTIVE | COMMUNITY
Start: 2024-04-18 | End: 1900-01-01

## 2024-04-18 RX ORDER — FLUTICASONE PROPIONATE 50 UG/1
50 SPRAY, METERED NASAL TWICE DAILY
Qty: 1 | Refills: 0 | Status: ACTIVE | COMMUNITY
Start: 2024-04-18 | End: 1900-01-01
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
## 2024-04-18 NOTE — PHYSICAL EXAM
[No Acute Distress] : no acute distress [Normal Sclera/Conjunctiva] : normal sclera/conjunctiva [Normal Outer Ear/Nose] : the outer ears and nose were normal in appearance [No JVD] : no jugular venous distention [No Lymphadenopathy] : no lymphadenopathy [No Respiratory Distress] : no respiratory distress  [No Accessory Muscle Use] : no accessory muscle use [Normal Rate] : normal rate  [Regular Rhythm] : with a regular rhythm [Normal S1, S2] : normal S1 and S2 [No Carotid Bruits] : no carotid bruits [No Edema] : there was no peripheral edema [Soft] : abdomen soft [Non Tender] : non-tender [Non-distended] : non-distended [No Joint Swelling] : no joint swelling [Grossly Normal Strength/Tone] : grossly normal strength/tone [No Rash] : no rash [de-identified] : palpable glandular structure adjacent to thyroid cartilage; no pharygeal exudates noted, left submandibular tenderness, scant pharyngeal erythema without exudate [de-identified] : rotund

## 2024-04-18 NOTE — REVIEW OF SYSTEMS
[Sore Throat] : sore throat [Fever] : no fever [Night Sweats] : no night sweats [Discharge] : no discharge [Vision Problems] : no vision problems [Chest Pain] : no chest pain [Lower Ext Edema] : no lower extremity edema [Shortness Of Breath] : no shortness of breath [Cough] : no cough [Abdominal Pain] : no abdominal pain [Nausea] : no nausea [Vomiting] : no vomiting [Joint Pain] : no joint pain [Muscle Pain] : no muscle pain [Headache] : no headache [Dizziness] : no dizziness

## 2024-04-18 NOTE — ASSESSMENT
[FreeTextEntry1] : #Throat pain/ odynophagia=- likely secondary to allergic sinusitis/ post nasal drip - has been occuring for past 2 months - small lymph node palpable near thyroid cartilage - will order allergy/asthma allergy panel - cetirizine sent to rx counselled patient on how/when to take medication and about side effects trial of 2 weeks of flonase counselled patient on how/when to take medication and about side effects RTC if no improvement NO EVIDENCE OF angioedema, or upper airway compromise at this time  #Prediabetes - A1c 5.7 - counseled on healthy diet and exercise  #HLD - Counseled patient on taking atorvastatin to lower cholesterol and LDL    HCM - UTD with mammo - next EUS/colonoscopy with GI 2024 - routine labs prior to next visit

## 2024-04-29 DIAGNOSIS — E78.5 HYPERLIPIDEMIA, UNSPECIFIED: ICD-10-CM

## 2024-04-29 DIAGNOSIS — R07.0 PAIN IN THROAT: ICD-10-CM

## 2024-04-29 DIAGNOSIS — R73.03 PREDIABETES: ICD-10-CM

## 2024-04-29 DIAGNOSIS — Z00.00 ENCOUNTER FOR GENERAL ADULT MEDICAL EXAMINATION WITHOUT ABNORMAL FINDINGS: ICD-10-CM

## 2024-05-02 ENCOUNTER — OUTPATIENT (OUTPATIENT)
Dept: OUTPATIENT SERVICES | Facility: HOSPITAL | Age: 49
LOS: 1 days | End: 2024-05-02
Payer: COMMERCIAL

## 2024-05-02 DIAGNOSIS — Z98.890 OTHER SPECIFIED POSTPROCEDURAL STATES: Chronic | ICD-10-CM

## 2024-05-02 DIAGNOSIS — Z90.49 ACQUIRED ABSENCE OF OTHER SPECIFIED PARTS OF DIGESTIVE TRACT: Chronic | ICD-10-CM

## 2024-05-02 DIAGNOSIS — M54.2 CERVICALGIA: ICD-10-CM

## 2024-05-02 DIAGNOSIS — M25.511 PAIN IN RIGHT SHOULDER: ICD-10-CM

## 2024-05-02 DIAGNOSIS — Z90.3 ACQUIRED ABSENCE OF STOMACH [PART OF]: Chronic | ICD-10-CM

## 2024-05-02 PROCEDURE — 97014 ELECTRIC STIMULATION THERAPY: CPT

## 2024-05-02 PROCEDURE — 97010 HOT OR COLD PACKS THERAPY: CPT | Mod: GP

## 2024-05-02 PROCEDURE — 97110 THERAPEUTIC EXERCISES: CPT | Mod: GP

## 2024-05-03 DIAGNOSIS — M54.2 CERVICALGIA: ICD-10-CM

## 2024-05-03 DIAGNOSIS — M25.511 PAIN IN RIGHT SHOULDER: ICD-10-CM

## 2024-05-14 ENCOUNTER — OUTPATIENT (OUTPATIENT)
Dept: OUTPATIENT SERVICES | Facility: HOSPITAL | Age: 49
LOS: 1 days | End: 2024-05-14

## 2024-05-14 DIAGNOSIS — M25.511 PAIN IN RIGHT SHOULDER: ICD-10-CM

## 2024-05-14 DIAGNOSIS — Z90.49 ACQUIRED ABSENCE OF OTHER SPECIFIED PARTS OF DIGESTIVE TRACT: Chronic | ICD-10-CM

## 2024-05-14 DIAGNOSIS — M54.2 CERVICALGIA: ICD-10-CM

## 2024-05-14 DIAGNOSIS — Z98.890 OTHER SPECIFIED POSTPROCEDURAL STATES: Chronic | ICD-10-CM

## 2024-05-14 DIAGNOSIS — Z90.3 ACQUIRED ABSENCE OF STOMACH [PART OF]: Chronic | ICD-10-CM

## 2024-05-17 NOTE — PHYSICAL THERAPY INITIAL EVALUATION ADULT - GAIT PATTERN USED, PT EVAL
Preventive Care Visit  [facility]  Prashanth Galvez MD, Pediatrics  May 17, 2024  \  Assessment & Plan   18 month old, here for preventive care.    Encounter for routine child health examination w/o abnormal findings  - Healthy child with normal growth and development  - Anticipatory guidance  - DEVELOPMENTAL TEST, RUBIO  - M-CHAT Development Testing  - PRIMARY CARE FOLLOW-UP SCHEDULING    Need for vaccination  - DTAP,5 PERTUSSIS ANTIGENS 6W-6Y (DAPTACEL)  - HEPATITIS A 12M-18Y(HAVRIX/VAQTA)  - HIB (PRP-T)(ACTHIB)    Need for prophylactic fluoride administration  - sodium fluoride (VANISH) 5% white varnish 1 packet  - TX APPLICATION TOPICAL FLUORIDE VARNISH BY [facility]    Speech delay  - passed ASQ-3 screen today, reassurance    History of placement of ear tubes  - no recent ear infections  - no concerns today    Patient has been advised of split billing requirements and indicates understanding: Yes  Growth      Normal OFC, length and weight    Immunizations   Appropriate vaccinations were ordered.  I provided face to face vaccine counseling, answered questions, and explained the benefits and risks of the vaccine components ordered today including:  DTaP (<7Y), Hepatitis A (Pediatric 2 dose), and HIB    Anticipatory Guidance    Reviewed age appropriate anticipatory guidance.   The following topics were discussed:  SOCIAL/ FAMILY:    Stranger/ separation anxiety    Reading to child    Book given from Reach Out & Read program    Hitting/ biting/ aggressive behavior  NUTRITION:    Healthy food choices    Weaning     Age-related decrease in appetite  HEALTH/ SAFETY:    Dental hygiene    Sleep issues    Never leave unattended    Exploration/ climbing    Referrals/Ongoing Specialty Care  None  Verbal Dental Referral: Verbal dental referral was given  Dental Fluoride Varnish: Yes, fluoride varnish application risks and benefits were discussed, and verbal consent was received.      Subjective   Kushal  is presenting for the following:  Well Child        5/17/2024     7:22 AM   Additional Questions   Accompanied by Mother   Questions for today's visit No   Surgery, major illness, or injury since last physical No         5/17/2024   Social   Lives with Parent(s)   Who takes care of your child? Parent(s)   Recent potential stressors None   History of trauma No   Family Hx mental health challenges No   Lack of transportation has limited access to appts/meds No   Do you have housing?  Yes   Are you worried about losing your housing? No         5/17/2024     6:06 AM   Health Risks/Safety   What type of car seat does your child use?  Car seat with harness   Is your child's car seat forward or rear facing? Rear facing   Where does your child sit in the car?  Back seat   Do you use space heaters, wood stove, or a fireplace in your home? No   Are poisons/cleaning supplies and medications kept out of reach? Yes   Do you have a swimming pool? No   Do you have guns/firearms in the home? (!) YES   Are the guns/firearms secured in a safe or with a trigger lock? Yes   Is ammunition stored separately from guns? Yes         5/17/2024     6:06 AM   TB Screening   Was your child born outside of the [place]? No         5/17/2024     6:06 AM   TB Screening: Consider immunosuppression as a risk factor for TB   Recent TB infection or positive TB test in family/close contacts No   Recent travel outside [place] (child/family/close contacts) No   Recent residence in high-risk group setting (correctional facility/health care facility/homeless shelter/refugee camp) No          5/17/2024     6:06 AM   Dental Screening   Has your child had cavities in the last 2 years? No   Have parents/caregivers/siblings had cavities in the last 2 years? (!) YES, IN THE LAST 6 MONTHS- HIGH RISK         5/17/2024   Diet   Questions about feeding? No   How does your child eat?  Sippy cup    Self-feeding   What does your child regularly drink? Water    Cow's  "Milk   What type of milk? Whole   What type of water? Tap   Vitamin or supplement use None   How often does your family eat meals together? Every day   How many snacks does your child eat per day 2-3   Are there types of foods your child won't eat? No   In past 12 months, concerned food might run out No   In past 12 months, food has run out/couldn't afford more No         5/17/2024     6:06 AM   Elimination   Bowel or bladder concerns? No concerns         5/17/2024     6:06 AM   Media Use   Hours per day of screen time (for entertainment) 0.5         5/17/2024     6:06 AM   Sleep   Do you have any concerns about your child's sleep? No concerns, regular bedtime routine and sleeps well through the night         5/17/2024     6:06 AM   Vision/Hearing   Vision or hearing concerns No concerns         5/17/2024     6:06 AM   Development/ Social-Emotional Screen   Developmental concerns No   Does your child receive any special services? No     Development - M-CHAT and ASQ required for C&TC    Screening tool used, reviewed with parent/guardian: Electronic M-CHAT-R       5/17/2024     6:09 AM   MCHAT-R Total Score   M-Chat Score 0 (Low-risk)      Follow-up:  LOW-RISK: Total Score is 0-2. No follow up necessary  ASQ 18 M Communication Gross Motor Fine Motor Problem Solving Personal-social   Score 60 60 60 50 50   Cutoff 13.06 37.38 34.32 25.74 27.19   Result Passed Passed Passed Passed Passed     Milestones (by observation/ exam/ report) 75-90% ile   SOCIAL/EMOTIONAL:   Moves away from you, but looks to make sure you are close by   Points to show you something interesting   Puts hands out for you to wash them   Looks at a few pages in a book with you   Helps you dress them by pushing arms through sleeve or lifting up foot  LANGUAGE/COMMUNICATION:   Tries to say three or more words besides \"mama\" or \"sonia\"   Follows one step directions without any gestures, like giving you the toy when you say, \"Give it to me.\"  COGNITIVE " "(LEARNING, THINKING, PROBLEM-SOLVING):   Copies you doing chores, like sweeping with a broom   Plays with toys in a simple way, like pushing a toy car  MOVEMENT/PHYSICAL DEVELOPMENT:   Walks without holding on to anyone or anything   Scirbbles   Drinks from a cup without a lid and may spill sometimes   Feeds themself with their fingers   Tries to use a spoon   Climbs on and off a couch or chair without help         Objective     Exam  Pulse 118   Temp 97.5  F (36.4  C) (Temporal)   Resp 28   Ht 2' 8.68\" (0.83 m)   Wt 26 lb 3 oz (11.9 kg)   HC 18.39\" (46.7 cm)   BMI 17.24 kg/m    28 %ile (Z= -0.59) based on WHO (Boys, 0-2 years) head circumference-for-age based on Head Circumference recorded on 5/17/2024.  73 %ile (Z= 0.62) based on WHO (Boys, 0-2 years) weight-for-age data using vitals from 5/17/2024.  51 %ile (Z= 0.02) based on WHO (Boys, 0-2 years) Length-for-age data based on Length recorded on 5/17/2024.  81 %ile (Z= 0.87) based on WHO (Boys, 0-2 years) weight-for-recumbent length data based on body measurements available as of 5/17/2024.    Physical Exam  GENERAL: Active, alert, in no acute distress.  SKIN: Clear. No significant rash, abnormal pigmentation or lesions  HEAD: Normocephalic.  EYES:  Symmetric light reflex and no eye movement on cover/uncover test. Normal conjunctivae.  EARS: Normal canals. Tympanic membranes are normal; gray and translucent. Ear tubes in place bilaterally.   NOSE: Normal without discharge.  MOUTH/THROAT: Clear. No oral lesions. Teeth without obvious abnormalities.  NECK: Supple, no masses.  No thyromegaly.  LYMPH NODES: No adenopathy  LUNGS: Clear. No rales, rhonchi, wheezing or retractions  HEART: Regular rhythm. Normal S1/S2. No murmurs. Normal pulses.  ABDOMEN: Soft, non-tender, not distended, no masses or hepatosplenomegaly. Bowel sounds normal.   GENITALIA: Normal male external genitalia. Darryn stage I,  both testes descended, no hernia or hydrocele.    EXTREMITIES: " Full range of motion, no deformities  NEUROLOGIC: No focal findings. Cranial nerves grossly intact: DTR's normal. Normal gait, strength and tone    Signed Electronically by: Prashanth Galvez MD     2-point gait

## 2024-05-21 ENCOUNTER — OUTPATIENT (OUTPATIENT)
Dept: OUTPATIENT SERVICES | Facility: HOSPITAL | Age: 49
LOS: 1 days | End: 2024-05-21

## 2024-05-21 DIAGNOSIS — M54.2 CERVICALGIA: ICD-10-CM

## 2024-05-21 DIAGNOSIS — Z98.890 OTHER SPECIFIED POSTPROCEDURAL STATES: Chronic | ICD-10-CM

## 2024-05-21 DIAGNOSIS — M25.511 PAIN IN RIGHT SHOULDER: ICD-10-CM

## 2024-05-21 DIAGNOSIS — Z90.49 ACQUIRED ABSENCE OF OTHER SPECIFIED PARTS OF DIGESTIVE TRACT: Chronic | ICD-10-CM

## 2024-05-21 DIAGNOSIS — Z90.3 ACQUIRED ABSENCE OF STOMACH [PART OF]: Chronic | ICD-10-CM

## 2024-05-22 NOTE — ED ADULT NURSE NOTE - PAIN: PRESENCE, MLM
RE: Plan of Care    Vimal Samayoa DO    Thank you for referring Chandni Hoyos. The following information reflects my assessment and plan of care.    Please review and sign the attached form to indicate your approval of the plan of care. Insurance compliance requires your approval be on this plan of care. After your review, please fax back all pages received. Should you have any questions, feel free to contact me.    Tray Mustafa  [facility]  [address]  [place] [address]  Phone: [phone]  Fax: [phone]           Plan of Care 3/11/24   Effective from: 3/11/2024  Effective to: 2024    Plan ID: [id number]            Participants as of Finalize on 2024    Name Type Comments Contact Info    Vimal Samayoa DO Referring Provider  [phone]    Tray Mustafa Occupational Therapist             Soha, Chandni STEPHY MRN:[id number] (:1955 68 year old F)             Evaluation     Author: Tray Mustafa Status: Signed Last edited: 2024  4:00 PM       Occupational Therapy Progress Note    Visit Type: Progress Note  Visit: 20  Referring Provider: Vimal Samayoa DO  Medical Diagnosis (from order): I89.0 - Lymph edema     SUBJECTIVE                                                                                                               Visit 20  Current Functional Limitations: Moderate swelling in both legs with mild skin changes and intermittent sensory changes affecting lifestyle task completion.          OBJECTIVE                                                                                                                                Measurements  Change in Measurements    - total volume (mL)        Left: Current: 7029.34 (Measured 24)           - Eval/progress note measurement: 7980.09 (Measured 3/11/24) = change: 950.75        Right: Current: 6929.22 (Measured 24)           - Eval/progress note measurement: 7433.96  (Measured 3/11/24) = change: 504.74        Difference between left and right: 100.12  Currently a 12% decrease in left leg limb volume and a 7% decrease in right leg limb volume  7029   LE Circumference (cm)        80 cm proximal to plantar surface: left:  ; right:          70 cm proximal to plantar surface: left:  ; right:          60 cm proximal to plantar surface: left:  ; right:          50 cm proximal to plantar surface: left: 53; right: 54.3        40 cm proximal to plantar surface: left: 44.3; right: 46.3        30 cm proximal to plantar surface: left: 43.5; right: 42        20 cm proximal to plantar surface: left: 37.5; right: 36.1        10 cm proximal to plantar surface: left: 31.5; right: 28.9        malleolus: left:  ; right:          ankle: left: 35.8; right: 33.3        heel: left:  ; right:          arch: left: 29.1; right: 28.5        metatarsal phalangeal: left: 27.1; right: 26.5     - Total Circumference: left: 301.8; right: 295.9  LE Volume        70 cm proximal to plantar surface: left:  ; right:          60 cm proximal to plantar surface: left:  ; right:          50 cm proximal to plantar surface: left: 745.29; right: 782.3        40 cm proximal to plantar surface: left: 1888.93; right: 2018.11        30 cm proximal to plantar surface: left: 1534.04; right: 1552.74        20 cm proximal to plantar surface: left: 1307.97; right: 1216.08        10 cm proximal to plantar surface: left: 949.79; right: 844.18        malleolus: left: 263.27; right: 221.6        ankle: left: 340.05; right: 294.21     - Total Volume: left: 7029.34; right: 6929.22   Values stored in flowsheets.    Treatment     Manual Therapy   Lymphatic Drainage:     - Position: supine    - Body region:        deep breathing, abdominal sequencing and supraclavicular fossa and shoulders        Left: groin, popliteal and LLE        Right: groin, popliteal and RLE  Compression Bandage:    - Body region:         Left: foot, ankle, lower leg  and toes        Right: foot, ankle, lower leg and toes    - tubular stocking    - Size D latex tubular stocking from knees to toes    Patient Education/Instruction  - Compression bandage:    - Wear schedule: wear instructed time as tolerated, remove if significant discomfort occurs and remove before bedtime  - Lymphedema education: lymphedema risk reduction practices and compression garments and alternatives        ASSESSMENT                                                                                                          Patient will continue to benefit from skilled care as outlined.    Demonstrates a decrease in limb volume in both legs, patient reports less heaviness to both legs.  Continues to wear tubular stockings reporting a positive response to wearing.  Long term daily compression has not been obtained.  Patient agreed to source vendors through her insurance prior to paying out of pocket.  Plan to continue for additional visits to source and trial long term compression.     PLAN                                                                                                                          Extend frequency and duration per below.  Frequency / Duration  2 times per week tapering as patient progresses for an estimated total of 4 visits    Goals  Long Term Goals: to be met by end of plan of care   1. Decrease current limb volume by 3- 5% to reduce limb heaviness and improve ability to complete lifestyle tasks.Status: met   2. Complete lymphedema home management program independently.Status: progressing/ongoing Needs to obtain long term daily compression      Therapy procedure time and total treatment time can be found documented on the Time Entry flowsheet         Current Participants as of 5/23/2024    Name Type Comments Contact Info    Vimal Samayoa DO Referring Provider  [phone]    Signature pending    Tray Mustafa Occupational Therapist      Electronically signed by Tray  Ramsey at 5/23/2024 1221 CDT          RE: Plan of Care for Chandni Hoyos, YOB: 1955     I certify the need for these services, furnished under this plan of treatment and while under my care.  I agree with the plan of care as stated and request that therapy proceed.        __________________________________________________________________________________  Provider Signature         Date    complains of pain/discomfort

## 2024-05-23 ENCOUNTER — OUTPATIENT (OUTPATIENT)
Dept: OUTPATIENT SERVICES | Facility: HOSPITAL | Age: 49
LOS: 1 days | End: 2024-05-23

## 2024-05-23 DIAGNOSIS — M25.511 PAIN IN RIGHT SHOULDER: ICD-10-CM

## 2024-05-23 DIAGNOSIS — Z98.890 OTHER SPECIFIED POSTPROCEDURAL STATES: Chronic | ICD-10-CM

## 2024-05-23 DIAGNOSIS — M54.2 CERVICALGIA: ICD-10-CM

## 2024-05-23 DIAGNOSIS — Z90.3 ACQUIRED ABSENCE OF STOMACH [PART OF]: Chronic | ICD-10-CM

## 2024-05-23 DIAGNOSIS — Z90.49 ACQUIRED ABSENCE OF OTHER SPECIFIED PARTS OF DIGESTIVE TRACT: Chronic | ICD-10-CM

## 2024-06-04 ENCOUNTER — LABORATORY RESULT (OUTPATIENT)
Age: 49
End: 2024-06-04

## 2024-06-04 ENCOUNTER — OUTPATIENT (OUTPATIENT)
Dept: OUTPATIENT SERVICES | Facility: HOSPITAL | Age: 49
LOS: 1 days | End: 2024-06-04

## 2024-06-04 DIAGNOSIS — Z98.890 OTHER SPECIFIED POSTPROCEDURAL STATES: Chronic | ICD-10-CM

## 2024-06-04 DIAGNOSIS — Z00.00 ENCOUNTER FOR GENERAL ADULT MEDICAL EXAMINATION WITHOUT ABNORMAL FINDINGS: ICD-10-CM

## 2024-06-04 DIAGNOSIS — Z90.49 ACQUIRED ABSENCE OF OTHER SPECIFIED PARTS OF DIGESTIVE TRACT: Chronic | ICD-10-CM

## 2024-06-04 DIAGNOSIS — Z90.3 ACQUIRED ABSENCE OF STOMACH [PART OF]: Chronic | ICD-10-CM

## 2024-06-05 DIAGNOSIS — Z00.00 ENCOUNTER FOR GENERAL ADULT MEDICAL EXAMINATION WITHOUT ABNORMAL FINDINGS: ICD-10-CM

## 2024-06-06 LAB
25(OH)D3 SERPL-MCNC: 32 NG/ML
A ALTERNATA IGE QN: <0.1 KUA/L
A FUMIGATUS IGE QN: <0.1 KUA/L
ALBUMIN SERPL ELPH-MCNC: 4.6 G/DL
ALMOND IGE QN: <0.1 KUA/L
ALP BLD-CCNC: 127 U/L
ALT SERPL-CCNC: 14 U/L
ANION GAP SERPL CALC-SCNC: 14 MMOL/L
AST SERPL-CCNC: 17 U/L
BILIRUB SERPL-MCNC: 0.5 MG/DL
BRAZIL NUT IGE QN: <0.1 KUA/L
BUN SERPL-MCNC: 20 MG/DL
C ALBICANS IGE QN: <0.1 KUA/L
C HERBARUM IGE QN: <0.1 KUA/L
CALCIUM SERPL-MCNC: 9.8 MG/DL
CASHEW NUT IGE QN: <0.1 KUA/L
CAT DANDER IGE QN: <0.1 KUA/L
CHLORIDE SERPL-SCNC: 103 MMOL/L
CHOLEST SERPL-MCNC: 258 MG/DL
CO2 SERPL-SCNC: 23 MMOL/L
CODFISH IGE QN: <0.1 KUA/L
COMMON RAGWEED IGE QN: <0.1 KUA/L
COW MILK IGE QN: <0.1 KUA/L
CREAT SERPL-MCNC: 0.6 MG/DL
D FARINAE IGE QN: <0.1 KUA/L
D PTERONYSS IGE QN: <0.1 KUA/L
DEPRECATED A ALTERNATA IGE RAST QL: 0 (ref 0–?)
DEPRECATED A FUMIGATUS IGE RAST QL: 0 (ref 0–?)
DEPRECATED ALMOND IGE RAST QL: 0 (ref 0–?)
DEPRECATED BRAZIL NUT IGE RAST QL: 0 (ref 0–?)
DEPRECATED C ALBICANS IGE RAST QL: 0
DEPRECATED C HERBARUM IGE RAST QL: 0 (ref 0–?)
DEPRECATED CASHEW NUT IGE RAST QL: 0 (ref 0–?)
DEPRECATED CAT DANDER IGE RAST QL: 0 (ref 0–?)
DEPRECATED CODFISH IGE RAST QL: 0 (ref 0–?)
DEPRECATED COMMON RAGWEED IGE RAST QL: 0 (ref 0–?)
DEPRECATED COW MILK IGE RAST QL: 0 (ref 0–?)
DEPRECATED D FARINAE IGE RAST QL: 0 (ref 0–?)
DEPRECATED D PTERONYSS IGE RAST QL: 0 (ref 0–?)
DEPRECATED DOG DANDER IGE RAST QL: 0 (ref 0–?)
DEPRECATED EGG WHITE IGE RAST QL: 0 (ref 0–?)
DEPRECATED HAZELNUT IGE RAST QL: 0 (ref 0–?)
DEPRECATED M RACEMOSUS IGE RAST QL: 0
DEPRECATED PEANUT IGE RAST QL: 0 (ref 0–?)
DEPRECATED ROACH IGE RAST QL: 0 (ref 0–?)
DEPRECATED SALMON IGE RAST QL: 0 (ref 0–?)
DEPRECATED SCALLOP IGE RAST QL: <0.1 KUA/L
DEPRECATED SESAME SEED IGE RAST QL: 0 (ref 0–?)
DEPRECATED SHRIMP IGE RAST QL: 0 (ref 0–?)
DEPRECATED SOYBEAN IGE RAST QL: 0 (ref 0–?)
DEPRECATED TIMOTHY IGE RAST QL: 0 (ref 0–?)
DEPRECATED TUNA IGE RAST QL: 0 (ref 0–?)
DEPRECATED WALNUT IGE RAST QL: 0 (ref 0–?)
DEPRECATED WHEAT IGE RAST QL: 0 (ref 0–?)
DEPRECATED WHITE OAK IGE RAST QL: 0 (ref 0–?)
DOG DANDER IGE QN: <0.1 KUA/L
EGFR: 111 ML/MIN/1.73M2
EGG WHITE IGE QN: <0.1 KUA/L
ESTIMATED AVERAGE GLUCOSE: 126 MG/DL
GLUCOSE SERPL-MCNC: 88 MG/DL
HAZELNUT IGE QN: <0.1 KUA/L
HBA1C MFR BLD HPLC: 6 %
HCT VFR BLD CALC: 40.5 %
HDLC SERPL-MCNC: 39 MG/DL
HGB BLD-MCNC: 13.1 G/DL
LDLC SERPL CALC-MCNC: 181 MG/DL
M RACEMOSUS IGE QN: <0.1 KUA/L
MCHC RBC-ENTMCNC: 28.4 PG
MCHC RBC-ENTMCNC: 32.3 G/DL
MCV RBC AUTO: 87.9 FL
NONHDLC SERPL-MCNC: 219 MG/DL
PEANUT IGE QN: <0.1 KUA/L
PLATELET # BLD AUTO: 299 K/UL
PMV BLD AUTO: 0 /100 WBCS
PMV BLD: 10.8 FL
POTASSIUM SERPL-SCNC: 4.7 MMOL/L
PROT SERPL-MCNC: 7.2 G/DL
RBC # BLD: 4.61 M/UL
RBC # FLD: 13.8 %
ROACH IGE QN: <0.1 KUA/L
SALMON IGE QN: <0.1 KUA/L
SCALLOP IGE QN: 0 (ref 0–?)
SCALLOP IGE QN: <0.1 KUA/L
SESAME SEED IGE QN: <0.1 KUA/L
SODIUM SERPL-SCNC: 140 MMOL/L
SOYBEAN IGE QN: <0.1 KUA/L
TIMOTHY IGE QN: <0.1 KUA/L
TOTAL IGE SMQN RAST: 11 KU/L
TRIGL SERPL-MCNC: 190 MG/DL
TSH SERPL-ACNC: 1.37 UIU/ML
TUNA IGE QN: <0.1 KUA/L
WALNUT IGE QN: <0.1 KUA/L
WBC # FLD AUTO: 5.45 K/UL
WHEAT IGE QN: <0.1 KUA/L
WHITE OAK IGE QN: <0.1 KUA/L

## 2024-06-10 RX ORDER — ATORVASTATIN CALCIUM 40 MG/1
40 TABLET, FILM COATED ORAL
Qty: 1 | Refills: 3 | Status: ACTIVE | COMMUNITY
Start: 2022-12-27 | End: 1900-01-01

## 2024-06-11 ENCOUNTER — APPOINTMENT (OUTPATIENT)
Dept: PODIATRY | Facility: CLINIC | Age: 49
End: 2024-06-11

## 2024-06-11 PROCEDURE — G2211 COMPLEX E/M VISIT ADD ON: CPT | Mod: NC,1L

## 2024-06-11 PROCEDURE — 99214 OFFICE O/P EST MOD 30 MIN: CPT

## 2024-06-20 ENCOUNTER — APPOINTMENT (OUTPATIENT)
Dept: DERMATOLOGY | Facility: CLINIC | Age: 49
End: 2024-06-20

## 2024-06-20 ENCOUNTER — OUTPATIENT (OUTPATIENT)
Dept: OUTPATIENT SERVICES | Facility: HOSPITAL | Age: 49
LOS: 1 days | End: 2024-06-20
Payer: COMMERCIAL

## 2024-06-20 ENCOUNTER — APPOINTMENT (OUTPATIENT)
Dept: OTOLARYNGOLOGY | Facility: CLINIC | Age: 49
End: 2024-06-20
Payer: COMMERCIAL

## 2024-06-20 VITALS — BODY MASS INDEX: 34.87 KG/M2 | HEIGHT: 56 IN | WEIGHT: 155 LBS

## 2024-06-20 DIAGNOSIS — G47.33 OBSTRUCTIVE SLEEP APNEA (ADULT) (PEDIATRIC): ICD-10-CM

## 2024-06-20 DIAGNOSIS — Z98.890 OTHER SPECIFIED POSTPROCEDURAL STATES: Chronic | ICD-10-CM

## 2024-06-20 DIAGNOSIS — R09.82 POSTNASAL DRIP: ICD-10-CM

## 2024-06-20 DIAGNOSIS — Z00.00 ENCOUNTER FOR GENERAL ADULT MEDICAL EXAMINATION WITHOUT ABNORMAL FINDINGS: ICD-10-CM

## 2024-06-20 DIAGNOSIS — Z90.49 ACQUIRED ABSENCE OF OTHER SPECIFIED PARTS OF DIGESTIVE TRACT: Chronic | ICD-10-CM

## 2024-06-20 DIAGNOSIS — Z90.3 ACQUIRED ABSENCE OF STOMACH [PART OF]: Chronic | ICD-10-CM

## 2024-06-20 DIAGNOSIS — L73.2 HIDRADENITIS SUPPURATIVA: ICD-10-CM

## 2024-06-20 PROCEDURE — 31575 DIAGNOSTIC LARYNGOSCOPY: CPT

## 2024-06-20 PROCEDURE — 99204 OFFICE O/P NEW MOD 45 MIN: CPT

## 2024-06-20 PROCEDURE — 17110 DESTRUCTION B9 LES UP TO 14: CPT

## 2024-06-20 PROCEDURE — 99204 OFFICE O/P NEW MOD 45 MIN: CPT | Mod: 25

## 2024-06-20 RX ORDER — CLINDAMYCIN PHOSPHATE 10 MG/ML
1 SOLUTION TOPICAL TWICE DAILY
Qty: 1 | Refills: 3 | Status: ACTIVE | COMMUNITY
Start: 2024-06-20 | End: 1900-01-01

## 2024-06-20 RX ORDER — TRIAMCINOLONE ACETONIDE 1 MG/G
0.1 CREAM TOPICAL TWICE DAILY
Qty: 1 | Refills: 2 | Status: ACTIVE | COMMUNITY
Start: 2022-10-13 | End: 1900-01-01

## 2024-06-20 RX ORDER — FLUTICASONE PROPIONATE 50 UG/1
50 SPRAY, METERED NASAL
Qty: 1 | Refills: 3 | Status: ACTIVE | COMMUNITY
Start: 2024-06-20 | End: 1900-01-01

## 2024-06-20 NOTE — ASSESSMENT
[FreeTextEntry1] : Recommend continued use of PPI as prescribed.  Anti-reflux diet reviewed.  Fonase prescribed for PND. Sleep study referral placed for repeat PSG.  Follow up in 3 months.

## 2024-06-20 NOTE — PROCEDURE
[de-identified] : Indication: odynophagia Findings: post nasal drip and laryngopharyngeal reflux.

## 2024-06-20 NOTE — PHYSICAL EXAM
[Midline] : trachea located in midline position [Laryngoscopy Performed] : laryngoscopy was performed, see procedure section for findings [de-identified] : Post nasal drip [Normal] : palpation of lymph nodes is normal

## 2024-06-20 NOTE — HISTORY OF PRESENT ILLNESS
[de-identified] : Bhut[place] ID# [id number] Patient presents today c/o sensation of something in throat , throat inflammation, restricted breathing,  occasional  otalgia in both ears.  Has had past history of sleeve gastrectomy and ongoing GERD on omeprazole.   She also has a known history of KAN, however she was told that she did not need CPAP after her substantial weight loss.  She endorses snoring and trouble breathing at night.

## 2024-06-21 NOTE — ASSESSMENT
[FreeTextEntry1] : #Hidrantinitis Suppurativa: better - will send clindamycin solution to affected areas  armpits and groin - counseled on gentle skin care   # Contact dermatitis dorsal foot:moderate - well controlled on triamcinolone ointment bid to affected areas, sent refills   #solar lentigos on arms:  - counseled on sunscreen use   RTC in 6 months

## 2024-06-21 NOTE — HISTORY OF PRESENT ILLNESS
[FreeTextEntry1] : c/o rash on R dorsal foot [de-identified] : The patient is a 46 yo female w/PMH Hidrandenitis Suppurativa (previously on Clinda + Flagyl) c/o of rash on the dorsal foot itchy that comes and goes and is helped by triamcinolone but now needs more. She also reports improvement of Hidrandenitis symptoms and is no longer taking oral antibiotics, left with scar and fewer lesions and would like to try a topical antibiotic. Patient denies any other rashes or new skin lesions.  No fever, no chills.

## 2024-06-21 NOTE — PHYSICAL EXAM
[Alert] : alert [Oriented x 3] : ~L oriented x 3 [Well Nourished] : well nourished [Conjunctiva Non-injected] : conjunctiva non-injected [No Visual Lymphadenopathy] : no visual  lymphadenopathy [No Clubbing] : no clubbing [No Edema] : no edema [FreeTextEntry3] : - some scarring under L armpit with few eryth follicular papules, no tender lesions/ groin not examined. - eryth patch on the R dorsal foot

## 2024-06-25 DIAGNOSIS — L25.9 UNSPECIFIED CONTACT DERMATITIS, UNSPECIFIED CAUSE: ICD-10-CM

## 2024-06-25 DIAGNOSIS — L73.2 HIDRADENITIS SUPPURATIVA: ICD-10-CM

## 2024-07-09 DIAGNOSIS — Z12.11 ENCOUNTER FOR SCREENING FOR MALIGNANT NEOPLASM OF COLON: ICD-10-CM

## 2024-07-09 DIAGNOSIS — Z00.00 ENCOUNTER FOR GENERAL ADULT MEDICAL EXAMINATION W/OUT ABNORMAL FINDINGS: ICD-10-CM

## 2024-07-09 RX ORDER — POLYETHYLENE GLYCOL 3350 AND ELECTROLYTES WITH LEMON FLAVOR 236; 22.74; 6.74; 5.86; 2.97 G/4L; G/4L; G/4L; G/4L; G/4L
236 POWDER, FOR SOLUTION ORAL
Qty: 1 | Refills: 0 | Status: ACTIVE | COMMUNITY
Start: 2024-07-09 | End: 1900-01-01

## 2024-07-16 ENCOUNTER — APPOINTMENT (OUTPATIENT)
Dept: INTERNAL MEDICINE | Facility: CLINIC | Age: 49
End: 2024-07-16

## 2024-07-24 ENCOUNTER — TRANSCRIPTION ENCOUNTER (OUTPATIENT)
Age: 49
End: 2024-07-24

## 2024-07-24 ENCOUNTER — RESULT REVIEW (OUTPATIENT)
Age: 49
End: 2024-07-24

## 2024-07-24 ENCOUNTER — OUTPATIENT (OUTPATIENT)
Dept: OUTPATIENT SERVICES | Facility: HOSPITAL | Age: 49
LOS: 1 days | Discharge: HOME | End: 2024-07-24
Payer: MEDICAID

## 2024-07-24 VITALS
OXYGEN SATURATION: 95 % | DIASTOLIC BLOOD PRESSURE: 76 MMHG | TEMPERATURE: 98 F | RESPIRATION RATE: 18 BRPM | WEIGHT: 154.98 LBS | HEART RATE: 69 BPM | SYSTOLIC BLOOD PRESSURE: 117 MMHG | HEIGHT: 56 IN

## 2024-07-24 VITALS
HEART RATE: 68 BPM | DIASTOLIC BLOOD PRESSURE: 71 MMHG | OXYGEN SATURATION: 100 % | RESPIRATION RATE: 18 BRPM | SYSTOLIC BLOOD PRESSURE: 126 MMHG

## 2024-07-24 DIAGNOSIS — Z15.89 GENETIC SUSCEPTIBILITY TO OTHER DISEASE: ICD-10-CM

## 2024-07-24 DIAGNOSIS — Z90.3 ACQUIRED ABSENCE OF STOMACH [PART OF]: Chronic | ICD-10-CM

## 2024-07-24 DIAGNOSIS — Z98.890 OTHER SPECIFIED POSTPROCEDURAL STATES: Chronic | ICD-10-CM

## 2024-07-24 DIAGNOSIS — Z90.49 ACQUIRED ABSENCE OF OTHER SPECIFIED PARTS OF DIGESTIVE TRACT: Chronic | ICD-10-CM

## 2024-07-24 DIAGNOSIS — K76.0 FATTY (CHANGE OF) LIVER, NOT ELSEWHERE CLASSIFIED: ICD-10-CM

## 2024-07-24 PROCEDURE — 88305 TISSUE EXAM BY PATHOLOGIST: CPT | Mod: 26

## 2024-07-24 PROCEDURE — 88312 SPECIAL STAINS GROUP 1: CPT

## 2024-07-24 PROCEDURE — 43239 EGD BIOPSY SINGLE/MULTIPLE: CPT

## 2024-07-24 PROCEDURE — 88305 TISSUE EXAM BY PATHOLOGIST: CPT

## 2024-07-24 PROCEDURE — 88312 SPECIAL STAINS GROUP 1: CPT | Mod: 26

## 2024-07-24 NOTE — ASU DISCHARGE PLAN (ADULT/PEDIATRIC) - NS MD DC FALL RISK RISK
For information on Fall & Injury Prevention, visit: [email or web address] OR  [email or web address] OR  https://www.cdc.gov/steadi/patient.html

## 2024-07-24 NOTE — ASU DISCHARGE PLAN (ADULT/PEDIATRIC) - CARE PROVIDER_API CALL
Anival Dickens  Gastroenterology  [address] [place] [address]  Phone: [phone]  Fax: [phone]  Follow Up Time:

## 2024-07-24 NOTE — H&P PST ADULT - HISTORY OF PRESENT ILLNESS
48 year old female is here for egd and eus for pancreatic cancer screening given history of genetic mutation.

## 2024-07-24 NOTE — CHART NOTE - NSCHARTNOTEFT_GEN_A_CORE
ENDO RECOVERY ANESTHESIA NOTE      Procedure:   Post op diagnosis:      ____  Intubated  TV:______       Rate: ______      FiO2: ______    __x__  Patent Airway    __x__  Full return of protective reflexes    __x__  Full recovery from anesthesia / back to baseline status    Vitals  HR: 83  BP: 100/63  RR: 15  O2 Sat: 99%  Temp: 97.5    Mental Status:  __x__ Awake   ___x__ Alert   _____ Drowsy   _____ Sedated    Nausea/Vomiting:  __x__ NO  ______Yes,   See Post - Op Orders          Pain Scale (0-10):  _____    Treatment: ____ None    __x__ See Post - Op/PCA Orders    Post - Operative Fluids:   ____ Oral   __x__ See Post - Op Orders    Plan: Discharge when criteria met:   __x__Home       _____Floor     _____Critical Care   Other:_________________    Comments: Patient had smooth intraoperative event, no anesthesia complication.

## 2024-07-24 NOTE — ASU PATIENT PROFILE, ADULT - TEACHING/LEARNING FACTORS IMPACT ABILITY TO LEARN
Genetics note:    CMP, CK, urine organic acids, acylcarnitines, and genetic testing came back negative/normal. The  screening was falsely positive. There is no clinical, biochemical, and molecular evidence of conditions associated with elevated C16. Called father and discussed. Report to be mailed to their address. Follow-up as needed.     Given that mother has had one episode of rhabdomyolysis, if she has recurrent, unexplained rhabdomyolysis, I recommended that she be evaluated.     Val Boyer MD  Medical Geneticist   none

## 2024-07-24 NOTE — ASU PATIENT PROFILE, ADULT - FALL HARM RISK - UNIVERSAL INTERVENTIONS
Bed in lowest position, wheels locked, appropriate side rails in place/Call bell, personal items and telephone in reach/Instruct patient to call for assistance before getting out of bed or chair/Non-slip footwear when patient is out of bed/[place] to call system/Physically safe environment - no spills, clutter or unnecessary equipment/Purposeful Proactive Rounding/Room/bathroom lighting operational, light cord in reach

## 2024-07-26 RX ORDER — POLYETHYLENE GLYCOL 3350, SODIUM SULFATE ANHYDROUS, SODIUM BICARBONATE, SODIUM CHLORIDE, POTASSIUM CHLORIDE 236; 22.74; 6.74; 5.86; 2.97 G/4L; G/4L; G/4L; G/4L; G/4L
1 POWDER, FOR SOLUTION ORAL
Qty: 1 | Refills: 0
Start: 2024-07-26 | End: 2024-07-26

## 2024-07-26 RX ORDER — BISACODYL 5 MG
4 TABLET, DELAYED RELEASE (ENTERIC COATED) ORAL
Qty: 4 | Refills: 0
Start: 2024-07-26 | End: 2024-07-26

## 2024-08-01 DIAGNOSIS — Z85.43 PERSONAL HISTORY OF MALIGNANT NEOPLASM OF OVARY: ICD-10-CM

## 2024-08-01 DIAGNOSIS — K76.0 FATTY (CHANGE OF) LIVER, NOT ELSEWHERE CLASSIFIED: ICD-10-CM

## 2024-08-01 DIAGNOSIS — Z91.041 RADIOGRAPHIC DYE ALLERGY STATUS: ICD-10-CM

## 2024-08-01 DIAGNOSIS — E78.00 PURE HYPERCHOLESTEROLEMIA, UNSPECIFIED: ICD-10-CM

## 2024-08-01 DIAGNOSIS — Z90.3 ACQUIRED ABSENCE OF STOMACH [PART OF]: ICD-10-CM

## 2024-08-01 DIAGNOSIS — R56.9 UNSPECIFIED CONVULSIONS: ICD-10-CM

## 2024-08-01 DIAGNOSIS — G47.33 OBSTRUCTIVE SLEEP APNEA (ADULT) (PEDIATRIC): ICD-10-CM

## 2024-08-01 DIAGNOSIS — Z90.49 ACQUIRED ABSENCE OF OTHER SPECIFIED PARTS OF DIGESTIVE TRACT: ICD-10-CM

## 2024-08-01 DIAGNOSIS — K21.9 GASTRO-ESOPHAGEAL REFLUX DISEASE WITHOUT ESOPHAGITIS: ICD-10-CM

## 2024-08-01 DIAGNOSIS — Z12.89 ENCOUNTER FOR SCREENING FOR MALIGNANT NEOPLASM OF OTHER SITES: ICD-10-CM

## 2024-08-01 DIAGNOSIS — K29.50 UNSPECIFIED CHRONIC GASTRITIS WITHOUT BLEEDING: ICD-10-CM

## 2024-08-01 DIAGNOSIS — E11.9 TYPE 2 DIABETES MELLITUS WITHOUT COMPLICATIONS: ICD-10-CM

## 2024-08-12 ENCOUNTER — OUTPATIENT (OUTPATIENT)
Dept: OUTPATIENT SERVICES | Facility: HOSPITAL | Age: 49
LOS: 1 days | Discharge: HOME | End: 2024-08-12
Payer: COMMERCIAL

## 2024-08-12 ENCOUNTER — APPOINTMENT (OUTPATIENT)
Dept: SLEEP CENTER | Facility: HOSPITAL | Age: 49
End: 2024-08-12

## 2024-08-12 DIAGNOSIS — G47.33 OBSTRUCTIVE SLEEP APNEA (ADULT) (PEDIATRIC): ICD-10-CM

## 2024-08-12 DIAGNOSIS — Z98.890 OTHER SPECIFIED POSTPROCEDURAL STATES: Chronic | ICD-10-CM

## 2024-08-12 DIAGNOSIS — Z90.49 ACQUIRED ABSENCE OF OTHER SPECIFIED PARTS OF DIGESTIVE TRACT: Chronic | ICD-10-CM

## 2024-08-12 DIAGNOSIS — Z90.3 ACQUIRED ABSENCE OF STOMACH [PART OF]: Chronic | ICD-10-CM

## 2024-08-12 PROCEDURE — 95810 POLYSOM 6/> YRS 4/> PARAM: CPT

## 2024-08-12 PROCEDURE — 95810 POLYSOM 6/> YRS 4/> PARAM: CPT | Mod: 26

## 2024-08-14 DIAGNOSIS — G47.33 OBSTRUCTIVE SLEEP APNEA (ADULT) (PEDIATRIC): ICD-10-CM

## 2024-08-15 ENCOUNTER — TRANSCRIPTION ENCOUNTER (OUTPATIENT)
Age: 49
End: 2024-08-15

## 2024-08-15 ENCOUNTER — OUTPATIENT (OUTPATIENT)
Dept: OUTPATIENT SERVICES | Facility: HOSPITAL | Age: 49
LOS: 1 days | Discharge: HOME | End: 2024-08-15
Payer: COMMERCIAL

## 2024-08-15 ENCOUNTER — RESULT REVIEW (OUTPATIENT)
Age: 49
End: 2024-08-15

## 2024-08-15 VITALS
TEMPERATURE: 98 F | HEIGHT: 56 IN | SYSTOLIC BLOOD PRESSURE: 119 MMHG | RESPIRATION RATE: 18 BRPM | DIASTOLIC BLOOD PRESSURE: 80 MMHG | HEART RATE: 72 BPM | WEIGHT: 149.91 LBS

## 2024-08-15 VITALS
OXYGEN SATURATION: 100 % | DIASTOLIC BLOOD PRESSURE: 72 MMHG | TEMPERATURE: 97 F | HEART RATE: 59 BPM | RESPIRATION RATE: 21 BRPM | SYSTOLIC BLOOD PRESSURE: 129 MMHG

## 2024-08-15 DIAGNOSIS — Z12.11 ENCOUNTER FOR SCREENING FOR MALIGNANT NEOPLASM OF COLON: ICD-10-CM

## 2024-08-15 DIAGNOSIS — E66.9 OBESITY, UNSPECIFIED: ICD-10-CM

## 2024-08-15 DIAGNOSIS — G40.909 EPILEPSY, UNSPECIFIED, NOT INTRACTABLE, WITHOUT STATUS EPILEPTICUS: ICD-10-CM

## 2024-08-15 DIAGNOSIS — E78.00 PURE HYPERCHOLESTEROLEMIA, UNSPECIFIED: ICD-10-CM

## 2024-08-15 DIAGNOSIS — E11.9 TYPE 2 DIABETES MELLITUS WITHOUT COMPLICATIONS: ICD-10-CM

## 2024-08-15 DIAGNOSIS — K76.0 FATTY (CHANGE OF) LIVER, NOT ELSEWHERE CLASSIFIED: ICD-10-CM

## 2024-08-15 DIAGNOSIS — Z98.890 OTHER SPECIFIED POSTPROCEDURAL STATES: Chronic | ICD-10-CM

## 2024-08-15 DIAGNOSIS — Z90.710 ACQUIRED ABSENCE OF BOTH CERVIX AND UTERUS: ICD-10-CM

## 2024-08-15 DIAGNOSIS — K63.89 OTHER SPECIFIED DISEASES OF INTESTINE: ICD-10-CM

## 2024-08-15 DIAGNOSIS — Z98.84 BARIATRIC SURGERY STATUS: ICD-10-CM

## 2024-08-15 DIAGNOSIS — Z90.49 ACQUIRED ABSENCE OF OTHER SPECIFIED PARTS OF DIGESTIVE TRACT: ICD-10-CM

## 2024-08-15 DIAGNOSIS — Z90.3 ACQUIRED ABSENCE OF STOMACH [PART OF]: Chronic | ICD-10-CM

## 2024-08-15 DIAGNOSIS — G47.30 SLEEP APNEA, UNSPECIFIED: ICD-10-CM

## 2024-08-15 DIAGNOSIS — K57.30 DIVERTICULOSIS OF LARGE INTESTINE WITHOUT PERFORATION OR ABSCESS WITHOUT BLEEDING: ICD-10-CM

## 2024-08-15 DIAGNOSIS — Z90.49 ACQUIRED ABSENCE OF OTHER SPECIFIED PARTS OF DIGESTIVE TRACT: Chronic | ICD-10-CM

## 2024-08-15 DIAGNOSIS — Z85.43 PERSONAL HISTORY OF MALIGNANT NEOPLASM OF OVARY: ICD-10-CM

## 2024-08-15 DIAGNOSIS — Z91.041 RADIOGRAPHIC DYE ALLERGY STATUS: ICD-10-CM

## 2024-08-15 PROCEDURE — 88305 TISSUE EXAM BY PATHOLOGIST: CPT | Mod: 26

## 2024-08-15 PROCEDURE — 45380 COLONOSCOPY AND BIOPSY: CPT

## 2024-08-15 PROCEDURE — 88305 TISSUE EXAM BY PATHOLOGIST: CPT

## 2024-08-15 NOTE — ASU DISCHARGE PLAN (ADULT/PEDIATRIC) - CALL YOUR DOCTOR IF YOU HAVE ANY OF THE FOLLOWING:
Bleeding that does not stop/Pain not relieved by Medications Bleeding that does not stop/Pain not relieved by Medications/Fever greater than (need to indicate Fahrenheit or Celsius)/Nausea and vomiting that does not stop/Excessive diarrhea/Inability to tolerate liquids or foods/Increased irritability or sluggishness

## 2024-08-15 NOTE — PRE-ANESTHESIA EVALUATION ADULT - HEIGHT IN FEET
RT Nebulizer Bronchodilator Protocol Note    There is a bronchodilator order in the chart from a provider indicating to follow the RT Bronchodilator Protocol and there is an Initiate RT Bronchodilator Protocol order as well (see protocol at bottom of note). CXR Findings:  XR CHEST PORTABLE    Result Date: 3/4/2023  Bibasilar airspace density/atelectasis. No other acute cardiopulmonary findings. The findings from the last RT Protocol Assessment were as follows:  Smoking: Chronic pulmonary disease  Respiratory Pattern: Regular pattern and RR 12-20 bpm  Breath Sounds: Slightly diminished and/or crackles  Cough: Strong, spontaneous, non-productive  Indication for Bronchodilator Therapy: Decreased or absent breath sounds, On home bronchodilators  Bronchodilator Assessment Score: 4    Aerosolized bronchodilator medication orders have been revised according to the RT Nebulizer Bronchodilator Protocol below. Respiratory Therapist to perform RT Therapy Protocol Assessment initially then follow the protocol. Repeat RT Therapy Protocol Assessment PRN for score 0-3 or on second treatment, BID, and PRN for scores above 3. No Indications - adjust the frequency to every 6 hours PRN wheezing or bronchospasm, if no treatments needed after 48 hours then discontinue using Per Protocol order mode. If indication present, adjust the RT bronchodilator orders based on the Bronchodilator Assessment Score as indicated below. If a patient is on this medication at home then do not decrease Frequency below that used at home. 0-3 - enter or revise RT bronchodilator order(s) to equivalent RT Bronchodilator order with Frequency of every 4 hours PRN for wheezing or increased work of breathing using Per Protocol order mode.        4-6 - enter or revise RT Bronchodilator order(s) to two equivalent RT bronchodilator orders with one order with BID Frequency and one order with Frequency of every 4 hours PRN wheezing or increased work of breathing using Per Protocol order mode. 7-10 - enter or revise RT Bronchodilator order(s) to two equivalent RT bronchodilator orders with one order with TID Frequency and one order with Frequency of every 4 hours PRN wheezing or increased work of breathing using Per Protocol order mode. 11-13 - enter or revise RT Bronchodilator order(s) to one equivalent RT bronchodilator order with QID Frequency and an Albuterol order with Frequency of every 4 hours PRN wheezing or increased work of breathing using Per Protocol order mode. Greater than 13 - enter or revise RT Bronchodilator order(s) to one equivalent RT bronchodilator order with every 4 hours Frequency and an Albuterol order with Frequency of every 2 hours PRN wheezing or increased work of breathing using Per Protocol order mode. RT to enter RT Home Evaluation for COPD & MDI Assessment order using Per Protocol order mode.     Electronically signed by Eddy Becerril RCP on 3/5/2023 at 9:58 AM 4

## 2024-08-15 NOTE — CHART NOTE - NSCHARTNOTEFT_GEN_A_CORE
PACU ANESTHESIA ADMISSION NOTE      Procedure:   Post op diagnosis:      ____  Intubated  TV:______       Rate: ______      FiO2: ______    _x___  Patent Airway    _x___  Full return of protective reflexes    _x___  Full recovery from anesthesia / back to baseline status    Vitals:  T(C): 36.3 (08-15-24 @ 11:10), Max: 36.7 (08-15-24 @ 09:19)  HR: 59 (08-15-24 @ 11:10) (54 - 72)  BP: 129/72 (08-15-24 @ 11:10) (86/53 - 129/72)  RR: 21 (08-15-24 @ 11:10) (17 - 26)  SpO2: 100% (08-15-24 @ 11:10) (99% - 100%)    Mental Status:  _x___ Awake   _____ Alert   _____ Drowsy   _____ Sedated    Nausea/Vomiting:  _x___  NO       ______Yes,   See Post - Op Orders         Pain Scale (0-10):  __0___    Treatment: _x___ None    ____ See Post - Op/PCA Orders    Post - Operative Fluids:   __x__ Oral   ____ See Post - Op Orders    Plan: Discharge:   _x___Home       _____Floor     _____Critical Care    _____  Other:_________________    Comments:  No anesthesia issues or complications noted.  Discharge when criteria met.

## 2024-08-15 NOTE — ASU PATIENT PROFILE, ADULT - AS SC BRADEN ACTIVITY
Please have them decrease hydralazine dosing to 1/2 tablet TID.  Call with updated readings in 1 week or call sooner if blood pressure staying greater than 130 over 80 1-2 hours after meds and after resting calmy 10-15 minutes or if they continue to get systolic blood pressure readings of less than 100.   (4) walks frequently

## 2024-08-15 NOTE — H&P ADULT - HISTORY OF PRESENT ILLNESS
Patient presents today for Endoscopic evaluation with possible intervention.  Patient presents today for screening colonoscopy

## 2024-08-23 ENCOUNTER — APPOINTMENT (OUTPATIENT)
Dept: GASTROENTEROLOGY | Facility: CLINIC | Age: 49
End: 2024-08-23
Payer: COMMERCIAL

## 2024-08-23 ENCOUNTER — OUTPATIENT (OUTPATIENT)
Dept: OUTPATIENT SERVICES | Facility: HOSPITAL | Age: 49
LOS: 1 days | End: 2024-08-23
Payer: COMMERCIAL

## 2024-08-23 ENCOUNTER — NON-APPOINTMENT (OUTPATIENT)
Age: 49
End: 2024-08-23

## 2024-08-23 VITALS
TEMPERATURE: 98.2 F | OXYGEN SATURATION: 96 % | DIASTOLIC BLOOD PRESSURE: 79 MMHG | HEIGHT: 56 IN | HEART RATE: 69 BPM | WEIGHT: 157 LBS | SYSTOLIC BLOOD PRESSURE: 124 MMHG | BODY MASS INDEX: 35.32 KG/M2

## 2024-08-23 DIAGNOSIS — Z98.890 OTHER SPECIFIED POSTPROCEDURAL STATES: Chronic | ICD-10-CM

## 2024-08-23 DIAGNOSIS — K76.0 FATTY (CHANGE OF) LIVER, NOT ELSEWHERE CLASSIFIED: ICD-10-CM

## 2024-08-23 DIAGNOSIS — Z90.3 ACQUIRED ABSENCE OF STOMACH [PART OF]: Chronic | ICD-10-CM

## 2024-08-23 DIAGNOSIS — E66.9 OBESITY, UNSPECIFIED: ICD-10-CM

## 2024-08-23 DIAGNOSIS — R10.13 EPIGASTRIC PAIN: ICD-10-CM

## 2024-08-23 DIAGNOSIS — Z00.00 ENCOUNTER FOR GENERAL ADULT MEDICAL EXAMINATION WITHOUT ABNORMAL FINDINGS: ICD-10-CM

## 2024-08-23 DIAGNOSIS — C25.9 MALIGNANT NEOPLASM OF PANCREAS, UNSPECIFIED: ICD-10-CM

## 2024-08-23 DIAGNOSIS — K57.90 DIVERTICULOSIS OF INTESTINE, PART UNSPECIFIED, W/OUT PERFORATION OR ABSCESS W/OUT BLEEDING: ICD-10-CM

## 2024-08-23 DIAGNOSIS — Z90.49 ACQUIRED ABSENCE OF OTHER SPECIFIED PARTS OF DIGESTIVE TRACT: Chronic | ICD-10-CM

## 2024-08-23 PROCEDURE — 99214 OFFICE O/P EST MOD 30 MIN: CPT

## 2024-08-23 NOTE — ASSESSMENT
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  # Gene Mutation epigastric pain - CKDN2A heterozygote can increase the risk of multiple melanoma and pancreatic cancer - EUS 4/2023 negative for pathology, egd biopsy negative for h. pylori - EUS July 2024 negative for pathology, negative H.pylori  - MRI Pancreatic protocol  - Repeat EUS in one year  - s/p cholecystectomy  - PPI QD   # Elevated alk phos, trendig down - biopsy proven bui - follow up with hepatology -immune for hep B, hep C negative - repeat CMP  # CRC screening -last colonoscopy July 2021, 3 mm hyperplastic polyp -s/p colonoscopy in 2024, small polyp was removed.  - Repeat colonoscopy in 5 years.

## 2024-08-23 NOTE — PHYSICAL EXAM
[Alert] : alert [Sclera] : the sclera and conjunctiva were normal [Hearing Threshold Finger Rub Not [place]] : hearing was normal [Normal Appearance] : the appearance of the neck was normal [No Respiratory Distress] : no respiratory distress [Heart Rate And Rhythm] : heart rate was normal and rhythm regular [Bowel Sounds] : normal bowel sounds [No CVA Tenderness] : no CVA  tenderness [Abnormal Walk] : normal gait [Normal Color / Pigmentation] : normal skin color and pigmentation [Oriented To Time, Place, And Person] : oriented to person, place, and time [Abdomen Tenderness] : non-tender [No Masses] : no abdominal mass palpated [Abdomen Soft] : soft

## 2024-08-23 NOTE — REVIEW OF SYSTEMS
[Fever] : no fever [Chills] : no chills [Scleral Icterus (Yellow Eyes)] : no scleral icterus [Sore Throat] : no sore throat [Chest Pain] : no chest pain [Palpitations] : no palpitations [SOB on Exertion] : no shortness of breath during exertion [Vomiting] : no vomiting [Constipation] : no constipation [Diarrhea] : no diarrhea [Melena (black stool)] : no melena [Bleeding] : no bleeding [Fecal Incontinence (soiling)] : no fecal incontinence [Limb Swelling] : no limb swelling [Jaundice (yellowing of skin)] : no jaundice [Dizziness] : no dizziness [Muscle Weakness] : no muscle weakness [Easy Bruising] : no tendency for easy bruising

## 2024-08-23 NOTE — HISTORY OF PRESENT ILLNESS
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 has no complains. denies nausea, vomiting,  wt loss Patient underwent screening colonoscopy with one benign polyp removed and underwent screening EUS that reveled fatty pancreas with no other abnormalities.  Complaints of epigastric pain not associated with food. for the last few weeks. denies heartburn

## 2024-08-28 DIAGNOSIS — E66.9 OBESITY, UNSPECIFIED: ICD-10-CM

## 2024-08-28 DIAGNOSIS — K57.90 DIVERTICULOSIS OF INTESTINE, PART UNSPECIFIED, WITHOUT PERFORATION OR ABSCESS WITHOUT BLEEDING: ICD-10-CM

## 2024-08-28 DIAGNOSIS — R10.13 EPIGASTRIC PAIN: ICD-10-CM

## 2024-08-28 DIAGNOSIS — C25.9 MALIGNANT NEOPLASM OF PANCREAS, UNSPECIFIED: ICD-10-CM

## 2024-08-28 DIAGNOSIS — K76.0 FATTY (CHANGE OF) LIVER, NOT ELSEWHERE CLASSIFIED: ICD-10-CM

## 2024-09-10 NOTE — ED ADULT NURSE NOTE - CAS EDN DISCHARGE ASSESSMENT
Spine appears normal, range of motion is not limited, no muscle or joint tenderness Alert and oriented to person, place and time

## 2024-09-17 ENCOUNTER — APPOINTMENT (OUTPATIENT)
Dept: INTERNAL MEDICINE | Facility: CLINIC | Age: 49
End: 2024-09-17
Payer: COMMERCIAL

## 2024-09-17 ENCOUNTER — OUTPATIENT (OUTPATIENT)
Dept: OUTPATIENT SERVICES | Facility: HOSPITAL | Age: 49
LOS: 1 days | End: 2024-09-17
Payer: COMMERCIAL

## 2024-09-17 VITALS
WEIGHT: 161 LBS | OXYGEN SATURATION: 95 % | TEMPERATURE: 97.9 F | HEIGHT: 56 IN | HEART RATE: 85 BPM | SYSTOLIC BLOOD PRESSURE: 125 MMHG | BODY MASS INDEX: 36.22 KG/M2 | DIASTOLIC BLOOD PRESSURE: 87 MMHG

## 2024-09-17 DIAGNOSIS — Z98.890 OTHER SPECIFIED POSTPROCEDURAL STATES: Chronic | ICD-10-CM

## 2024-09-17 DIAGNOSIS — Z00.00 ENCOUNTER FOR GENERAL ADULT MEDICAL EXAMINATION WITHOUT ABNORMAL FINDINGS: ICD-10-CM

## 2024-09-17 DIAGNOSIS — Z00.00 ENCOUNTER FOR GENERAL ADULT MEDICAL EXAMINATION W/OUT ABNORMAL FINDINGS: ICD-10-CM

## 2024-09-17 DIAGNOSIS — Z90.3 ACQUIRED ABSENCE OF STOMACH [PART OF]: Chronic | ICD-10-CM

## 2024-09-17 DIAGNOSIS — K76.0 FATTY (CHANGE OF) LIVER, NOT ELSEWHERE CLASSIFIED: ICD-10-CM

## 2024-09-17 DIAGNOSIS — E11.65 TYPE 2 DIABETES MELLITUS WITH HYPERGLYCEMIA: ICD-10-CM

## 2024-09-17 DIAGNOSIS — Z90.49 ACQUIRED ABSENCE OF OTHER SPECIFIED PARTS OF DIGESTIVE TRACT: Chronic | ICD-10-CM

## 2024-09-17 DIAGNOSIS — E66.9 OBESITY, UNSPECIFIED: ICD-10-CM

## 2024-09-17 DIAGNOSIS — E78.5 HYPERLIPIDEMIA, UNSPECIFIED: ICD-10-CM

## 2024-09-17 PROCEDURE — 99213 OFFICE O/P EST LOW 20 MIN: CPT

## 2024-09-17 NOTE — ASSESSMENT
[FreeTextEntry1] : Pt is a 47 y/o F with PMHx of obesity s/p sleeve 2022, hx of left ovarian cancer s/p WILBERT/RSO/Omentectomy 2017, dm2, HLD, NAFLD, presenting today for follow up.  #dm2 - well controlled - A1c 6 in april Low sugar, low carbohydrate diet Exercise Counseled Patient given opportunity to discuss frequency and target blood sugar levels Patient educated on symptoms of hypo/hyperglycemic events  #HLD undercontrolled - in June 2024  -On atorvastatin 40 mg daily -will increase to 80 mg daily   counselled patient on how/when to take medication and about side effects Low fat, low cholesterol diet. Discussed importance of eating a heart healthy diet Counseled on aerobic exercise and weight loss Fasting lipid panel every 3 months Treatment options and possible side effects discussed Smoking cessation discussed, if applicable Patient counseled on effects of ETOH on lipid levels  upper airway cough syndrome- presumptive diagnosis well controlled c flonase trial  Obesity; Diet/Exercise Counseled Patient was counseled on changing their diet to low fat, low carbohydrates and low cholesterol. Patient was also counseled on increasing their activity to 45 minutes of exercise daily.  HCM mammogram prescribed  Mammogram Cancer Screening; Patient counseled on the importance of a yearly mammogram screening and directed to have test performed or follow-up with OB/GYN. Failure to perform test can result in breast cancer and death - colonoscopy with GI 2024, next colono after 5 years  - routine labs prior to next visit.

## 2024-09-17 NOTE — PHYSICAL EXAM
[No Acute Distress] : no acute distress [Well Nourished] : well nourished [Well Developed] : well developed [No Respiratory Distress] : no respiratory distress  [No Accessory Muscle Use] : no accessory muscle use [Clear to Auscultation] : lungs were clear to auscultation bilaterally [Normal Rate] : normal rate  [Regular Rhythm] : with a regular rhythm [Normal S1, S2] : normal S1 and S2 [Soft] : abdomen soft [Non Tender] : non-tender [Non-distended] : non-distended [No HSM] : no HSM [Coordination Grossly Intact] : coordination grossly intact [No Focal Deficits] : no focal deficits [de-identified] : overweight

## 2024-09-17 NOTE — HISTORY OF PRESENT ILLNESS
[FreeTextEntry1] : follow up  [de-identified] : Pt is a 47 y/o F with PMHx of obesity s/p sleeve 2022, hx of left ovarian cancer s/p WILBERT/RSO/Omentectomy 2017, Pre diabetic, HLD, NAFLD, presenting today for follow up. No active complaints today.   patient declines use of  today communicates well c english

## 2024-09-17 NOTE — REVIEW OF SYSTEMS
[Negative] : Genitourinary [Fever] : no fever [Chills] : no chills [Fatigue] : no fatigue [Night Sweats] : no night sweats [Chest Pain] : no chest pain [Palpitations] : no palpitations [Lower Ext Edema] : no lower extremity edema [Orthopnea] : no orthopnea [Shortness Of Breath] : no shortness of breath [Wheezing] : no wheezing [Cough] : no cough [Abdominal Pain] : no abdominal pain [Nausea] : no nausea [Melena] : no melena

## 2024-09-17 NOTE — END OF VISIT
[] : Resident [FreeTextEntry3] : I saw the patient, examined the patient independently, reviewed medical record, and provided the medical services. Agree with resident note as personally edited above. trend lipids, labs prior to next visit flonase working well

## 2024-09-18 ENCOUNTER — APPOINTMENT (OUTPATIENT)
Dept: OPHTHALMOLOGY | Facility: CLINIC | Age: 49
End: 2024-09-18

## 2024-09-19 ENCOUNTER — APPOINTMENT (OUTPATIENT)
Dept: OTOLARYNGOLOGY | Facility: CLINIC | Age: 49
End: 2024-09-19

## 2024-09-19 ENCOUNTER — OUTPATIENT (OUTPATIENT)
Dept: OUTPATIENT SERVICES | Facility: HOSPITAL | Age: 49
LOS: 1 days | End: 2024-09-19
Payer: COMMERCIAL

## 2024-09-19 VITALS
SYSTOLIC BLOOD PRESSURE: 120 MMHG | WEIGHT: 160.94 LBS | HEART RATE: 60 BPM | OXYGEN SATURATION: 100 % | RESPIRATION RATE: 18 BRPM | DIASTOLIC BLOOD PRESSURE: 82 MMHG | HEIGHT: 57 IN | TEMPERATURE: 98 F

## 2024-09-19 DIAGNOSIS — Z90.49 ACQUIRED ABSENCE OF OTHER SPECIFIED PARTS OF DIGESTIVE TRACT: Chronic | ICD-10-CM

## 2024-09-19 DIAGNOSIS — Z90.3 ACQUIRED ABSENCE OF STOMACH [PART OF]: Chronic | ICD-10-CM

## 2024-09-19 DIAGNOSIS — Z01.818 ENCOUNTER FOR OTHER PREPROCEDURAL EXAMINATION: ICD-10-CM

## 2024-09-19 DIAGNOSIS — Z98.890 OTHER SPECIFIED POSTPROCEDURAL STATES: Chronic | ICD-10-CM

## 2024-09-19 DIAGNOSIS — L98.7 EXCESSIVE AND REDUNDANT SKIN AND SUBCUTANEOUS TISSUE: ICD-10-CM

## 2024-09-19 LAB
A1C WITH ESTIMATED AVERAGE GLUCOSE RESULT: 5.8 % — HIGH (ref 4–5.6)
ALBUMIN SERPL ELPH-MCNC: 4.7 G/DL — SIGNIFICANT CHANGE UP (ref 3.5–5.2)
ALP SERPL-CCNC: 145 U/L — HIGH (ref 30–115)
ALT FLD-CCNC: 26 U/L — SIGNIFICANT CHANGE UP (ref 0–41)
ANION GAP SERPL CALC-SCNC: 17 MMOL/L — HIGH (ref 7–14)
AST SERPL-CCNC: 24 U/L — SIGNIFICANT CHANGE UP (ref 0–41)
BASOPHILS # BLD AUTO: 0.03 K/UL — SIGNIFICANT CHANGE UP (ref 0–0.2)
BASOPHILS NFR BLD AUTO: 0.5 % — SIGNIFICANT CHANGE UP (ref 0–1)
BILIRUB SERPL-MCNC: 0.5 MG/DL — SIGNIFICANT CHANGE UP (ref 0.2–1.2)
BUN SERPL-MCNC: 19 MG/DL — SIGNIFICANT CHANGE UP (ref 10–20)
CALCIUM SERPL-MCNC: 9.4 MG/DL — SIGNIFICANT CHANGE UP (ref 8.4–10.5)
CHLORIDE SERPL-SCNC: 106 MMOL/L — SIGNIFICANT CHANGE UP (ref 98–110)
CO2 SERPL-SCNC: 20 MMOL/L — SIGNIFICANT CHANGE UP (ref 17–32)
CREAT SERPL-MCNC: 0.6 MG/DL — LOW (ref 0.7–1.5)
EGFR: 111 ML/MIN/1.73M2 — SIGNIFICANT CHANGE UP
EOSINOPHIL # BLD AUTO: 0.09 K/UL — SIGNIFICANT CHANGE UP (ref 0–0.7)
EOSINOPHIL NFR BLD AUTO: 1.6 % — SIGNIFICANT CHANGE UP (ref 0–8)
ESTIMATED AVERAGE GLUCOSE: 120 MG/DL — HIGH (ref 68–114)
GLUCOSE SERPL-MCNC: 82 MG/DL — SIGNIFICANT CHANGE UP (ref 70–99)
HCT VFR BLD CALC: 38.5 % — SIGNIFICANT CHANGE UP (ref 37–47)
HGB BLD-MCNC: 13 G/DL — SIGNIFICANT CHANGE UP (ref 12–16)
IMM GRANULOCYTES NFR BLD AUTO: 0.2 % — SIGNIFICANT CHANGE UP (ref 0.1–0.3)
LYMPHOCYTES # BLD AUTO: 2.21 K/UL — SIGNIFICANT CHANGE UP (ref 1.2–3.4)
LYMPHOCYTES # BLD AUTO: 39.3 % — SIGNIFICANT CHANGE UP (ref 20.5–51.1)
MCHC RBC-ENTMCNC: 29.7 PG — SIGNIFICANT CHANGE UP (ref 27–31)
MCHC RBC-ENTMCNC: 33.8 G/DL — SIGNIFICANT CHANGE UP (ref 32–37)
MCV RBC AUTO: 88.1 FL — SIGNIFICANT CHANGE UP (ref 81–99)
MONOCYTES # BLD AUTO: 0.4 K/UL — SIGNIFICANT CHANGE UP (ref 0.1–0.6)
MONOCYTES NFR BLD AUTO: 7.1 % — SIGNIFICANT CHANGE UP (ref 1.7–9.3)
NEUTROPHILS # BLD AUTO: 2.89 K/UL — SIGNIFICANT CHANGE UP (ref 1.4–6.5)
NEUTROPHILS NFR BLD AUTO: 51.3 % — SIGNIFICANT CHANGE UP (ref 42.2–75.2)
NRBC # BLD: 0 /100 WBCS — SIGNIFICANT CHANGE UP (ref 0–0)
PLATELET # BLD AUTO: 301 K/UL — SIGNIFICANT CHANGE UP (ref 130–400)
PMV BLD: 11.2 FL — HIGH (ref 7.4–10.4)
POTASSIUM SERPL-MCNC: 4.4 MMOL/L — SIGNIFICANT CHANGE UP (ref 3.5–5)
POTASSIUM SERPL-SCNC: 4.4 MMOL/L — SIGNIFICANT CHANGE UP (ref 3.5–5)
PROT SERPL-MCNC: 7.3 G/DL — SIGNIFICANT CHANGE UP (ref 6–8)
RBC # BLD: 4.37 M/UL — SIGNIFICANT CHANGE UP (ref 4.2–5.4)
RBC # FLD: 13.8 % — SIGNIFICANT CHANGE UP (ref 11.5–14.5)
SODIUM SERPL-SCNC: 143 MMOL/L — SIGNIFICANT CHANGE UP (ref 135–146)
WBC # BLD: 5.63 K/UL — SIGNIFICANT CHANGE UP (ref 4.8–10.8)
WBC # FLD AUTO: 5.63 K/UL — SIGNIFICANT CHANGE UP (ref 4.8–10.8)

## 2024-09-19 PROCEDURE — 83036 HEMOGLOBIN GLYCOSYLATED A1C: CPT

## 2024-09-19 PROCEDURE — 99214 OFFICE O/P EST MOD 30 MIN: CPT | Mod: 25

## 2024-09-19 PROCEDURE — 80053 COMPREHEN METABOLIC PANEL: CPT

## 2024-09-19 PROCEDURE — 93005 ELECTROCARDIOGRAM TRACING: CPT

## 2024-09-19 PROCEDURE — 36415 COLL VENOUS BLD VENIPUNCTURE: CPT

## 2024-09-19 PROCEDURE — 85025 COMPLETE CBC W/AUTO DIFF WBC: CPT

## 2024-09-19 PROCEDURE — 93010 ELECTROCARDIOGRAM REPORT: CPT

## 2024-09-19 NOTE — H&P PST ADULT - HISTORY OF PRESENT ILLNESS
Patient is a 48 year old female presenting to PAST in preparation for ISSAC DIAZ PANNICULECTOMY on  10/3 under gen anesthesia by Dr. Donnelly  PATIENT CURRENTLY DENIES CHEST PAIN  SHORTNESS OF BREATH  PALPITATIONS,  DYSURIA, OR UPPER RESPIRATORY INFECTION IN PAST 2 WEEKS    Anesthesia Alert  NO--Difficult Airway  NO--History of neck surgery or radiation  NO--Limited ROM of neck  NO--History of Malignant hyperthermia  NO--Personal or family history of Pseudocholinesterase deficiency  NO--Prior Anesthesia Complication  NO--Latex Allergy  NO--Loose teeth  NO--History of Rheumatoid Arthritis  NO--KAN  NO-- BLEEDING RISK  NO--Other_____    As per patient, this is their complete medical and surgical history, including medications both prescribed or over the counter.  Patient verbalized understanding of instructions and was given the opportunity to ask questions and have them answered.   Patient is a 48 year old female presenting to PAST in preparation for ISSAC DIAZ PANNICULECTOMY on  10/3 under gen anesthesia by Dr. Donnelly  Reports h/o excess skin to abdomen, was advised to have above  PATIENT CURRENTLY DENIES CHEST PAIN  SHORTNESS OF BREATH  PALPITATIONS,  DYSURIA, OR UPPER RESPIRATORY INFECTION IN PAST 2 WEEKS    Anesthesia Alert  NO--Difficult Airway  NO--History of neck surgery or radiation  NO--Limited ROM of neck  NO--History of Malignant hyperthermia  NO--Personal or family history of Pseudocholinesterase deficiency  NO--Prior Anesthesia Complication  NO--Latex Allergy  NO--Loose teeth  NO--History of Rheumatoid Arthritis  yes--KAN ( does not use c-pap)  NO-- BLEEDING RISK  NO--Other_____      Duke Activity Status Index (DASI) from [facility]  on 9/19/2024      RESULT SUMMARY:  58.2 points  The higher the score (maximum 58.2), the higher the functional status.    9.89 METs        INPUTS:  Take care of self —> 2.75 = Yes  Walk indoors —> 1.75 = Yes  Walk 1&ndash;2 blocks on level ground —> 2.75 = Yes  Climb a flight of stairs or walk up a hill —> 5.5 = Yes  Run a short distance —> 8 = Yes  Do light work around the house —> 2.7 = Yes  Do moderate work around the house —> 3.5 = Yes  Do heavy work around the house —> 8 = Yes  Do yardwork —> 4.5 = Yes  Have sexual relations —> 5.25 = Yes  Participate in moderate recreational activities —> 6 = Yes  Participate in strenuous sports —> 7.5 = Yes        Revised Cardiac Risk Index for Pre-Operative Risk from [facility]  on 9/19/2024      RESULT SUMMARY:  0 points  Class I Risk    3.9 %  30-day risk of death, MI, or cardiac arrest    From Duceppe 2017. These numbers are higher than those from the original study (Gianfranco 1999). See Evidence for details.      INPUTS:  Elevated-risk surgery —> 0 = No  History of ischemic heart disease —> 0 = No  History of congestive heart failure —> 0 = No  History of cerebrovascular disease —> 0 = No  Pre-operative treatment with insulin —> 0 = No  Pre-operative creatinine >2 mg/dL / 176.8 µmol/L —> 0 = No        As per patient, this is their complete medical and surgical history, including medications both prescribed or over the counter.  Patient verbalized understanding of instructions and was given the opportunity to ask questions and have them answered.

## 2024-09-20 DIAGNOSIS — Z01.818 ENCOUNTER FOR OTHER PREPROCEDURAL EXAMINATION: ICD-10-CM

## 2024-09-20 DIAGNOSIS — L98.7 EXCESSIVE AND REDUNDANT SKIN AND SUBCUTANEOUS TISSUE: ICD-10-CM

## 2024-09-25 RX ORDER — GABAPENTIN 300 MG/1
300 CAPSULE ORAL
Qty: 7 | Refills: 0 | Status: ACTIVE | COMMUNITY
Start: 2024-09-25 | End: 1900-01-01

## 2024-09-27 DIAGNOSIS — E78.5 HYPERLIPIDEMIA, UNSPECIFIED: ICD-10-CM

## 2024-09-27 DIAGNOSIS — K76.0 FATTY (CHANGE OF) LIVER, NOT ELSEWHERE CLASSIFIED: ICD-10-CM

## 2024-09-27 DIAGNOSIS — E66.9 OBESITY, UNSPECIFIED: ICD-10-CM

## 2024-09-27 DIAGNOSIS — Z00.00 ENCOUNTER FOR GENERAL ADULT MEDICAL EXAMINATION WITHOUT ABNORMAL FINDINGS: ICD-10-CM

## 2024-09-27 DIAGNOSIS — E11.65 TYPE 2 DIABETES MELLITUS WITH HYPERGLYCEMIA: ICD-10-CM

## 2024-10-03 ENCOUNTER — APPOINTMENT (OUTPATIENT)
Dept: PLASTIC SURGERY | Facility: AMBULATORY SURGERY CENTER | Age: 49
End: 2024-10-03

## 2024-10-10 ENCOUNTER — APPOINTMENT (OUTPATIENT)
Dept: PLASTIC SURGERY | Facility: CLINIC | Age: 49
End: 2024-10-10

## 2024-10-12 NOTE — ASU PREOP CHECKLIST - HAIR REMOVAL
Pharmacokinetic Assessment Follow Up: IV Vancomycin    Vancomycin serum concentration assessment(s):    The random level was drawn correctly and can be used to guide therapy at this time. The measurement is above the desired definitive target range of 10 to 20 mcg/mL.    Vancomycin Regimen Plan:  No vancomycin dose today  Discontinue the scheduled vancomycin regimen and re-dose when the random level is less than 20 mcg/mL, next level to be drawn at 03:00 on 10/13/24..    Drug levels (last 3 results):  Recent Labs   Lab Result Units 10/10/24  0305 10/11/24  0421 10/12/24  0325   Vancomycin, Random ug/mL 22.1 19.4 22.0       Pharmacy will continue to follow and monitor vancomycin.    Please contact pharmacy at extension [phone] for questions regarding this assessment.    Thank you for the consult,   Molly Encinas       Patient brief summary:  Ar Sharma is a 64 y.o. male initiated on antimicrobial therapy with IV Vancomycin for treatment of  pneumonia      Drug Allergies:   Review of patient's allergies indicates:   Allergen Reactions    Lokelma [sodium zirconium cyclosilicate] Other (See Comments)     Fluid retention, weight gain, CHF excerebration, severe constipation    Atorvastatin Other (See Comments)     Joint pain    Jardiance [empagliflozin] Other (See Comments)     Chest pains, significant weight loss, lower blood pressure       Actual Body Weight:   80.7    Renal Function:   Estimated Creatinine Clearance: 12.4 mL/min (A) (based on SCr of 6 mg/dL (H)).,     Dialysis Method (if applicable):  intermittent HD    CBC (last 72 hours):  Recent Labs   Lab Result Units 10/10/24  0305 10/11/24  0421 10/11/24  1637 10/12/24  0325   WBC K/uL 20.75* 20.95* 31.56* 33.99*   Hemoglobin g/dL 9.9* 10.4* 11.0* 10.6*   Hematocrit % 29.2* 29.5* 31.5* 29.8*   Platelets K/uL 302 352 400 311   Gran % % 77.0* 77.2* 73.0 87.4*   Lymph % % 5.1* 4.2* 1.0* 1.4*   Mono % % 12.1 12.2 12.0 6.6   Eosinophil % % 2.1 1.9 1.0 0.1    Basophil % % 0.9 0.4 0.0 0.3   Differential Method  Automated Automated Manual Automated       Metabolic Panel (last 72 hours):  Recent Labs   Lab Result Units 10/10/24  0305 10/11/24  0421   Sodium mmol/L 131* 126*   Potassium mmol/L 4.2 4.8   Chloride mmol/L 97 94*   CO2 mmol/L 23 20*   Glucose mg/dL 127* 114*   BUN mg/dL 39* 55*   Creatinine mg/dL 4.7* 6.0*   Albumin g/dL 2.4* 2.4*   Total Bilirubin mg/dL  --  2.5*   Alkaline Phosphatase U/L  --  204*   AST U/L  --  110*   ALT U/L  --  29   Phosphorus mg/dL 4.1  --        Vancomycin Administrations:  vancomycin given in the last 96 hours                     vancomycin (VANCOCIN) 500 mg in D5W 100 mL IVPB (MB+) ()  Restarted 10/11/24 1606     500 mg New Bag  1604    vancomycin 750 mg in D5W 250 mL IVPB (admixture device) (mg) 750 mg New Bag 10/09/24 0536                    Microbiologic Results:  Microbiology Results (last 7 days)       Procedure Component Value Units Date/Time    Blood culture [[id number]] Collected: 10/09/24 0915    Order Status: Completed Specimen: Blood Updated: 10/11/24 1103     Blood Culture, Routine No Growth to date      No Growth to date      No Growth to date    Narrative:      Draw sample # 2 from separate site    Blood culture [[id number]] Collected: 10/09/24 0930    Order Status: Completed Specimen: Blood Updated: 10/11/24 1103     Blood Culture, Routine No Growth to date      No Growth to date      No Growth to date    Narrative:      Draw sample # 2 from separate site    Fungus Culture, Blood or Bone Marrow [[id number]]     Order Status: Canceled Specimen: Blood     Blood culture [[id number]] Collected: 10/05/24 1612    Order Status: Completed Specimen: Blood from Peripheral, Hand, Left Updated: 10/09/24 1703     Blood Culture, Routine No Growth after 4 days.    Blood culture [[id number]] Collected: 10/05/24 1556    Order Status: Completed Specimen: Blood from Peripheral, Wrist, Left Updated: 10/09/24 1703     Blood Culture,  Routine No Growth after 4 days.           hair removal not indicated

## 2024-10-15 ENCOUNTER — APPOINTMENT (OUTPATIENT)
Dept: PODIATRY | Facility: CLINIC | Age: 49
End: 2024-10-15

## 2024-10-15 VITALS
HEART RATE: 73 BPM | TEMPERATURE: 96.8 F | OXYGEN SATURATION: 98 % | WEIGHT: 160 LBS | HEIGHT: 56 IN | BODY MASS INDEX: 35.99 KG/M2

## 2024-10-15 DIAGNOSIS — S86.311S STRAIN OF MUSCLE(S) AND TENDON(S) OF PERONEAL MUSCLE GROUP AT LOWER LEG LEVEL, RIGHT LEG, SEQUELA: ICD-10-CM

## 2024-10-15 PROCEDURE — 99213 OFFICE O/P EST LOW 20 MIN: CPT

## 2024-11-05 ENCOUNTER — APPOINTMENT (OUTPATIENT)
Dept: GYNECOLOGIC ONCOLOGY | Facility: CLINIC | Age: 49
End: 2024-11-05

## 2024-11-07 ENCOUNTER — APPOINTMENT (OUTPATIENT)
Age: 49
End: 2024-11-07

## 2024-11-18 ENCOUNTER — NON-APPOINTMENT (OUTPATIENT)
Age: 49
End: 2024-11-18

## 2024-11-22 ENCOUNTER — NON-APPOINTMENT (OUTPATIENT)
Age: 49
End: 2024-11-22

## 2024-11-29 ENCOUNTER — RESULT REVIEW (OUTPATIENT)
Age: 49
End: 2024-11-29

## 2024-11-29 ENCOUNTER — OUTPATIENT (OUTPATIENT)
Dept: OUTPATIENT SERVICES | Facility: HOSPITAL | Age: 49
LOS: 1 days | End: 2024-11-29
Payer: COMMERCIAL

## 2024-11-29 DIAGNOSIS — Z98.890 OTHER SPECIFIED POSTPROCEDURAL STATES: Chronic | ICD-10-CM

## 2024-11-29 DIAGNOSIS — M76.61 ACHILLES TENDINITIS, RIGHT LEG: ICD-10-CM

## 2024-11-29 DIAGNOSIS — Z90.49 ACQUIRED ABSENCE OF OTHER SPECIFIED PARTS OF DIGESTIVE TRACT: Chronic | ICD-10-CM

## 2024-11-29 DIAGNOSIS — Z00.8 ENCOUNTER FOR OTHER GENERAL EXAMINATION: ICD-10-CM

## 2024-11-29 DIAGNOSIS — Z90.3 ACQUIRED ABSENCE OF STOMACH [PART OF]: Chronic | ICD-10-CM

## 2024-11-29 PROCEDURE — 73721 MRI JNT OF LWR EXTRE W/O DYE: CPT | Mod: RT

## 2024-11-29 PROCEDURE — 73721 MRI JNT OF LWR EXTRE W/O DYE: CPT | Mod: 26,RT

## 2024-11-30 DIAGNOSIS — M76.61 ACHILLES TENDINITIS, RIGHT LEG: ICD-10-CM

## 2024-12-06 ENCOUNTER — OUTPATIENT (OUTPATIENT)
Dept: OUTPATIENT SERVICES | Facility: HOSPITAL | Age: 49
LOS: 1 days | End: 2024-12-06
Payer: COMMERCIAL

## 2024-12-06 VITALS
HEIGHT: 56 IN | HEART RATE: 70 BPM | WEIGHT: 160.94 LBS | OXYGEN SATURATION: 96 % | RESPIRATION RATE: 19 BRPM | DIASTOLIC BLOOD PRESSURE: 80 MMHG | TEMPERATURE: 98 F | SYSTOLIC BLOOD PRESSURE: 122 MMHG

## 2024-12-06 DIAGNOSIS — Z98.890 OTHER SPECIFIED POSTPROCEDURAL STATES: Chronic | ICD-10-CM

## 2024-12-06 DIAGNOSIS — Z90.49 ACQUIRED ABSENCE OF OTHER SPECIFIED PARTS OF DIGESTIVE TRACT: Chronic | ICD-10-CM

## 2024-12-06 DIAGNOSIS — L98.7 EXCESSIVE AND REDUNDANT SKIN AND SUBCUTANEOUS TISSUE: ICD-10-CM

## 2024-12-06 DIAGNOSIS — Z01.818 ENCOUNTER FOR OTHER PREPROCEDURAL EXAMINATION: ICD-10-CM

## 2024-12-06 DIAGNOSIS — Z90.3 ACQUIRED ABSENCE OF STOMACH [PART OF]: Chronic | ICD-10-CM

## 2024-12-06 PROCEDURE — 99214 OFFICE O/P EST MOD 30 MIN: CPT | Mod: 25

## 2024-12-06 NOTE — H&P PST ADULT - REASON FOR ADMISSION
49 year old female presenting to PAST in preparation for ISSAC DIAZ PANNICULECTOMY on 12/24/24 under gen anesthesia by Dr. Donnelly

## 2024-12-06 NOTE — H&P PST ADULT - HISTORY OF PRESENT ILLNESS
49 year old female presenting to PAST in preparation for ISSAC DIAZ PANNICULECTOMY on 12/24/24 under gen anesthesia by Dr. Donnelly    PATIENT / GUARDIAN CURRENTLY DENIES CHEST PAIN  SHORTNESS OF BREATH  PALPITATIONS,  DYSURIA, OR UPPER RESPIRATORY INFECTION IN PAST 2 WEEKS  Patient / Guardian verbalized understanding of instructions and was given the opportunity to ask questions and have them answered.  As per patient, this is their complete medical and surgical history, including medications both prescribed or over the counter.  written and verbal instructions with teach back on chlorhexidine shampoo provided,  pt verbalized understanding with returned demonstration    Anesthesia Alert  NO--Difficult Airway  NO--History of neck surgery or radiation  NO--Limited ROM of neck  NO--History of Malignant hyperthermia  NO--Personal or family history of Pseudocholinesterase deficiency.  NO--Prior Anesthesia Complication  NO--Latex Allergy  NO--Loose teeth  NO--History of Rheumatoid Arthritis  NO--KAN  NO--Bleeding risk  NO--Other_____  Mallampati airway: Class III     Duke Activity Status Index (DASI) from [email or web address]  on 12/6/2024  ** All calculations should be rechecked by clinician prior to use **    RESULT SUMMARY:  58.2 points  The higher the score (maximum 58.2), the higher the functional status.    9.89 METs        INPUTS:  Take care of self —> 2.75 = Yes  Walk indoors —> 1.75 = Yes  Walk 1&ndash;2 blocks on level ground —> 2.75 = Yes  Climb a flight of stairs or walk up a hill —> 5.5 = Yes  Run a short distance —> 8 = Yes  Do light work around the house —> 2.7 = Yes  Do moderate work around the house —> 3.5 = Yes  Do heavy work around the house —> 8 = Yes  Do yardwork —> 4.5 = Yes  Have sexual relations —> 5.25 = Yes  Participate in moderate recreational activities —> 6 = Yes  Participate in strenuous sports —> 7.5 = Yes    Revised Cardiac Risk Index for Pre-Operative Risk from [email or web address]  on 12/6/2024  ** All calculations should be rechecked by clinician prior to use **    RESULT SUMMARY:  0 points  RCRI Score    3.9 %  Risk of major cardiac event      INPUTS:  High-risk surgery —> 0 = No  History of ischemic heart disease —> 0 = No  History of congestive heart failure —> 0 = No  History of cerebrovascular disease —> 0 = No  Pre-operative treatment with insulin —> 0 = No  Pre-operative creatinine >2 mg/dL / 176.8 µmol/L —> 0 = No

## 2024-12-07 DIAGNOSIS — Z01.818 ENCOUNTER FOR OTHER PREPROCEDURAL EXAMINATION: ICD-10-CM

## 2024-12-07 DIAGNOSIS — L98.7 EXCESSIVE AND REDUNDANT SKIN AND SUBCUTANEOUS TISSUE: ICD-10-CM

## 2024-12-13 ENCOUNTER — APPOINTMENT (OUTPATIENT)
Dept: PODIATRY | Facility: AMBULATORY SURGERY CENTER | Age: 49
End: 2024-12-13

## 2024-12-16 DIAGNOSIS — M79.2 NEURALGIA AND NEURITIS, UNSPECIFIED: ICD-10-CM

## 2024-12-16 RX ORDER — GABAPENTIN 300 MG/1
300 CAPSULE ORAL
Qty: 7 | Refills: 0 | Status: ACTIVE | COMMUNITY
Start: 2024-12-16 | End: 1900-01-01

## 2024-12-23 NOTE — ASU PATIENT PROFILE, ADULT - FALL HARM RISK - UNIVERSAL INTERVENTIONS
Bed in lowest position, wheels locked, appropriate side rails in place/Call bell, personal items and telephone in reach/Instruct patient to call for assistance before getting out of bed or chair/Non-slip footwear when patient is out of bed/[employer] to call system/Physically safe environment - no spills, clutter or unnecessary equipment/Purposeful Proactive Rounding/Room/bathroom lighting operational, light cord in reach

## 2024-12-24 ENCOUNTER — TRANSCRIPTION ENCOUNTER (OUTPATIENT)
Age: 49
End: 2024-12-24

## 2024-12-24 ENCOUNTER — RESULT REVIEW (OUTPATIENT)
Age: 49
End: 2024-12-24

## 2024-12-24 ENCOUNTER — APPOINTMENT (OUTPATIENT)
Dept: PLASTIC SURGERY | Facility: AMBULATORY SURGERY CENTER | Age: 49
End: 2024-12-24
Payer: COMMERCIAL

## 2024-12-24 ENCOUNTER — INPATIENT (INPATIENT)
Facility: HOSPITAL | Age: 49
LOS: 0 days | Discharge: HOME | DRG: 385 | End: 2024-12-25
Attending: SURGERY | Admitting: SURGERY
Payer: COMMERCIAL

## 2024-12-24 VITALS
OXYGEN SATURATION: 97 % | WEIGHT: 160.94 LBS | TEMPERATURE: 98 F | DIASTOLIC BLOOD PRESSURE: 92 MMHG | HEART RATE: 70 BPM | SYSTOLIC BLOOD PRESSURE: 155 MMHG | RESPIRATION RATE: 19 BRPM | HEIGHT: 56 IN

## 2024-12-24 DIAGNOSIS — Z98.890 OTHER SPECIFIED POSTPROCEDURAL STATES: Chronic | ICD-10-CM

## 2024-12-24 DIAGNOSIS — Z90.3 ACQUIRED ABSENCE OF STOMACH [PART OF]: Chronic | ICD-10-CM

## 2024-12-24 DIAGNOSIS — L98.7 EXCESSIVE AND REDUNDANT SKIN AND SUBCUTANEOUS TISSUE: ICD-10-CM

## 2024-12-24 DIAGNOSIS — Z90.49 ACQUIRED ABSENCE OF OTHER SPECIFIED PARTS OF DIGESTIVE TRACT: Chronic | ICD-10-CM

## 2024-12-24 LAB
HCT VFR BLD CALC: 28.4 % — LOW (ref 37–47)
HGB BLD-MCNC: 9.4 G/DL — LOW (ref 12–16)
MCHC RBC-ENTMCNC: 29.7 PG — SIGNIFICANT CHANGE UP (ref 27–31)
MCHC RBC-ENTMCNC: 33.1 G/DL — SIGNIFICANT CHANGE UP (ref 32–37)
MCV RBC AUTO: 89.9 FL — SIGNIFICANT CHANGE UP (ref 81–99)
NRBC # BLD: 0 /100 WBCS — SIGNIFICANT CHANGE UP (ref 0–0)
PLATELET # BLD AUTO: 242 K/UL — SIGNIFICANT CHANGE UP (ref 130–400)
PMV BLD: 11 FL — HIGH (ref 7.4–10.4)
RBC # BLD: 3.16 M/UL — LOW (ref 4.2–5.4)
RBC # FLD: 13.9 % — SIGNIFICANT CHANGE UP (ref 11.5–14.5)
WBC # BLD: 10.54 K/UL — SIGNIFICANT CHANGE UP (ref 4.8–10.8)
WBC # FLD AUTO: 10.54 K/UL — SIGNIFICANT CHANGE UP (ref 4.8–10.8)

## 2024-12-24 PROCEDURE — 15830 EXC EXCESSIVE SKIN ABDOMEN: CPT | Mod: 22

## 2024-12-24 PROCEDURE — 85027 COMPLETE CBC AUTOMATED: CPT

## 2024-12-24 PROCEDURE — 36415 COLL VENOUS BLD VENIPUNCTURE: CPT

## 2024-12-24 PROCEDURE — 88302 TISSUE EXAM BY PATHOLOGIST: CPT | Mod: 26

## 2024-12-24 PROCEDURE — C9399: CPT

## 2024-12-24 PROCEDURE — 49591 RPR AA HRN 1ST < 3 CM RDC: CPT

## 2024-12-24 RX ORDER — OXYCODONE HCL 15 MG
5 TABLET ORAL ONCE
Refills: 0 | Status: DISCONTINUED | OUTPATIENT
Start: 2024-12-24 | End: 2024-12-24

## 2024-12-24 RX ORDER — ONDANSETRON 4 MG/1
4 TABLET ORAL ONCE
Refills: 0 | Status: COMPLETED | OUTPATIENT
Start: 2024-12-24 | End: 2024-12-24

## 2024-12-24 RX ORDER — GABAPENTIN 300 MG/1
300 CAPSULE ORAL EVERY 8 HOURS
Refills: 0 | Status: DISCONTINUED | OUTPATIENT
Start: 2024-12-24 | End: 2024-12-25

## 2024-12-24 RX ORDER — HYDROMORPHONE HCL 4 MG
0.5 TABLET ORAL
Refills: 0 | Status: DISCONTINUED | OUTPATIENT
Start: 2024-12-24 | End: 2024-12-25

## 2024-12-24 RX ORDER — ONDANSETRON 4 MG/1
1 TABLET ORAL
Qty: 12 | Refills: 0
Start: 2024-12-24 | End: 2024-12-26

## 2024-12-24 RX ORDER — SODIUM CHLORIDE 9 MG/ML
1000 INJECTION, SOLUTION INTRAVENOUS
Refills: 0 | Status: DISCONTINUED | OUTPATIENT
Start: 2024-12-24 | End: 2024-12-24

## 2024-12-24 RX ORDER — SODIUM CHLORIDE 9 MG/ML
1000 INJECTION, SOLUTION INTRAVENOUS
Refills: 0 | Status: DISCONTINUED | OUTPATIENT
Start: 2024-12-24 | End: 2024-12-25

## 2024-12-24 RX ORDER — DOXYCYCLINE MONOHYDRATE 100 MG
1 TABLET ORAL
Qty: 14 | Refills: 0
Start: 2024-12-24 | End: 2024-12-30

## 2024-12-24 RX ORDER — SODIUM CHLORIDE 9 MG/ML
1000 INJECTION, SOLUTION INTRAMUSCULAR; INTRAVENOUS; SUBCUTANEOUS
Refills: 0 | Status: DISCONTINUED | OUTPATIENT
Start: 2024-12-24 | End: 2024-12-24

## 2024-12-24 RX ORDER — OXYCODONE AND ACETAMINOPHEN 5; 325 MG/1; MG/1
2 TABLET ORAL ONCE
Refills: 0 | Status: DISCONTINUED | OUTPATIENT
Start: 2024-12-24 | End: 2024-12-24

## 2024-12-24 RX ORDER — SCOPOLAMINE 1.5 MG/1
1 PATCH, EXTENDED RELEASE TRANSDERMAL
Refills: 0 | Status: COMPLETED | OUTPATIENT
Start: 2024-12-24 | End: 2024-12-24

## 2024-12-24 RX ORDER — APREPITANT 40 MG/1
40 CAPSULE ORAL ONCE
Refills: 0 | Status: COMPLETED | OUTPATIENT
Start: 2024-12-24 | End: 2024-12-24

## 2024-12-24 RX ORDER — ACETAMINOPHEN 80 MG/.8ML
975 SOLUTION/ DROPS ORAL EVERY 6 HOURS
Refills: 0 | Status: DISCONTINUED | OUTPATIENT
Start: 2024-12-24 | End: 2024-12-25

## 2024-12-24 RX ORDER — ENOXAPARIN SODIUM 60 MG/.6ML
40 INJECTION INTRAVENOUS; SUBCUTANEOUS ONCE
Refills: 0 | Status: COMPLETED | OUTPATIENT
Start: 2024-12-24 | End: 2024-12-24

## 2024-12-24 RX ORDER — TRAMADOL HYDROCHLORIDE 50 MG/1
1 TABLET ORAL
Qty: 12 | Refills: 0
Start: 2024-12-24 | End: 2024-12-26

## 2024-12-24 RX ORDER — CEFAZOLIN SODIUM 1 G
2000 VIAL (EA) INJECTION EVERY 8 HOURS
Refills: 0 | Status: DISCONTINUED | OUTPATIENT
Start: 2024-12-24 | End: 2024-12-25

## 2024-12-24 RX ORDER — MEPERIDINE HYDROCHLORIDE 50 MG/ML
12.5 INJECTION, SOLUTION INTRAMUSCULAR; INTRAVENOUS; SUBCUTANEOUS ONCE
Refills: 0 | Status: DISCONTINUED | OUTPATIENT
Start: 2024-12-24 | End: 2024-12-25

## 2024-12-24 RX ADMIN — APREPITANT 40 MILLIGRAM(S): 40 CAPSULE ORAL at 07:32

## 2024-12-24 RX ADMIN — Medication 100 MILLIGRAM(S): at 21:22

## 2024-12-24 RX ADMIN — ONDANSETRON 4 MILLIGRAM(S): 4 TABLET ORAL at 13:41

## 2024-12-24 RX ADMIN — SODIUM CHLORIDE 100 MILLILITER(S): 9 INJECTION, SOLUTION INTRAVENOUS at 16:00

## 2024-12-24 RX ADMIN — Medication 5 MILLIGRAM(S): at 22:42

## 2024-12-24 RX ADMIN — ONDANSETRON 4 MILLIGRAM(S): 4 TABLET ORAL at 17:26

## 2024-12-24 RX ADMIN — ENOXAPARIN SODIUM 40 MILLIGRAM(S): 60 INJECTION INTRAVENOUS; SUBCUTANEOUS at 07:32

## 2024-12-24 RX ADMIN — SODIUM CHLORIDE 100 MILLILITER(S): 9 INJECTION, SOLUTION INTRAVENOUS at 12:34

## 2024-12-24 RX ADMIN — Medication 0.5 MILLIGRAM(S): at 13:13

## 2024-12-24 RX ADMIN — SCOPOLAMINE 1 PATCH: 1.5 PATCH, EXTENDED RELEASE TRANSDERMAL at 07:32

## 2024-12-24 NOTE — BRIEF OPERATIVE NOTE - OPERATION/FINDINGS
panniculectomy with rectus diastasis repair  intraop consult to general surgery for fascial defect of ab wall

## 2024-12-24 NOTE — ASU DISCHARGE PLAN (ADULT/PEDIATRIC) - NS MD DC FALL RISK RISK
For information on Fall & Injury Prevention, visit: [email or web address] OR  [email or web address] OR  https://www.cdc.gov/steadi/patient.html

## 2024-12-24 NOTE — ASU DISCHARGE PLAN (ADULT/PEDIATRIC) - ASU DC SPECIAL INSTRUCTIONSFT
keep binder on and dry untill follow up.     beach chair position while seated    tylenol, gabapentin for pain. Tramadol PRN. no NSAIDs    abx as perscribed    follow up 1 week

## 2024-12-24 NOTE — CHART NOTE - NSCHARTNOTEFT_GEN_A_CORE
Post Operative Note  Patient: ALFIE MOSLEY 49y (1975) Female   MRN: [id number]  Location: [address]  Visit: 12-24-24 Inpatient  Date: 12-24-24 @ 20:31    Assessment:  49yF s/p panniculectomy with rectus diastasis repair with intraop consult to general surgery for fascial defect of ab wall s/p ventral hernia repair.   post op patient was hypotensive in pacu with SBP in the 90s. small palpable hematoma on the left side of the abdomen was noted post op.  patient was admitted for observation overnight and hemodynamic monitoring       Plan:  - Monitor vitals q2 hours  - Monitor post-op labs, hemoglobin 9.4 post op, follow up AM CBC  - Continue Pain Medications   - Encourage ambulation as tolerated  - Monitor urine output  - Monitor wound and dressing for any changes,   - clears tonight and NPO after midnight       Date/Time: 12-24-24 @ 20:31 Post Operative Note  Patient: ALFIE MOSLEY 49y (1975) Female   MRN: [id number]  Location: [address]  Visit: 12-24-24 Inpatient  Date: 12-24-24 @ 20:31    Assessment:  49yF s/p panniculectomy with rectus diastasis repair with intraop consult to general surgery for fascial defect of ab wall s/p ventral hernia repair.   post op patient was hypotensive in pacu with SBP in the 90s. small palpable hematoma on the left side of the abdomen was noted post op.  patient was admitted for observation overnight and hemodynamic monitoring       Plan:  - Monitor vitals q2 hours  - Monitor post-op labs, hemoglobin 9.4 post op, follow up AM CBC  - Continue Pain Medications   - Encourage ambulation as tolerated  - Monitor urine output  - Monitor wound and dressing for any changes,   - clears tonight and NPO after midnight   - monitor bárbara drain outputs       Date/Time: 12-24-24 @ 20:31

## 2024-12-24 NOTE — CHART NOTE - NSCHARTNOTEFT_GEN_A_CORE
ANESTHESIA to PACU NOTE      ____ Intubated  TV:______       Rate: ______      FiO2: ______    ___x_ Patent Airway    ___x_ Full return of protective reflexes    ____ Full recovery from anesthesia / sedation to baseline status    Vitals:  HR 88  /68  RR 15  O2sat. 98%  Temp: 36.6C      Mental Status:  __x__ Awake   ___x__ Alert   _____ Drowsy   _____ Sedated    Nausea/Vomiting: ____ Yes, See Post - Op Orders      __x__ No    Pain Scale (0-10): _____    Treatment: ___x_ None    ____ See Post - Op/PCA Orders    Post - Operative Fluids:   ____ Oral   __x__ See Post - Op Orders    Plan:  Discharge to:   ___x_Home       _____Floor      _____Critical Care    _____ Other:_________________    Comments: s/p general anesthesia with ETT. Pt was extubated post procedure to n/c at 2L/min. No anesthesia complications. Pt's condition is stable in PACU. Full report is given to PACU RN.

## 2024-12-24 NOTE — ASU DISCHARGE PLAN (ADULT/PEDIATRIC) - CARE PROVIDER_API CALL
Manuela Donnelly  Plastic Surgery  [address] [place] [address], [address]  [place] [address]  Phone: [phone]  Fax: [phone]  Follow Up Time: 1 week

## 2024-12-24 NOTE — ASU DISCHARGE PLAN (ADULT/PEDIATRIC) - FINANCIAL ASSISTANCE
[facility] provides services at a reduced cost to those who are determined to be eligible through [facility]’s financial assistance program. Information regarding [facility]’s financial assistance program can be found by going to [email or web address] or by calling [phone].

## 2024-12-25 ENCOUNTER — TRANSCRIPTION ENCOUNTER (OUTPATIENT)
Age: 49
End: 2024-12-25

## 2024-12-25 VITALS
HEART RATE: 71 BPM | RESPIRATION RATE: 18 BRPM | OXYGEN SATURATION: 99 % | TEMPERATURE: 98 F | SYSTOLIC BLOOD PRESSURE: 93 MMHG | DIASTOLIC BLOOD PRESSURE: 62 MMHG

## 2024-12-25 LAB
HCT VFR BLD CALC: 23.7 % — LOW (ref 37–47)
HGB BLD-MCNC: 7.9 G/DL — LOW (ref 12–16)
MCHC RBC-ENTMCNC: 29.9 PG — SIGNIFICANT CHANGE UP (ref 27–31)
MCHC RBC-ENTMCNC: 33.3 G/DL — SIGNIFICANT CHANGE UP (ref 32–37)
MCV RBC AUTO: 89.8 FL — SIGNIFICANT CHANGE UP (ref 81–99)
NRBC # BLD: 0 /100 WBCS — SIGNIFICANT CHANGE UP (ref 0–0)
PLATELET # BLD AUTO: 208 K/UL — SIGNIFICANT CHANGE UP (ref 130–400)
PMV BLD: 11.4 FL — HIGH (ref 7.4–10.4)
RBC # BLD: 2.64 M/UL — LOW (ref 4.2–5.4)
RBC # FLD: 14.4 % — SIGNIFICANT CHANGE UP (ref 11.5–14.5)
WBC # BLD: 6.53 K/UL — SIGNIFICANT CHANGE UP (ref 4.8–10.8)
WBC # FLD AUTO: 6.53 K/UL — SIGNIFICANT CHANGE UP (ref 4.8–10.8)

## 2024-12-25 RX ORDER — GABAPENTIN 300 MG/1
1 CAPSULE ORAL
Qty: 0 | Refills: 0 | DISCHARGE
Start: 2024-12-25

## 2024-12-25 RX ORDER — ACETAMINOPHEN 80 MG/.8ML
3 SOLUTION/ DROPS ORAL
Qty: 0 | Refills: 0 | DISCHARGE
Start: 2024-12-25

## 2024-12-25 RX ORDER — TRAMADOL HYDROCHLORIDE 50 MG/1
50 TABLET ORAL EVERY 8 HOURS
Refills: 0 | Status: DISCONTINUED | OUTPATIENT
Start: 2024-12-25 | End: 2024-12-25

## 2024-12-25 RX ADMIN — ACETAMINOPHEN 975 MILLIGRAM(S): 80 SOLUTION/ DROPS ORAL at 08:19

## 2024-12-25 RX ADMIN — ACETAMINOPHEN 975 MILLIGRAM(S): 80 SOLUTION/ DROPS ORAL at 07:49

## 2024-12-25 RX ADMIN — Medication 100 MILLIGRAM(S): at 05:30

## 2024-12-25 NOTE — DISCHARGE NOTE NURSING/CASE MANAGEMENT/SOCIAL WORK - FINANCIAL ASSISTANCE
[facility] provides services at a reduced cost to those who are determined to be eligible through [facility]’s financial assistance program. Information regarding [facility]’s financial assistance program can be found by going to [email or web address] or by calling [phone].

## 2024-12-25 NOTE — DISCHARGE NOTE PROVIDER - NSDCCPCAREPLAN_GEN_ALL_CORE_FT
PRINCIPAL DISCHARGE DIAGNOSIS  Diagnosis: Hematoma  Assessment and Plan of Treatment: SURGERY DISCHARGE INSTRUCTIONS  FOLLOW-UP - with Dr. Donnelly in 2 weeks. Call the office to make an appointment or if you have any questions/concerns.  DIET - regular.   ACTIVITY- No heavy lifting for 4-6 wks over 10-20 lbs. Walking is encouraged. No running or swimming. No driving while taking pain medication.  WOUNDCARE - Some drainage from your incisions or drain sites is normal. If you have drainage from an open incision or drain site- cover loosely with sterile gauze and tape and change daily. If you have clear outer plastic dressing with gauze- remove 3 days after surgery and leave little white bandage strips underneath it on for 7 days. If you have no bandages but have purple glue over your incisions instead, this will come off with time. May shower 24 hours after surgery but no submerging wound under water for 2 weeks (tub bathing). Pat area dry when wet, keep clean and dry. Do not apply powders or lotion to wound area.   DRAINS- if you have drains, number or label each drain, for example drain 1 and drain 2. measure the output from each of them as needed when they become full or when you remember, for example once in the morning and once at night. Write down the total amount from each drain daily and bring this to your appointment.  PAIN MEDS - Take prescription pain meds as instructed but only if needed as they can be addicting. Take over the counter extra strength tylenol 650mg and/or ibuprofen 400mg with food every 6 hours for pain instead of prescription pain meds if you do not need stronger pain control. Do not take tylenol in addition to your prescription pain med if your prescription pain med already has tylenol in it. No more than 4g of tylenol in 24hrs or 1g in 4 hrs. No mixing alcohol with prescription pain meds. No driving or operating machinery while taking prescription pain meds. Drink plenty of water and increase your fiber intake while taking prescription pain meds.

## 2024-12-25 NOTE — PROGRESS NOTE ADULT - ASSESSMENT
ASSESSMENT:  49y F w/ PMHx of DM, diverticulitis, POD#1 s/p panniculectomy with rectus diastasis repair with intraop consult to general surgery for fascial defect of abdominal wall s/p ventral hernia repair.     PLAN:  hypotensive post op, continue hemodynamic monitoring   Monitor post-op labs, hemoglobin 9.4 post op, follow up AM CBC  Continue Pain Medications   Encourage ambulation as tolerated  Monitor urine output  Monitor wound and dressing for any changes,   NPO after midnight   monitor bárbara drain outputs     spectra [id number]

## 2024-12-25 NOTE — DISCHARGE NOTE PROVIDER - NSDCFUSCHEDAPPT_GEN_ALL_CORE_FT
Brice Welch  [facility] PreAdmits  Scheduled Appointment: 12/26/2024    [facility]  DERMA  [address]  Scheduled Appointment: 12/26/2024    [facility]  PLASTICSUR [address]  Scheduled Appointment: 12/31/2024    [facility] PreAdmits  Scheduled Appointment: 02/14/2025    [facility]  GASTRO  [address]  Scheduled Appointment: 02/14/2025

## 2024-12-25 NOTE — DISCHARGE NOTE NURSING/CASE MANAGEMENT/SOCIAL WORK - NSDCVIVACCINE_GEN_ALL_CORE_FT
influenza, injectable, quadrivalent, preservative free; 19-Oct-2018 11:51; Maggy Lazaro (RN); [employer]; [id number] (Exp. Date: 30-Jun-2019); IntraMuscular; Deltoid Left.; 0.5 milliLiter(s); VIS (VIS Published: 07-Aug-2015, VIS Presented: 19-Oct-2018);

## 2024-12-25 NOTE — DISCHARGE NOTE PROVIDER - HOSPITAL COURSE
48 y/o s/p panniculectomy w/ rectus diastasis repair, intraop consult to general surgery for fascial defect of abdominal wall on 12/24. Post-operatively, patient was hypotensive in PACU with SBP in the 90s. A small palpable hematoma on the left side of the abdomen was noted post op. The patient was admitted for observation overnight and hemodynamic monitoring. She had q2 vitals overnight, BPs were consistently soft (90s/60s). hemoglobin 9.4 post op, AM Hemoglobin was 7.9. Hematoma was non-expanding, soft, minimal tenderness. R+L DEVIN outputs were serosanguinous with more output from the L DEVIN. Patient was deemed medically stable in the morning and d/colleen home.

## 2024-12-25 NOTE — DISCHARGE NOTE NURSING/CASE MANAGEMENT/SOCIAL WORK - PATIENT PORTAL LINK FT
You can access the FollowMyHealth Patient Portal offered by [facility] by registering at the following website: [email or web address]. By joining [employer]’s FollowMyHealth portal, you will also be able to view your health information using other applications (apps) compatible with our system.

## 2024-12-25 NOTE — PROGRESS NOTE ADULT - ATTENDING COMMENTS
Pt seen and examined at bedside with [facility] team and senior Dr. Singer UNDERWOOD overnight.  Pain controlled  No additional IVF boluses overnight  Denies fevers, chillls, lightheadedness, CP or SOB\  Abd binder in place    Bedside orthostatic BP while sitting - asymptomatic  and normocardia      AVSS  Gen: NAD,  no pallor  Abd: soft throughout, appropriate incisional tenderness, diastasis/hernia repair intact, no focal hematoma, DEVIN drains - thin sanguinous drainage    Left DEVIN drain 215 ml (last shift 70 ml)  Right DEVIN drain 50 ml    Labs reviewed  Hg 9.4 to 7.9      A/P: POD#1 s/p Jessenia de Lis panniculectomy, no active bleeding or expanding hematoma  Stable BP. no orthostatic hypotension.    -c/w abd binder at all times  -ambulate as tolerated  -DEVIN drain care; VNS  -incentive spirometry  -pt family picked up homegoing meds from [facility] yesterday; medication reviewed with pt at bedside  -all questions were answered.   at bedsided  -advance diet  -DC home  -follow up in PRS office tomorrow

## 2024-12-25 NOTE — PROGRESS NOTE ADULT - SUBJECTIVE AND OBJECTIVE BOX
GENERAL SURGERY PROGRESS NOTE    Patient: ALFIE MOSLEY , 49y ([birth year])Female   MRN: [id number]  Location: [address]  Visit: 12-24-24 Inpatient  Date: 12-25-24 @ 00:23      Procedure/Dx/Injuries:     Events of past 24 hours: post op patient was hypotensive in pacu with SBP in the 90s. small palpable hematoma on the left side of the abdomen was noted post op.  patient was admitted for observation overnight and hemodynamic monitoring     PAST MEDICAL & SURGICAL HISTORY:  Diabetes      Ovarian cancer  UNSURE OF STAGE HOWEVER REPORTS IT "MINOR"  Sees Oncologist Dr. Vanessa Correa      Seizure disorder  last seizure > 6 months ago. No longer on medications      Hypercholesteremia      Vertigo  > 3 months ago, not currently complaining of same (3/4/2020)      Diverticulosis of intestine      Diverticulitis      Fatty liver      Obesity      Sleep apnea      History of cholecystectomy  10-15 years ago      H/O abdominal hysterectomy  With Right Oophrecetomy   In December 2017      H/O colonoscopy  >3 years ago      History of colon resection      History of mobilization of splenic flexure      H/O gastric sleeve          Vitals:   T(F): 98.3 (12-24-24 @ 20:26), Max: 98.3 (12-24-24 @ 20:26)  HR: 66 (12-24-24 @ 20:26)  BP: 99/64 (12-24-24 @ 20:26)  RR: 18 (12-24-24 @ 20:26)  SpO2: 95% (12-24-24 @ 20:26)      Diet, NPO after Midnight:      NPO Start Date: 24-Dec-2024,   NPO Start Time: 23:59  Diet, Clear Liquid      Fluids: lactated ringers.: Solution, 1000 milliLiter(s) infuse at 100 mL/Hr      I & O's:      Bowel Movement: : [] YES [] NO  Flatus: : [] YES [] NO    PHYSICAL EXAM:  General: NAD, AAOx3, calm and cooperative  HEENT:  Neck supple  Cardiac: RRR S1, S2,   Respiratory: CTAB,  Abdomen: Soft, non-distended, non-tender, no rebound, no guarding. dressings in place, clean, dry and intact, b/l bárbara drains ss output. stable left lower abdomen hematoma   Vascular:  extremities well perfused    MEDICATIONS  (STANDING):  ceFAZolin   IVPB 2000 milliGRAM(s) IV Intermittent every 8 hours  lactated ringers. 1000 milliLiter(s) (100 mL/Hr) IV Continuous <Continuous>    MEDICATIONS  (PRN):  acetaminophen     Tablet .. 975 milliGRAM(s) Oral every 6 hours PRN Mild Pain (1 - 3)  gabapentin 300 milliGRAM(s) Oral every 8 hours PRN pain  HYDROmorphone  Injectable 0.5 milliGRAM(s) IV Push every 10 minutes PRN Moderate Pain (4 - 6)  meperidine     Injectable 12.5 milliGRAM(s) IV Push once PRN Shivering      DVT PROPHYLAXIS:   GI PROPHYLAXIS:   ANTICOAGULATION:   ANTIBIOTICS:  ceFAZolin   IVPB 2000 milliGRAM(s)      LAB/STUDIES:  Labs:  CAPILLARY BLOOD GLUCOSE                          9.4    10.54 )-----------( 242      ( 24 Dec 2024 16:45 )             28.4         IMAGING:  none         GENERAL SURGERY PROGRESS NOTE    Patient: ALFIE MOSLEY , 49y ([birth year])Female   MRN: [id number]  Location: [address]  Visit: 12-24-24 Inpatient  Date: 12-25-24 @ 00:23      Procedure/Dx/Injuries:     Events of past 24 hours: post op patient was hypotensive in pacu with SBP in the 90s. small palpable hematoma on the left side of the abdomen was noted post op. Patient was admitted for observation overnight and hemodynamic monitoring   Patient seen and examined at bedside in the morning. Patient doing well, tolerating pain appropriately. Blood pressure sitting up was 108/70. Patient's BÁRBARA drains x2 in place.     PAST MEDICAL & SURGICAL HISTORY:  Diabetes      Ovarian cancer  UNSURE OF STAGE HOWEVER REPORTS IT "MINOR"  Sees Oncologist Dr. Vanessa Correa      Seizure disorder  last seizure > 6 months ago. No longer on medications      Hypercholesteremia      Vertigo  > 3 months ago, not currently complaining of same (3/4/2020)      Diverticulosis of intestine      Diverticulitis      Fatty liver      Obesity      Sleep apnea      History of cholecystectomy  10-15 years ago      H/O abdominal hysterectomy  With Right Oophrecetomy   In December 2017      H/O colonoscopy  >3 years ago      History of colon resection      History of mobilization of splenic flexure      H/O gastric sleeve          Vitals:   T(F): 98.3 (12-24-24 @ 20:26), Max: 98.3 (12-24-24 @ 20:26)  HR: 66 (12-24-24 @ 20:26)  BP: 99/64 (12-24-24 @ 20:26)  RR: 18 (12-24-24 @ 20:26)  SpO2: 95% (12-24-24 @ 20:26)      Diet, NPO after Midnight:      NPO Start Date: 24-Dec-2024,   NPO Start Time: 23:59  Diet, Clear Liquid      Fluids: lactated ringers.: Solution, 1000 milliLiter(s) infuse at 100 mL/Hr      I & O's:      Bowel Movement: : [] YES [] NO  Flatus: : [] YES [] NO    PHYSICAL EXAM:  General: NAD, AAOx3, calm and cooperative  HEENT:  Neck supple  Cardiac: RRR S1, S2,   Respiratory: CTAB,  Abdomen: Soft, non-distended, non-tender, no rebound, no guarding. dressings in place, clean, dry and intact, b/l bárbara drains ss output. stable left lower abdomen hematoma   Vascular:  extremities well perfused    MEDICATIONS  (STANDING):  ceFAZolin   IVPB 2000 milliGRAM(s) IV Intermittent every 8 hours  lactated ringers. 1000 milliLiter(s) (100 mL/Hr) IV Continuous <Continuous>    MEDICATIONS  (PRN):  acetaminophen     Tablet .. 975 milliGRAM(s) Oral every 6 hours PRN Mild Pain (1 - 3)  gabapentin 300 milliGRAM(s) Oral every 8 hours PRN pain  HYDROmorphone  Injectable 0.5 milliGRAM(s) IV Push every 10 minutes PRN Moderate Pain (4 - 6)  meperidine     Injectable 12.5 milliGRAM(s) IV Push once PRN Shivering      DVT PROPHYLAXIS:   GI PROPHYLAXIS:   ANTICOAGULATION:   ANTIBIOTICS:  ceFAZolin   IVPB 2000 milliGRAM(s)      LAB/STUDIES:  Labs:  CAPILLARY BLOOD GLUCOSE                          9.4    10.54 )-----------( 242      ( 24 Dec 2024 16:45 )             28.4         IMAGING:  none

## 2024-12-25 NOTE — DISCHARGE NOTE PROVIDER - NSDCMRMEDTOKEN_GEN_ALL_CORE_FT
acetaminophen 325 mg oral tablet: 3 tab(s) orally every 6 hours As needed Mild Pain (1 - 3)  doxycycline hyclate 100 mg oral tablet: 1 tab(s) orally every 12 hours  gabapentin 300 mg oral capsule: 1 cap(s) orally every 8 hours As needed pain  Multiple Vitamins oral tablet: 1 tab(s) orally once a day  OMEPRAZOLE 20MG CAP: 1 cap(s) orally once a day  ondansetron 4 mg oral tablet: 1 tab(s) orally every 6 hours as needed for  nausea MDD: 4 tbas per day  traMADol 50 mg oral tablet: 1 tab(s) orally every 6 hours as needed for  severe pain MDD: 4 tabs per day

## 2024-12-26 ENCOUNTER — OUTPATIENT (OUTPATIENT)
Dept: OUTPATIENT SERVICES | Facility: HOSPITAL | Age: 49
LOS: 1 days | End: 2024-12-26
Payer: COMMERCIAL

## 2024-12-26 ENCOUNTER — APPOINTMENT (OUTPATIENT)
Dept: DERMATOLOGY | Facility: CLINIC | Age: 49
End: 2024-12-26

## 2024-12-26 VITALS — WEIGHT: 160 LBS | BODY MASS INDEX: 35.87 KG/M2

## 2024-12-26 DIAGNOSIS — L25.9 UNSPECIFIED CONTACT DERMATITIS, UNSPECIFIED CAUSE: ICD-10-CM

## 2024-12-26 DIAGNOSIS — Z98.890 OTHER SPECIFIED POSTPROCEDURAL STATES: Chronic | ICD-10-CM

## 2024-12-26 DIAGNOSIS — Z90.49 ACQUIRED ABSENCE OF OTHER SPECIFIED PARTS OF DIGESTIVE TRACT: Chronic | ICD-10-CM

## 2024-12-26 DIAGNOSIS — Z90.3 ACQUIRED ABSENCE OF STOMACH [PART OF]: Chronic | ICD-10-CM

## 2024-12-26 DIAGNOSIS — L72.0 EPIDERMAL CYST: ICD-10-CM

## 2024-12-26 DIAGNOSIS — Z00.00 ENCOUNTER FOR GENERAL ADULT MEDICAL EXAMINATION WITHOUT ABNORMAL FINDINGS: ICD-10-CM

## 2024-12-26 LAB — SURGICAL PATHOLOGY STUDY: SIGNIFICANT CHANGE UP

## 2024-12-26 PROCEDURE — 99214 OFFICE O/P EST MOD 30 MIN: CPT

## 2024-12-27 ENCOUNTER — APPOINTMENT (OUTPATIENT)
Dept: PLASTIC SURGERY | Facility: CLINIC | Age: 49
End: 2024-12-27

## 2024-12-27 DIAGNOSIS — L25.9 UNSPECIFIED CONTACT DERMATITIS, UNSPECIFIED CAUSE: ICD-10-CM

## 2024-12-27 DIAGNOSIS — L72.0 EPIDERMAL CYST: ICD-10-CM

## 2024-12-27 DIAGNOSIS — R21 RASH AND OTHER NONSPECIFIC SKIN ERUPTION: ICD-10-CM

## 2024-12-27 PROCEDURE — 99024 POSTOP FOLLOW-UP VISIT: CPT

## 2024-12-27 RX ORDER — HYDROCORTISONE 25 MG/G
2.5 CREAM TOPICAL TWICE DAILY
Qty: 30 | Refills: 0 | Status: ACTIVE | COMMUNITY
Start: 2024-12-27 | End: 1900-01-01

## 2024-12-30 DIAGNOSIS — L73.2 HIDRADENITIS SUPPURATIVA: ICD-10-CM

## 2024-12-31 ENCOUNTER — APPOINTMENT (OUTPATIENT)
Dept: PLASTIC SURGERY | Facility: CLINIC | Age: 49
End: 2024-12-31
Payer: COMMERCIAL

## 2024-12-31 DIAGNOSIS — Z87.891 PERSONAL HISTORY OF NICOTINE DEPENDENCE: ICD-10-CM

## 2024-12-31 DIAGNOSIS — E66.811 OBESITY, CLASS 1: ICD-10-CM

## 2024-12-31 PROCEDURE — 99024 POSTOP FOLLOW-UP VISIT: CPT

## 2025-01-02 DIAGNOSIS — K76.0 FATTY (CHANGE OF) LIVER, NOT ELSEWHERE CLASSIFIED: ICD-10-CM

## 2025-01-02 DIAGNOSIS — I95.81 POSTPROCEDURAL HYPOTENSION: ICD-10-CM

## 2025-01-02 DIAGNOSIS — Z90.49 ACQUIRED ABSENCE OF OTHER SPECIFIED PARTS OF DIGESTIVE TRACT: ICD-10-CM

## 2025-01-02 DIAGNOSIS — K42.9 UMBILICAL HERNIA WITHOUT OBSTRUCTION OR GANGRENE: ICD-10-CM
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
## 2025-01-02 DIAGNOSIS — Z85.43 PERSONAL HISTORY OF MALIGNANT NEOPLASM OF OVARY: ICD-10-CM

## 2025-01-02 DIAGNOSIS — Z90.79 ACQUIRED ABSENCE OF OTHER GENITAL ORGAN(S): ICD-10-CM

## 2025-01-02 DIAGNOSIS — G47.30 SLEEP APNEA, UNSPECIFIED: ICD-10-CM

## 2025-01-02 DIAGNOSIS — L98.7 EXCESSIVE AND REDUNDANT SKIN AND SUBCUTANEOUS TISSUE: ICD-10-CM

## 2025-01-02 DIAGNOSIS — R73.03 PREDIABETES: ICD-10-CM

## 2025-01-02 DIAGNOSIS — E66.9 OBESITY, UNSPECIFIED: ICD-10-CM

## 2025-01-02 DIAGNOSIS — L76.32 POSTPROCEDURAL HEMATOMA OF SKIN AND SUBCUTANEOUS TISSUE FOLLOWING OTHER PROCEDURE: ICD-10-CM

## 2025-01-02 DIAGNOSIS — Z98.84 BARIATRIC SURGERY STATUS: ICD-10-CM

## 2025-01-02 DIAGNOSIS — Z91.041 RADIOGRAPHIC DYE ALLERGY STATUS: ICD-10-CM
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
## 2025-01-02 DIAGNOSIS — E78.5 HYPERLIPIDEMIA, UNSPECIFIED: ICD-10-CM

## 2025-01-02 DIAGNOSIS — Z98.891 HISTORY OF UTERINE SCAR FROM PREVIOUS SURGERY: ICD-10-CM

## 2025-01-02 DIAGNOSIS — Z90.721 ACQUIRED ABSENCE OF OVARIES, UNILATERAL: ICD-10-CM

## 2025-01-03 DIAGNOSIS — Z98.890 OTHER SPECIFIED POSTPROCEDURAL STATES: ICD-10-CM

## 2025-01-03 RX ORDER — DOXYCYCLINE HYCLATE 100 MG/1
100 CAPSULE ORAL TWICE DAILY
Qty: 10 | Refills: 0 | Status: ACTIVE | COMMUNITY
Start: 2025-01-03 | End: 1900-01-01

## 2025-01-05 ENCOUNTER — EMERGENCY (EMERGENCY)
Facility: HOSPITAL | Age: 50
LOS: 0 days | Discharge: HOME | End: 2025-01-06
Attending: EMERGENCY MEDICINE
Payer: COMMERCIAL

## 2025-01-05 VITALS
OXYGEN SATURATION: 99 % | SYSTOLIC BLOOD PRESSURE: 122 MMHG | RESPIRATION RATE: 17 BRPM | WEIGHT: 159.39 LBS | DIASTOLIC BLOOD PRESSURE: 69 MMHG | TEMPERATURE: 98 F | HEART RATE: 103 BPM | HEIGHT: 56 IN

## 2025-01-05 DIAGNOSIS — Z98.890 OTHER SPECIFIED POSTPROCEDURAL STATES: Chronic | ICD-10-CM

## 2025-01-05 DIAGNOSIS — Z90.3 ACQUIRED ABSENCE OF STOMACH [PART OF]: Chronic | ICD-10-CM

## 2025-01-05 DIAGNOSIS — Z90.49 ACQUIRED ABSENCE OF OTHER SPECIFIED PARTS OF DIGESTIVE TRACT: Chronic | ICD-10-CM

## 2025-01-05 LAB
ALBUMIN SERPL ELPH-MCNC: 4.2 G/DL — SIGNIFICANT CHANGE UP (ref 3.5–5.2)
ALP SERPL-CCNC: 135 U/L — HIGH (ref 30–115)
ALT FLD-CCNC: 18 U/L — SIGNIFICANT CHANGE UP (ref 0–41)
ANION GAP SERPL CALC-SCNC: 11 MMOL/L — SIGNIFICANT CHANGE UP (ref 7–14)
AST SERPL-CCNC: 15 U/L — SIGNIFICANT CHANGE UP (ref 0–41)
BASOPHILS # BLD AUTO: 0.06 K/UL — SIGNIFICANT CHANGE UP (ref 0–0.2)
BASOPHILS NFR BLD AUTO: 0.6 % — SIGNIFICANT CHANGE UP (ref 0–1)
BILIRUB SERPL-MCNC: 0.3 MG/DL — SIGNIFICANT CHANGE UP (ref 0.2–1.2)
BUN SERPL-MCNC: 18 MG/DL — SIGNIFICANT CHANGE UP (ref 10–20)
CALCIUM SERPL-MCNC: 8.7 MG/DL — SIGNIFICANT CHANGE UP (ref 8.4–10.5)
CHLORIDE SERPL-SCNC: 106 MMOL/L — SIGNIFICANT CHANGE UP (ref 98–110)
CO2 SERPL-SCNC: 23 MMOL/L — SIGNIFICANT CHANGE UP (ref 17–32)
CREAT SERPL-MCNC: 0.5 MG/DL — LOW (ref 0.7–1.5)
EGFR: 115 ML/MIN/1.73M2 — SIGNIFICANT CHANGE UP
EOSINOPHIL # BLD AUTO: 0.12 K/UL — SIGNIFICANT CHANGE UP (ref 0–0.7)
EOSINOPHIL NFR BLD AUTO: 1.1 % — SIGNIFICANT CHANGE UP (ref 0–8)
GLUCOSE SERPL-MCNC: 109 MG/DL — HIGH (ref 70–99)
HCG SERPL QL: NEGATIVE — SIGNIFICANT CHANGE UP
HCT VFR BLD CALC: 29.2 % — LOW (ref 37–47)
HGB BLD-MCNC: 9.2 G/DL — LOW (ref 12–16)
IMM GRANULOCYTES NFR BLD AUTO: 0.5 % — HIGH (ref 0.1–0.3)
LIDOCAIN IGE QN: 29 U/L — SIGNIFICANT CHANGE UP (ref 7–60)
LYMPHOCYTES # BLD AUTO: 1.8 K/UL — SIGNIFICANT CHANGE UP (ref 1.2–3.4)
LYMPHOCYTES # BLD AUTO: 17.2 % — LOW (ref 20.5–51.1)
MCHC RBC-ENTMCNC: 28.4 PG — SIGNIFICANT CHANGE UP (ref 27–31)
MCHC RBC-ENTMCNC: 31.5 G/DL — LOW (ref 32–37)
MCV RBC AUTO: 90.1 FL — SIGNIFICANT CHANGE UP (ref 81–99)
MONOCYTES # BLD AUTO: 0.6 K/UL — SIGNIFICANT CHANGE UP (ref 0.1–0.6)
MONOCYTES NFR BLD AUTO: 5.7 % — SIGNIFICANT CHANGE UP (ref 1.7–9.3)
NEUTROPHILS # BLD AUTO: 7.86 K/UL — HIGH (ref 1.4–6.5)
NEUTROPHILS NFR BLD AUTO: 74.9 % — SIGNIFICANT CHANGE UP (ref 42.2–75.2)
NRBC # BLD: 0 /100 WBCS — SIGNIFICANT CHANGE UP (ref 0–0)
PLATELET # BLD AUTO: 484 K/UL — HIGH (ref 130–400)
PMV BLD: 9.7 FL — SIGNIFICANT CHANGE UP (ref 7.4–10.4)
POTASSIUM SERPL-MCNC: 4.4 MMOL/L — SIGNIFICANT CHANGE UP (ref 3.5–5)
POTASSIUM SERPL-SCNC: 4.4 MMOL/L — SIGNIFICANT CHANGE UP (ref 3.5–5)
PROT SERPL-MCNC: 6.3 G/DL — SIGNIFICANT CHANGE UP (ref 6–8)
RBC # BLD: 3.24 M/UL — LOW (ref 4.2–5.4)
RBC # FLD: 14.7 % — HIGH (ref 11.5–14.5)
SODIUM SERPL-SCNC: 140 MMOL/L — SIGNIFICANT CHANGE UP (ref 135–146)
WBC # BLD: 10.49 K/UL — SIGNIFICANT CHANGE UP (ref 4.8–10.8)
WBC # FLD AUTO: 10.49 K/UL — SIGNIFICANT CHANGE UP (ref 4.8–10.8)

## 2025-01-05 PROCEDURE — 99284 EMERGENCY DEPT VISIT MOD MDM: CPT | Mod: 25

## 2025-01-05 PROCEDURE — 74177 CT ABD & PELVIS W/CONTRAST: CPT | Mod: MC

## 2025-01-05 PROCEDURE — 87077 CULTURE AEROBIC IDENTIFY: CPT

## 2025-01-05 PROCEDURE — 80053 COMPREHEN METABOLIC PANEL: CPT

## 2025-01-05 PROCEDURE — 85025 COMPLETE CBC W/AUTO DIFF WBC: CPT

## 2025-01-05 PROCEDURE — 84703 CHORIONIC GONADOTROPIN ASSAY: CPT

## 2025-01-05 PROCEDURE — 99285 EMERGENCY DEPT VISIT HI MDM: CPT

## 2025-01-05 PROCEDURE — 36415 COLL VENOUS BLD VENIPUNCTURE: CPT

## 2025-01-05 PROCEDURE — 74177 CT ABD & PELVIS W/CONTRAST: CPT | Mod: 26,MC

## 2025-01-05 PROCEDURE — 83690 ASSAY OF LIPASE: CPT

## 2025-01-05 PROCEDURE — 96374 THER/PROPH/DIAG INJ IV PUSH: CPT | Mod: XU

## 2025-01-05 PROCEDURE — 87186 SC STD MICRODIL/AGAR DIL: CPT

## 2025-01-05 PROCEDURE — 96375 TX/PRO/DX INJ NEW DRUG ADDON: CPT

## 2025-01-05 PROCEDURE — 87070 CULTURE OTHR SPECIMN AEROBIC: CPT

## 2025-01-05 RX ORDER — METHYLPREDNISOLONE 4 MG/1
40 TABLET ORAL ONCE
Refills: 0 | Status: COMPLETED | OUTPATIENT
Start: 2025-01-05 | End: 2025-01-05

## 2025-01-05 RX ORDER — MORPHINE SULFATE 15 MG
4 TABLET, EXTENDED RELEASE ORAL ONCE
Refills: 0 | Status: DISCONTINUED | OUTPATIENT
Start: 2025-01-05 | End: 2025-01-05

## 2025-01-05 RX ORDER — DIPHENHYDRAMINE HCL 25 MG
50 TABLET ORAL ONCE
Refills: 0 | Status: COMPLETED | OUTPATIENT
Start: 2025-01-05 | End: 2025-01-05

## 2025-01-05 RX ADMIN — Medication 50 MILLIGRAM(S): at 20:22

## 2025-01-05 RX ADMIN — METHYLPREDNISOLONE 40 MILLIGRAM(S): 4 TABLET ORAL at 20:22

## 2025-01-05 RX ADMIN — Medication 4 MILLIGRAM(S): at 20:05

## 2025-01-05 NOTE — ED PROVIDER NOTE - PATIENT PORTAL LINK FT
Notified patient that nevus that was removed by Dr. Clemons came back from the pathology lab as benign and for her to call if she has any other problems and to keep up regular eye exams.   You can access the FollowMyHealth Patient Portal offered by [facility] by registering at the following website: [email or web address]. By joining [employer]’s FollowMyHealth portal, you will also be able to view your health information using other applications (apps) compatible with our system.

## 2025-01-05 NOTE — ED PROVIDER NOTE - CLINICAL SUMMARY MEDICAL DECISION MAKING FREE TEXT BOX
49-year-old female presents to the ED for rectus muscle walll diastasis.  Recent surgery.  Evaluated by the initial team who signed the case out to me pending evaluation by surgery.  Patient did complain of abdominal pain.  A CT was performed which revealed postoperative collections of seromas and hematomas.  Drainage catheter is visualized.  Surgery was consulted who did a bedside aspiration of the collection under the wound.  Recommendation with Augmentin for 1 week.  Discharged with surgical follow-up.

## 2025-01-05 NOTE — ED PROVIDER NOTE - CARE PROVIDER_API CALL
Manuela Donnelly  Plastic Surgery  [address] [place] [address], [address]  [place] [address]  Phone: [phone]  Fax: [phone]  Established Patient  Follow Up Time:

## 2025-01-05 NOTE — ED PROVIDER NOTE - PHYSICAL EXAMINATION
VITAL SIGNS: noted  CONSTITUTIONAL: Well-developed; well-nourished; in no acute distress  HEAD: Normocephalic; atraumatic  EYES: PERRL, EOM intact; conjunctiva and sclera clear  ENT: No nasal discharge; MMM, oropharynx clear without tonsillar hypertrophy or exudates  NECK: Supple; non tender. No anterior cervical lymphadenopathy noted  CARD: S1, S2 normal; no murmurs, gallops, or rubs. Regular rate and rhythm  RESP: CTAB/L, no wheezes, rales or rhonchi  Abdomen: Incisions are clean and dry. Normal bowel sounds. + drain on right side with dark serosanguineous output. Skin mildly erythematous on the lateral aspect of the incision. Moderately tender around the left side of incision no rebound no guarding   EXT: Normal ROM. No calf tenderness or edema. Distal pulses intact  NEURO: Awake and alert, oriented. Grossly unremarkable. No focal deficits.  SKIN: Skin exam is warm and dry, no acute rash

## 2025-01-05 NOTE — ED ADULT TRIAGE NOTE - CHIEF COMPLAINT QUOTE
Pt here s/p hernia and skin removal surgery ~ 10 days ago with drains still in place. Pt reports foul drainage and chills.

## 2025-01-05 NOTE — ED PROVIDER NOTE - NSFOLLOWUPINSTRUCTIONS_ED_ALL_ED_FT
Abdominal Pain    WHAT YOU NEED TO KNOW:    Abdominal pain can be dull, achy, or sharp. You may have pain in one area of your abdomen, or in your entire abdomen. Your pain may be caused by a condition such as constipation, food sensitivity or poisoning, infection, or a blockage. Abdominal pain can also be from a hernia, appendicitis, or an ulcer. Liver, gallbladder, or kidney conditions can also cause abdominal pain. The cause of your abdominal pain may not be known.  Abdominal Organs    DISCHARGE INSTRUCTIONS:    Call your local emergency number (911 in the ) if:    You have chest pain or shortness of breath.    Seek care immediately if:    You have pulsing pain in your upper abdomen or lower back that suddenly becomes constant.    Your pain is in the right lower abdominal area and worsens with movement.    You have a fever over 100.4°F (38°C) or shaking chills.    You are vomiting and cannot keep food or liquids down.    Your pain does not improve or gets worse over the next 8 to 12 hours.    You see blood in your vomit or bowel movements, or they look black and tarry.    Your skin or the whites of your eyes turn yellow.    You are a woman and have a large amount of vaginal bleeding that is not your monthly period.  Call your doctor if:    You have pain in your lower back.    You are a man and have pain in your testicles.    You have pain when you urinate.    You have questions or concerns about your condition or care.  Medicines: You may need any of the following:    Medicines may be given to calm your stomach or prevent vomiting.    Prescription pain medicine may be given. Ask your healthcare provider how to take this medicine safely. Some prescription pain medicines contain acetaminophen. Do not take other medicines that contain acetaminophen without talking to your healthcare provider. Too much acetaminophen may cause liver damage. Prescription pain medicine may cause constipation. Ask your healthcare provider how to prevent or treat constipation.    Take your medicine as directed. Contact your healthcare provider if you think your medicine is not helping or if you have side effects. Tell your provider if you are allergic to any medicine. Keep a list of the medicines, vitamins, and herbs you take. Include the amounts, and when and why you take them. Bring the list or the pill bottles to follow-up visits. Carry your medicine list with you in case of an emergency.  Manage or prevent abdominal pain:    Apply heat on your abdomen for 20 to 30 minutes every 2 hours for as many days as directed. Heat helps decrease pain and muscle spasms.    Make changes to the foods you eat, if needed. Do not eat foods that cause abdominal pain or other symptoms. Eat small meals more often. The following changes may also help:  Eat more high-fiber foods if you are constipated. High-fiber foods include fruits, vegetables, whole-grain foods, and legumes such as frost beans.        Do not eat foods that cause gas if you have bloating. Examples include broccoli, cabbage, beans, and carbonated drinks.    Do not eat foods or drinks that contain sorbitol or fructose if you have diarrhea and bloating. Some examples are fruit juices, candy, jelly, and sugar-free gum.    Do not eat high-fat foods. Examples include fried foods, cheeseburgers, hot dogs, and desserts.    Make changes to the liquids you drink, if needed. Do not drink liquids that cause pain or make it worse, such as orange juice. Drink liquids throughout the day to stay hydrated. The following changes may also help:  Drink more liquids to prevent dehydration from diarrhea or vomiting. Ask your healthcare provider how much liquid to drink each day and which liquids are best for you.    Limit or do not have caffeine. Caffeine may make symptoms such as heartburn or nausea worse.    Limit or do not drink alcohol. Alcohol can make your abdominal pain worse. Ask your healthcare provider if it is okay for you to drink alcohol. Also ask how much is okay for you to drink. A drink of alcohol is 12 ounces of beer, ½ ounce of liquor, or 5 ounces of wine.    Keep a diary of your abdominal pain. A diary may help your healthcare provider learn what is causing your pain. Include when the pain happens, how long it lasts, and what the pain feels like. Write down any other symptoms you have with abdominal pain. Also write down what you eat, and any symptoms you have after you eat.    Manage stress. Stress may cause abdominal pain. Your healthcare provider may recommend relaxation techniques and deep breathing exercises to help decrease your stress. Your healthcare provider may recommend you talk to someone about your stress or anxiety, such as a counselor or a friend. Get plenty of sleep. Exercise regularly.   Family Walking for Exercise      Do not smoke. Nicotine and other chemicals in cigarettes can damage your esophagus and stomach. Ask your healthcare provider for information if you currently smoke and need help to quit. E-cigarettes or smokeless tobacco still contain nicotine. Talk to your healthcare provider before you use these products.  Follow up with your doctor as directed: Write down your questions so you remember to ask them during your visits.

## 2025-01-05 NOTE — ED ADULT NURSE NOTE - OBJECTIVE STATEMENT
Pt presents to the ED complaining of foul smelling drainage from surgical drains placed 10 days ago. Pt also reports pain from surgical site. Pt states she had hernia surgery 10 days ago.

## 2025-01-05 NOTE — ED PROVIDER NOTE - OBJECTIVE STATEMENT
Pt is a 49 y.o female with PMHx of Panniculectomy with rectus diastasis repair/plication recently on 12/24/24, with a post-op hematoma who presents to the ED today complaining of abdominal pain, chills at home, and foul smell from the drain. Denies any other symptoms. Pt tolerating po.

## 2025-01-06 VITALS
OXYGEN SATURATION: 95 % | HEART RATE: 85 BPM | SYSTOLIC BLOOD PRESSURE: 99 MMHG | TEMPERATURE: 99 F | DIASTOLIC BLOOD PRESSURE: 66 MMHG | RESPIRATION RATE: 17 BRPM

## 2025-01-06 RX ORDER — AMOXICILLIN/POTASSIUM CLAV 875-125 MG
875 TABLET ORAL
Qty: 14 | Refills: 0
Start: 2025-01-06 | End: 2025-01-12

## 2025-01-06 NOTE — CONSULT NOTE ADULT - ASSESSMENT
ASSESSMENT:  49yF w/ PMHx of Panniculectomy with rectus diastasis repair/plication recently on 12/24/24, with a post-op hematoma who presented to the ED today complaining of abdominal pain, chills at home, and foul smell from the drain. WBC 10.4, Hb 9.2, Afebrile. Due to patient's complaints and exam, decision was made to use Ultrasound to aspirate fluid on the left. Aspirated 80 cc of dark serosanguineous fluid, with no purulence. Sent for culture.  Physical exam findings, imaging, and labs as documented above.     Patient did report mild relief after aspiration. Due to no evidence of abscess or purulence, decision made to send patient home with Augmentin and instructions to f/u in clinic as schedule this week. Patient knows to call office or return to ED if symptoms worsen. Right tere drain left in place.     PLAN:  - F/u with Dr. Donnelly as scheduled  - Augmentin 1 week BID  - Leave right tere drain in place      Above plan discussed with Attending Surgeon Dr. Donnelly, patient, patient family, and Primary team  01-06-25 @ 02:10

## 2025-01-06 NOTE — CONSULT NOTE ADULT - SUBJECTIVE AND OBJECTIVE BOX
PLASTIC SURGERY CONSULT NOTE    Patient: ALFIE MOSLEY , 49y ([birth year])Female   MRN: [id number]  Location: [facility]  Visit: 01-05-25 Emergency  Date: 01-06-25 @ 02:10    HPI: Patient is a 49 female with a hx of Panniculectomy with rectus diastasis repair/plication recently on 12/24/24, with a post-op hematoma who presented to the ED today complaining of abdominal pain, chills at home, and foul smell from the drain. Patient had 2 black drains placed during the surgery, and the right drain still remains, but the left was removed in office about 1 week ago. The right drain has dark serosanguineous output, representative of old hematoma. Less than 30cc per day recorded. In the ED, CT was done showing a large subcutaneous collection on the left spanning 20 cm. Overlying skin laterally is erythematous.     Due to patient's complaints and exam, decision was made to use Ultrasound to aspirate fluid on the left. Aspirated 80 cc of dark serosanguineous fluid, with no purulence. Sent for culture.       PAST MEDICAL & SURGICAL HISTORY:  Diabetes      Ovarian cancer  UNSURE OF STAGE HOWEVER REPORTS IT "MINOR"  Sees Oncologist Dr. Vanessa Correa      Seizure disorder  last seizure > 6 months ago. No longer on medications      Hypercholesteremia      Vertigo  > 3 months ago, not currently complaining of same (3/4/2020)      Diverticulosis of intestine      Diverticulitis      Fatty liver      Obesity      Sleep apnea      History of cholecystectomy  10-15 years ago      H/O abdominal hysterectomy  With Right Oophrecetomy   In December 2017      H/O colonoscopy  >3 years ago      History of colon resection      History of mobilization of splenic flexure      H/O gastric sleeve          Home Medications:  acetaminophen 325 mg oral tablet: 3 tab(s) orally every 6 hours As needed Mild Pain (1 - 3) (25 Dec 2024 09:29)  gabapentin 300 mg oral capsule: 1 cap(s) orally every 8 hours As needed pain (25 Dec 2024 09:29)  Multiple Vitamins oral tablet: 1 tab(s) orally once a day (24 Dec 2024 06:53)  OMEPRAZOLE 20MG CAP: 1 cap(s) orally once a day (24 Dec 2024 06:53)        VITALS:  T(F): 98.7 (01-06-25 @ 00:45), Max: 98.7 (01-06-25 @ 00:45)  HR: 85 (01-06-25 @ 00:45) (85 - 103)  BP: 99/66 (01-06-25 @ 00:45) (99/66 - 122/69)  RR: 17 (01-06-25 @ 00:45) (17 - 17)  SpO2: 95% (01-06-25 @ 00:45) (95% - 99%)    PHYSICAL EXAM:  General: NAD, AAOx3, calm and cooperative  Abdomen: Incisions are clean and dry. Ge drain on right side with dark serosanguineous output. Left side without drain. Skin mildly erythematous on the lateral aspect of the incision. No drainage. Moderately tender around the left side of incision, mildly tender on right.     MEDICATIONS  (STANDING):    MEDICATIONS  (PRN):      LAB/STUDIES:                        9.2    10.49 )-----------( 484      ( 05 Jan 2025 20:18 )             29.2     01-05    140  |  106  |  18  ----------------------------<  109[H]  4.4   |  23  |  0.5[L]    Ca    8.7      05 Jan 2025 20:18    TPro  6.3  /  Alb  4.2  /  TBili  0.3  /  DBili  x   /  AST  15  /  ALT  18  /  AlkPhos  135[H]  01-05      LIVER FUNCTIONS - ( 05 Jan 2025 20:18 )  Alb: 4.2 g/dL / Pro: 6.3 g/dL / ALK PHOS: 135 U/L / ALT: 18 U/L / AST: 15 U/L / GGT: x           Urinalysis Basic - ( 05 Jan 2025 20:18 )    Color: x / Appearance: x / SG: x / pH: x  Gluc: 109 mg/dL / Ketone: x  / Bili: x / Urobili: x   Blood: x / Protein: x / Nitrite: x   Leuk Esterase: x / RBC: x / WBC x   Sq Epi: x / Non Sq Epi: x / Bacteria: x      IMAGING:  CT AP 1/5  IMPRESSION:  1.  No CT evidence for acute abdominal pathology.  2.  Interval panniculectomy and ventral abdominal wall hernia repair with   postoperative collections including seromas or hematomas spanning 20 cm   in transverse dimension in the subcutaneous soft tissues of the left   lower quadrant. No subcutaneous emphysema. No abscess.  3.  A drainage catheter is visualized with tip terminating in the   subcutaneous soft tissues of the right lower quadrant.  ******PRELIMINARY REPORT******

## 2025-01-07 ENCOUNTER — APPOINTMENT (OUTPATIENT)
Dept: PLASTIC SURGERY | Facility: CLINIC | Age: 50
End: 2025-01-07
Payer: COMMERCIAL

## 2025-01-07 PROCEDURE — 99024 POSTOP FOLLOW-UP VISIT: CPT

## 2025-01-08 ENCOUNTER — APPOINTMENT (OUTPATIENT)
Dept: PLASTIC SURGERY | Facility: CLINIC | Age: 50
End: 2025-01-08
Payer: COMMERCIAL

## 2025-01-08 LAB
-  AMOXICILLIN/CLAVULANIC ACID: SIGNIFICANT CHANGE UP
-  AMPICILLIN/SULBACTAM: SIGNIFICANT CHANGE UP
-  AMPICILLIN: SIGNIFICANT CHANGE UP
-  AZTREONAM: SIGNIFICANT CHANGE UP
-  CEFAZOLIN: SIGNIFICANT CHANGE UP
-  CEFEPIME: SIGNIFICANT CHANGE UP
-  CEFOXITIN: SIGNIFICANT CHANGE UP
-  CEFTRIAXONE: SIGNIFICANT CHANGE UP
-  CIPROFLOXACIN: SIGNIFICANT CHANGE UP
-  ERTAPENEM: SIGNIFICANT CHANGE UP
-  GENTAMICIN: SIGNIFICANT CHANGE UP
-  LEVOFLOXACIN: SIGNIFICANT CHANGE UP
-  MEROPENEM: SIGNIFICANT CHANGE UP
-  PIPERACILLIN/TAZOBACTAM: SIGNIFICANT CHANGE UP
-  TOBRAMYCIN: SIGNIFICANT CHANGE UP
-  TRIMETHOPRIM/SULFAMETHOXAZOLE: SIGNIFICANT CHANGE UP
METHOD TYPE: SIGNIFICANT CHANGE UP

## 2025-01-08 PROCEDURE — 99024 POSTOP FOLLOW-UP VISIT: CPT

## 2025-01-08 RX ORDER — TRAMADOL HYDROCHLORIDE 50 MG/1
50 TABLET, COATED ORAL
Qty: 6 | Refills: 0 | Status: ACTIVE | COMMUNITY
Start: 2025-01-08 | End: 1900-01-01

## 2025-01-08 RX ORDER — GABAPENTIN 300 MG/1
300 CAPSULE ORAL TWICE DAILY
Qty: 10 | Refills: 0 | Status: ACTIVE | COMMUNITY
Start: 2025-01-08 | End: 1900-01-01

## 2025-01-09 LAB
APTT BLD: 33.1 SEC
INR PPP: 1.22 RATIO
PT BLD: 14.5 SEC

## 2025-01-10 ENCOUNTER — APPOINTMENT (OUTPATIENT)
Dept: PLASTIC SURGERY | Facility: CLINIC | Age: 50
End: 2025-01-10
Payer: COMMERCIAL

## 2025-01-10 DIAGNOSIS — B37.31 ACUTE CANDIDIASIS OF VULVA AND VAGINA: ICD-10-CM

## 2025-01-10 DIAGNOSIS — S30.1XXA CONTUSION OF ABDOMINAL WALL, INITIAL ENCOUNTER: ICD-10-CM

## 2025-01-10 DIAGNOSIS — L03.311 CELLULITIS OF ABDOMINAL WALL: ICD-10-CM

## 2025-01-10 PROCEDURE — 99024 POSTOP FOLLOW-UP VISIT: CPT

## 2025-01-10 RX ORDER — CLOTRIMAZOLE 10 MG/G
1 CREAM TOPICAL 3 TIMES DAILY
Qty: 1 | Refills: 0 | Status: ACTIVE | COMMUNITY
Start: 2025-01-10 | End: 1900-01-01

## 2025-01-10 RX ORDER — FLUCONAZOLE 150 MG/1
150 TABLET ORAL
Qty: 1 | Refills: 2 | Status: ACTIVE | COMMUNITY
Start: 2025-01-10 | End: 1900-01-01

## 2025-01-10 RX ORDER — SULFAMETHOXAZOLE AND TRIMETHOPRIM 800; 160 MG/1; MG/1
800-160 TABLET ORAL TWICE DAILY
Qty: 14 | Refills: 0 | Status: ACTIVE | COMMUNITY
Start: 2025-01-10 | End: 1900-01-01

## 2025-01-11 LAB
CULTURE RESULTS: ABNORMAL
ORGANISM # SPEC MICROSCOPIC CNT: ABNORMAL
ORGANISM # SPEC MICROSCOPIC CNT: SIGNIFICANT CHANGE UP
SPECIMEN SOURCE: SIGNIFICANT CHANGE UP

## 2025-01-12 ENCOUNTER — INPATIENT (INPATIENT)
Facility: HOSPITAL | Age: 50
LOS: 0 days | Discharge: HOME | DRG: 711 | End: 2025-01-13
Attending: SURGERY | Admitting: SURGERY
Payer: COMMERCIAL

## 2025-01-12 VITALS
DIASTOLIC BLOOD PRESSURE: 65 MMHG | OXYGEN SATURATION: 97 % | HEART RATE: 92 BPM | SYSTOLIC BLOOD PRESSURE: 103 MMHG | TEMPERATURE: 99 F | RESPIRATION RATE: 18 BRPM | WEIGHT: 160.06 LBS | HEIGHT: 56 IN

## 2025-01-12 DIAGNOSIS — Z90.49 ACQUIRED ABSENCE OF OTHER SPECIFIED PARTS OF DIGESTIVE TRACT: Chronic | ICD-10-CM

## 2025-01-12 DIAGNOSIS — Z98.890 OTHER SPECIFIED POSTPROCEDURAL STATES: Chronic | ICD-10-CM

## 2025-01-12 DIAGNOSIS — Z90.3 ACQUIRED ABSENCE OF STOMACH [PART OF]: Chronic | ICD-10-CM

## 2025-01-12 DIAGNOSIS — S30.1XXA CONTUSION OF ABDOMINAL WALL, INITIAL ENCOUNTER: ICD-10-CM

## 2025-01-12 LAB
ALBUMIN SERPL ELPH-MCNC: 4 G/DL — SIGNIFICANT CHANGE UP (ref 3.5–5.2)
ALP SERPL-CCNC: 187 U/L — HIGH (ref 30–115)
ALT FLD-CCNC: 83 U/L — HIGH (ref 0–41)
ANION GAP SERPL CALC-SCNC: 12 MMOL/L — SIGNIFICANT CHANGE UP (ref 7–14)
AST SERPL-CCNC: 53 U/L — HIGH (ref 0–41)
BASOPHILS # BLD AUTO: 0.04 K/UL — SIGNIFICANT CHANGE UP (ref 0–0.2)
BASOPHILS NFR BLD AUTO: 0.4 % — SIGNIFICANT CHANGE UP (ref 0–1)
BILIRUB SERPL-MCNC: 0.3 MG/DL — SIGNIFICANT CHANGE UP (ref 0.2–1.2)
BUN SERPL-MCNC: 14 MG/DL — SIGNIFICANT CHANGE UP (ref 10–20)
CALCIUM SERPL-MCNC: 9 MG/DL — SIGNIFICANT CHANGE UP (ref 8.4–10.5)
CHLORIDE SERPL-SCNC: 100 MMOL/L — SIGNIFICANT CHANGE UP (ref 98–110)
CO2 SERPL-SCNC: 24 MMOL/L — SIGNIFICANT CHANGE UP (ref 17–32)
CREAT SERPL-MCNC: 0.6 MG/DL — LOW (ref 0.7–1.5)
EGFR: 110 ML/MIN/1.73M2 — SIGNIFICANT CHANGE UP
EOSINOPHIL # BLD AUTO: 0.19 K/UL — SIGNIFICANT CHANGE UP (ref 0–0.7)
EOSINOPHIL NFR BLD AUTO: 2.1 % — SIGNIFICANT CHANGE UP (ref 0–8)
GLUCOSE BLDC GLUCOMTR-MCNC: 88 MG/DL — SIGNIFICANT CHANGE UP (ref 70–99)
GLUCOSE SERPL-MCNC: 88 MG/DL — SIGNIFICANT CHANGE UP (ref 70–99)
HCT VFR BLD CALC: 28.9 % — LOW (ref 37–47)
HGB BLD-MCNC: 9 G/DL — LOW (ref 12–16)
IMM GRANULOCYTES NFR BLD AUTO: 0.7 % — HIGH (ref 0.1–0.3)
LACTATE SERPL-SCNC: 0.7 MMOL/L — SIGNIFICANT CHANGE UP (ref 0.7–2)
LYMPHOCYTES # BLD AUTO: 1.92 K/UL — SIGNIFICANT CHANGE UP (ref 1.2–3.4)
LYMPHOCYTES # BLD AUTO: 20.8 % — SIGNIFICANT CHANGE UP (ref 20.5–51.1)
MCHC RBC-ENTMCNC: 27.8 PG — SIGNIFICANT CHANGE UP (ref 27–31)
MCHC RBC-ENTMCNC: 31.1 G/DL — LOW (ref 32–37)
MCV RBC AUTO: 89.2 FL — SIGNIFICANT CHANGE UP (ref 81–99)
MONOCYTES # BLD AUTO: 0.77 K/UL — HIGH (ref 0.1–0.6)
MONOCYTES NFR BLD AUTO: 8.4 % — SIGNIFICANT CHANGE UP (ref 1.7–9.3)
NEUTROPHILS # BLD AUTO: 6.24 K/UL — SIGNIFICANT CHANGE UP (ref 1.4–6.5)
NEUTROPHILS NFR BLD AUTO: 67.6 % — SIGNIFICANT CHANGE UP (ref 42.2–75.2)
NRBC # BLD: 0 /100 WBCS — SIGNIFICANT CHANGE UP (ref 0–0)
PLATELET # BLD AUTO: 533 K/UL — HIGH (ref 130–400)
PMV BLD: 9.6 FL — SIGNIFICANT CHANGE UP (ref 7.4–10.4)
POTASSIUM SERPL-MCNC: 4.6 MMOL/L — SIGNIFICANT CHANGE UP (ref 3.5–5)
POTASSIUM SERPL-SCNC: 4.6 MMOL/L — SIGNIFICANT CHANGE UP (ref 3.5–5)
PROT SERPL-MCNC: 6.6 G/DL — SIGNIFICANT CHANGE UP (ref 6–8)
RBC # BLD: 3.24 M/UL — LOW (ref 4.2–5.4)
RBC # FLD: 14.7 % — HIGH (ref 11.5–14.5)
SODIUM SERPL-SCNC: 136 MMOL/L — SIGNIFICANT CHANGE UP (ref 135–146)
WBC # BLD: 9.22 K/UL — SIGNIFICANT CHANGE UP (ref 4.8–10.8)
WBC # FLD AUTO: 9.22 K/UL — SIGNIFICANT CHANGE UP (ref 4.8–10.8)

## 2025-01-12 PROCEDURE — 99223 1ST HOSP IP/OBS HIGH 75: CPT | Mod: 25,57

## 2025-01-12 PROCEDURE — 99285 EMERGENCY DEPT VISIT HI MDM: CPT

## 2025-01-12 PROCEDURE — 10140 I&D HMTMA SEROMA/FLUID COLLJ: CPT | Mod: 78

## 2025-01-12 PROCEDURE — 85025 COMPLETE CBC W/AUTO DIFF WBC: CPT

## 2025-01-12 PROCEDURE — 80048 BASIC METABOLIC PNL TOTAL CA: CPT

## 2025-01-12 PROCEDURE — 82962 GLUCOSE BLOOD TEST: CPT

## 2025-01-12 PROCEDURE — 36415 COLL VENOUS BLD VENIPUNCTURE: CPT

## 2025-01-12 PROCEDURE — C9399: CPT

## 2025-01-12 RX ORDER — ACETAMINOPHEN 80 MG/.8ML
650 SOLUTION/ DROPS ORAL EVERY 6 HOURS
Refills: 0 | Status: DISCONTINUED | OUTPATIENT
Start: 2025-01-12 | End: 2025-01-12

## 2025-01-12 RX ORDER — HYDROMORPHONE HCL 4 MG
0.5 TABLET ORAL
Refills: 0 | Status: DISCONTINUED | OUTPATIENT
Start: 2025-01-12 | End: 2025-01-13

## 2025-01-12 RX ORDER — CEFAZOLIN SODIUM 1 G
500 VIAL (EA) INJECTION EVERY 8 HOURS
Refills: 0 | Status: DISCONTINUED | OUTPATIENT
Start: 2025-01-12 | End: 2025-01-13

## 2025-01-12 RX ORDER — SODIUM CHLORIDE 9 MG/ML
1000 INJECTION, SOLUTION INTRAVENOUS
Refills: 0 | Status: DISCONTINUED | OUTPATIENT
Start: 2025-01-12 | End: 2025-01-12

## 2025-01-12 RX ORDER — SODIUM CHLORIDE 9 MG/ML
1000 INJECTION, SOLUTION INTRAVENOUS
Refills: 0 | Status: DISCONTINUED | OUTPATIENT
Start: 2025-01-12 | End: 2025-01-13

## 2025-01-12 RX ORDER — CEFAZOLIN SODIUM 1 G
VIAL (EA) INJECTION
Refills: 0 | Status: DISCONTINUED | OUTPATIENT
Start: 2025-01-12 | End: 2025-01-12

## 2025-01-12 RX ORDER — MORPHINE SULFATE 15 MG
4 TABLET, EXTENDED RELEASE ORAL ONCE
Refills: 0 | Status: DISCONTINUED | OUTPATIENT
Start: 2025-01-12 | End: 2025-01-12

## 2025-01-12 RX ORDER — CEFAZOLIN SODIUM 1 G
500 VIAL (EA) INJECTION EVERY 8 HOURS
Refills: 0 | Status: DISCONTINUED | OUTPATIENT
Start: 2025-01-12 | End: 2025-01-12

## 2025-01-12 RX ORDER — KETOROLAC TROMETHAMINE 30 MG/ML
15 INJECTION INTRAMUSCULAR; INTRAVENOUS EVERY 8 HOURS
Refills: 0 | Status: DISCONTINUED | OUTPATIENT
Start: 2025-01-12 | End: 2025-01-12

## 2025-01-12 RX ORDER — TRAMADOL HYDROCHLORIDE 50 MG/1
25 TABLET ORAL EVERY 8 HOURS
Refills: 0 | Status: DISCONTINUED | OUTPATIENT
Start: 2025-01-12 | End: 2025-01-13

## 2025-01-12 RX ORDER — ACETAMINOPHEN 80 MG/.8ML
650 SOLUTION/ DROPS ORAL EVERY 6 HOURS
Refills: 0 | Status: DISCONTINUED | OUTPATIENT
Start: 2025-01-12 | End: 2025-01-13

## 2025-01-12 RX ORDER — ENOXAPARIN SODIUM 60 MG/.6ML
40 INJECTION INTRAVENOUS; SUBCUTANEOUS EVERY 24 HOURS
Refills: 0 | Status: DISCONTINUED | OUTPATIENT
Start: 2025-01-12 | End: 2025-01-13

## 2025-01-12 RX ORDER — ENOXAPARIN SODIUM 60 MG/.6ML
40 INJECTION INTRAVENOUS; SUBCUTANEOUS EVERY 24 HOURS
Refills: 0 | Status: DISCONTINUED | OUTPATIENT
Start: 2025-01-12 | End: 2025-01-12

## 2025-01-12 RX ORDER — CEFAZOLIN SODIUM 1 G
500 VIAL (EA) INJECTION ONCE
Refills: 0 | Status: COMPLETED | OUTPATIENT
Start: 2025-01-12 | End: 2025-01-12

## 2025-01-12 RX ADMIN — Medication 4 MILLIGRAM(S): at 12:19

## 2025-01-12 RX ADMIN — SODIUM CHLORIDE 75 MILLILITER(S): 9 INJECTION, SOLUTION INTRAVENOUS at 20:23

## 2025-01-12 RX ADMIN — SODIUM CHLORIDE 115 MILLILITER(S): 9 INJECTION, SOLUTION INTRAVENOUS at 16:17

## 2025-01-12 RX ADMIN — Medication 100 MILLIGRAM(S): at 16:08

## 2025-01-12 RX ADMIN — SODIUM CHLORIDE 115 MILLILITER(S): 9 INJECTION, SOLUTION INTRAVENOUS at 21:53

## 2025-01-12 NOTE — ED ADULT TRIAGE NOTE - HOW PATIENT ADDRESSED, PROFILE
Rita Render In Strict Bullet Format?: No Continue Regimen: Doxycycline, Tretinoin, Clindamycin Detail Level: Zone

## 2025-01-12 NOTE — H&P ADULT - HISTORY OF PRESENT ILLNESS
PLASTIC SURGERY CONSULT NOTE    Patient: ALFIE MOSLEY , 49y ([birth year])Female   MRN: [id number]  Location: [facility] ED Hold 011 A  Visit: 01-12-25 Inpatient  Date: 01-12-25 @ 14:24    HPI:  The patient is a 49 year old female with PMH of HLD, diverticulosis, hysterectomy and R oophorectomy, panniculectomy 12/24 with Dr. Donnelly presenting with 24 hours of left lower abdominal wound drainage. Patient was doing well post-op, had two drains in place and left drain removed in office while the right put out dark/old blood about 30cc/day. She came to the ED on 1/5 for evaluation of abdominal pain, chills, and malodorous drain output. CT at that time showed large subQ collection spanning 20cm, and the decision was made to perform ultrasound guided drainage - approximately 80cc of dark SS fluid was removed. There was no sign of purulence, the patient was discharged on augmentin. She was seen in the office on Friday 1/10 and still had drainage, she was put on PO bactrim. She comes in today for increased drainage from L aspect of her abdominal wound. She denies fevers, but has had intermittent chills.   In the ED patient is afebrile and hemodynamically stable. Labs notable for WBC 9.22 without left shift, hgb 9 (from 9.2), BMP within normal limits. Plastic surgery consulted for eval of her wound    PAST MEDICAL & SURGICAL HISTORY:  Diabetes      Ovarian cancer  UNSURE OF STAGE HOWEVER REPORTS IT "MINOR"  Sees Oncologist Dr. Vanessa Correa      Seizure disorder  last seizure > 6 months ago. No longer on medications      Hypercholesteremia      Vertigo  > 3 months ago, not currently complaining of same (3/4/2020)      Diverticulosis of intestine      Diverticulitis      Fatty liver      Obesity      Sleep apnea      History of cholecystectomy  10-15 years ago      H/O abdominal hysterectomy  With Right Oophrecetomy   In December 2017      H/O colonoscopy  >3 years ago      History of colon resection      History of mobilization of splenic flexure      H/O gastric sleeve      Status post panniculectomy          Home Medications:  acetaminophen 325 mg oral tablet: 3 tab(s) orally every 6 hours As needed Mild Pain (1 - 3) (25 Dec 2024 09:29)  gabapentin 300 mg oral capsule: 1 cap(s) orally every 8 hours As needed pain (25 Dec 2024 09:29)  Multiple Vitamins oral tablet: 1 tab(s) orally once a day (24 Dec 2024 06:53)  OMEPRAZOLE 20MG CAP: 1 cap(s) orally once a day (24 Dec 2024 06:53)        VITALS:  T(F): 98.8 (01-12-25 @ 07:06), Max: 98.8 (01-12-25 @ 07:06)  HR: 92 (01-12-25 @ 07:06) (92 - 92)  BP: 103/65 (01-12-25 @ 07:06) (103/65 - 103/65)  RR: 18 (01-12-25 @ 07:06) (18 - 18)  SpO2: 97% (01-12-25 @ 07:06) (97% - 97%)    PHYSICAL EXAM:    General: well appearing, no acute distress  HEENT: pupils equal and reactive, EOM intact, mucous membranes moist, no scleral icterus  CV: RRR on radial exam  Pulm: breathing well on room air, no acute respiratory distress  Abdomen: soft, nontender, no rebound or guarding. Lower abdominal wound erythematous with mild drainage from left side, palpable hematoma under left side of incision. R side of wound with DEVIN drain  Ext: well perfused, no peripheral edema, no ROM or strength deficits  Neuro: alert and oriented x3, no focal sensory/motor deficits  Surgical Sites: midline abdominal wound clean dry and intact, lower transverse abdominal wound R side intact, L side with small opening just medial to ASIS with serosanguinous drainage, mild malodor.  D/T/L: R lower abdominal drain with serosanguinous output.       MEDICATIONS  (STANDING):  ceFAZolin   IVPB      ceFAZolin   IVPB 500 milliGRAM(s) IV Intermittent once  ceFAZolin   IVPB 500 milliGRAM(s) IV Intermittent every 8 hours  enoxaparin Injectable 40 milliGRAM(s) SubCutaneous every 24 hours  lactated ringers. 1000 milliLiter(s) (115 mL/Hr) IV Continuous <Continuous>    MEDICATIONS  (PRN):  acetaminophen     Tablet .. 650 milliGRAM(s) Oral every 6 hours PRN Mild Pain (1 - 3)  ketorolac   Injectable 15 milliGRAM(s) IV Push every 8 hours PRN Moderate Pain (4 - 6)      LAB/STUDIES:                        9.0    9.22  )-----------( 533      ( 12 Jan 2025 10:36 )             28.9     01-12    136  |  100  |  14  ----------------------------<  88  4.6   |  24  |  0.6[L]    Ca    9.0      12 Jan 2025 10:36    TPro  6.6  /  Alb  4.0  /  TBili  0.3  /  DBili  x   /  AST  53[H]  /  ALT  83[H]  /  AlkPhos  187[H]  01-12      LIVER FUNCTIONS - ( 12 Jan 2025 10:36 )  Alb: 4.0 g/dL / Pro: 6.6 g/dL / ALK PHOS: 187 U/L / ALT: 83 U/L / AST: 53 U/L / GGT: x           Urinalysis Basic - ( 12 Jan 2025 10:36 )    Color: x / Appearance: x / SG: x / pH: x  Gluc: 88 mg/dL / Ketone: x  / Bili: x / Urobili: x   Blood: x / Protein: x / Nitrite: x   Leuk Esterase: x / RBC: x / WBC x   Sq Epi: x / Non Sq Epi: x / Bacteria: x                  IMAGING:  no new imaging    ACCESS DEVICES:  [x ] Peripheral IV  [ ] Central Venous Line	[ ] R	[ ] L	[ ] IJ	[ ] Fem	[ ] SC	Placed:   [ ] Arterial Line		[ ] R	[ ] L	[ ] Fem	[ ] Rad	[ ] Ax	Placed:   [ ] PICC:					[ ] Mediport  [ ] Urinary Catheter, Date Placed:

## 2025-01-12 NOTE — ED PROVIDER NOTE - CLINICAL SUMMARY MEDICAL DECISION MAKING FREE TEXT BOX
Pt here with drainage from LLQ abd wall for which she was recently started on abx and had imaging showing 20cm fluid collection. Afebrile, vitals reassuring. Plastics consulted. Labs notable for stable anemia, mildly elevated LFTs. Plastics says no imaging at this time, they may attempt bedside drainage. Plastics reccs admission to their service for further management of recurrent postoperative seroma/hematoma given risk for progression of sepsis, anemia, etc if not closely monitored and tx'ed.

## 2025-01-12 NOTE — ED PROVIDER NOTE - PROGRESS NOTE DETAILS
TC: Pt here with drainage from LLQ abd wall for which she was recently started on abx and had imaging showing 20cm fluid collection. Afebrile, vitals reassuring. Plan for plastics c/s. TC: Labs notable for stable anemia, mildly elevated LFTs. Plastics says no imaging at this time, they may attempted bedside drainage. Pt is otherwise scheduled for outpatient drainage of fluid collection tomorrow. TC: Late entry - Labs notable for stable anemia, mildly elevated LFTs. Plastics says no imaging at this time, they may attempt bedside drainage. Pt is otherwise scheduled for outpatient drainage of fluid collection tomorrow. TC: Surgery says admit to 4C

## 2025-01-12 NOTE — CHART NOTE - NSCHARTNOTEFT_GEN_A_CORE
PACU ANESTHESIA ADMISSION NOTE      Procedure: Abdominal wound hematoma & washout  Post op diagnosis:  Abdominal wound hematoma    ____  Intubated  TV:______       Rate: ______      FiO2: ______    _x___  Patent Airway    _x___  Full return of protective reflexes    __x__  Full recovery from anesthesia / back to baseline     Vitals:   T:   97.5F    R:    18              BP:    114/50              Sat:  99%                 P: 84      Mental Status:  _x___ Awake   __x___ Alert   _____ Drowsy   _____ Sedated    Nausea/Vomiting:  _x___ NO  ______Yes,   See Post - Op Orders          Pain Scale (0-10):  __0/10___    Treatment: ____ None    _x___ See Post - Op/PCA Orders    Post - Operative Fluids:   ____ Oral   _x___ See Post - Op Orders    Plan: Discharge:   ____Home       __x___Floor     _____Critical Care    _____  Other:_________________    Comments: Pt tolerated procedure well, no anesthesia related complications. Care of pt endorsed to PACU, report given to PACU RN. Discharge to the next level of care when criteria are met.

## 2025-01-12 NOTE — PRE-OP CHECKLIST - COMMENTS
patient only reports sips of water with meds today, no food since yesterday evening-MDs, OR RN aware

## 2025-01-12 NOTE — H&P ADULT - ASSESSMENT
ASSESSMENT:  49yF w/ PMHx notable for panniculectomy 12/24 with postop drainage presenting with new drainage from left side of lower abdominal wound. Patient otherwise doing well, afebrile, no leukocytosis, with mild drainage. Patient likely with hematoma under her transverse incision. She was initially scheduled for IR drainage tomorrow 1/13 however given patient's new malodourous drainage from wound despite antibiotics, as well as surrounding skin at risk for breakdown, recommend patient stay for washout by plastic surgery team    PLAN:  - admit to Dr. Donnelly, 4c  - Abx: Ancef  - NPO, Iv fluids  - consent for OR, type and screen  - record R DEVNI output, monitor closely  - pain control    Plan discussed with plastic surgery attending, Dr. Andrzej Stanley MD  PGY2 General/Plastic Surgery    CONSULT SPECTRA: [id number]

## 2025-01-12 NOTE — ED PROVIDER NOTE - PHYSICAL EXAMINATION
CONSTITUTIONAL: well developed, nontoxic appearing, in no acute distress, speaking in full sentences  SKIN: warm, dry, no rash, cap refill < 2 seconds  HEENT: normocephalic, atraumatic, no conjunctival erythema, moist mucous membranes, patent airway  NECK: supple  CV:  regular rate, regular rhythm, 2+ radial pulses bilaterally  RESP: no wheezes, no rales, no rhonchi, normal work of breathing  ABD: soft, LLQ tenderness with erythema and serosanguinous drainage, R abd drain clean/dry/intact, nondistended, no rebound, no guarding  MSK: normal ROM, no cyanosis, no edema  NEURO: alert, oriented, grossly unremarkable  PSYCH: cooperative, appropriate

## 2025-01-12 NOTE — ED PROVIDER NOTE - DIFFERENTIAL DIAGNOSIS
differential dx includes but is not limited to:  cellulitis, infected hematoma, sepsis Differential Diagnosis

## 2025-01-12 NOTE — H&P ADULT - ATTENDING COMMENTS
49 year old female with PMH of HLD, diverticulosis, hysterectomy and R oophorectomy, panniculectomy 12/24 with Dr. Donnelly presenting with 24 hours of left lower abdominal wound drainage. Patient was doing well post-op, had two drains in place and left drain removed in office while the right put out dark/old blood about 30cc/day.  She presented to the ED with abdominal pain, where CT abd/pelvis demonstrated post-op hematoma.  Bedside needle aspiration of old blood.  She followed up in the office for outpt IR placement of new left abdominal drain, scheduled for 1/13/25.  On bactrim PO for left incisional cellulitis. Last office visit 1/10/25.  However, she now presented to the ED for increasing drainage from left abdominal incision    subjective fevers at home.  ED AVSS  Gen: Non-toxic, NAD  Abd: soft, appropriate incisional tenderness, left abdominal perincisional erythema, no purulent drainage.    Labs reviewed  WBC 9  BMP wnl    needle aspiration cx 1/6/25 - proteus mirabilis    A/P: POD#19 s/p Jessenia de Lis panniculectomy with post-op hematoma, with suspected fat necrosis.    Recommend left abdominal hematoma evacuation and washout, and indicated procedures    -B/R/A reviewed. New drains to be placed  -Informed consent obtained with   -Ancef  -DVT ppx  -All questions were answered  -Spoke with spouse as well  -NPO and for OR

## 2025-01-12 NOTE — PRE-OP CHECKLIST - 1.
Patient alert & oriented x4, denies any contacts, jewelry, removable dental work or undergarments. As per patient, no personal property brought to pre-op area; all belongings left with family. Patient denies any implants/metals/or other internal prostheses besides DEVIN drain.

## 2025-01-12 NOTE — ED PROVIDER NOTE - CARE PLAN
1 Principal Discharge DX:	Abdominal wall seroma  Secondary Diagnosis:	Chronic anemia  Secondary Diagnosis:	Elevated liver enzymes

## 2025-01-12 NOTE — ED PROVIDER NOTE - OBJECTIVE STATEMENT
50 yo F with hx of DM, diverticulitis, fatty liver, HLD, ovarian cancer, seizure, KAN, panniculectomy with rectus diastasis repair on 12/24 who presents with pain and drainage from LLQ which started this AM. Pt initially had 2 drains but L sided drain removed. Seen in ED last wk for abd pain and chills. Had imaging showing 20cm fluid collection on L s/p US guided aspiration by plastics. She had fever again 3 days ago so went to office 2 days ago and was started on bactrim. Did not have further fluid draining until this AM so came to ED. R drain with minimal output. No fever.    PMD Dr. Gonsales  Plastics Dr. Donnelly

## 2025-01-12 NOTE — ED PROVIDER NOTE - NSICDXPASTSURGICALHX_GEN_ALL_CORE_FT
PAST SURGICAL HISTORY:  H/O abdominal hysterectomy With Right Oophrecetomy   In December 2017    H/O colonoscopy >3 years ago    H/O gastric sleeve     History of cholecystectomy 10-15 years ago    History of colon resection     History of mobilization of splenic flexure     Status post panniculectomy

## 2025-01-13 ENCOUNTER — TRANSCRIPTION ENCOUNTER (OUTPATIENT)
Age: 50
End: 2025-01-13

## 2025-01-13 VITALS
TEMPERATURE: 98 F | DIASTOLIC BLOOD PRESSURE: 66 MMHG | OXYGEN SATURATION: 96 % | SYSTOLIC BLOOD PRESSURE: 98 MMHG | HEART RATE: 92 BPM | RESPIRATION RATE: 18 BRPM

## 2025-01-13 LAB
ANION GAP SERPL CALC-SCNC: 8 MMOL/L — SIGNIFICANT CHANGE UP (ref 7–14)
BASOPHILS # BLD AUTO: 0.03 K/UL — SIGNIFICANT CHANGE UP (ref 0–0.2)
BASOPHILS NFR BLD AUTO: 0.4 % — SIGNIFICANT CHANGE UP (ref 0–1)
BUN SERPL-MCNC: 13 MG/DL — SIGNIFICANT CHANGE UP (ref 10–20)
CALCIUM SERPL-MCNC: 8.8 MG/DL — SIGNIFICANT CHANGE UP (ref 8.4–10.5)
CHLORIDE SERPL-SCNC: 100 MMOL/L — SIGNIFICANT CHANGE UP (ref 98–110)
CO2 SERPL-SCNC: 25 MMOL/L — SIGNIFICANT CHANGE UP (ref 17–32)
CREAT SERPL-MCNC: 0.6 MG/DL — LOW (ref 0.7–1.5)
EGFR: 110 ML/MIN/1.73M2 — SIGNIFICANT CHANGE UP
EOSINOPHIL # BLD AUTO: 0.01 K/UL — SIGNIFICANT CHANGE UP (ref 0–0.7)
EOSINOPHIL NFR BLD AUTO: 0.1 % — SIGNIFICANT CHANGE UP (ref 0–8)
GLUCOSE SERPL-MCNC: 133 MG/DL — HIGH (ref 70–99)
HCT VFR BLD CALC: 26.4 % — LOW (ref 37–47)
HGB BLD-MCNC: 8.3 G/DL — LOW (ref 12–16)
IMM GRANULOCYTES NFR BLD AUTO: 0.5 % — HIGH (ref 0.1–0.3)
LYMPHOCYTES # BLD AUTO: 0.61 K/UL — LOW (ref 1.2–3.4)
LYMPHOCYTES # BLD AUTO: 7.4 % — LOW (ref 20.5–51.1)
MCHC RBC-ENTMCNC: 27.9 PG — SIGNIFICANT CHANGE UP (ref 27–31)
MCHC RBC-ENTMCNC: 31.4 G/DL — LOW (ref 32–37)
MCV RBC AUTO: 88.6 FL — SIGNIFICANT CHANGE UP (ref 81–99)
MONOCYTES # BLD AUTO: 0.12 K/UL — SIGNIFICANT CHANGE UP (ref 0.1–0.6)
MONOCYTES NFR BLD AUTO: 1.5 % — LOW (ref 1.7–9.3)
NEUTROPHILS # BLD AUTO: 7.41 K/UL — HIGH (ref 1.4–6.5)
NEUTROPHILS NFR BLD AUTO: 90.1 % — HIGH (ref 42.2–75.2)
NRBC # BLD: 0 /100 WBCS — SIGNIFICANT CHANGE UP (ref 0–0)
PLATELET # BLD AUTO: 483 K/UL — HIGH (ref 130–400)
PMV BLD: 9.6 FL — SIGNIFICANT CHANGE UP (ref 7.4–10.4)
POTASSIUM SERPL-MCNC: 4.9 MMOL/L — SIGNIFICANT CHANGE UP (ref 3.5–5)
POTASSIUM SERPL-SCNC: 4.9 MMOL/L — SIGNIFICANT CHANGE UP (ref 3.5–5)
RBC # BLD: 2.98 M/UL — LOW (ref 4.2–5.4)
RBC # FLD: 14.5 % — SIGNIFICANT CHANGE UP (ref 11.5–14.5)
SODIUM SERPL-SCNC: 133 MMOL/L — LOW (ref 135–146)
WBC # BLD: 8.22 K/UL — SIGNIFICANT CHANGE UP (ref 4.8–10.8)
WBC # FLD AUTO: 8.22 K/UL — SIGNIFICANT CHANGE UP (ref 4.8–10.8)

## 2025-01-13 RX ORDER — AMOXICILLIN/POTASSIUM CLAV 875-125 MG
875 TABLET ORAL
Qty: 20 | Refills: 0
Start: 2025-01-13 | End: 2025-01-22

## 2025-01-13 RX ADMIN — Medication 100 MILLIGRAM(S): at 11:27

## 2025-01-13 RX ADMIN — TRAMADOL HYDROCHLORIDE 25 MILLIGRAM(S): 50 TABLET ORAL at 02:29

## 2025-01-13 RX ADMIN — Medication 100 MILLIGRAM(S): at 02:13

## 2025-01-13 RX ADMIN — ENOXAPARIN SODIUM 40 MILLIGRAM(S): 60 INJECTION INTRAVENOUS; SUBCUTANEOUS at 02:12

## 2025-01-13 NOTE — DISCHARGE NOTE NURSING/CASE MANAGEMENT/SOCIAL WORK - FINANCIAL ASSISTANCE
[facility] provides services at a reduced cost to those who are determined to be eligible through [facility]’s financial assistance program. Information regarding [facility]’s financial assistance program can be found by going to [email or web address] or by calling [phone].

## 2025-01-13 NOTE — CONSULT NOTE ADULT - SUBJECTIVE AND OBJECTIVE BOX
ALFIE MOSLEY  49y, Female  Allergy: contrast media (gadolinium-based) (Short breath; Headache)      All historical available data reviewed.    HPI:  PLASTIC SURGERY CONSULT NOTE    Patient: ALFIE MOSLEY , 49y ([birth year])Female   MRN: [id number]  Location: [facility] ED Hold 011 A  Visit: 01-12-25 Inpatient  Date: 01-12-25 @ 14:24    HPI:  The patient is a 49 year old female with PMH of HLD, diverticulosis, hysterectomy and R oophorectomy, panniculectomy 12/24 with Dr. Donnelly presenting with 24 hours of left lower abdominal wound drainage. Patient was doing well post-op, had two drains in place and left drain removed in office while the right put out dark/old blood about 30cc/day. She came to the ED on 1/5 for evaluation of abdominal pain, chills, and malodorous drain output. CT at that time showed large subQ collection spanning 20cm, and the decision was made to perform ultrasound guided drainage - approximately 80cc of dark SS fluid was removed. There was no sign of purulence, the patient was discharged on augmentin. She was seen in the office on Friday 1/10 and still had drainage, she was put on PO bactrim. She comes in today for increased drainage from L aspect of her abdominal wound. She denies fevers, but has had intermittent chills.   In the ED patient is afebrile and hemodynamically stable. Labs notable for WBC 9.22 without left shift, hgb 9 (from 9.2), BMP within normal limits. Plastic surgery consulted for eval of her wound    PAST MEDICAL & SURGICAL HISTORY:  Diabetes      Ovarian cancer  UNSURE OF STAGE HOWEVER REPORTS IT "MINOR"  Sees Oncologist Dr. Vanessa Correa      Seizure disorder  last seizure > 6 months ago. No longer on medications      Hypercholesteremia      Vertigo  > 3 months ago, not currently complaining of same (3/4/2020)      Diverticulosis of intestine      Diverticulitis      Fatty liver      Obesity      Sleep apnea      History of cholecystectomy  10-15 years ago      H/O abdominal hysterectomy  With Right Oophrecetomy   In December 2017      H/O colonoscopy  >3 years ago      History of colon resection      History of mobilization of splenic flexure      H/O gastric sleeve      Status post panniculectomy          Home Medications:  acetaminophen 325 mg oral tablet: 3 tab(s) orally every 6 hours As needed Mild Pain (1 - 3) (25 Dec 2024 09:29)  gabapentin 300 mg oral capsule: 1 cap(s) orally every 8 hours As needed pain (25 Dec 2024 09:29)  Multiple Vitamins oral tablet: 1 tab(s) orally once a day (24 Dec 2024 06:53)  OMEPRAZOLE 20MG CAP: 1 cap(s) orally once a day (24 Dec 2024 06:53)        VITALS:  T(F): 98.8 (01-12-25 @ 07:06), Max: 98.8 (01-12-25 @ 07:06)  HR: 92 (01-12-25 @ 07:06) (92 - 92)  BP: 103/65 (01-12-25 @ 07:06) (103/65 - 103/65)  RR: 18 (01-12-25 @ 07:06) (18 - 18)  SpO2: 97% (01-12-25 @ 07:06) (97% - 97%)    PHYSICAL EXAM:    General: well appearing, no acute distress  HEENT: pupils equal and reactive, EOM intact, mucous membranes moist, no scleral icterus  CV: RRR on radial exam  Pulm: breathing well on room air, no acute respiratory distress  Abdomen: soft, nontender, no rebound or guarding. Lower abdominal wound erythematous with mild drainage from left side, palpable hematoma under left side of incision. R side of wound with DEVIN drain  Ext: well perfused, no peripheral edema, no ROM or strength deficits  Neuro: alert and oriented x3, no focal sensory/motor deficits  Surgical Sites: midline abdominal wound clean dry and intact, lower transverse abdominal wound R side intact, L side with small opening just medial to ASIS with serosanguinous drainage, mild malodor.  D/T/L: R lower abdominal drain with serosanguinous output.       MEDICATIONS  (STANDING):  ceFAZolin   IVPB      ceFAZolin   IVPB 500 milliGRAM(s) IV Intermittent once  ceFAZolin   IVPB 500 milliGRAM(s) IV Intermittent every 8 hours  enoxaparin Injectable 40 milliGRAM(s) SubCutaneous every 24 hours  lactated ringers. 1000 milliLiter(s) (115 mL/Hr) IV Continuous <Continuous>    MEDICATIONS  (PRN):  acetaminophen     Tablet .. 650 milliGRAM(s) Oral every 6 hours PRN Mild Pain (1 - 3)  ketorolac   Injectable 15 milliGRAM(s) IV Push every 8 hours PRN Moderate Pain (4 - 6)      LAB/STUDIES:                        9.0    9.22  )-----------( 533      ( 12 Jan 2025 10:36 )             28.9     01-12    136  |  100  |  14  ----------------------------<  88  4.6   |  24  |  0.6[L]    Ca    9.0      12 Jan 2025 10:36    TPro  6.6  /  Alb  4.0  /  TBili  0.3  /  DBili  x   /  AST  53[H]  /  ALT  83[H]  /  AlkPhos  187[H]  01-12      LIVER FUNCTIONS - ( 12 Jan 2025 10:36 )  Alb: 4.0 g/dL / Pro: 6.6 g/dL / ALK PHOS: 187 U/L / ALT: 83 U/L / AST: 53 U/L / GGT: x           Urinalysis Basic - ( 12 Jan 2025 10:36 )    Color: x / Appearance: x / SG: x / pH: x  Gluc: 88 mg/dL / Ketone: x  / Bili: x / Urobili: x   Blood: x / Protein: x / Nitrite: x   Leuk Esterase: x / RBC: x / WBC x   Sq Epi: x / Non Sq Epi: x / Bacteria: x                  IMAGING:  no new imaging    ACCESS DEVICES:  [x ] Peripheral IV  [ ] Central Venous Line	[ ] R	[ ] L	[ ] IJ	[ ] Fem	[ ] SC	Placed:   [ ] Arterial Line		[ ] R	[ ] L	[ ] Fem	[ ] Rad	[ ] Ax	Placed:   [ ] PICC:					[ ] Mediport  [ ] Urinary Catheter, Date Placed:      (12 Jan 2025 14:23)    FAMILY HISTORY:  Diabetes (Father, Mother)    Hypercholesteremia (Mother)    HTN (hypertension) (Mother)      PAST MEDICAL & SURGICAL HISTORY:  Diabetes      Ovarian cancer  UNSURE OF STAGE HOWEVER REPORTS IT "MINOR"  Sees Oncologist Dr. Vansesa Correa      Seizure disorder  last seizure > 6 months ago. No longer on medications      Hypercholesteremia      Vertigo  > 3 months ago, not currently complaining of same (3/4/2020)      Diverticulosis of intestine      Diverticulitis      Fatty liver      Obesity      Sleep apnea      History of cholecystectomy  10-15 years ago      H/O abdominal hysterectomy  With Right Oophrecetomy   In December 2017      H/O colonoscopy  >3 years ago      History of colon resection      History of mobilization of splenic flexure      H/O gastric sleeve      Status post panniculectomy            VITALS:  T(F): 97.5, Max: 98.3 (01-12-25 @ 18:08)  HR: 78  BP: 102/66  RR: 18Vital Signs Last 24 Hrs  T(C): 36.4 (13 Jan 2025 12:02), Max: 36.8 (12 Jan 2025 18:08)  T(F): 97.5 (13 Jan 2025 12:02), Max: 98.3 (12 Jan 2025 18:08)  HR: 78 (13 Jan 2025 12:02) (62 - 87)  BP: 102/66 (13 Jan 2025 12:02) (89/60 - 137/58)  BP(mean): 75 (12 Jan 2025 22:30) (75 - 75)  RR: 18 (13 Jan 2025 12:02) (13 - 20)  SpO2: 97% (13 Jan 2025 12:02) (96% - 100%)    Parameters below as of 12 Jan 2025 22:30  Patient On (Oxygen Delivery Method): room air        TESTS & MEASUREMENTS:                        8.3    8.22  )-----------( 483      ( 12 Jan 2025 23:56 )             26.4     01-12    133[L]  |  100  |  13  ----------------------------<  133[H]  4.9   |  25  |  0.6[L]    Ca    8.8      12 Jan 2025 23:56    TPro  6.6  /  Alb  4.0  /  TBili  0.3  /  DBili  x   /  AST  53[H]  /  ALT  83[H]  /  AlkPhos  187[H]  01-12    LIVER FUNCTIONS - ( 12 Jan 2025 10:36 )  Alb: 4.0 g/dL / Pro: 6.6 g/dL / ALK PHOS: 187 U/L / ALT: 83 U/L / AST: 53 U/L / GGT: x             Urinalysis Basic - ( 12 Jan 2025 23:56 )    Color: x / Appearance: x / SG: x / pH: x  Gluc: 133 mg/dL / Ketone: x  / Bili: x / Urobili: x   Blood: x / Protein: x / Nitrite: x   Leuk Esterase: x / RBC: x / WBC x   Sq Epi: x / Non Sq Epi: x / Bacteria: x          RADIOLOGY & ADDITIONAL TESTS:  Personal review of radiological diagnostics performed  Echo and EKG results noted when applicable.     MEDICATIONS:  acetaminophen     Tablet .. 650 milliGRAM(s) Oral every 6 hours PRN  ceFAZolin   IVPB 500 milliGRAM(s) IV Intermittent every 8 hours  enoxaparin Injectable 40 milliGRAM(s) SubCutaneous every 24 hours  lactated ringers. 1000 milliLiter(s) IV Continuous <Continuous>  traMADol 25 milliGRAM(s) Oral every 8 hours PRN      ANTIBIOTICS:  ceFAZolin   IVPB 500 milliGRAM(s) IV Intermittent every 8 hours

## 2025-01-13 NOTE — PROGRESS NOTE ADULT - SUBJECTIVE AND OBJECTIVE BOX
Plastic Surgery Progress Note (pg [facility]: [id number], [facility]: [phone])    SUBJECTIVE  The patient was seen and examined. Pain well-controlled. Denies fevers, chills.     OBJECTIVE  ___________________________________________________  VITAL SIGNS / I&O's   Vital Signs Last 24 Hrs  T(C): 36.6 (13 Jan 2025 08:04), Max: 36.8 (12 Jan 2025 18:08)  T(F): 97.8 (13 Jan 2025 08:04), Max: 98.3 (12 Jan 2025 18:08)  HR: 70 (13 Jan 2025 08:04) (62 - 87)  BP: 103/69 (13 Jan 2025 08:04) (89/60 - 137/58)  BP(mean): 75 (12 Jan 2025 22:30) (75 - 75)  RR: 18 (13 Jan 2025 08:04) (13 - 20)  SpO2: 96% (13 Jan 2025 08:04) (96% - 100%)    Parameters below as of 12 Jan 2025 22:30  Patient On (Oxygen Delivery Method): room air          12 Jan 2025 07:01  -  13 Jan 2025 07:00  --------------------------------------------------------  IN:    Lactated Ringers: 75 mL    Oral Fluid: 300 mL  Total IN: 375 mL    OUT:    Bulb (mL): 25 mL    Bulb (mL): 5 mL    Bulb (mL): 15 mL  Total OUT: 45 mL    Total NET: 330 mL      13 Jan 2025 07:01  -  13 Jan 2025 10:47  --------------------------------------------------------  IN:  Total IN: 0 mL    OUT:    Bulb (mL): 0 mL    Bulb (mL): 5 mL    Bulb (mL): 5 mL  Total OUT: 10 mL    Total NET: -10 mL        ___________________________________________________  PHYSICAL EXAM    -- CONSTITUTIONAL: NAD, lying in bed  -- NEURO: Awake, alert  -- PULM: Non-labored respirations  -- CARD: well-perfused, hemodynamically stable  -- ABDOMEN: abdomen soft, flat, NTTP. No concern for additional re-collection. Left incision with moderate surrounding erythema/edema- improved since yesterday. Incision c/d/i. DEVIN drains to suction with serosanginous output.     ___________________________________________________  LABS                        8.3    8.22  )-----------( 483      ( 12 Jan 2025 23:56 )             26.4     12 Jan 2025 23:56    133    |  100    |  13     ----------------------------<  133    4.9     |  25     |  0.6      Ca    8.8        12 Jan 2025 23:56    TPro  6.6    /  Alb  4.0    /  TBili  0.3    /  DBili  x      /  AST  53     /  ALT  83     /  AlkPhos  187    12 Jan 2025 10:36      CAPILLARY BLOOD GLUCOSE      POCT Blood Glucose.: 88 mg/dL (12 Jan 2025 18:18)        Urinalysis Basic - ( 12 Jan 2025 23:56 )    Color: x / Appearance: x / SG: x / pH: x  Gluc: 133 mg/dL / Ketone: x  / Bili: x / Urobili: x   Blood: x / Protein: x / Nitrite: x   Leuk Esterase: x / RBC: x / WBC x   Sq Epi: x / Non Sq Epi: x / Bacteria: x      ___________________________________________________  MICRO  Recent Cultures:    ___________________________________________________  MEDICATIONS  (STANDING):  ceFAZolin   IVPB 500 milliGRAM(s) IV Intermittent every 8 hours  enoxaparin Injectable 40 milliGRAM(s) SubCutaneous every 24 hours  lactated ringers. 1000 milliLiter(s) (115 mL/Hr) IV Continuous <Continuous>    MEDICATIONS  (PRN):  acetaminophen     Tablet .. 650 milliGRAM(s) Oral every 6 hours PRN Mild Pain (1 - 3)  traMADol 25 milliGRAM(s) Oral every 8 hours PRN Severe Pain (7 - 10)

## 2025-01-13 NOTE — DISCHARGE NOTE PROVIDER - ATTENDING ATTESTATION STATEMENT
Initial SW/CM Assessment/Plan of Care Note     Baseline Assessment  59 year old admitted 9/25/2020 as Inpatient with a diagnosis of umbar degenerative disc disease.   Prior to admission patient was living with Spouse/significant other and residing in a House.   Patient’s Primary Care Provider is Tyron Bustillo DO.     Prior to Admission Status  Functional Status  Functional Status Comments: Independent w/ all ADLs prior to admission    Agency/Support Services  Type of Services Prior to Hospitalization: None  Support Systems: Spouse/Significant other  Home Devices/Equipment: Blood pressure monitor  Mobility Assist Devices: Cane  Sensory Support Devices: Eyeglasses  Outpatient CM/CTNN: n/a    Current Status  Physical Therapy: Recommends home with 24 hour non-skilled assistance and non-daily skilled therapy.  Occupational Therapy: Recommends home with 24 hour non-skilled assistance and non-daily skilled therapy.    Insurance  Primary: [facility]  Secondary: N/A    Barriers to Discharge  Identified Barriers to Discharge/Transition Planning: Assessment/stabilization in progress    Progress Note  Met with patient at bedside to complete CM assessment. Patient admitted s/p L3-S1 Fusion. CT chest, multip PEs. Heparin gtt. PMR evaluated patient. Patient is eager to return home.    She lives home with her spouse and adult children. She has adequate support at home. She plans to stay on the main level, which has access to a bathroom. She declines acute and rehab and wants to go home with [facility] RN, PT, OT. Referral started, requested order from Dr. Moser.      Plan  SW/CM - Recommendations for Discharge: Home with family with [facility] RN, PT, OT.        Refer to SW/CM Flowsheet for Goals and objective data.         
Care Rounds completed. Pt has been medically cleared for DC pend heme final recs. This CM met w/ pt in room (fulll PPE worn), plan to DC home w/ spouse, dtr and DIL will help daily. Pt will DC w/ [facility] RN/PT/OT, RN to draw INR on Sat 10/3 (Buddy notified). Pt is to wear LSO brace when OOB, has a RW at home. Although she has stairs, plans to stay on 1st level. Pt states she's comfortable w/ Lovenox injections. Will DC this evening after 1700 Lovenox dose. Dtr to drive home, no further CM needs.   
I have personally seen and examined the patient. I have collaborated with and supervised the

## 2025-01-13 NOTE — DISCHARGE NOTE NURSING/CASE MANAGEMENT/SOCIAL WORK - PATIENT PORTAL LINK FT
You can access the FollowMyHealth Patient Portal offered by [facility] by registering at the following website: [email or web address]. By joining [employer] [employer]’s FollowMyHealth portal, you will also be able to view your health information using other applications (apps) compatible with our system.

## 2025-01-13 NOTE — DISCHARGE NOTE PROVIDER - NSDCFUADDINST_GEN_ALL_CORE_FT
Take Tylenol for pain as needed. Continue taking home gabapentin as prescribed.     A prescription for antibiotics was sent to your pharmacy (Augmentin). Take in its entirety, do not skip doses.    Keep dressings on and dry until follow up. You may sponge-bathe outside of the surgical site.   Continue wearing abdominal binder at all times.     Activity: Avoid vigorous exercise and heavy lifting (>10 lbs) until at least follow-up appointment. Sleep at an incline or with at least 2 pillows underneath your back. Continue ambulating as tolerated. Walk flexed at the hips to ensure there is no strain on your abdominal incision.     DRAIN: You will be discharged with a DEVIN drain. You will need to empty it and record outputs accurately. This will be taught to you by the nursing staff. Please do not remove the DEVIN drain. It will be removed in the office. Please bring to the office accurate records of output.

## 2025-01-13 NOTE — CHART NOTE - NSCHARTNOTEFT_GEN_A_CORE
Post Operative Note  Patient: ALFIE MOSLEY 49y (1975) Female   MRN: [id number]  Location: [address]  Visit: 01-12-25 Inpatient      Procedure: Abdominal wall hematoma     S/P Evacuation of hematoma of anterior torso        Subjective: patient seen and examined at bedside. Pain well controlled. ABD binder in place. 3 drains in place all w/ minimal SS output. +TOV. -n/v, -g/bm    Objective:  Vitals: T(F): 98.3 (01-12-25 @ 23:36), Max: 98.8 (01-12-25 @ 07:06)  HR: 62 (01-12-25 @ 23:36)  BP: 97/63 (01-12-25 @ 22:30) (97/63 - 137/58)  RR: 18 (01-12-25 @ 23:36)  SpO2: 97% (01-12-25 @ 23:36)  Vent Settings:     In:   01-12-25 @ 07:01  -  01-13-25 @ 04:04  --------------------------------------------------------  IN: 375 mL      IV Fluids: lactated ringers. 1000 milliLiter(s) (75 mL/Hr) IV Continuous <Continuous>  lactated ringers. 1000 milliLiter(s) (115 mL/Hr) IV Continuous <Continuous>      Out:   01-12-25 @ 07:01  -  01-13-25 @ 04:04  --------------------------------------------------------  OUT: 25 mL      EBL:     Voided Urine:   01-12-25 @ 07:01 - 01-13-25 @ 04:04  --------------------------------------------------------  OUT: 25 mL      Hayden Catheter: yes no   Drains:   DEVIN:   01-12-25 @ 07:01  -  01-13-25 @ 04:04  --------------------------------------------------------  OUT: 25 mL     ,   Chest Tube:      NG Tube:       Physical Examination:  General Appearance: NAD, A+O*3  HEENT: EOMI, sclera non-icteric.  Lungs: normal respiratory effort  Abdomen:  Soft,  mildly tender, appropriate for post-op course, nondistended. No rigidity, guarding, or rebound tenderness.  Abdominal binder in place. 3 drains in place all w/ minimal SS output  MSK/Extremities: Warm & well-perfused.   Skin: Warm, dry. No jaundice.   Incisions/Wounds: Dressings in place, clean, dry and intact, no signs of infection/active bleeding/drainage    Medications: [Standing]  acetaminophen     Tablet .. 650 milliGRAM(s) Oral every 6 hours PRN  ceFAZolin   IVPB 500 milliGRAM(s) IV Intermittent every 8 hours  enoxaparin Injectable 40 milliGRAM(s) SubCutaneous every 24 hours  HYDROmorphone  Injectable 0.5 milliGRAM(s) IV Push every 10 minutes PRN  lactated ringers. 1000 milliLiter(s) IV Continuous <Continuous>  lactated ringers. 1000 milliLiter(s) IV Continuous <Continuous>  traMADol 25 milliGRAM(s) Oral every 8 hours PRN    Medications: [PRN]  acetaminophen     Tablet .. 650 milliGRAM(s) Oral every 6 hours PRN  ceFAZolin   IVPB 500 milliGRAM(s) IV Intermittent every 8 hours  enoxaparin Injectable 40 milliGRAM(s) SubCutaneous every 24 hours  HYDROmorphone  Injectable 0.5 milliGRAM(s) IV Push every 10 minutes PRN  lactated ringers. 1000 milliLiter(s) IV Continuous <Continuous>  lactated ringers. 1000 milliLiter(s) IV Continuous <Continuous>  traMADol 25 milliGRAM(s) Oral every 8 hours PRN      DVT PROPHYLAXIS: enoxaparin Injectable 40 milliGRAM(s) SubCutaneous every 24 hours    GI PROPHYLAXIS:   ANTICOAGULATION:   ANTIBIOTICS:  ceFAZolin   IVPB 500 milliGRAM(s)        Labs:                        8.3    8.22  )-----------( 483      ( 12 Jan 2025 23:56 )             26.4     01-12    133[L]  |  100  |  13  ----------------------------<  133[H]  4.9   |  25  |  0.6[L]    Ca    8.8      12 Jan 2025 23:56    TPro  6.6  /  Alb  4.0  /  TBili  0.3  /  DBili  x   /  AST  53[H]  /  ALT  83[H]  /  AlkPhos  187[H]  01-12  Assessment:  49yF s/p     Plan:  - Monitor vitals  - Monitor post-op labs and replete as necessary  - Monitor for bowel function  -  multimodal pain reg  - ID c/s, c/w ancef in the meantime  - Encourage ambulation as tolerated  - Monitor urine output   - Monitor wound and dressing for changes, redress as needed.  - Dressing: baci, xeroform, ABD  - Cancel IR

## 2025-01-13 NOTE — DISCHARGE NOTE PROVIDER - CARE PROVIDERS DIRECT ADDRESSES
[email or web address]

## 2025-01-13 NOTE — PROGRESS NOTE ADULT - ASSESSMENT
49yF w/ PMHx notable for panniculectomy 12/24 with postop drainage presenting with new drainage from left side of lower abdominal wound. Patient otherwise doing well, afebrile, no leukocytosis, with mild drainage. Patient likely with hematoma under her transverse incision. Prior drainage + Proteus. Now s/p RTOR 1/12 for hematoma evacuation- 200 cc evacuated from left abdomen- no active bleeding noted. Recovering appropriately on the floor  - Patient doing well after washout  - DVT ppx: lovenox  - ancef  - Diet: AAT  - no indication for IR given recent washout and hematoma evacuation  - ID consult for prior +cx and mild/moderate cellulitis around L incision site.   - DEVIN drain care  - Dressings: xeroform, abd, abdominal binder   49yF w/ PMHx notable for panniculectomy 12/24 with postop drainage presenting with new drainage from left side of lower abdominal wound. Patient otherwise doing well, afebrile, no leukocytosis, with mild drainage. Patient likely with hematoma under her transverse incision. Prior drainage + Proteus. Now s/p RTOR 1/12 for hematoma evacuation- 200 cc evacuated from left abdomen- no active bleeding noted. Recovering appropriately on the floor  - Patient doing well after washout  - DVT ppx: lovenox  - ancef  - Diet: AAT  - no indication for IR given recent washout and hematoma evacuation  - ID consult for prior +cx and mild/moderate cellulitis around L incision site.   - DEVIN drain care  - Dressings: xeroform, abd, abdominal binder      Patient seen/examined bedside with Dr. Donnelly.  used for duration of encounter Jeovanny #[id number]

## 2025-01-13 NOTE — DISCHARGE NOTE PROVIDER - NSDCMRMEDTOKEN_GEN_ALL_CORE_FT
acetaminophen 325 mg oral tablet: 3 tab(s) orally every 6 hours As needed Mild Pain (1 - 3)  gabapentin 300 mg oral capsule: 1 cap(s) orally every 8 hours As needed pain  Multiple Vitamins oral tablet: 1 tab(s) orally once a day  OMEPRAZOLE 20MG CAP: 1 cap(s) orally once a day  traMADol 50 mg oral tablet: 1 tab(s) orally every 6 hours as needed for  severe pain MDD: 4 tabs per day   acetaminophen 325 mg oral tablet: 3 tab(s) orally every 6 hours As needed Mild Pain (1 - 3)  amoxicillin-clavulanate 875 mg-125 mg oral tablet: 875 milligram(s) orally 2 times a day  gabapentin 300 mg oral capsule: 1 cap(s) orally every 8 hours As needed pain  Multiple Vitamins oral tablet: 1 tab(s) orally once a day  OMEPRAZOLE 20MG CAP: 1 cap(s) orally once a day  traMADol 50 mg oral tablet: 1 tab(s) orally every 6 hours as needed for  severe pain MDD: 4 tabs per day

## 2025-01-13 NOTE — PROGRESS NOTE ADULT - ATTENDING COMMENTS
Pt seen and examined at bedside with PRS resident  Pain controlled. YASMINE  AVSS  Gen: NAD  Abd: left abd incision erythema improving; incision C/D/I, DEVIN drains s/s    Labs  WBC 8  Hg 8.3    A/P: POD#1 s/p left abdominal evacuation of hematoma and abx lavage.  Pt HD stable.    -May DC home after ID recommendations  -Abx: Augmentin  -sponge bath  -DEVIN drains  -abdominal binder  -HOB elevation; beach chair position  -follow up in office in 1 week

## 2025-01-13 NOTE — PATIENT PROFILE ADULT - FALL HARM RISK - HARM RISK INTERVENTIONS

## 2025-01-13 NOTE — DISCHARGE NOTE NURSING/CASE MANAGEMENT/SOCIAL WORK - NSDCVIVACCINE_GEN_ALL_CORE_FT
influenza, injectable, quadrivalent, preservative free; 19-Oct-2018 11:51; Maggy Lazaro (RN); [employer]; [id number] (Exp. Date: 30-Jun-2019); IntraMuscular; Deltoid Left.; 0.5 milliLiter(s); VIS (VIS Published: 07-Aug-2015, VIS Presented: 19-Oct-2018);

## 2025-01-13 NOTE — CONSULT NOTE ADULT - TIME BILLING
-My assessment required an independent historian and is independent of the teaching service  -I independently interpreted the most recent imaging ( CXR ) and labs ( CBC, CMP) and cultures ( along with the sensitivities / GILMAR )  -I discussed my recommendations with the primary team housestaff/Attending  -I assisted with initiation of antibiotics  -Pt is on ABx therapy requiring intensive monitoring for toxicity

## 2025-01-13 NOTE — DISCHARGE NOTE PROVIDER - CARE PROVIDER_API CALL
Manuela Donnelly  Plastic Surgery  [address] [place] [address], [address]  [place] [address]  Phone: [phone]  Fax: [phone]  Follow Up Time: 1 week

## 2025-01-13 NOTE — DISCHARGE NOTE PROVIDER - NSDCFUSCHEDAPPT_GEN_ALL_CORE_FT
Manuela Donnelly  [facility]  PLASTICSUR [address]  Scheduled Appointment: 01/21/2025    [facility] PreAdmits  Scheduled Appointment: 02/14/2025    [facility]  [address] [facility] [address]  Scheduled Appointment: 02/14/2025

## 2025-01-13 NOTE — CONSULT NOTE ADULT - ASSESSMENT
49 year old female with PMH of HLD, diverticulosis, hysterectomy and R oophorectomy, panniculectomy 12/24 with Dr. Donnelly presenting with 24 hours of left lower abdominal wound drainage. Patient was doing well post-op, had two drains in place and left drain removed in office while the right put out dark/old blood about 30cc/day. She came to the ED on 1/5 for evaluation of abdominal pain, chills, and malodorous drain output. CT at that time showed large subQ collection spanning 20cm, and the decision was made to perform ultrasound guided drainage - approximately 80cc of dark SS fluid was removed. There was no sign of purulence, the patient was discharged on augmentin. She was seen in the office on Friday 1/10 and still had drainage, she was put on PO bactrim. She comes in today for increased drainage from L aspect of her abdominal wound. She denies fevers, but has had intermittent chills.   In the ED patient is afebrile and hemodynamically stable. Labs notable for WBC 9.22 without left shift, hgb 9 (from 9.2), BMP within normal limits. 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   Abdominal wall hematoma with possible cellulitis left flank    < from: CT Abdomen and Pelvis w/ IV Cont (01.05.25 @ 22:58) >  1.  No CT evidence for acute abdominal pathology.  2.  Interval panniculectomy and ventral abdominal wall hernia repair with postoperative collections including seromas or hematomas spanning 20 cm intransverse dimension in the subcutaneous soft tissues of the left lower quadrant. No subcutaneous emphysema. No abscess.  3.  A drainage catheter is visualized with tip terminating in the subcutaneous soft tissues of the right lower quadrant where there is also   a persistent collection.    1/6 S/p US guided drainage of collection. No purulence  1/6 WCx P mirabilis. Independent analysis of CX results and interpretation of sensitivity results.     Cannot r/o an infected hematoma and for now will recommend treating as such  1/12 S/p evacuation of abdominal wall hematoma with circa 200 ccs of old blood    Diabetes  Ovarian cancer  Seizure disorder  Hypercholesteremia  Diverticulosis of intestine  Diverticulitis  Fatty liver  Obesity    -ANcef 2 gm iv q8h  -on d/c change iv to po Augmentin 875 mg q12h for 10 more days starting 1/14    Discussion of management/test results/antibiotic regimen  with primary Plastic Sx  team.

## 2025-01-13 NOTE — DISCHARGE NOTE PROVIDER - NSDCCPCAREPLAN_GEN_ALL_CORE_FT
PRINCIPAL DISCHARGE DIAGNOSIS  Diagnosis: Abdominal hematoma  Assessment and Plan of Treatment:       SECONDARY DISCHARGE DIAGNOSES  Diagnosis: Chronic anemia  Assessment and Plan of Treatment:     Diagnosis: Elevated liver enzymes  Assessment and Plan of Treatment:

## 2025-01-13 NOTE — DISCHARGE NOTE PROVIDER - HOSPITAL COURSE
49y Female was admitted to [facility] on 01-12-25. The patient has a PMHx of HLD, diverticulosis, hysterectomy and R oophorectomy, panniculectomy 12/24 with Dr. Donnelly presenting with 24 hours of left lower abdominal wound drainage. She previously received a postoperative CT scan which showed a non-rim enhancing collection which was aspirated and cultured. Culture was + for Proteus and patient was placed on antibiotics. Upon arrival to the ED patient endorsed increased wound drainage. THe decision was made to return to the operating room for washout. Intraoperatively she was found to have a 200 cc hematoma without active bleeding. 2x DEVIN drains were placed on the left side. Postoperatively the patient recovered in the PACU, was hemodynamically stable, and was sent to the floor. The patient's pain was controlled by IV pain medications and they were transitioned to PO narcotics. The patient was advanced to regular diet and tolerated it well. The patient was hemodynamically stable and placed on home medications. Infectious Disease was consulted for previously positive culture and [id number] PO Augmentin as prescribed. The patient was told to follow up with Dr. YAIMA Donnelly in 1 week and had no other issues. 49y Female was admitted to [facility] on 01-12-25. The patient has a PMHx of HLD, diverticulosis, hysterectomy and R oophorectomy, panniculectomy 12/24 with Dr. Donnelly presenting with 24 hours of left lower abdominal wound drainage. She previously received a postoperative CT scan which showed a non-rim enhancing collection which was aspirated and cultured. Culture was + for Proteus and patient was placed on antibiotics. Upon arrival to the ED patient endorsed increased wound drainage. THe decision was made to return to the operating room for washout. Intraoperatively she was found to have a 200 cc hematoma without active bleeding. 2x DEVIN drains were placed on the left side. Postoperatively the patient recovered in the PACU, was hemodynamically stable, and was sent to the floor. The patient's pain was controlled by IV pain medications and they were transitioned to PO narcotics. The patient was advanced to regular diet and tolerated it well. The patient was hemodynamically stable and placed on home medications. Infectious Disease was consulted for previously positive culture and recommended PO Augmentin as prescribed. The patient was told to follow up with Dr. YAIMA Donnelly in 1 week and had no other issues.

## 2025-01-16 DIAGNOSIS — Z91.041 RADIOGRAPHIC DYE ALLERGY STATUS: ICD-10-CM

## 2025-01-16 DIAGNOSIS — M79.81 NONTRAUMATIC HEMATOMA OF SOFT TISSUE: ICD-10-CM

## 2025-01-16 DIAGNOSIS — E66.9 OBESITY, UNSPECIFIED: ICD-10-CM

## 2025-01-16 DIAGNOSIS — G40.909 EPILEPSY, UNSPECIFIED, NOT INTRACTABLE, WITHOUT STATUS EPILEPTICUS: ICD-10-CM

## 2025-01-16 DIAGNOSIS — K76.0 FATTY (CHANGE OF) LIVER, NOT ELSEWHERE CLASSIFIED: ICD-10-CM

## 2025-01-16 DIAGNOSIS — Z85.43 PERSONAL HISTORY OF MALIGNANT NEOPLASM OF OVARY: ICD-10-CM

## 2025-01-16 DIAGNOSIS — L03.311 CELLULITIS OF ABDOMINAL WALL: ICD-10-CM
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
## 2025-01-16 DIAGNOSIS — K65.4 SCLEROSING MESENTERITIS: ICD-10-CM

## 2025-01-16 DIAGNOSIS — K59.00 CONSTIPATION, UNSPECIFIED: ICD-10-CM

## 2025-01-16 DIAGNOSIS — E78.00 PURE HYPERCHOLESTEROLEMIA, UNSPECIFIED: ICD-10-CM

## 2025-01-16 DIAGNOSIS — T81.41XA INFECTION FOLLOWING A PROCEDURE, SUPERFICIAL INCISIONAL SURGICAL SITE, INITIAL ENCOUNTER: ICD-10-CM

## 2025-01-16 DIAGNOSIS — D84.89 OTHER IMMUNODEFICIENCIES: ICD-10-CM

## 2025-01-16 DIAGNOSIS — L76.31 POSTPROCEDURAL HEMATOMA OF SKIN AND SUBCUTANEOUS TISSUE FOLLOWING A DERMATOLOGIC PROCEDURE: ICD-10-CM

## 2025-01-16 DIAGNOSIS — D64.9 ANEMIA, UNSPECIFIED: ICD-10-CM
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
## 2025-01-16 DIAGNOSIS — Z98.84 BARIATRIC SURGERY STATUS: ICD-10-CM

## 2025-01-16 DIAGNOSIS — K57.90 DIVERTICULOSIS OF INTESTINE, PART UNSPECIFIED, WITHOUT PERFORATION OR ABSCESS WITHOUT BLEEDING: ICD-10-CM

## 2025-01-16 DIAGNOSIS — E11.69 TYPE 2 DIABETES MELLITUS WITH OTHER SPECIFIED COMPLICATION: ICD-10-CM

## 2025-01-16 DIAGNOSIS — G47.33 OBSTRUCTIVE SLEEP APNEA (ADULT) (PEDIATRIC): ICD-10-CM

## 2025-01-16 RX ORDER — DOCUSATE SODIUM 100 MG/1
100 CAPSULE ORAL TWICE DAILY
Qty: 60 | Refills: 1 | Status: ACTIVE | COMMUNITY
Start: 2025-01-16 | End: 1900-01-01

## 2025-01-17 LAB
CULTURE RESULTS: SIGNIFICANT CHANGE UP
CULTURE RESULTS: SIGNIFICANT CHANGE UP
SPECIMEN SOURCE: SIGNIFICANT CHANGE UP
SPECIMEN SOURCE: SIGNIFICANT CHANGE UP

## 2025-01-21 ENCOUNTER — APPOINTMENT (OUTPATIENT)
Dept: PLASTIC SURGERY | Facility: CLINIC | Age: 50
End: 2025-01-21
Payer: COMMERCIAL

## 2025-01-21 DIAGNOSIS — S30.1XXA CONTUSION OF ABDOMINAL WALL, INITIAL ENCOUNTER: ICD-10-CM

## 2025-01-21 PROCEDURE — 99024 POSTOP FOLLOW-UP VISIT: CPT

## 2025-01-27 ENCOUNTER — APPOINTMENT (OUTPATIENT)
Dept: PLASTIC SURGERY | Facility: CLINIC | Age: 50
End: 2025-01-27
Payer: COMMERCIAL

## 2025-01-27 DIAGNOSIS — Z98.890 OTHER SPECIFIED POSTPROCEDURAL STATES: ICD-10-CM

## 2025-01-27 PROCEDURE — 99024 POSTOP FOLLOW-UP VISIT: CPT

## 2025-02-03 ENCOUNTER — APPOINTMENT (OUTPATIENT)
Dept: PLASTIC SURGERY | Facility: CLINIC | Age: 50
End: 2025-02-03
Payer: COMMERCIAL

## 2025-02-03 PROCEDURE — 99024 POSTOP FOLLOW-UP VISIT: CPT

## 2025-02-03 NOTE — ED PROVIDER NOTE - WET READ LAUNCH FT
February 3, 2025        Ashlee Parsons MD  [address]  [place] [address]             [facility]  [address]  [place] [address]  Phone: [phone]   Patient: Caryn Sparks   MR Number: [id number]   YOB: 2010   Date of Visit: 2/3/2025       Dear Dr. Parsons:    Thank you for referring Caryn Sparks to me for evaluation. Below are the relevant portions of my assessment and plan of care.            If you have questions, please do not hesitate to call me. I look forward to following Caryn along with you.    Sincerely,      Jalen Wakefield MD           CC  No Recipients     
There are no Wet Read(s) to document.

## 2025-02-04 NOTE — ASU PATIENT PROFILE, ADULT - FALL HARM RISK - CONCLUSION
Patient requesting refill on     Requested Prescriptions     Pending Prescriptions Disp Refills    levothyroxine (SYNTHROID) 75 MCG tablet [Pharmacy Med Name: LEVOTHYROXINE 75 MCG TABLET] 90 tablet 0     Sig: Take 1 tablet by mouth every morning (before breakfast)        Last OV 7/22/2024   
Universal Safety Interventions

## 2025-02-14 ENCOUNTER — NON-APPOINTMENT (OUTPATIENT)
Age: 50
End: 2025-02-14

## 2025-02-14 ENCOUNTER — OUTPATIENT (OUTPATIENT)
Dept: OUTPATIENT SERVICES | Facility: HOSPITAL | Age: 50
LOS: 1 days | End: 2025-02-14
Payer: COMMERCIAL

## 2025-02-14 ENCOUNTER — APPOINTMENT (OUTPATIENT)
Dept: GASTROENTEROLOGY | Facility: CLINIC | Age: 50
End: 2025-02-14
Payer: COMMERCIAL

## 2025-02-14 VITALS
DIASTOLIC BLOOD PRESSURE: 79 MMHG | BODY MASS INDEX: 34.42 KG/M2 | WEIGHT: 153 LBS | OXYGEN SATURATION: 99 % | HEIGHT: 56 IN | SYSTOLIC BLOOD PRESSURE: 134 MMHG | HEART RATE: 69 BPM | TEMPERATURE: 98.2 F

## 2025-02-14 DIAGNOSIS — K57.90 DIVERTICULOSIS OF INTESTINE, PART UNSPECIFIED, W/OUT PERFORATION OR ABSCESS W/OUT BLEEDING: ICD-10-CM

## 2025-02-14 DIAGNOSIS — Z98.890 OTHER SPECIFIED POSTPROCEDURAL STATES: Chronic | ICD-10-CM

## 2025-02-14 DIAGNOSIS — Z90.49 ACQUIRED ABSENCE OF OTHER SPECIFIED PARTS OF DIGESTIVE TRACT: Chronic | ICD-10-CM

## 2025-02-14 DIAGNOSIS — Z15.89 GENETIC SUSCEPTIBILITY TO OTHER DISEASE: ICD-10-CM

## 2025-02-14 DIAGNOSIS — R10.11 RIGHT UPPER QUADRANT PAIN: ICD-10-CM

## 2025-02-14 DIAGNOSIS — K21.9 GASTRO-ESOPHAGEAL REFLUX DISEASE W/OUT ESOPHAGITIS: ICD-10-CM

## 2025-02-14 DIAGNOSIS — Z12.11 ENCOUNTER FOR SCREENING FOR MALIGNANT NEOPLASM OF COLON: ICD-10-CM

## 2025-02-14 DIAGNOSIS — Z90.3 ACQUIRED ABSENCE OF STOMACH [PART OF]: Chronic | ICD-10-CM

## 2025-02-14 DIAGNOSIS — Z00.00 ENCOUNTER FOR GENERAL ADULT MEDICAL EXAMINATION WITHOUT ABNORMAL FINDINGS: ICD-10-CM

## 2025-02-14 DIAGNOSIS — K59.00 CONSTIPATION, UNSPECIFIED: ICD-10-CM

## 2025-02-14 DIAGNOSIS — R74.8 ABNORMAL LEVELS OF OTHER SERUM ENZYMES: ICD-10-CM

## 2025-02-14 DIAGNOSIS — T78.40XA ALLERGY, UNSPECIFIED, INITIAL ENCOUNTER: ICD-10-CM

## 2025-02-14 DIAGNOSIS — K76.0 FATTY (CHANGE OF) LIVER, NOT ELSEWHERE CLASSIFIED: ICD-10-CM

## 2025-02-14 PROCEDURE — 99214 OFFICE O/P EST MOD 30 MIN: CPT

## 2025-02-14 RX ORDER — PREDNISONE 50 MG/1
50 TABLET ORAL
Qty: 3 | Refills: 0 | Status: ACTIVE | COMMUNITY
Start: 2025-02-14 | End: 1900-01-01

## 2025-02-14 RX ORDER — PANTOPRAZOLE 40 MG/1
40 TABLET, DELAYED RELEASE ORAL DAILY
Qty: 90 | Refills: 3 | Status: ACTIVE | COMMUNITY
Start: 2025-02-14 | End: 1900-01-01

## 2025-02-14 RX ORDER — CAMPHOR 0.45 %
25 GEL (GRAM) TOPICAL ONCE
Qty: 2 | Refills: 0 | Status: ACTIVE | COMMUNITY
Start: 2025-02-14 | End: 1900-01-01

## 2025-02-14 RX ORDER — PREDNISONE 5 MG/1
5 TABLET ORAL DAILY
Qty: 9 | Refills: 0 | Status: DISCONTINUED | COMMUNITY
Start: 2025-02-14 | End: 2025-02-14

## 2025-02-15 ENCOUNTER — OUTPATIENT (OUTPATIENT)
Dept: OUTPATIENT SERVICES | Facility: HOSPITAL | Age: 50
LOS: 1 days | End: 2025-02-15
Payer: COMMERCIAL

## 2025-02-15 DIAGNOSIS — Z90.3 ACQUIRED ABSENCE OF STOMACH [PART OF]: Chronic | ICD-10-CM

## 2025-02-15 DIAGNOSIS — Z98.890 OTHER SPECIFIED POSTPROCEDURAL STATES: Chronic | ICD-10-CM

## 2025-02-15 DIAGNOSIS — Z90.49 ACQUIRED ABSENCE OF OTHER SPECIFIED PARTS OF DIGESTIVE TRACT: Chronic | ICD-10-CM

## 2025-02-15 DIAGNOSIS — Z15.89 GENETIC SUSCEPTIBILITY TO OTHER DISEASE: ICD-10-CM

## 2025-02-15 PROCEDURE — 86708 HEPATITIS A ANTIBODY: CPT

## 2025-02-16 DIAGNOSIS — Z15.89 GENETIC SUSCEPTIBILITY TO OTHER DISEASE: ICD-10-CM

## 2025-02-17 DIAGNOSIS — K59.00 CONSTIPATION, UNSPECIFIED: ICD-10-CM

## 2025-02-17 DIAGNOSIS — Z12.11 ENCOUNTER FOR SCREENING FOR MALIGNANT NEOPLASM OF COLON: ICD-10-CM

## 2025-02-17 DIAGNOSIS — K76.0 FATTY (CHANGE OF) LIVER, NOT ELSEWHERE CLASSIFIED: ICD-10-CM

## 2025-02-17 DIAGNOSIS — K21.9 GASTRO-ESOPHAGEAL REFLUX DISEASE WITHOUT ESOPHAGITIS: ICD-10-CM

## 2025-02-17 DIAGNOSIS — R10.11 RIGHT UPPER QUADRANT PAIN: ICD-10-CM

## 2025-02-17 DIAGNOSIS — S30.1XXA CONTUSION OF ABDOMINAL WALL, INITIAL ENCOUNTER: ICD-10-CM

## 2025-02-17 DIAGNOSIS — K57.90 DIVERTICULOSIS OF INTESTINE, PART UNSPECIFIED, WITHOUT PERFORATION OR ABSCESS WITHOUT BLEEDING: ICD-10-CM

## 2025-02-17 DIAGNOSIS — Z98.890 OTHER SPECIFIED POSTPROCEDURAL STATES: ICD-10-CM

## 2025-02-17 DIAGNOSIS — R74.8 ABNORMAL LEVELS OF OTHER SERUM ENZYMES: ICD-10-CM

## 2025-02-18 ENCOUNTER — APPOINTMENT (OUTPATIENT)
Dept: PLASTIC SURGERY | Facility: CLINIC | Age: 50
End: 2025-02-18

## 2025-02-18 DIAGNOSIS — S30.1XXA CONTUSION OF ABDOMINAL WALL, INITIAL ENCOUNTER: ICD-10-CM

## 2025-02-18 DIAGNOSIS — Z98.890 OTHER SPECIFIED POSTPROCEDURAL STATES: ICD-10-CM

## 2025-02-18 PROCEDURE — 99024 POSTOP FOLLOW-UP VISIT: CPT

## 2025-02-18 NOTE — H&P ADULT - NSTOBACCOSCREENHP_GEN_A_NCS
Patient here today for nurse blood pressure check.  Last dose of BP medication taken:  2/18/25 at 0630    BP Readings from Last 1 Encounters:   02/18/25 1257 130/72     Pulse Readings from Last 1 Encounters:   02/18/25 70     Patient Reported Vitals           No data to display                 Last Clinician Visit for this condition: 10/8/24. BP at OV on 10/24 with Dr. Ramires was elevated leading to repeat BP today.   Per that visit, plan of care: His blood pressure readings are within the normal range. Continue current medication regimen. CMP ordered.   Next office visit is scheduled for: 4/10/2025    Current BP Medications are: lisinopril-hydrochlorothiazide 20-25 MG per tablet     Please review and advise if there are any changes to current plan of care.  Next Nurse BP visit scheduled: No     No

## 2025-02-21 NOTE — DISCHARGE NOTE PROVIDER - DISCHARGE DIET
Cardiology             eKv Barreto  1951  [phone]              Regular Diet - No restrictions

## 2025-02-28 ENCOUNTER — APPOINTMENT (OUTPATIENT)
Dept: PLASTIC SURGERY | Facility: CLINIC | Age: 50
End: 2025-02-28
Payer: COMMERCIAL

## 2025-02-28 PROCEDURE — 99024 POSTOP FOLLOW-UP VISIT: CPT

## 2025-03-28 ENCOUNTER — APPOINTMENT (OUTPATIENT)
Dept: PLASTIC SURGERY | Facility: CLINIC | Age: 50
End: 2025-03-28
Payer: COMMERCIAL

## 2025-03-28 VITALS — HEIGHT: 56 IN | WEIGHT: 157 LBS | BODY MASS INDEX: 35.32 KG/M2

## 2025-03-28 PROCEDURE — 99213 OFFICE O/P EST LOW 20 MIN: CPT

## 2025-04-01 NOTE — ASU PREOP CHECKLIST - TO WHOM
[facility]   part of [facility]     Hospitalist Progress Note     Alisha Wilde Patient Status:  Inpatient    10/25/1963 MRN [id number]   Location [facility] 4SW-A Attending Sumit Kapoor MD   Hosp Day # 2 PCP Bridger Avendano MD     Chief Complaint: chest pain    Subjective:     Patient without acute events overnight. Pt without any new pain. No CP or SOB. Plan for surgery tmrw. Has some nausea today.     Objective:    Review of Systems:   A comprehensive review of systems was completed; pertinent positive and negatives stated in subjective.    Vital signs:  Temp:  [97.4 °F (36.3 °C)-98.4 °F (36.9 °C)] 98.4 °F (36.9 °C)  Pulse:  [50-61] 50  Resp:  [9-19] 14  SpO2:  [88 %-95 %] 88 %  AO: (117-133)/(46-56) 120/52    Physical Exam:    General: No acute distress  Respiratory: No wheezes, no rhonchi  Cardiovascular: S1, S2, regular rate and rhythm  Abdomen: Soft, Non-tender, non-distended, positive bowel sounds  Neuro: No new focal deficits.   Extremities: No edema      Diagnostic Data:    Labs:  Recent Labs   Lab 25  0757 25  1631 25  1632 25  0320 25  0449   WBC 7.9  --  6.4 5.1 5.0   HGB 9.8*  --  10.0* 9.4* 9.3*   MCV 79.5*  --  78.4* 79.1* 78.9*   .0  --  268.0 249.0 228.0   INR 1.04 1.14  --  1.15  --        Recent Labs   Lab 25  1631 25  0320 25  0449   GLU 84 99 107*   BUN 12 14 13   CREATSERUM 0.92 1.39* 1.54*   CA 7.4* 9.1 9.0   ALB 3.3 3.9 3.8    138 141   K 3.2* 4.6  4.6 4.3   * 108 113*   CO2 18.0* 20.0* 18.0*   ALKPHO 46* 55 55   AST <8 <8 9   ALT <7* <7* <7*   BILT 0.2 0.2 0.2   TP 5.6* 6.7 6.5       Estimated Glomerular Filtration Rate: 38 mL/min/1.73m2 (A) (result from lab).    Recent Labs   Lab 25  0757   TROPHS 8       Recent Labs   Lab 25  0757 25  1631 25  0320   PTP 14.2 14.7 14.8   INR 1.04 1.14 1.15                  Microbiology    No results found for this visit on  03/30/25.      Imaging: Reviewed in Epic.    Medications:    pantoprazole  40 mg Intravenous Q24H    ezetimibe  10 mg Oral Daily       Assessment & Plan:      #Type B aortic Dissection  #L renal artery dissection  Monitor in ICU  BP control  STAT CTA chest/abd/pelvis now  Continue cardene and esmolol gtt  Vascular surgery following  Awaiting decision on lumbar drain placement  Goal SBP <130, monitor closely given symptoms of CP and back pain  Pain control with PRN moprhine  Tentatively scheduled for OR 4/2     #Prior hx of Type A aortic dissection  S/p repair 11/2024  Post op complications with rep failure and NAIMA needing trach and peg that are now removed     #Ess HTN  Resume meds  BP control    #Dyslipidemia  zetia     #CKD3     #TANYA     #Obesity, BMI 36        Sumit Kapoor MD    Supplementary Documentation:     Quality:  DVT Mechanical Prophylaxis:   SCDs, Early ambuation  DVT Pharmacologic Prophylaxis   Medication   None                Code Status: Full Code  Webb: External urinary catheter in place  Webb Duration (in days):   Central line:    BRANDON:     Discharge is dependent on: progress  At this point Ms. Wilde is expected to be discharge to: tbd    The 21st Century Cures Act makes medical notes like these available to patients in the interest of transparency. Please be advised this is a medical document. Medical documents are intended to carry relevant information, facts as evident, and the clinical opinion of the practitioner. The medical note is intended as peer to peer communication and may appear blunt or direct. It is written in medical language and may contain abbreviations or verbiage that are unfamiliar.                     FELICIA

## 2025-04-18 ENCOUNTER — OUTPATIENT (OUTPATIENT)
Dept: OUTPATIENT SERVICES | Facility: HOSPITAL | Age: 50
LOS: 1 days | End: 2025-04-18
Payer: COMMERCIAL

## 2025-04-18 ENCOUNTER — APPOINTMENT (OUTPATIENT)
Dept: OPHTHALMOLOGY | Facility: CLINIC | Age: 50
End: 2025-04-18
Payer: COMMERCIAL

## 2025-04-18 DIAGNOSIS — Z98.890 OTHER SPECIFIED POSTPROCEDURAL STATES: Chronic | ICD-10-CM

## 2025-04-18 DIAGNOSIS — Z90.49 ACQUIRED ABSENCE OF OTHER SPECIFIED PARTS OF DIGESTIVE TRACT: Chronic | ICD-10-CM

## 2025-04-18 DIAGNOSIS — Z90.3 ACQUIRED ABSENCE OF STOMACH [PART OF]: Chronic | ICD-10-CM

## 2025-04-18 DIAGNOSIS — H53.8 OTHER VISUAL DISTURBANCES: ICD-10-CM

## 2025-04-18 PROCEDURE — 92133 CPTRZD OPH DX IMG PST SGM ON: CPT

## 2025-04-18 PROCEDURE — 92014 COMPRE OPH EXAM EST PT 1/>: CPT

## 2025-04-18 PROCEDURE — 92133 CPTRZD OPH DX IMG PST SGM ON: CPT | Mod: 26

## 2025-04-28 DIAGNOSIS — H40.013 OPEN ANGLE WITH BORDERLINE FINDINGS, LOW RISK, BILATERAL: ICD-10-CM

## 2025-04-28 DIAGNOSIS — H11.042 PERIPHERAL PTERYGIUM, STATIONARY, LEFT EYE: ICD-10-CM

## 2025-04-28 DIAGNOSIS — H02.831 DERMATOCHALASIS OF RIGHT UPPER EYELID: ICD-10-CM

## 2025-04-28 DIAGNOSIS — H04.123 DRY EYE SYNDROME OF BILATERAL LACRIMAL GLANDS: ICD-10-CM

## 2025-04-28 DIAGNOSIS — H02.834 DERMATOCHALASIS OF LEFT UPPER EYELID: ICD-10-CM

## 2025-05-13 ENCOUNTER — APPOINTMENT (OUTPATIENT)
Dept: PODIATRY | Facility: CLINIC | Age: 50
End: 2025-05-13

## 2025-05-13 DIAGNOSIS — M25.572 PAIN IN LEFT ANKLE AND JOINTS OF LEFT FOOT: ICD-10-CM

## 2025-05-13 PROCEDURE — 99213 OFFICE O/P EST LOW 20 MIN: CPT

## 2025-05-16 ENCOUNTER — RESULT REVIEW (OUTPATIENT)
Age: 50
End: 2025-05-16

## 2025-05-16 ENCOUNTER — NON-APPOINTMENT (OUTPATIENT)
Age: 50
End: 2025-05-16

## 2025-05-16 ENCOUNTER — OUTPATIENT (OUTPATIENT)
Dept: OUTPATIENT SERVICES | Facility: HOSPITAL | Age: 50
LOS: 1 days | End: 2025-05-16
Payer: COMMERCIAL

## 2025-05-16 DIAGNOSIS — Z98.890 OTHER SPECIFIED POSTPROCEDURAL STATES: Chronic | ICD-10-CM

## 2025-05-16 DIAGNOSIS — Z12.31 ENCOUNTER FOR SCREENING MAMMOGRAM FOR MALIGNANT NEOPLASM OF BREAST: ICD-10-CM

## 2025-05-16 DIAGNOSIS — Z90.3 ACQUIRED ABSENCE OF STOMACH [PART OF]: Chronic | ICD-10-CM

## 2025-05-16 DIAGNOSIS — Z90.49 ACQUIRED ABSENCE OF OTHER SPECIFIED PARTS OF DIGESTIVE TRACT: Chronic | ICD-10-CM

## 2025-05-16 PROCEDURE — 77067 SCR MAMMO BI INCL CAD: CPT

## 2025-05-16 PROCEDURE — 77063 BREAST TOMOSYNTHESIS BI: CPT

## 2025-05-16 PROCEDURE — 77063 BREAST TOMOSYNTHESIS BI: CPT | Mod: 26

## 2025-05-16 PROCEDURE — 77067 SCR MAMMO BI INCL CAD: CPT | Mod: 26

## 2025-05-17 DIAGNOSIS — Z12.31 ENCOUNTER FOR SCREENING MAMMOGRAM FOR MALIGNANT NEOPLASM OF BREAST: ICD-10-CM

## 2025-05-22 NOTE — PRE-ANESTHESIA EVALUATION ADULT - NSANTHOSAYNRD_GEN_A_CORE
No. KAN screening performed.  STOP BANG Legend: 0-2 = LOW Risk; 3-4 = INTERMEDIATE Risk; 5-8 = HIGH Risk No

## 2025-05-23 ENCOUNTER — OUTPATIENT (OUTPATIENT)
Dept: OUTPATIENT SERVICES | Facility: HOSPITAL | Age: 50
LOS: 1 days | End: 2025-05-23
Payer: COMMERCIAL

## 2025-05-23 ENCOUNTER — APPOINTMENT (OUTPATIENT)
Dept: OPHTHALMOLOGY | Facility: CLINIC | Age: 50
End: 2025-05-23

## 2025-05-23 DIAGNOSIS — Z98.890 OTHER SPECIFIED POSTPROCEDURAL STATES: Chronic | ICD-10-CM

## 2025-05-23 DIAGNOSIS — Z90.3 ACQUIRED ABSENCE OF STOMACH [PART OF]: Chronic | ICD-10-CM

## 2025-05-23 DIAGNOSIS — H53.8 OTHER VISUAL DISTURBANCES: ICD-10-CM

## 2025-05-23 DIAGNOSIS — Z90.49 ACQUIRED ABSENCE OF OTHER SPECIFIED PARTS OF DIGESTIVE TRACT: Chronic | ICD-10-CM

## 2025-05-23 PROCEDURE — 99214 OFFICE O/P EST MOD 30 MIN: CPT

## 2025-05-27 ENCOUNTER — NON-APPOINTMENT (OUTPATIENT)
Age: 50
End: 2025-05-27

## 2025-05-28 ENCOUNTER — NON-APPOINTMENT (OUTPATIENT)
Age: 50
End: 2025-05-28

## 2025-05-29 DIAGNOSIS — H04.123 DRY EYE SYNDROME OF BILATERAL LACRIMAL GLANDS: ICD-10-CM

## 2025-05-29 DIAGNOSIS — H02.831 DERMATOCHALASIS OF RIGHT UPPER EYELID: ICD-10-CM

## 2025-05-29 DIAGNOSIS — H11.042 PERIPHERAL PTERYGIUM, STATIONARY, LEFT EYE: ICD-10-CM

## 2025-05-29 DIAGNOSIS — H10.812 PINGUECULITIS, LEFT EYE: ICD-10-CM

## 2025-05-29 DIAGNOSIS — H02.834 DERMATOCHALASIS OF LEFT UPPER EYELID: ICD-10-CM

## 2025-05-29 DIAGNOSIS — H40.013 OPEN ANGLE WITH BORDERLINE FINDINGS, LOW RISK, BILATERAL: ICD-10-CM

## 2025-06-03 ENCOUNTER — OUTPATIENT (OUTPATIENT)
Dept: OUTPATIENT SERVICES | Facility: HOSPITAL | Age: 50
LOS: 1 days | End: 2025-06-03
Payer: COMMERCIAL

## 2025-06-03 ENCOUNTER — APPOINTMENT (OUTPATIENT)
Dept: INTERNAL MEDICINE | Facility: CLINIC | Age: 50
End: 2025-06-03
Payer: COMMERCIAL

## 2025-06-03 VITALS
HEART RATE: 100 BPM | OXYGEN SATURATION: 97 % | WEIGHT: 165.06 LBS | SYSTOLIC BLOOD PRESSURE: 131 MMHG | DIASTOLIC BLOOD PRESSURE: 88 MMHG | HEIGHT: 56 IN | BODY MASS INDEX: 37.13 KG/M2 | TEMPERATURE: 98.2 F

## 2025-06-03 DIAGNOSIS — Z90.3 ACQUIRED ABSENCE OF STOMACH [PART OF]: ICD-10-CM

## 2025-06-03 DIAGNOSIS — Z00.00 ENCOUNTER FOR GENERAL ADULT MEDICAL EXAMINATION WITHOUT ABNORMAL FINDINGS: ICD-10-CM

## 2025-06-03 DIAGNOSIS — E11.9 TYPE 2 DIABETES MELLITUS W/OUT COMPLICATIONS: ICD-10-CM

## 2025-06-03 DIAGNOSIS — E78.5 HYPERLIPIDEMIA, UNSPECIFIED: ICD-10-CM

## 2025-06-03 DIAGNOSIS — R73.03 PREDIABETES.: ICD-10-CM

## 2025-06-03 DIAGNOSIS — Z90.3 ACQUIRED ABSENCE OF STOMACH [PART OF]: Chronic | ICD-10-CM

## 2025-06-03 DIAGNOSIS — Z98.890 OTHER SPECIFIED POSTPROCEDURAL STATES: Chronic | ICD-10-CM

## 2025-06-03 DIAGNOSIS — E01.0 IODINE-DEFICIENCY RELATED DIFFUSE (ENDEMIC) GOITER: ICD-10-CM

## 2025-06-03 DIAGNOSIS — Z90.49 ACQUIRED ABSENCE OF OTHER SPECIFIED PARTS OF DIGESTIVE TRACT: Chronic | ICD-10-CM

## 2025-06-03 DIAGNOSIS — Z98.890 OTHER SPECIFIED POSTPROCEDURAL STATES: ICD-10-CM

## 2025-06-03 DIAGNOSIS — Z00.00 ENCOUNTER FOR GENERAL ADULT MEDICAL EXAMINATION W/OUT ABNORMAL FINDINGS: ICD-10-CM

## 2025-06-03 DIAGNOSIS — E66.811 OBESITY, CLASS 1: ICD-10-CM

## 2025-06-03 PROCEDURE — G2211 COMPLEX E/M VISIT ADD ON: CPT | Mod: NC

## 2025-06-03 PROCEDURE — 99214 OFFICE O/P EST MOD 30 MIN: CPT

## 2025-06-04 ENCOUNTER — APPOINTMENT (OUTPATIENT)
Dept: PLASTIC SURGERY | Facility: CLINIC | Age: 50
End: 2025-06-04
Payer: COMMERCIAL

## 2025-06-04 VITALS — WEIGHT: 115 LBS | HEIGHT: 72 IN | BODY MASS INDEX: 15.58 KG/M2

## 2025-06-04 PROCEDURE — 99213 OFFICE O/P EST LOW 20 MIN: CPT

## 2025-06-05 ENCOUNTER — NON-APPOINTMENT (OUTPATIENT)
Age: 50
End: 2025-06-05

## 2025-06-06 ENCOUNTER — NON-APPOINTMENT (OUTPATIENT)
Age: 50
End: 2025-06-06

## 2025-06-10 DIAGNOSIS — E01.0 IODINE-DEFICIENCY RELATED DIFFUSE (ENDEMIC) GOITER: ICD-10-CM

## 2025-06-10 DIAGNOSIS — E78.5 HYPERLIPIDEMIA, UNSPECIFIED: ICD-10-CM

## 2025-06-10 DIAGNOSIS — Z98.890 OTHER SPECIFIED POSTPROCEDURAL STATES: ICD-10-CM

## 2025-06-10 DIAGNOSIS — Z00.00 ENCOUNTER FOR GENERAL ADULT MEDICAL EXAMINATION WITHOUT ABNORMAL FINDINGS: ICD-10-CM

## 2025-06-10 DIAGNOSIS — E11.9 TYPE 2 DIABETES MELLITUS WITHOUT COMPLICATIONS: ICD-10-CM

## 2025-06-10 DIAGNOSIS — E66.811 OBESITY, CLASS 1: ICD-10-CM

## 2025-06-10 DIAGNOSIS — Z90.3 ACQUIRED ABSENCE OF STOMACH [PART OF]: ICD-10-CM

## 2025-06-11 ENCOUNTER — NON-APPOINTMENT (OUTPATIENT)
Age: 50
End: 2025-06-11

## 2025-06-16 ENCOUNTER — RESULT REVIEW (OUTPATIENT)
Age: 50
End: 2025-06-16

## 2025-06-16 ENCOUNTER — OUTPATIENT (OUTPATIENT)
Dept: OUTPATIENT SERVICES | Facility: HOSPITAL | Age: 50
LOS: 1 days | End: 2025-06-16
Payer: COMMERCIAL

## 2025-06-16 DIAGNOSIS — M25.571 PAIN IN RIGHT ANKLE AND JOINTS OF RIGHT FOOT: ICD-10-CM

## 2025-06-16 DIAGNOSIS — Z98.890 OTHER SPECIFIED POSTPROCEDURAL STATES: Chronic | ICD-10-CM

## 2025-06-16 DIAGNOSIS — Z00.8 ENCOUNTER FOR OTHER GENERAL EXAMINATION: ICD-10-CM

## 2025-06-16 DIAGNOSIS — Z90.3 ACQUIRED ABSENCE OF STOMACH [PART OF]: Chronic | ICD-10-CM

## 2025-06-16 DIAGNOSIS — Z90.49 ACQUIRED ABSENCE OF OTHER SPECIFIED PARTS OF DIGESTIVE TRACT: Chronic | ICD-10-CM

## 2025-06-16 PROCEDURE — 73721 MRI JNT OF LWR EXTRE W/O DYE: CPT | Mod: RT

## 2025-06-16 PROCEDURE — 73721 MRI JNT OF LWR EXTRE W/O DYE: CPT | Mod: 26,RT

## 2025-06-17 DIAGNOSIS — M25.571 PAIN IN RIGHT ANKLE AND JOINTS OF RIGHT FOOT: ICD-10-CM

## 2025-06-19 ENCOUNTER — OUTPATIENT (OUTPATIENT)
Dept: OUTPATIENT SERVICES | Facility: HOSPITAL | Age: 50
LOS: 1 days | End: 2025-06-19
Payer: COMMERCIAL

## 2025-06-19 DIAGNOSIS — Z90.49 ACQUIRED ABSENCE OF OTHER SPECIFIED PARTS OF DIGESTIVE TRACT: Chronic | ICD-10-CM

## 2025-06-19 DIAGNOSIS — Z90.3 ACQUIRED ABSENCE OF STOMACH [PART OF]: Chronic | ICD-10-CM

## 2025-06-19 DIAGNOSIS — Z98.890 OTHER SPECIFIED POSTPROCEDURAL STATES: Chronic | ICD-10-CM

## 2025-06-19 DIAGNOSIS — Z00.00 ENCOUNTER FOR GENERAL ADULT MEDICAL EXAMINATION WITHOUT ABNORMAL FINDINGS: ICD-10-CM

## 2025-06-19 PROCEDURE — 84630 ASSAY OF ZINC: CPT

## 2025-06-19 PROCEDURE — 84443 ASSAY THYROID STIM HORMONE: CPT

## 2025-06-19 PROCEDURE — 82306 VITAMIN D 25 HYDROXY: CPT

## 2025-06-19 PROCEDURE — 82728 ASSAY OF FERRITIN: CPT

## 2025-06-19 PROCEDURE — 83036 HEMOGLOBIN GLYCOSYLATED A1C: CPT

## 2025-06-19 PROCEDURE — 80061 LIPID PANEL: CPT

## 2025-06-19 PROCEDURE — 83735 ASSAY OF MAGNESIUM: CPT

## 2025-06-19 PROCEDURE — 80053 COMPREHEN METABOLIC PANEL: CPT

## 2025-06-19 PROCEDURE — 83550 IRON BINDING TEST: CPT

## 2025-06-19 PROCEDURE — 85025 COMPLETE CBC W/AUTO DIFF WBC: CPT

## 2025-06-19 PROCEDURE — 83540 ASSAY OF IRON: CPT

## 2025-06-20 DIAGNOSIS — Z00.00 ENCOUNTER FOR GENERAL ADULT MEDICAL EXAMINATION WITHOUT ABNORMAL FINDINGS: ICD-10-CM

## 2025-06-26 ENCOUNTER — OUTPATIENT (OUTPATIENT)
Dept: OUTPATIENT SERVICES | Facility: HOSPITAL | Age: 50
LOS: 1 days | End: 2025-06-26
Payer: COMMERCIAL

## 2025-06-26 ENCOUNTER — RESULT REVIEW (OUTPATIENT)
Age: 50
End: 2025-06-26

## 2025-06-26 DIAGNOSIS — Z90.3 ACQUIRED ABSENCE OF STOMACH [PART OF]: Chronic | ICD-10-CM

## 2025-06-26 DIAGNOSIS — Z98.890 OTHER SPECIFIED POSTPROCEDURAL STATES: Chronic | ICD-10-CM

## 2025-06-26 DIAGNOSIS — E01.0 IODINE-DEFICIENCY RELATED DIFFUSE (ENDEMIC) GOITER: ICD-10-CM

## 2025-06-26 DIAGNOSIS — Z90.49 ACQUIRED ABSENCE OF OTHER SPECIFIED PARTS OF DIGESTIVE TRACT: Chronic | ICD-10-CM

## 2025-06-26 PROCEDURE — 76536 US EXAM OF HEAD AND NECK: CPT

## 2025-06-26 PROCEDURE — 76536 US EXAM OF HEAD AND NECK: CPT | Mod: 26

## 2025-06-27 DIAGNOSIS — E01.0 IODINE-DEFICIENCY RELATED DIFFUSE (ENDEMIC) GOITER: ICD-10-CM

## 2025-06-30 ENCOUNTER — APPOINTMENT (OUTPATIENT)
Dept: PODIATRY | Facility: CLINIC | Age: 50
End: 2025-06-30
Payer: COMMERCIAL

## 2025-06-30 VITALS — WEIGHT: 164 LBS | HEIGHT: 56 IN | BODY MASS INDEX: 36.89 KG/M2

## 2025-06-30 PROCEDURE — 99213 OFFICE O/P EST LOW 20 MIN: CPT

## 2025-06-30 RX ORDER — METHOCARBAMOL 750 MG/1
750 TABLET, FILM COATED ORAL 3 TIMES DAILY
Qty: 30 | Refills: 3 | Status: ACTIVE | COMMUNITY
Start: 2025-06-30 | End: 1900-01-01

## 2025-07-01 NOTE — ED ADULT NURSE NOTE - NS_SISCREENINGSR_GEN_ALL_ED
Gladys Keenan is a 69 year old female here for  Chief Complaint   Patient presents with    Endometrial/Uterine Cancer     Denies latex allergy or sensitivity.    Medication verified, no changes.  PCP and Pharmacy verified.    Social History     Tobacco Use   Smoking Status Never   Smokeless Tobacco Never     Advance Directives Filed: Yes    ECOG:   ECOG [07/01/25 1119]   ECOG Performance Status 0       Vitals:    Visit Vitals  Wt 79.4 kg (175 lb 1.6 oz)   LMP  (LMP Unknown) Comment: age 50 LMP   BMI 25.12 kg/m²       These vital signs are:  Within defined parameters (Per Reference \"Defined Limits Hospital Outpatient Department (HOD)\")    Height: No.  Ht Readings from Last 1 Encounters:   04/10/25 5' 10\" (1.778 m)     Weight:Yes, shoes on.  Wt Readings from Last 3 Encounters:   07/01/25 79.4 kg (175 lb 1.6 oz)   04/10/25 77.1 kg (170 lb)   04/03/25 77.1 kg (170 lb)       BMI: Body mass index is 25.12 kg/m².    REVIEW OF SYSTEMS  GENERAL:  Patient denies headache, fevers, chills, night sweats, excessive fatigue, change in appetite, weight loss, dizziness  ALLERGIC/IMMUNOLOGIC: Verified allergies: Yes  EYES:  Patient denies significant visual difficulties, double vision, blurred vision  ENT/MOUTH: Patient denies problems with hearing, sore throat, sinus drainage, mouth sores  ENDOCRINE:  Patient denies diabetes, thyroid disease, hormone replacement, hot flashes  HEMATOLOGIC/LYMPHATIC: Patient denies easy bruising, bleeding, tender lymph nodes, swollen lymph nodes  BREASTS: Patient denies abnormal masses of breast, nipple discharge, pain  RESPIRATORY:  Patient denies lung pain with breathing, cough, coughing up blood, shortness of breath  CARDIOVASCULAR:  Patient denies anginal chest pain, palpitations, shortness of breath when lying flat, peripheral edema  GASTROINTESTINAL: Patient denies nausea, vomiting, diarrhea, GI bleeding, change in bowel habits, heartburn, sensation of feeling full, difficulty swallowing, but  complains of: abdominal pain on the left side and constipation on and off  : Patient denies abnormal genital masses, blood in the urine, frequency, urgency, burning with urination, hesitancy, incontinence, discharge, but complains of: vaginal bleeding  MUSCULOSKELETAL:  Patient denies joint pain, bone pain, joint swelling, redness, decreased range of motion  SKIN:  Patient denies chronic rashes, inflammation, ulcerations, skin changes, itching  NEUROLOGIC:  Patient denies loss of balance, areas of focal weakness, abnormal gait, sensory problems, numbness, but complains of: tingling right hand, more so lately  PSYCHIATRIC: Patient denies depression, anxiety, but complains of: insomnia    This patient reported abnormal symptoms that needed immediate verbal communication: No     Negative

## 2025-07-13 ENCOUNTER — OUTPATIENT (OUTPATIENT)
Dept: OUTPATIENT SERVICES | Facility: HOSPITAL | Age: 50
LOS: 1 days | End: 2025-07-13
Payer: COMMERCIAL

## 2025-07-13 DIAGNOSIS — R10.11 RIGHT UPPER QUADRANT PAIN: ICD-10-CM

## 2025-07-13 DIAGNOSIS — Z98.890 OTHER SPECIFIED POSTPROCEDURAL STATES: Chronic | ICD-10-CM

## 2025-07-13 DIAGNOSIS — Z90.3 ACQUIRED ABSENCE OF STOMACH [PART OF]: Chronic | ICD-10-CM

## 2025-07-13 DIAGNOSIS — Z90.49 ACQUIRED ABSENCE OF OTHER SPECIFIED PARTS OF DIGESTIVE TRACT: Chronic | ICD-10-CM

## 2025-07-13 PROCEDURE — 74176 CT ABD & PELVIS W/O CONTRAST: CPT

## 2025-07-13 PROCEDURE — 74176 CT ABD & PELVIS W/O CONTRAST: CPT | Mod: 26

## 2025-07-14 DIAGNOSIS — R10.11 RIGHT UPPER QUADRANT PAIN: ICD-10-CM

## 2025-08-21 ENCOUNTER — OUTPATIENT (OUTPATIENT)
Dept: OUTPATIENT SERVICES | Facility: HOSPITAL | Age: 50
LOS: 1 days | End: 2025-08-21
Payer: COMMERCIAL

## 2025-08-21 DIAGNOSIS — Z90.3 ACQUIRED ABSENCE OF STOMACH [PART OF]: Chronic | ICD-10-CM

## 2025-08-21 DIAGNOSIS — M25.571 PAIN IN RIGHT ANKLE AND JOINTS OF RIGHT FOOT: ICD-10-CM

## 2025-08-21 DIAGNOSIS — Z98.890 OTHER SPECIFIED POSTPROCEDURAL STATES: Chronic | ICD-10-CM

## 2025-08-21 DIAGNOSIS — Z90.49 ACQUIRED ABSENCE OF OTHER SPECIFIED PARTS OF DIGESTIVE TRACT: Chronic | ICD-10-CM

## 2025-08-21 PROCEDURE — 73610 X-RAY EXAM OF ANKLE: CPT | Mod: RT

## 2025-08-21 PROCEDURE — 73630 X-RAY EXAM OF FOOT: CPT | Mod: RT

## 2025-08-21 PROCEDURE — 73610 X-RAY EXAM OF ANKLE: CPT | Mod: 26,RT

## 2025-08-21 PROCEDURE — 73630 X-RAY EXAM OF FOOT: CPT | Mod: 26,RT

## 2025-08-22 ENCOUNTER — OUTPATIENT (OUTPATIENT)
Dept: OUTPATIENT SERVICES | Facility: HOSPITAL | Age: 50
LOS: 1 days | End: 2025-08-22
Payer: COMMERCIAL

## 2025-08-22 ENCOUNTER — APPOINTMENT (OUTPATIENT)
Dept: GASTROENTEROLOGY | Facility: CLINIC | Age: 50
End: 2025-08-22
Payer: COMMERCIAL

## 2025-08-22 VITALS
HEART RATE: 80 BPM | SYSTOLIC BLOOD PRESSURE: 120 MMHG | OXYGEN SATURATION: 99 % | WEIGHT: 152 LBS | BODY MASS INDEX: 34.19 KG/M2 | DIASTOLIC BLOOD PRESSURE: 83 MMHG | HEIGHT: 56 IN

## 2025-08-22 DIAGNOSIS — Z90.3 ACQUIRED ABSENCE OF STOMACH [PART OF]: Chronic | ICD-10-CM

## 2025-08-22 DIAGNOSIS — Z15.89 GENETIC SUSCEPTIBILITY TO OTHER DISEASE: ICD-10-CM

## 2025-08-22 DIAGNOSIS — R74.8 ABNORMAL LEVELS OF OTHER SERUM ENZYMES: ICD-10-CM

## 2025-08-22 DIAGNOSIS — K76.0 FATTY (CHANGE OF) LIVER, NOT ELSEWHERE CLASSIFIED: ICD-10-CM

## 2025-08-22 DIAGNOSIS — Z98.890 OTHER SPECIFIED POSTPROCEDURAL STATES: Chronic | ICD-10-CM

## 2025-08-22 DIAGNOSIS — M25.571 PAIN IN RIGHT ANKLE AND JOINTS OF RIGHT FOOT: ICD-10-CM

## 2025-08-22 DIAGNOSIS — Z00.00 ENCOUNTER FOR GENERAL ADULT MEDICAL EXAMINATION WITHOUT ABNORMAL FINDINGS: ICD-10-CM

## 2025-08-22 PROCEDURE — 99214 OFFICE O/P EST MOD 30 MIN: CPT

## 2025-08-26 DIAGNOSIS — K76.0 FATTY (CHANGE OF) LIVER, NOT ELSEWHERE CLASSIFIED: ICD-10-CM

## 2025-08-26 DIAGNOSIS — R74.8 ABNORMAL LEVELS OF OTHER SERUM ENZYMES: ICD-10-CM

## 2025-08-26 DIAGNOSIS — Z15.89 GENETIC SUSCEPTIBILITY TO OTHER DISEASE: ICD-10-CM

## 2025-09-09 ENCOUNTER — APPOINTMENT (OUTPATIENT)
Dept: INTERNAL MEDICINE | Facility: CLINIC | Age: 50
End: 2025-09-09
Payer: COMMERCIAL

## 2025-09-09 VITALS
BODY MASS INDEX: 34.42 KG/M2 | OXYGEN SATURATION: 97 % | DIASTOLIC BLOOD PRESSURE: 82 MMHG | TEMPERATURE: 97.9 F | HEART RATE: 64 BPM | HEIGHT: 56 IN | SYSTOLIC BLOOD PRESSURE: 121 MMHG | WEIGHT: 153 LBS

## 2025-09-09 DIAGNOSIS — Z00.00 ENCOUNTER FOR GENERAL ADULT MEDICAL EXAMINATION W/OUT ABNORMAL FINDINGS: ICD-10-CM

## 2025-09-09 DIAGNOSIS — E01.0 IODINE-DEFICIENCY RELATED DIFFUSE (ENDEMIC) GOITER: ICD-10-CM

## 2025-09-09 DIAGNOSIS — E11.9 TYPE 2 DIABETES MELLITUS W/OUT COMPLICATIONS: ICD-10-CM

## 2025-09-09 DIAGNOSIS — G47.33 OBSTRUCTIVE SLEEP APNEA (ADULT) (PEDIATRIC): ICD-10-CM

## 2025-09-09 DIAGNOSIS — E66.811 OBESITY, CLASS 1: ICD-10-CM

## 2025-09-09 PROCEDURE — 99214 OFFICE O/P EST MOD 30 MIN: CPT

## 2025-09-09 PROCEDURE — G2211 COMPLEX E/M VISIT ADD ON: CPT | Mod: NC

## 2025-09-09 RX ORDER — TIRZEPATIDE 5 MG/.5ML
5 INJECTION, SOLUTION SUBCUTANEOUS
Refills: 0 | Status: ACTIVE | COMMUNITY

## 2025-09-10 ENCOUNTER — NON-APPOINTMENT (OUTPATIENT)
Age: 50
End: 2025-09-10

## 2025-09-10 RX ORDER — SEMAGLUTIDE 0.68 MG/ML
2 INJECTION, SOLUTION SUBCUTANEOUS
Qty: 12 | Refills: 0 | Status: ACTIVE | COMMUNITY
Start: 2025-09-09

## 2025-09-14 LAB
ALBUMIN SERPL ELPH-MCNC: 4.5 G/DL
ALP BLD-CCNC: 134 U/L
ALT SERPL-CCNC: 11 U/L
ANION GAP SERPL CALC-SCNC: 10 MMOL/L
AST SERPL-CCNC: 16 U/L
BASOPHILS # BLD AUTO: 0.03 K/UL
BASOPHILS NFR BLD AUTO: 0.5 %
BILIRUB SERPL-MCNC: 0.2 MG/DL
BUN SERPL-MCNC: 18 MG/DL
CALCIUM SERPL-MCNC: 9.5 MG/DL
CHLORIDE SERPL-SCNC: 108 MMOL/L
CHOLEST SERPL-MCNC: 221 MG/DL
CO2 SERPL-SCNC: 24 MMOL/L
CREAT SERPL-MCNC: 0.6 MG/DL
EGFRCR SERPLBLD CKD-EPI 2021: 110 ML/MIN/1.73M2
EOSINOPHIL # BLD AUTO: 0.11 K/UL
EOSINOPHIL NFR BLD AUTO: 1.9 %
ESTIMATED AVERAGE GLUCOSE: 123 MG/DL
GLUCOSE SERPL-MCNC: 89 MG/DL
HBA1C MFR BLD HPLC: 5.9 %
HCT VFR BLD CALC: 39.1 %
HDLC SERPL-MCNC: 39 MG/DL
HGB BLD-MCNC: 12.6 G/DL
IMM GRANULOCYTES NFR BLD AUTO: 0.2 %
LDLC SERPL-MCNC: 166 MG/DL
LYMPHOCYTES # BLD AUTO: 2.33 K/UL
LYMPHOCYTES NFR BLD AUTO: 40.6 %
MAN DIFF?: NORMAL
MCHC RBC-ENTMCNC: 27.6 PG
MCHC RBC-ENTMCNC: 32.2 G/DL
MCV RBC AUTO: 85.6 FL
MONOCYTES # BLD AUTO: 0.36 K/UL
MONOCYTES NFR BLD AUTO: 6.3 %
NEUTROPHILS # BLD AUTO: 2.9 K/UL
NEUTROPHILS NFR BLD AUTO: 50.5 %
NONHDLC SERPL-MCNC: 182 MG/DL
PLATELET # BLD AUTO: 284 K/UL
PMV BLD AUTO: 0 /100 WBCS
PMV BLD: 10.7 FL
POTASSIUM SERPL-SCNC: 4.5 MMOL/L
PROT SERPL-MCNC: 6.9 G/DL
RBC # BLD: 4.57 M/UL
RBC # FLD: 14.6 %
SODIUM SERPL-SCNC: 142 MMOL/L
TRIGL SERPL-MCNC: 91 MG/DL
WBC # FLD AUTO: 5.74 K/UL